# Patient Record
Sex: FEMALE | Race: WHITE | NOT HISPANIC OR LATINO | Employment: OTHER | URBAN - METROPOLITAN AREA
[De-identification: names, ages, dates, MRNs, and addresses within clinical notes are randomized per-mention and may not be internally consistent; named-entity substitution may affect disease eponyms.]

---

## 2018-01-23 ENCOUNTER — HOSPITAL ENCOUNTER (INPATIENT)
Facility: HOSPITAL | Age: 58
LOS: 2 days | Discharge: HOME | DRG: 068 | End: 2018-01-25
Attending: EMERGENCY MEDICINE | Admitting: FAMILY MEDICINE
Payer: COMMERCIAL

## 2018-01-23 ENCOUNTER — APPOINTMENT (EMERGENCY)
Dept: RADIOLOGY | Facility: HOSPITAL | Age: 58
DRG: 068 | End: 2018-01-23
Payer: COMMERCIAL

## 2018-01-23 DIAGNOSIS — J10.1 INFLUENZA A: ICD-10-CM

## 2018-01-23 DIAGNOSIS — J44.9 COPD (CHRONIC OBSTRUCTIVE PULMONARY DISEASE) (HCC): ICD-10-CM

## 2018-01-23 DIAGNOSIS — J40 BRONCHITIS: Primary | ICD-10-CM

## 2018-01-23 DIAGNOSIS — F17.200 NICOTINE DEPENDENCE: ICD-10-CM

## 2018-01-23 DIAGNOSIS — J44.1 COPD EXACERBATION (HCC): ICD-10-CM

## 2018-01-23 PROBLEM — F32.A DEPRESSION: Status: ACTIVE | Noted: 2018-01-23

## 2018-01-23 PROBLEM — K21.9 GERD (GASTROESOPHAGEAL REFLUX DISEASE): Status: ACTIVE | Noted: 2018-01-23

## 2018-01-23 PROBLEM — M54.9 CHRONIC BACK PAIN: Status: ACTIVE | Noted: 2018-01-23

## 2018-01-23 PROBLEM — G89.29 CHRONIC BACK PAIN: Status: ACTIVE | Noted: 2018-01-23

## 2018-01-23 PROBLEM — F31.9 BIPOLAR 1 DISORDER (HCC): Status: ACTIVE | Noted: 2018-01-23

## 2018-01-23 PROBLEM — J45.909 ASTHMA: Status: ACTIVE | Noted: 2018-01-23

## 2018-01-23 PROBLEM — F41.9 ANXIETY: Status: ACTIVE | Noted: 2018-01-23

## 2018-01-23 PROBLEM — R65.10 SIRS (SYSTEMIC INFLAMMATORY RESPONSE SYNDROME) (HCC): Status: ACTIVE | Noted: 2018-01-23

## 2018-01-23 PROBLEM — R06.00 DYSPNEA: Status: ACTIVE | Noted: 2018-01-23

## 2018-01-23 PROBLEM — E78.5 HLD (HYPERLIPIDEMIA): Status: ACTIVE | Noted: 2018-01-23

## 2018-01-23 LAB
ALBUMIN SERPL BCP-MCNC: 3.2 G/DL (ref 3.5–5)
ALP SERPL-CCNC: 112 U/L (ref 46–116)
ALT SERPL W P-5'-P-CCNC: 22 U/L (ref 12–78)
ANION GAP SERPL CALCULATED.3IONS-SCNC: 6 MMOL/L (ref 4–13)
APTT PPP: 29 SECONDS (ref 24–33)
AST SERPL W P-5'-P-CCNC: 19 U/L (ref 5–45)
BASOPHILS # BLD AUTO: 0.12 THOUSAND/UL (ref 0–0.1)
BASOPHILS NFR MAR MANUAL: 1 % (ref 0–1)
BILIRUB SERPL-MCNC: 0.5 MG/DL (ref 0.2–1)
BUN SERPL-MCNC: 11 MG/DL (ref 5–25)
CALCIUM SERPL-MCNC: 9.3 MG/DL (ref 8.3–10.1)
CHLORIDE SERPL-SCNC: 97 MMOL/L (ref 100–108)
CO2 SERPL-SCNC: 32 MMOL/L (ref 21–32)
CREAT SERPL-MCNC: 0.88 MG/DL (ref 0.6–1.3)
ERYTHROCYTE [DISTWIDTH] IN BLOOD BY AUTOMATED COUNT: 12.6 % (ref 11.6–15.1)
FLUAV AG SPEC QL IA: NEGATIVE
FLUBV AG SPEC QL IA: NEGATIVE
GFR SERPL CREATININE-BSD FRML MDRD: 74 ML/MIN/1.73SQ M
GLUCOSE SERPL-MCNC: 92 MG/DL (ref 65–140)
HCT VFR BLD AUTO: 36.2 % (ref 37–47)
HGB BLD-MCNC: 12.4 G/DL (ref 12–16)
INR PPP: 1.05 (ref 0.86–1.16)
LACTATE SERPL-SCNC: 1.4 MMOL/L (ref 0.5–2)
LG PLATELETS BLD QL SMEAR: PRESENT
LIPASE SERPL-CCNC: 51 U/L (ref 73–393)
LYMPHOCYTES # BLD AUTO: 0.87 THOUSAND/UL (ref 0.6–4.47)
LYMPHOCYTES # BLD AUTO: 7 %
MAGNESIUM SERPL-MCNC: 1.6 MG/DL (ref 1.6–2.6)
MCH RBC QN AUTO: 30.9 PG (ref 27–31)
MCHC RBC AUTO-ENTMCNC: 34.3 G/DL (ref 31.4–37.4)
MCV RBC AUTO: 90 FL (ref 82–98)
MONOCYTES # BLD AUTO: 0.87 THOUSAND/UL (ref 0–1.22)
MONOCYTES NFR BLD AUTO: 7 % (ref 4–12)
NEUTS BAND NFR BLD MANUAL: 13 % (ref 0–8)
NEUTS SEG # BLD: 10.54 THOUSAND/UL (ref 1.81–6.82)
NEUTS SEG NFR BLD AUTO: 72 %
PLATELET # BLD AUTO: 479 THOUSANDS/UL (ref 130–400)
PLATELET BLD QL SMEAR: ABNORMAL
PMV BLD AUTO: 7.3 FL (ref 8.9–12.7)
POLYCHROMASIA BLD QL SMEAR: PRESENT
POTASSIUM SERPL-SCNC: 4.1 MMOL/L (ref 3.5–5.3)
PROT SERPL-MCNC: 7.3 G/DL (ref 6.4–8.2)
PROTHROMBIN TIME: 11 SECONDS (ref 9.4–11.7)
RBC # BLD AUTO: 4.01 MILLION/UL (ref 4.2–5.4)
SODIUM SERPL-SCNC: 135 MMOL/L (ref 136–145)
TOTAL CELLS COUNTED SPEC: 100
TROPONIN I SERPL-MCNC: <0.02 NG/ML
WBC # BLD AUTO: 12.4 THOUSAND/UL (ref 4.8–10.8)

## 2018-01-23 PROCEDURE — 87081 CULTURE SCREEN ONLY: CPT | Performed by: EMERGENCY MEDICINE

## 2018-01-23 PROCEDURE — 96374 THER/PROPH/DIAG INJ IV PUSH: CPT

## 2018-01-23 PROCEDURE — 94664 DEMO&/EVAL PT USE INHALER: CPT

## 2018-01-23 PROCEDURE — 85730 THROMBOPLASTIN TIME PARTIAL: CPT | Performed by: EMERGENCY MEDICINE

## 2018-01-23 PROCEDURE — 99285 EMERGENCY DEPT VISIT HI MDM: CPT

## 2018-01-23 PROCEDURE — 71045 X-RAY EXAM CHEST 1 VIEW: CPT

## 2018-01-23 PROCEDURE — 83605 ASSAY OF LACTIC ACID: CPT | Performed by: EMERGENCY MEDICINE

## 2018-01-23 PROCEDURE — 85007 BL SMEAR W/DIFF WBC COUNT: CPT | Performed by: EMERGENCY MEDICINE

## 2018-01-23 PROCEDURE — 94760 N-INVAS EAR/PLS OXIMETRY 1: CPT

## 2018-01-23 PROCEDURE — 87449 NOS EACH ORGANISM AG IA: CPT | Performed by: NURSE PRACTITIONER

## 2018-01-23 PROCEDURE — 83690 ASSAY OF LIPASE: CPT | Performed by: EMERGENCY MEDICINE

## 2018-01-23 PROCEDURE — 85610 PROTHROMBIN TIME: CPT | Performed by: EMERGENCY MEDICINE

## 2018-01-23 PROCEDURE — 84484 ASSAY OF TROPONIN QUANT: CPT | Performed by: EMERGENCY MEDICINE

## 2018-01-23 PROCEDURE — 96361 HYDRATE IV INFUSION ADD-ON: CPT

## 2018-01-23 PROCEDURE — 87040 BLOOD CULTURE FOR BACTERIA: CPT | Performed by: EMERGENCY MEDICINE

## 2018-01-23 PROCEDURE — 85027 COMPLETE CBC AUTOMATED: CPT | Performed by: EMERGENCY MEDICINE

## 2018-01-23 PROCEDURE — 83735 ASSAY OF MAGNESIUM: CPT | Performed by: EMERGENCY MEDICINE

## 2018-01-23 PROCEDURE — 80053 COMPREHEN METABOLIC PANEL: CPT | Performed by: EMERGENCY MEDICINE

## 2018-01-23 PROCEDURE — 36415 COLL VENOUS BLD VENIPUNCTURE: CPT | Performed by: EMERGENCY MEDICINE

## 2018-01-23 PROCEDURE — 87798 DETECT AGENT NOS DNA AMP: CPT | Performed by: EMERGENCY MEDICINE

## 2018-01-23 PROCEDURE — 93005 ELECTROCARDIOGRAM TRACING: CPT

## 2018-01-23 PROCEDURE — 87400 INFLUENZA A/B EACH AG IA: CPT | Performed by: EMERGENCY MEDICINE

## 2018-01-23 PROCEDURE — 94640 AIRWAY INHALATION TREATMENT: CPT

## 2018-01-23 PROCEDURE — 81003 URINALYSIS AUTO W/O SCOPE: CPT | Performed by: EMERGENCY MEDICINE

## 2018-01-23 RX ORDER — ATORVASTATIN CALCIUM 20 MG/1
20 TABLET, FILM COATED ORAL
Status: DISCONTINUED | OUTPATIENT
Start: 2018-01-24 | End: 2018-01-25 | Stop reason: HOSPADM

## 2018-01-23 RX ORDER — LEVOFLOXACIN 5 MG/ML
500 INJECTION, SOLUTION INTRAVENOUS ONCE
Status: COMPLETED | OUTPATIENT
Start: 2018-01-23 | End: 2018-01-23

## 2018-01-23 RX ORDER — VENLAFAXINE HYDROCHLORIDE 75 MG/1
75 CAPSULE, EXTENDED RELEASE ORAL DAILY
Status: DISCONTINUED | OUTPATIENT
Start: 2018-01-24 | End: 2018-01-25 | Stop reason: HOSPADM

## 2018-01-23 RX ORDER — KETOTIFEN FUMARATE 0.35 MG/ML
1 SOLUTION/ DROPS OPHTHALMIC 2 TIMES DAILY
Status: DISCONTINUED | OUTPATIENT
Start: 2018-01-23 | End: 2018-01-25 | Stop reason: HOSPADM

## 2018-01-23 RX ORDER — ACETAMINOPHEN 325 MG/1
650 TABLET ORAL ONCE
Status: DISCONTINUED | OUTPATIENT
Start: 2018-01-23 | End: 2018-01-25 | Stop reason: HOSPADM

## 2018-01-23 RX ORDER — LAMOTRIGINE 150 MG/1
25 TABLET ORAL DAILY
COMMUNITY
End: 2018-07-23

## 2018-01-23 RX ORDER — IPRATROPIUM BROMIDE AND ALBUTEROL SULFATE 2.5; .5 MG/3ML; MG/3ML
3 SOLUTION RESPIRATORY (INHALATION)
Status: DISCONTINUED | OUTPATIENT
Start: 2018-01-23 | End: 2018-01-25 | Stop reason: HOSPADM

## 2018-01-23 RX ORDER — PROPRANOLOL HYDROCHLORIDE 40 MG/1
10 TABLET ORAL DAILY
COMMUNITY

## 2018-01-23 RX ORDER — OSELTAMIVIR PHOSPHATE 75 MG/1
75 CAPSULE ORAL EVERY 12 HOURS SCHEDULED
Status: DISCONTINUED | OUTPATIENT
Start: 2018-01-23 | End: 2018-01-25 | Stop reason: HOSPADM

## 2018-01-23 RX ORDER — TOLTERODINE 4 MG/1
4 CAPSULE, EXTENDED RELEASE ORAL DAILY
Status: DISCONTINUED | OUTPATIENT
Start: 2018-01-24 | End: 2018-01-25 | Stop reason: HOSPADM

## 2018-01-23 RX ORDER — LORATADINE 10 MG/1
10 TABLET ORAL DAILY
COMMUNITY

## 2018-01-23 RX ORDER — IPRATROPIUM BROMIDE AND ALBUTEROL SULFATE 2.5; .5 MG/3ML; MG/3ML
3 SOLUTION RESPIRATORY (INHALATION) ONCE
Status: COMPLETED | OUTPATIENT
Start: 2018-01-23 | End: 2018-01-23

## 2018-01-23 RX ORDER — ONDANSETRON 2 MG/ML
4 INJECTION INTRAMUSCULAR; INTRAVENOUS EVERY 6 HOURS PRN
Status: DISCONTINUED | OUTPATIENT
Start: 2018-01-23 | End: 2018-01-25 | Stop reason: HOSPADM

## 2018-01-23 RX ORDER — METHYLPREDNISOLONE SODIUM SUCCINATE 40 MG/ML
20 INJECTION, POWDER, LYOPHILIZED, FOR SOLUTION INTRAMUSCULAR; INTRAVENOUS EVERY 8 HOURS SCHEDULED
Status: DISCONTINUED | OUTPATIENT
Start: 2018-01-23 | End: 2018-01-24

## 2018-01-23 RX ORDER — LAMOTRIGINE 25 MG/1
25 TABLET ORAL DAILY
Status: DISCONTINUED | OUTPATIENT
Start: 2018-01-24 | End: 2018-01-25 | Stop reason: HOSPADM

## 2018-01-23 RX ORDER — ACETAMINOPHEN 325 MG/1
650 TABLET ORAL EVERY 6 HOURS PRN
COMMUNITY
End: 2019-06-30

## 2018-01-23 RX ORDER — PROPANTHELINE BROMIDE 15 MG/1
15 TABLET, FILM COATED ORAL 4 TIMES DAILY
COMMUNITY
End: 2019-06-30

## 2018-01-23 RX ORDER — METHYLPREDNISOLONE SODIUM SUCCINATE 125 MG/2ML
125 INJECTION, POWDER, LYOPHILIZED, FOR SOLUTION INTRAMUSCULAR; INTRAVENOUS ONCE
Status: COMPLETED | OUTPATIENT
Start: 2018-01-23 | End: 2018-01-23

## 2018-01-23 RX ORDER — BENZTROPINE MESYLATE 0.5 MG/1
0.5 TABLET ORAL 2 TIMES DAILY
Status: DISCONTINUED | OUTPATIENT
Start: 2018-01-23 | End: 2018-01-25 | Stop reason: HOSPADM

## 2018-01-23 RX ORDER — MONTELUKAST SODIUM 10 MG/1
10 TABLET ORAL
Status: DISCONTINUED | OUTPATIENT
Start: 2018-01-23 | End: 2018-01-25 | Stop reason: HOSPADM

## 2018-01-23 RX ORDER — ACETAMINOPHEN 325 MG/1
650 TABLET ORAL EVERY 6 HOURS PRN
Status: DISCONTINUED | OUTPATIENT
Start: 2018-01-23 | End: 2018-01-25 | Stop reason: HOSPADM

## 2018-01-23 RX ORDER — PANTOPRAZOLE SODIUM 40 MG/1
40 TABLET, DELAYED RELEASE ORAL
Status: DISCONTINUED | OUTPATIENT
Start: 2018-01-24 | End: 2018-01-25 | Stop reason: HOSPADM

## 2018-01-23 RX ORDER — NICOTINE 21 MG/24HR
1 PATCH, TRANSDERMAL 24 HOURS TRANSDERMAL DAILY
Status: DISCONTINUED | OUTPATIENT
Start: 2018-01-24 | End: 2018-01-25 | Stop reason: HOSPADM

## 2018-01-23 RX ORDER — BUSPIRONE HYDROCHLORIDE 5 MG/1
15 TABLET ORAL 3 TIMES DAILY
Status: DISCONTINUED | OUTPATIENT
Start: 2018-01-23 | End: 2018-01-25 | Stop reason: HOSPADM

## 2018-01-23 RX ORDER — GABAPENTIN 300 MG/1
900 CAPSULE ORAL 3 TIMES DAILY
Status: DISCONTINUED | OUTPATIENT
Start: 2018-01-23 | End: 2018-01-25 | Stop reason: HOSPADM

## 2018-01-23 RX ORDER — MULTIVITAMIN
1 TABLET ORAL DAILY
COMMUNITY

## 2018-01-23 RX ORDER — PROPRANOLOL HYDROCHLORIDE 20 MG/1
40 TABLET ORAL 2 TIMES DAILY
Status: DISCONTINUED | OUTPATIENT
Start: 2018-01-23 | End: 2018-01-24

## 2018-01-23 RX ORDER — FLUTICASONE PROPIONATE 50 MCG
1 SPRAY, SUSPENSION (ML) NASAL DAILY
COMMUNITY

## 2018-01-23 RX ADMIN — IPRATROPIUM BROMIDE AND ALBUTEROL SULFATE 3 ML: .5; 3 SOLUTION RESPIRATORY (INHALATION) at 17:34

## 2018-01-23 RX ADMIN — GABAPENTIN 900 MG: 300 CAPSULE ORAL at 21:46

## 2018-01-23 RX ADMIN — LEVOFLOXACIN 500 MG: 5 INJECTION, SOLUTION INTRAVENOUS at 18:38

## 2018-01-23 RX ADMIN — PROPRANOLOL HYDROCHLORIDE 40 MG: 20 TABLET ORAL at 21:46

## 2018-01-23 RX ADMIN — BUSPIRONE HYDROCHLORIDE 15 MG: 5 TABLET ORAL at 21:46

## 2018-01-23 RX ADMIN — IPRATROPIUM BROMIDE AND ALBUTEROL SULFATE 3 ML: .5; 3 SOLUTION RESPIRATORY (INHALATION) at 21:11

## 2018-01-23 RX ADMIN — METHYLPREDNISOLONE SODIUM SUCCINATE 20 MG: 40 INJECTION, POWDER, FOR SOLUTION INTRAMUSCULAR; INTRAVENOUS at 21:48

## 2018-01-23 RX ADMIN — MONTELUKAST SODIUM 10 MG: 10 TABLET, FILM COATED ORAL at 21:46

## 2018-01-23 RX ADMIN — BENZTROPINE MESYLATE 0.5 MG: 0.5 TABLET ORAL at 23:06

## 2018-01-23 RX ADMIN — METHYLPREDNISOLONE SODIUM SUCCINATE 125 MG: 125 INJECTION, POWDER, FOR SOLUTION INTRAMUSCULAR; INTRAVENOUS at 17:33

## 2018-01-23 RX ADMIN — OSELTAMIVIR PHOSPHATE 75 MG: 75 CAPSULE ORAL at 22:23

## 2018-01-23 RX ADMIN — SODIUM CHLORIDE 1000 ML: 0.9 INJECTION, SOLUTION INTRAVENOUS at 17:30

## 2018-01-23 RX ADMIN — KETOTIFEN FUMARATE 1 DROP: 0.35 SOLUTION/ DROPS OPHTHALMIC at 23:07

## 2018-01-23 NOTE — ED PROVIDER NOTES
History  Chief Complaint   Patient presents with    Cough     pt c/o cough x 1day  60-year-old female with a history of COPD and smoking presents with shortness of breath wheezing and productive cough worsening over the last couple of days  She is currently febrile  Sick contacts noted  Denies any travel history  Denies any nausea vomiting chest pain abdominal pain diarrhea or any other symptoms at this time  She is not on home oxygen  History provided by:  Patient   used: No        Prior to Admission Medications   Prescriptions Last Dose Informant Patient Reported? Taking? Folic Acid-Vit V5-EQA M98 (FOLBEE PO)   Yes Yes   Sig: Take 1 tablet by mouth daily   Multiple Vitamin (MULTIVITAMIN) tablet   Yes Yes   Sig: Take 1 tablet by mouth daily   acetaminophen (TYLENOL) 325 mg tablet   Yes Yes   Sig: Take 650 mg by mouth every 6 (six) hours as needed for mild pain   albuterol (PROVENTIL HFA,VENTOLIN HFA) 90 mcg/act inhaler   Yes Yes   Sig: Inhale 2 puffs every 6 (six) hours as needed for wheezing  aspirin 81 MG tablet   Yes Yes   Sig: Take 81 mg by mouth daily  atorvastatin (LIPITOR) 20 mg tablet   Yes Yes   Sig: Take 20 mg by mouth daily  benztropine (COGENTIN) 0 5 mg tablet   Yes Yes   Sig: Take 0 5 mg by mouth 2 (two) times a day   busPIRone (BUSPAR) 10 mg tablet   Yes Yes   Sig: Take 15 mg by mouth 3 (three) times a day     fluticasone (FLONASE) 50 mcg/act nasal spray   Yes Yes   Si spray into each nostril daily   gabapentin (NEURONTIN) 300 mg capsule   Yes Yes   Sig: Take 900 mg by mouth 3 (three) times a day     ketotifen (ZADITOR) 0 025 % ophthalmic solution   Yes Yes   Si drop 2 (two) times a day     lamoTRIgine (LaMICtal) 150 MG tablet   Yes Yes   Sig: Take 25 mg by mouth daily   loratadine (CLARITIN) 10 mg tablet   Yes Yes   Sig: Take 10 mg by mouth daily   lurasidone (LATUDA) 40 mg tablet   Yes Yes   Sig: Take 40 mg by mouth daily with dinner montelukast (SINGULAIR) 10 mg tablet   Yes Yes   Sig: Take 10 mg by mouth daily at bedtime  pantoprazole (PROTONIX) 40 mg tablet   Yes Yes   Sig: Take 40 mg by mouth daily  propantheline (PROBANTHINE) 15 mg tablet   Yes No   Sig: Take 15 mg by mouth 4 (four) times a day   propranolol (INDERAL) 40 mg tablet   Yes Yes   Sig: Take 40 mg by mouth 2 (two) times a day   tolterodine (DETROL LA) 4 mg 24 hr capsule   Yes Yes   Sig: Take 4 mg by mouth daily  venlafaxine (EFFEXOR-XR) 37 5 mg 24 hr capsule   Yes Yes   Sig: Take 75 mg by mouth daily        Facility-Administered Medications: None       Past Medical History:   Diagnosis Date    Asthma     Chronic pain     back    GERD (gastroesophageal reflux disease)     Hyperlipidemia     Psychiatric disorder     bipolar       History reviewed  No pertinent surgical history  History reviewed  No pertinent family history  I have reviewed and agree with the history as documented  Social History   Substance Use Topics    Smoking status: Current Every Day Smoker     Packs/day: 2 00    Smokeless tobacco: Never Used    Alcohol use No        Review of Systems   All other systems reviewed and are negative  Physical Exam  ED Triage Vitals [01/23/18 1632]   Temperature Pulse Resp Blood Pressure SpO2   (!) 101 8 °F (38 8 °C) 84 -- 112/62 93 %      Temp src Heart Rate Source Patient Position - Orthostatic VS BP Location FiO2 (%)   -- Monitor Sitting Left arm --      Pain Score       --           Orthostatic Vital Signs  Vitals:    01/23/18 1632   BP: 112/62   Pulse: 84   Patient Position - Orthostatic VS: Sitting       Physical Exam   Constitutional: She is oriented to person, place, and time  She appears well-developed and well-nourished  HENT:   Head: Normocephalic and atraumatic  Eyes: EOM are normal  Pupils are equal, round, and reactive to light  Neck: Normal range of motion  Neck supple  Cardiovascular: Normal rate and regular rhythm  Pulmonary/Chest: Effort normal  She has wheezes  She has rales  Abdominal: Soft  Bowel sounds are normal    Musculoskeletal: Normal range of motion  Neurological: She is alert and oriented to person, place, and time  Skin: Skin is warm and dry  Psychiatric: She has a normal mood and affect  Nursing note and vitals reviewed  ED Medications  Medications   acetaminophen (TYLENOL) tablet 650 mg (0 mg Oral Hold 1/23/18 1803)   levofloxacin (LEVAQUIN) IVPB (premix) 500 mg (500 mg Intravenous New Bag 1/23/18 1838)   sodium chloride 0 9 % bolus 1,000 mL (0 mL Intravenous Stopped 1/23/18 1838)   ipratropium-albuterol (DUO-NEB) 0 5-2 5 mg/mL inhalation solution 3 mL (3 mL Nebulization Given 1/23/18 1734)   methylPREDNISolone sodium succinate (Solu-MEDROL) injection 125 mg (125 mg Intravenous Given 1/23/18 1733)       Diagnostic Studies  Results Reviewed     Procedure Component Value Units Date/Time    CBC and differential [[id number]]  (Abnormal) Collected:  01/23/18 1711    Lab Status:  Final result Specimen:  Blood from Arm, Left Updated:  01/23/18 1806     WBC 12 40 (H) Thousand/uL      RBC 4 01 (L) Million/uL      Hemoglobin 12 4 g/dL      Hematocrit 36 2 (L) %      MCV 90 fL      MCH 30 9 pg      MCHC 34 3 g/dL      RDW 12 6 %      MPV 7 3 (L) fL      Platelets 059 (H) Thousands/uL     Troponin I [[id number]]  (Normal) Collected:  01/23/18 1711    Lab Status:  Final result Specimen:  Blood from Arm, Left Updated:  01/23/18 1746     Troponin I <0 02 ng/mL     Narrative:         [employer] Chemistry analyzer 99% cutoff is > 0 04 ng/mL in network labs    o cTnI 99% cutoff is useful only when applied to patients in the clinical setting of myocardial ischemia  o cTnI 99% cutoff should be interpreted in the context of clinical history, ECG findings and possibly cardiac imaging to establish correct diagnosis    o cTnI 99% cutoff may be suggestive but clearly not indicative of a coronary event without the clinical setting of myocardial ischemia  Lactic acid, plasma [[id number]]  (Normal) Collected:  01/23/18 1711    Lab Status:  Final result Specimen:  Blood from Arm, Left Updated:  01/23/18 1743     LACTIC ACID 1 4 mmol/L     Narrative:         Result may be elevated if tourniquet was used during collection  Comprehensive metabolic panel [[id number]]  (Abnormal) Collected:  01/23/18 1711    Lab Status:  Final result Specimen:  Blood from Arm, Left Updated:  01/23/18 1741     Sodium 135 (L) mmol/L      Potassium 4 1 mmol/L      Chloride 97 (L) mmol/L      CO2 32 mmol/L      Anion Gap 6 mmol/L      BUN 11 mg/dL      Creatinine 0 88 mg/dL      Glucose 92 mg/dL      Calcium 9 3 mg/dL      AST 19 U/L      ALT 22 U/L      Alkaline Phosphatase 112 U/L      Total Protein 7 3 g/dL      Albumin 3 2 (L) g/dL      Total Bilirubin 0 50 mg/dL      eGFR 74 ml/min/1 73sq m     Narrative:         National Kidney Disease Education Program recommendations are as follows:  GFR calculation is accurate only with a steady state creatinine  Chronic Kidney disease less than 60 ml/min/1 73 sq  meters  Kidney failure less than 15 ml/min/1 73 sq  meters  Magnesium [[id number]]  (Normal) Collected:  01/23/18 1711    Lab Status:  Final result Specimen:  Blood from Arm, Left Updated:  01/23/18 1741     Magnesium 1 6 mg/dL     Lipase [[id number]]  (Abnormal) Collected:  01/23/18 1711    Lab Status:  Final result Specimen:  Blood from Arm, Left Updated:  01/23/18 1741     Lipase 51 (L) u/L     Rapid Influenza Screen with Reflex PCR (indicated for patients <2mo of age) [[id number]]  (Normal) Collected:  01/23/18 1711    Lab Status:  Final result Specimen:  Nasopharyngeal from Nasopharyngeal Swab Updated:  01/23/18 1740     Rapid Influenza A Ag Negative     Rapid Influenza B Ag Negative    Influenza A/B and RSV by PCR (Indicated for patients > 2 mo of age) [[id number]] Collected:  01/23/18 1711    Lab Status:   In process Specimen:  Nasopharyngeal from Nasopharyngeal Swab Updated:  01/23/18 1740    Protime-INR [[id number]]  (Normal) Collected:  01/23/18 1711    Lab Status:  Final result Specimen:  Blood from Arm, Left Updated:  01/23/18 1739     Protime 11 0 seconds      INR 1 05    APTT [[id number]]  (Normal) Collected:  01/23/18 1711    Lab Status:  Final result Specimen:  Blood from Arm, Left Updated:  01/23/18 1739     PTT 29 seconds     Narrative: Therapeutic Heparin Range = 60-90 seconds    Blood culture #2 [[id number]] Collected:  01/23/18 1711    Lab Status: In process Specimen:  Blood from Arm, Left Updated:  01/23/18 1723    Blood culture #1 [[id number]] Collected:  01/23/18 1711    Lab Status: In process Specimen:  Blood from Arm, Left Updated:  01/23/18 1723    UA w Reflex to Microscopic w Reflex to Culture [[id number]]     Lab Status:  No result Specimen:  Urine                  XR chest 1 view portable    (Results Pending)              Procedures  ECG 12 Lead Documentation  Date/Time: 1/23/2018 5:02 PM  Performed by: Kyle Hurst  Authorized by: Kyle Hurst     ECG reviewed by me, the ED Provider: yes    Patient location:  ED  Previous ECG:     Previous ECG:  Unavailable    Comparison to cardiac monitor: Yes    Interpretation:     Interpretation: normal    Rate:     ECG rate assessment: normal    Rhythm:     Rhythm: sinus rhythm    Ectopy:     Ectopy: none    QRS:     QRS axis:  Normal  Conduction:     Conduction: normal    ST segments:     ST segments:  Normal  T waves:     T waves: normal               Phone Contacts  ED Phone Contact    ED Course  ED Course                                MDM  Number of Diagnoses or Management Options  Bronchitis:   COPD (chronic obstructive pulmonary disease) ([facility] [address] ):   Diagnosis management comments: Patient given nebulizer treatments IV fluids and steroids  She was also started on antibiotics for bronchitis  Evaluated with labs and chest x-ray    Admitted to the hospitalist for bronchitis and COPD exacerbation  Amount and/or Complexity of Data Reviewed  Clinical lab tests: ordered and reviewed  Tests in the radiology section of CPT®: ordered and reviewed  Tests in the medicine section of CPT®: ordered and reviewed    Patient Progress  Patient progress: stable    CritCare Time    Disposition  Final diagnoses:   Bronchitis   COPD (chronic obstructive pulmonary disease) ([facility] [address] )     Time reflects when diagnosis was documented in both MDM as applicable and the Disposition within this note     Time User Action Codes Description Comment    1/23/2018  6:33 PM Dariel Petty Add [J40] Bronchitis     1/23/2018  6:33 PM Dariel Petty Add [J44 9] COPD (chronic obstructive pulmonary disease) [facility])       ED Disposition     ED Disposition Condition Comment    Admit         Follow-up Information    None       Patient's Medications   Discharge Prescriptions    No medications on file     No discharge procedures on file      ED Provider  Electronically Signed by           Roberto Matt DO  01/23/18 3287

## 2018-01-24 PROBLEM — J10.1 INFLUENZA A: Status: ACTIVE | Noted: 2018-01-24

## 2018-01-24 LAB
ANION GAP SERPL CALCULATED.3IONS-SCNC: 12 MMOL/L (ref 4–13)
BILIRUB UR QL STRIP: NEGATIVE
BUN SERPL-MCNC: 13 MG/DL (ref 5–25)
CALCIUM SERPL-MCNC: 9.4 MG/DL (ref 8.3–10.1)
CHLORIDE SERPL-SCNC: 104 MMOL/L (ref 100–108)
CLARITY UR: CLEAR
CO2 SERPL-SCNC: 28 MMOL/L (ref 21–32)
COLOR UR: NORMAL
CREAT SERPL-MCNC: 0.68 MG/DL (ref 0.6–1.3)
ERYTHROCYTE [DISTWIDTH] IN BLOOD BY AUTOMATED COUNT: 12.6 % (ref 11.6–15.1)
FLUAV AG SPEC QL: DETECTED
FLUBV AG SPEC QL: ABNORMAL
GFR SERPL CREATININE-BSD FRML MDRD: 98 ML/MIN/1.73SQ M
GLUCOSE SERPL-MCNC: 161 MG/DL (ref 65–140)
GLUCOSE UR STRIP-MCNC: NEGATIVE MG/DL
HCT VFR BLD AUTO: 33.1 % (ref 37–47)
HGB BLD-MCNC: 11.7 G/DL (ref 12–16)
HGB UR QL STRIP.AUTO: NEGATIVE
KETONES UR STRIP-MCNC: NEGATIVE MG/DL
L PNEUMO1 AG UR QL IA.RAPID: NEGATIVE
LEUKOCYTE ESTERASE UR QL STRIP: NEGATIVE
MAGNESIUM SERPL-MCNC: 1.9 MG/DL (ref 1.6–2.6)
MCH RBC QN AUTO: 32 PG (ref 27–31)
MCHC RBC AUTO-ENTMCNC: 35.3 G/DL (ref 31.4–37.4)
MCV RBC AUTO: 91 FL (ref 82–98)
NITRITE UR QL STRIP: NEGATIVE
PH UR STRIP.AUTO: 6 [PH] (ref 5–9)
PHOSPHATE SERPL-MCNC: 4.3 MG/DL (ref 2.7–4.5)
PLATELET # BLD AUTO: 406 THOUSANDS/UL (ref 130–400)
PMV BLD AUTO: 7.5 FL (ref 8.9–12.7)
POTASSIUM SERPL-SCNC: 4.3 MMOL/L (ref 3.5–5.3)
PROT UR STRIP-MCNC: NEGATIVE MG/DL
RBC # BLD AUTO: 3.65 MILLION/UL (ref 4.2–5.4)
RSV B RNA SPEC QL NAA+PROBE: ABNORMAL
S PNEUM AG UR QL: NEGATIVE
SODIUM SERPL-SCNC: 144 MMOL/L (ref 136–145)
SP GR UR STRIP.AUTO: <=1.005 (ref 1–1.03)
UROBILINOGEN UR QL STRIP.AUTO: 0.2 E.U./DL
WBC # BLD AUTO: 8.1 THOUSAND/UL (ref 4.8–10.8)

## 2018-01-24 PROCEDURE — 94760 N-INVAS EAR/PLS OXIMETRY 1: CPT

## 2018-01-24 PROCEDURE — 99232 SBSQ HOSP IP/OBS MODERATE 35: CPT | Performed by: NURSE PRACTITIONER

## 2018-01-24 PROCEDURE — 85027 COMPLETE CBC AUTOMATED: CPT | Performed by: NURSE PRACTITIONER

## 2018-01-24 PROCEDURE — 94640 AIRWAY INHALATION TREATMENT: CPT

## 2018-01-24 PROCEDURE — 84100 ASSAY OF PHOSPHORUS: CPT | Performed by: NURSE PRACTITIONER

## 2018-01-24 PROCEDURE — 87205 SMEAR GRAM STAIN: CPT | Performed by: NURSE PRACTITIONER

## 2018-01-24 PROCEDURE — 87070 CULTURE OTHR SPECIMN AEROBIC: CPT | Performed by: NURSE PRACTITIONER

## 2018-01-24 PROCEDURE — 80048 BASIC METABOLIC PNL TOTAL CA: CPT | Performed by: NURSE PRACTITIONER

## 2018-01-24 PROCEDURE — 83735 ASSAY OF MAGNESIUM: CPT | Performed by: NURSE PRACTITIONER

## 2018-01-24 RX ORDER — ASPIRIN 81 MG/1
81 TABLET, CHEWABLE ORAL DAILY
Status: DISCONTINUED | OUTPATIENT
Start: 2018-01-24 | End: 2018-01-25 | Stop reason: HOSPADM

## 2018-01-24 RX ORDER — IPRATROPIUM BROMIDE AND ALBUTEROL SULFATE 2.5; .5 MG/3ML; MG/3ML
SOLUTION RESPIRATORY (INHALATION)
Status: COMPLETED
Start: 2018-01-24 | End: 2018-01-24

## 2018-01-24 RX ORDER — METHYLPREDNISOLONE SODIUM SUCCINATE 40 MG/ML
20 INJECTION, POWDER, LYOPHILIZED, FOR SOLUTION INTRAMUSCULAR; INTRAVENOUS EVERY 12 HOURS SCHEDULED
Status: DISCONTINUED | OUTPATIENT
Start: 2018-01-24 | End: 2018-01-25 | Stop reason: HOSPADM

## 2018-01-24 RX ORDER — PROPRANOLOL HYDROCHLORIDE 20 MG/1
20 TABLET ORAL 2 TIMES DAILY
Status: DISCONTINUED | OUTPATIENT
Start: 2018-01-24 | End: 2018-01-25 | Stop reason: HOSPADM

## 2018-01-24 RX ADMIN — BENZTROPINE MESYLATE 0.5 MG: 0.5 TABLET ORAL at 17:38

## 2018-01-24 RX ADMIN — IPRATROPIUM BROMIDE AND ALBUTEROL SULFATE 3 ML: .5; 3 SOLUTION RESPIRATORY (INHALATION) at 02:34

## 2018-01-24 RX ADMIN — IPRATROPIUM BROMIDE AND ALBUTEROL SULFATE 3 ML: .5; 3 SOLUTION RESPIRATORY (INHALATION) at 20:49

## 2018-01-24 RX ADMIN — BUSPIRONE HYDROCHLORIDE 15 MG: 5 TABLET ORAL at 21:54

## 2018-01-24 RX ADMIN — ATORVASTATIN CALCIUM 20 MG: 20 TABLET, FILM COATED ORAL at 16:34

## 2018-01-24 RX ADMIN — METHYLPREDNISOLONE SODIUM SUCCINATE 20 MG: 40 INJECTION, POWDER, FOR SOLUTION INTRAMUSCULAR; INTRAVENOUS at 05:22

## 2018-01-24 RX ADMIN — IPRATROPIUM BROMIDE AND ALBUTEROL SULFATE 3 ML: .5; 3 SOLUTION RESPIRATORY (INHALATION) at 08:18

## 2018-01-24 RX ADMIN — OSELTAMIVIR PHOSPHATE 75 MG: 75 CAPSULE ORAL at 09:04

## 2018-01-24 RX ADMIN — VENLAFAXINE HYDROCHLORIDE 75 MG: 75 CAPSULE, EXTENDED RELEASE ORAL at 09:05

## 2018-01-24 RX ADMIN — KETOTIFEN FUMARATE 1 DROP: 0.35 SOLUTION/ DROPS OPHTHALMIC at 17:35

## 2018-01-24 RX ADMIN — METHYLPREDNISOLONE SODIUM SUCCINATE 20 MG: 40 INJECTION, POWDER, FOR SOLUTION INTRAMUSCULAR; INTRAVENOUS at 21:53

## 2018-01-24 RX ADMIN — BENZTROPINE MESYLATE 0.5 MG: 0.5 TABLET ORAL at 09:05

## 2018-01-24 RX ADMIN — PROPRANOLOL HYDROCHLORIDE 40 MG: 20 TABLET ORAL at 09:04

## 2018-01-24 RX ADMIN — TOLTERODINE TARTRATE 4 MG: 4 CAPSULE, EXTENDED RELEASE ORAL at 09:05

## 2018-01-24 RX ADMIN — GABAPENTIN 900 MG: 300 CAPSULE ORAL at 09:04

## 2018-01-24 RX ADMIN — ENOXAPARIN SODIUM 40 MG: 40 INJECTION SUBCUTANEOUS at 09:05

## 2018-01-24 RX ADMIN — PANTOPRAZOLE SODIUM 40 MG: 40 TABLET, DELAYED RELEASE ORAL at 05:22

## 2018-01-24 RX ADMIN — LURASIDONE HYDROCHLORIDE 40 MG: 20 TABLET, FILM COATED ORAL at 16:33

## 2018-01-24 RX ADMIN — METHYLPREDNISOLONE SODIUM SUCCINATE 20 MG: 40 INJECTION, POWDER, FOR SOLUTION INTRAMUSCULAR; INTRAVENOUS at 13:59

## 2018-01-24 RX ADMIN — GABAPENTIN 900 MG: 300 CAPSULE ORAL at 16:34

## 2018-01-24 RX ADMIN — PROPRANOLOL HYDROCHLORIDE 20 MG: 20 TABLET ORAL at 17:38

## 2018-01-24 RX ADMIN — ASPIRIN 81 MG 81 MG: 81 TABLET ORAL at 09:59

## 2018-01-24 RX ADMIN — IPRATROPIUM BROMIDE AND ALBUTEROL SULFATE 3 ML: .5; 3 SOLUTION RESPIRATORY (INHALATION) at 14:10

## 2018-01-24 RX ADMIN — MONTELUKAST SODIUM 10 MG: 10 TABLET, FILM COATED ORAL at 21:54

## 2018-01-24 RX ADMIN — BUSPIRONE HYDROCHLORIDE 15 MG: 5 TABLET ORAL at 09:03

## 2018-01-24 RX ADMIN — SODIUM CHLORIDE 250 ML: 0.9 INJECTION, SOLUTION INTRAVENOUS at 14:00

## 2018-01-24 RX ADMIN — GABAPENTIN 900 MG: 300 CAPSULE ORAL at 21:54

## 2018-01-24 RX ADMIN — BUSPIRONE HYDROCHLORIDE 15 MG: 5 TABLET ORAL at 16:34

## 2018-01-24 RX ADMIN — KETOTIFEN FUMARATE 1 DROP: 0.35 SOLUTION/ DROPS OPHTHALMIC at 09:05

## 2018-01-24 RX ADMIN — OSELTAMIVIR PHOSPHATE 75 MG: 75 CAPSULE ORAL at 21:54

## 2018-01-24 RX ADMIN — LAMOTRIGINE 25 MG: 25 TABLET ORAL at 09:04

## 2018-01-24 NOTE — RESPIRATORY THERAPY NOTE
RT Protocol Note  Tyra Hernandez 65 y o  female MRN: [id number]  Unit/Bed#: [address] [place] [address] [id number] Encounter: [id number]    Assessment    Principal Problem:    Dyspnea  Active Problems:    COPD exacerbation (HCC)    Nicotine dependence    Chronic back pain    GERD (gastroesophageal reflux disease)    HLD (hyperlipidemia)    Bipolar 1 disorder (HCC)      Home Pulmonary Medications:ventolin    Past Medical History:   Diagnosis Date    Asthma     Chronic pain     back    GERD (gastroesophageal reflux disease)     Hyperlipidemia     Psychiatric disorder     bipolar     Social History     Social History    Marital status:      Spouse name: N/A    Number of children: N/A    Years of education: N/A     Social History Main Topics    Smoking status: Current Every Day Smoker     Packs/day: 2 00    Smokeless tobacco: Never Used    Alcohol use No    Drug use: No    Sexual activity: Not Asked     Other Topics Concern    None     Social History Narrative    None       Subjective    Subjective Data: Pt states her breathing is much better than ealier today    Objective    Physical Exam:   Assessment Type: Pre-treatment  General Appearance: Alert, Awake  Respiratory Pattern: Normal  Chest Assessment: Chest expansion symmetrical  Bilateral Breath Sounds: Diminished, Expiratory wheezes  R Breath Sounds: Diminished  L Breath Sounds: Diminished, Expiratory wheezes  Cough: Non-productive, Strong  O2 Device: NC 2 l/m    Vitals:  Blood pressure 109/55, pulse 73, temperature 98 4 °F (36 9 °C), temperature source Oral, resp  rate 16, height 5' 8" (1 727 m), weight 87 1 kg (192 lb), SpO2 97 %, not currently breastfeeding  Imaging and other studies: I have personally reviewed pertinent reports        O2 Device: NC 2 l/m     Plan    Respiratory Plan: Home Bronchodilator Patient pathway  Airway Clearance Plan: Incentive Spirometer     Resp Comments: pt awake and alert no distress noted [facility] well

## 2018-01-24 NOTE — H&P
History and Physical - [facility]    Patient Information: Wendie Hendrix 65 y o  female MRN: [id number]  Unit/Bed#: [address] [place] [address] Encounter: [id number]  Admitting Physician: Se Newell  PCP: Andrew Colunga MD  Date of Admission:  01/23/18    Assessment/Plan:    Hospital Problem List:     Principal Problem:    Dyspnea  Active Problems:    COPD exacerbation (HCC)    Nicotine dependence    Chronic back pain    GERD (gastroesophageal reflux disease)    HLD (hyperlipidemia)    Bipolar 1 disorder (HCC)    SIRS ([facility]ic inflammatory response syndrome) ([facility] )    Anxiety    Depression      Plan for the Primary Problem(s):  · Dyspnea: CXR revealed no acute abnormality in the chest   · Multifactorial, likely due to acute bronchitis and COPD exacerbation, rule out influenza  · Check Influenza/ RSV by PCR  · Start scheduled Duoneb nebulizer treatments, Solumedrol 20 mg IV Q 8 hours, and Tamiflu prophylactically   · Given Levaquin IVPB in ED, hold off on further ABX as pt states she recently completed a 10-day course of ABX therapy  · Supplemental oxygen  · Droplet precautions until Influenza/RSV ruled out    Plan for Additional Problems:   · COPD Exacerbation: Solumedrol 20 mg IV Q 8 hours and scheduled nebulizer treatments  Continue home medication, Singulair  Rest of plan as above  · SIRS: As evidenced by fever and leukocytosis  Concern for influenza  Check Influenza/RSV by PCR  Tylenol as needed for fever  Follow-up final blood and sputum culture  Droplet precautions  Started Tamiflu prophylactically  · Hyponatremia/ Hypochloremia: Na 135, Cl 97, likely due to poor oral intake and dehydration  Given 1 liter normal saline bolus in ED  Check BMP in am    · Nicotine Dependence: Nicotine patch daily  Encourage smoking cessation and education     · GERD: On PPI  · HLD: On statin  · Bipolar Disorder: Continue home medications, Cogentin, , Lamictal, and Latuda,   · Anxiety/ Depression: Continue home medications, Buspar and Effexor     VTE Prophylaxis: Enoxaparin (Lovenox)  / reason for no mechanical VTE prophylaxis on Lovenox   Code Status: Full Code   POLST: POLST form is not discussed and not completed at this time  Anticipated Length of Stay:  Patient will be admitted on an Inpatient basis with an anticipated length of stay of  Greater than 2 midnights  Justification for Hospital Stay: dyspnea, COPD exacerbation on IV steroids    Total Time for Visit, including Counseling / Coordination of Care: 1 hour  Greater than 50% of this total time spent on direct patient counseling and coordination of care  Chief Complaint:   Shortness of breath, cough, myalgias     History of Present Illness:    Red Bailon is a 65 y o  female with a PMH including COPD, nicotine dependence, chronic back pain, bipolar disorder, GERD, and HLD who presents with worsening shortness of breath, wheezing, and a productive cough over the last few days  States she has had a 2-week history of cough, cold, and flu-like symptoms for which she states she completed a 10-day course of antibiotics per her PCP  She is unable to recall the name of the antibiotic or when she completed the course  States today she had increased difficulty breathing described as 'chest tightness' making it difficult to take deep breaths  Reports relief after Duoneb neb treatment, Solumedrol 80 mg IV x1, and Levaquin IVPB x1 in ED  Denies headache, lightheadedness, dizziness, chest pain, abdominal pain, nausea, vomiting, or diarrhea  Review of Systems:    Review of Systems   Constitutional: Positive for diaphoresis  Negative for appetite change, chills and fever  HENT: Positive for congestion  Negative for postnasal drip, rhinorrhea and sore throat  Eyes: Negative for photophobia and visual disturbance  Respiratory: Positive for cough, shortness of breath and wheezing  Negative for chest tightness      Cardiovascular: Negative for chest pain, palpitations and leg swelling  Gastrointestinal: Negative for abdominal distention, abdominal pain, constipation, diarrhea, nausea and vomiting  Genitourinary: Negative for difficulty urinating, dysuria and hematuria  Musculoskeletal: Negative for arthralgias, gait problem and myalgias  Skin: Negative for rash and wound  Neurological: Negative for dizziness, weakness, light-headedness, numbness and headaches  Psychiatric/Behavioral: Negative for confusion  The patient is not nervous/anxious  Past Medical and Surgical History:     Past Medical History:   Diagnosis Date    Asthma     Chronic pain     back    GERD (gastroesophageal reflux disease)     Hyperlipidemia     Psychiatric disorder     bipolar       History reviewed  No pertinent surgical history  Meds/Allergies:    Prior to Admission medications    Medication Sig Start Date End Date Taking? Authorizing Provider   acetaminophen (TYLENOL) 325 mg tablet Take 650 mg by mouth every 6 (six) hours as needed for mild pain   Yes Historical Provider, MD   albuterol (PROVENTIL HFA,VENTOLIN HFA) 90 mcg/act inhaler Inhale 2 puffs every 6 (six) hours as needed for wheezing  Yes Historical Provider, MD   aspirin 81 MG tablet Take 81 mg by mouth daily  Yes Historical Provider, MD   atorvastatin (LIPITOR) 20 mg tablet Take 20 mg by mouth daily     Yes Historical Provider, MD   benztropine (COGENTIN) 0 5 mg tablet Take 0 5 mg by mouth 2 (two) times a day   Yes Historical Provider, MD   busPIRone (BUSPAR) 10 mg tablet Take 15 mg by mouth 3 (three) times a day     Yes Historical Provider, MD   fluticasone (FLONASE) 50 mcg/act nasal spray 1 spray into each nostril daily   Yes Historical Provider, MD   Folic Acid-Vit B1-OAM P21 (FOLBEE PO) Take 1 tablet by mouth daily   Yes Historical Provider, MD   gabapentin (NEURONTIN) 300 mg capsule Take 900 mg by mouth 3 (three) times a day     Yes Historical Provider, MD   ketotifen (ZADITOR) 0 025 % ophthalmic solution 1 drop 2 (two) times a day  Yes Historical Provider, MD   lamoTRIgine (LaMICtal) 150 MG tablet Take 25 mg by mouth daily   Yes Historical Provider, MD   loratadine (CLARITIN) 10 mg tablet Take 10 mg by mouth daily   Yes Historical Provider, MD   lurasidone (LATUDA) 40 mg tablet Take 40 mg by mouth daily with dinner     Yes Historical Provider, MD   montelukast (SINGULAIR) 10 mg tablet Take 10 mg by mouth daily at bedtime  Yes Historical Provider, MD   Multiple Vitamin (MULTIVITAMIN) tablet Take 1 tablet by mouth daily   Yes Historical Provider, MD   pantoprazole (PROTONIX) 40 mg tablet Take 40 mg by mouth daily  Yes Historical Provider, MD   propranolol (INDERAL) 40 mg tablet Take 40 mg by mouth 2 (two) times a day   Yes Historical Provider, MD   tolterodine (DETROL LA) 4 mg 24 hr capsule Take 4 mg by mouth daily  Yes Historical Provider, MD   venlafaxine (EFFEXOR-XR) 37 5 mg 24 hr capsule Take 75 mg by mouth daily     Yes Historical Provider, MD   propantheline (PROBANTHINE) 15 mg tablet Take 15 mg by mouth 4 (four) times a day    Historical Provider, MD   albuterol (ACCUNEB) 1 25 MG/3ML nebulizer solution Take 1 ampule by nebulization 3 (three) times a day as needed for wheezing   1/23/18  Historical Provider, MD   ibuprofen (MOTRIN) 800 mg tablet Take 1 tablet by mouth every 8 (eight) hours as needed for mild pain 11/13/16 1/23/18  Álvaro Jones, DO   Mometasone Furo-Formoterol Fum (DULERA) 200-5 MCG/ACT AERO Inhale 2 puffs 2 (two) times a day  1/23/18  Historical Provider, MD     I have reviewed home medications with patient personally      Allergies: No Known Allergies    Social History:     Marital Status:    Patient Pre-hospital Living Situation: Home with boyfriend   Patient Pre-hospital Level of Mobility: Independent with use of cane   Patient Pre-hospital Diet Restrictions: None  Substance Use History:   History   Alcohol Use No     History   Smoking Status    Current Every Day Smoker    Packs/day: 1 50   Smokeless Tobacco    Never Used     History   Drug Use No       Family History:    History reviewed  No pertinent family history  Physical Exam:     Vitals:   Blood Pressure: 110/62 (01/23/18 2121)  Pulse: 70 (01/23/18 2121)  Temperature: 98 °F (36 7 °C) (01/23/18 2121)  Temp Source: Oral (01/23/18 2121)  Respirations: 18 (01/23/18 2121)  Height: 5' 9" (175 3 cm) (01/23/18 2121)  Weight - Scale: 87 1 kg (192 lb) (01/23/18 2121)  SpO2: 95 % (01/23/18 2121)    Physical Exam   Constitutional: She is oriented to person, place, and time  She appears well-developed and well-nourished  No distress  HENT:   Head: Normocephalic  Mouth/Throat: Oropharynx is clear and moist    Eyes: Conjunctivae and EOM are normal  Right eye exhibits no discharge  Left eye exhibits no discharge  No scleral icterus  Neck: Normal range of motion  Neck supple  No JVD present  Cardiovascular: Normal rate, regular rhythm, normal heart sounds and intact distal pulses  Pulmonary/Chest: Effort normal  No respiratory distress  She has decreased breath sounds in the right upper field, the right lower field, the left upper field and the left lower field  She has no wheezes  She has no rhonchi  She has no rales  Abdominal: Soft  Bowel sounds are normal  She exhibits no distension  There is no tenderness  There is no rebound and no guarding  Musculoskeletal: Normal range of motion  She exhibits no edema or tenderness  Neurological: She is alert and oriented to person, place, and time  No cranial nerve deficit  Skin: Skin is warm and dry  No rash noted  She is not diaphoretic  No erythema  Psychiatric: Her speech is normal and behavior is normal  Her mood appears anxious  Cognition and memory are impaired  She expresses impulsivity  Nursing note and vitals reviewed        Additional Data:   Labs:    Results from last 7 days  Lab Units 01/23/18  1711   WBC Thousand/uL 12 40*   HEMOGLOBIN g/dL 12 4 HEMATOCRIT % 36 2*   PLATELETS Thousands/uL 479*   LYMPHO PCT % 7   MONO PCT MAN % 7       Results from last 7 days  Lab Units 01/23/18  1711   SODIUM mmol/L 135*   POTASSIUM mmol/L 4 1   CHLORIDE mmol/L 97*   CO2 mmol/L 32   BUN mg/dL 11   CREATININE mg/dL 0 88   CALCIUM mg/dL 9 3   TOTAL PROTEIN g/dL 7 3   BILIRUBIN TOTAL mg/dL 0 50   ALK PHOS U/L 112   ALT U/L 22   AST U/L 19   GLUCOSE RANDOM mg/dL 92       Results from last 7 days  Lab Units 01/23/18  1711   INR  1 05       * I Have Reviewed All Lab Data Listed Above  * Additional Pertinent Lab Tests Reviewed: All Labs Within Last 24 Hours Reviewed    Imaging:  Imaging Reports Reviewed Today Include: Procedure: Xr Chest 1 View Portable    Result Date: 1/23/2018  Narrative: CHEST INDICATION:  Cough COMPARISON:  April 20, 2015 VIEWS:   AP frontal IMAGES:  1 FINDINGS:     Cardiomediastinal silhouette appears unremarkable  The lungs are clear  No pneumothorax or pleural effusion  Visualized osseous structures appear within normal limits for the patient's age  Impression: No acute abnormality in the chest  Workstation performed: [id number]       EKG, Pathology, and Other Studies Reviewed on Admission:   · EKG: NSR, QTc 432    Allscripts Records Reviewed: Yes     ** Please Note: Dragon 360 Dictation voice to text software may have been used in the creation of this document   **

## 2018-01-24 NOTE — CASE MANAGEMENT
Initial Clinical Review    Admission: Date/Time/Statement: 1/23/18 @ 1833     Orders Placed This Encounter   Procedures    Inpatient Admission (expected length of stay for this patient is greater than two midnights)     Standing Status:   Standing     Number of Occurrences:   1     Order Specific Question:   Admitting Physician     Answer:   Violeta Edmond [[id number]]     Order Specific Question:   Level of Care     Answer:   Med Surg [[id number]]     Order Specific Question:   Estimated length of stay     Answer:   More than 2 Midnights     Order Specific Question:   Certification     Answer:   I certify that inpatient services are medically necessary for this patient for a duration of greater than two midnights  See H&P and MD Progress Notes for additional information about the patient's course of treatment  ED: Date/Time/Mode of Arrival:   ED Arrival Information     Expected Arrival Acuity Means of Arrival Escorted By Service Admission Type    - 1/23/2018 16:18 [facility]    Arrival Complaint    difficulty breathing          Chief Complaint:   Chief Complaint   Patient presents with    Cough     pt c/o cough x 1day  History of Illness: Nash Zamorano is a 65 y o  female with a PMH including COPD, nicotine dependence, chronic back pain, bipolar disorder, GERD, and HLD who presents with worsening shortness of breath, wheezing, and a productive cough over the last few days  States she has had a 2-week history of cough, cold, and flu-like symptoms for which she states she completed a 10-day course of antibiotics per her PCP  She is unable to recall the name of the antibiotic or when she completed the course  States today she had increased difficulty breathing described as 'chest tightness' making it difficult to take deep breaths  Reports relief after Duoneb neb treatment, Solumedrol 80 mg IV x1, and Levaquin IVPB x1 in ED   Denies headache, lightheadedness, dizziness, chest pain, abdominal pain, nausea, vomiting, or diarrhea  She has decreased breath sounds in the right upper field, the right lower field, the left upper field and the left lower fieldShe has wheezes  She has rales  ED Vital Signs:   ED Triage Vitals   Temperature Pulse Respirations Blood Pressure SpO2   01/23/18 1632 01/23/18 1632 01/23/18 1843 01/23/18 1632 01/23/18 1632   (!) 101 8 °F (38 8 °C) 84 22 112/62 93 %      Temp Source Heart Rate Source Patient Position - Orthostatic VS BP Location FiO2 (%)   01/23/18 1843 01/23/18 1632 01/23/18 1632 01/23/18 1632 --   Oral Monitor Sitting Left arm       Pain Score       01/23/18 1843       No Pain        Wt Readings from Last 1 Encounters:   01/23/18 87 1 kg (192 lb)       Vital Signs (abnormal):      Abnormal Labs/Diagnostic Test Results: INFLU A PCR DETECTED   WBC 12 40 (H) Thousand/uL          RBC 4 01 (L) Million/uL     MPV 7 3 (L) fL          Platelets 545 (H) Thousands/uL     Sodium 135 (L) mmol/L           Chloride 97 (L) mmol/L      Albumin 3 2 (L) g/dL     Lipase 51 (L) u/L   CXR  No acute abnormality in the chest     ED Treatment:   Medication Administration from 01/23/2018 1618 to 01/23/2018 2059       Date/Time Order Dose Route Action Action by Comments     01/23/2018 1838 sodium chloride 0 9 % bolus 1,000 mL 0 mL Intravenous Stopped Tanvir Ricketts RN      01/23/2018 1730 sodium chloride 0 9 % bolus 1,000 mL 1,000 mL Intravenous New Bag Tanvir Ricketts RN      01/23/2018 1734 ipratropium-albuterol (DUO-NEB) 0 5-2 5 mg/mL inhalation solution 3 mL 3 mL Nebulization Given Tanvir Ricketts RN      01/23/2018 1733 methylPREDNISolone sodium succinate (Solu-MEDROL) injection 125 mg 125 mg Intravenous Given Tanvir Ricketts RN      01/23/2018 1803 acetaminophen (TYLENOL) tablet 650 mg 0 mg Oral Hold Luis Bishop RN pt dosed with OTC tylenol prehospital     01/23/2018 1952 levofloxacin (LEVAQUIN) IVPB (premix) 500 mg 0 mg Intravenous Stopped Conception CHAVEZ Orellana      01/23/2018 1838 levofloxacin (LEVAQUIN) IVPB (premix) 500 mg 500 mg Intravenous New Bag Tanvir Ricketts RN           Past Medical/Surgical History: Active Ambulatory Problems     Diagnosis Date Noted    No Active Ambulatory Problems     Resolved Ambulatory Problems     Diagnosis Date Noted    No Resolved Ambulatory Problems     Past Medical History:   Diagnosis Date    Asthma     Chronic pain     GERD (gastroesophageal reflux disease)     Hyperlipidemia     Psychiatric disorder        Admitting Diagnosis: COPD (chronic obstructive pulmonary disease) ([facility]) [J44 9]  Bronchitis [J40]  Difficulty breathing [R06 89]    Age/Sex: 65 y o  female    Assessment/Plan:   Principal Problem:    Dyspnea  Active Problems:    COPD exacerbation ([facility])    Nicotine dependence    Chronic back pain    GERD (gastroesophageal reflux disease)    HLD (hyperlipidemia)    Bipolar 1 disorder ([facility])    SIRS (systemic inflammatory response syndrome) ([facility] )    Anxiety    Depression   Plan for the Primary Problem(s):  · Dyspnea: CXR revealed no acute abnormality in the chest   ? Multifactorial, likely due to acute bronchitis and COPD exacerbation, rule out influenza  ? Check Influenza/ RSV by PCR  ? Start scheduled Duoneb nebulizer treatments, Solumedrol 20 mg IV Q 8 hours, and Tamiflu prophylactically   ? Given Levaquin IVPB in ED, hold off on further ABX as pt states she recently completed a 10-day course of ABX therapy  ? Supplemental oxygen  ? Droplet precautions until Influenza/RSV ruled out  Plan for Additional Problems:   · COPD Exacerbation: Solumedrol 20 mg IV Q 8 hours and scheduled nebulizer treatments  Continue home medication, Singulair  Rest of plan as above  · SIRS: As evidenced by fever and leukocytosis  Concern for influenza  Check Influenza/RSV by PCR  Tylenol as needed for fever  Follow-up final blood and sputum culture  Droplet precautions  Started Tamiflu prophylactically  · Hyponatremia/ Hypochloremia: Na 135, Cl 97, likely due to poor oral intake and dehydration  Given 1 liter normal saline bolus in ED  Check BMP in am    · Nicotine Dependence: Nicotine patch daily  Encourage smoking cessation and education  · GERD: On PPI  · HLD: On statin  · Bipolar Disorder: Continue home medications, Cogentin, , Lamictal, and Latuda,   · Anxiety/ Depression: Continue home medications, Buspar and Effexor   VTE Prophylaxis: Enoxaparin (Lovenox)  / reason for no mechanical VTE prophylaxis on Lovenox   Code Status: Full Code   POLST: POLST form is not discussed and not completed at this time  Anticipated Length of Stay:  Patient will be admitted on an Inpatient basis with an anticipated length of stay of  Greater than 2 midnights     Justification for Hospital Stay: dyspnea, COPD exacerbation on IV steroids     Admission Orders:  AIRWAY CLEARANCE PROTOCOL  IS  OXYGEN 2L KEEP SPO2>92%  BLD CX PENDING  URINE CX PENDING  BMP MG PHOS CBC PLT  CONTACT AND DROPLET ISOLATION  URIEN STREP PNA LEGIONELLA ANTIGEN  SPUTUM GSCS  Scheduled Meds:   acetaminophen 650 mg Oral Once   aspirin 81 mg Oral Daily   atorvastatin 20 mg Oral Daily With Dinner   benztropine 0 5 mg Oral BID   busPIRone 15 mg Oral TID   enoxaparin 40 mg Subcutaneous Daily   Folic Acid-Vit J4-VKD X51  Oral Daily   gabapentin 900 mg Oral TID   ipratropium-albuterol 3 mL Nebulization Q6H   ketotifen 1 drop Both Eyes BID   lamoTRIgine 25 mg Oral Daily   lurasidone 40 mg Oral Daily With Dinner   methylPREDNISolone sodium succinate 20 mg Intravenous Q8H [facility]   montelukast 10 mg Oral HS   nicotine 1 patch Transdermal Daily   oseltamivir 75 mg Oral Q12H BEAR   pantoprazole 40 mg Oral Early Morning   propranolol 40 mg Oral BID   tolterodine 4 mg Oral Daily   venlafaxine 75 mg Oral Daily     Continuous Infusions:    PRN Meds: acetaminophen    ondansetron

## 2018-01-24 NOTE — SOCIAL WORK
Met with pt  Uses [facility] for ambulation  No other dme  No hhc  Resides in apt with no steps  Resides with boyfriend Pratik Gillette  Uses [employer] for transport  Explained role of cm, no d/c needs  CM reviewed d/c planning process including the following: identifying help at home, patient preference for d/c planning needs, and availability of the treatment team to discuss questions or concerns patient and/or family may have regarding understanding of medications and recognizing signs and symptoms once discharged  CM also encouraged patient to follow up with all recommended appointments after discharge  Patient advised of importance for patient and family to participate in managing patient's medical well being

## 2018-01-24 NOTE — CASE MANAGEMENT
Continued Stay Review    Date: 1/24/18    Vital Signs:TMAX 101 8 /77 (BP Location: Right arm)   Pulse 73   Temp 98 3 °F (36 8 °C) (Oral)   Resp 18   Ht 5' 9" (1 753 m)   Wt 87 1 kg (192 lb)   SpO2 94%   Breastfeeding? No   BMI 28 35 kg/m²     CONTACT DROPLET ISOLATION  IVNS BOLUS 250ML   CBC BMP   Medications:   Scheduled Meds:   acetaminophen 650 mg Oral Once   aspirin 81 mg Oral Daily   atorvastatin 20 mg Oral Daily With Dinner   benztropine 0 5 mg Oral BID   busPIRone 15 mg Oral TID   enoxaparin 40 mg Subcutaneous Daily   Folic Acid-Vit Z0-EJH U14  Oral Daily   gabapentin 900 mg Oral TID   ipratropium-albuterol 3 mL Nebulization Q6H   ketotifen 1 drop Both Eyes BID   lamoTRIgine 25 mg Oral Daily   lurasidone 40 mg Oral Daily With Dinner   methylPREDNISolone sodium succinate 20 mg Intravenous Q8H [facility]   montelukast 10 mg Oral HS   nicotine 1 patch Transdermal Daily   oseltamivir 75 mg Oral Q12H BEAR   pantoprazole 40 mg Oral Early Morning   propranolol 40 mg Oral BID   tolterodine 4 mg Oral Daily   venlafaxine 75 mg Oral Daily     Continuous Infusions:    PRN Meds: acetaminophen    ondansetron    Abnormal Labs/Diagnostic Results: GLUCOSE 161 , UA NEGATIVE   + IFLU PCR  Age/Sex: 65 y o  female     ATTENDING  Continues with shortness of breath that worsens with exertion or conversation  Difficulty with deep inspiration, but improved from yesterday  Reports an episode of diarrhea  Denies mucus or blood in stool  Denies HA, CP, abd pain, N/V     Assessment:  Principal Problem:    COPD exacerbation (HCC)  Active Problems:    Nicotine dependence    Chronic back pain    GERD (gastroesophageal reflux disease)    HLD (hyperlipidemia)    Bipolar 1 disorder (HCC)    SIRS (systemic inflammatory response syndrome) (HCC)    Anxiety    Depression    Influenza A     Plan:  COPD Exacerbation:  Likely due to influenza A    Continue Solumedrol 20 mg IV Q 8 hours, scheduled nebulizer treatments, Singulair, and Tamiflu  Given Levaquin IVPB x1 in ED but will hold off on further antibiotic as patient states she recently completed a 10 day course of antibiotic therapy as an outpatient  Follow up blood and sputum cultures  Continue supplemental oxygen and droplet precautions      Influenza A:  Continue steroids, breathing treatments, and Tamiflu  Droplet precautions  Monitor for fevers      SIRS: As evidenced by fever and leukocytosis likely due to influenza A  Follow up final blood and sputum cultures  Continue droplet precautions  Rest of plan as above  Monitor for worsening signs and symptoms of infection  Monitor CBC in a m        Hyponatremia/ Hypochloremia: Na 135->144, Cl 97->104, resolved, likely due to poor oral intake and dehydration  Given 1 liter normal saline bolus in ED  Encourage oral intake  Monitor BMP in am       Nicotine Dependence: Nicotine patch daily  Encourage smoking cessation and education       GERD: On PPI     HLD: On statin     Bipolar Disorder: Continue home medications, Cogentin, , Lamictal, and Latuda     Anxiety/ Depression: Continue home medications, Buspar and Effexor      VTE Pharmacologic Prophylaxis:   Pharmacologic: Enoxaparin (Lovenox)  Mechanical: Mechanical VTE prophylaxis in place    Certification Statement: The patient will continue to require additional inpatient hospital stay due to SIRS, influenza, COPD exacerbation, nicotine abuse

## 2018-01-24 NOTE — PLAN OF CARE
DISCHARGE PLANNING     Discharge to home or other facility with appropriate resources Progressing        INFECTION - ADULT     Absence or prevention of progression during hospitalization Progressing     Absence of fever/infection during neutropenic period Progressing        RESPIRATORY - ADULT     Achieves optimal ventilation and oxygenation Progressing        SAFETY ADULT     Patient will remain free of falls Progressing     Maintain or return to baseline ADL function Progressing     Maintain or return mobility status to optimal level Progressing

## 2018-01-24 NOTE — PROGRESS NOTES
[facility] Progress Note  Patient: Zacarias Adams 65 y o  female   MRN: [id number]  PCP: Ester Montelongo MD  Unit/Bed#: [address] [place] [address] Encounter: [id number]  Date Of Visit: 01/24/18    Assessment:  Principal Problem:    COPD exacerbation ([facility] )  Active Problems:    Nicotine dependence    Chronic back pain    GERD (gastroesophageal reflux disease)    HLD (hyperlipidemia)    Bipolar 1 disorder (HCC)    SIRS (systemic inflammatory response syndrome) ([place] [address] )    Anxiety    Depression    Influenza A    Plan:  COPD Exacerbation:  Likely due to influenza A  Continue Solumedrol 20 mg IV Q 8 hours, scheduled nebulizer treatments, Singulair, and Tamiflu  Given Levaquin IVPB x1 in ED but will hold off on further antibiotic as patient states she recently completed a 10 day course of antibiotic therapy as an outpatient  Follow up blood and sputum cultures  Continue supplemental oxygen and droplet precautions  Influenza A:  Continue steroids, breathing treatments, and Tamiflu  Droplet precautions  Monitor for fevers  SIRS: As evidenced by fever and leukocytosis likely due to influenza A  Follow up final blood and sputum cultures  Continue droplet precautions  Rest of plan as above  Monitor for worsening signs and symptoms of infection  Monitor CBC in a m  Hyponatremia/ Hypochloremia: Na 135->144, Cl 97->104, resolved, likely due to poor oral intake and dehydration  Given 1 liter normal saline bolus in ED  Encourage oral intake  Monitor BMP in am      Nicotine Dependence: Nicotine patch daily  Encourage smoking cessation and education  GERD: On PPI    HLD: On statin    Bipolar Disorder: Continue home medications, Cogentin, , Lamictal, and Latuda    Anxiety/ Depression: Continue home medications, Buspar and Effexor      VTE Pharmacologic Prophylaxis:   Pharmacologic: Enoxaparin (Lovenox)  Mechanical: Mechanical VTE prophylaxis in place      Patient Centered Rounds: I have performed bedside rounds with nursing staff today  Discussions with Specialists or Other Care Team Provider: Nursing, CM   Education and Discussions with Family / Patient: I have answered all questions to the best of my ability  Time Spent for Care: 20 minutes  More than 50% of total time spent on counseling and coordination of care as described above  Current Length of Stay: 1 day(s)  Current Patient Status: Inpatient   Certification Statement: The patient will continue to require additional inpatient hospital stay due to SIRS, influenza, COPD exacerbation, nicotine abuse    Discharge Plan: Patient is not medically stable for discharge today, likely tomorrow if stable  Code Status: Level 1 - Full Code    Subjective:   Continues with shortness of breath that worsens with exertion or conversation  Difficulty with deep inspiration, but improved from yesterday  Reports an episode of diarrhea  Denies mucus or blood in stool  Denies HA, CP, abd pain, N/V  Objective:   Vitals:   Temp (24hrs), Av 9 °F (37 2 °C), Min:97 8 °F (36 6 °C), Max:101 8 °F (38 8 °C)    HR:  [62-86] 73  Resp:  [16-22] 18  BP: ()/(50-77) 117/77  SpO2:  [90 %-97 %] 94 %  Body mass index is 28 35 kg/m²  Input and Output Summary (last 24 hours): Intake/Output Summary (Last 24 hours) at 18 1123  Last data filed at 18 2152   Gross per 24 hour   Intake             1000 ml   Output              200 ml   Net              800 ml       Physical Exam:     Physical Exam   Constitutional: She is oriented to person, place, and time  She appears well-developed  No distress  HENT:   Head: Normocephalic  Neck: Normal range of motion  Cardiovascular: Normal rate and regular rhythm  Pulmonary/Chest: Effort normal  No respiratory distress  She has decreased breath sounds  She has wheezes  She has no rhonchi  She has no rales  Abdominal: Soft  Bowel sounds are normal  She exhibits no distension  There is no tenderness  Musculoskeletal: Normal range of motion  She exhibits no edema or tenderness  Neurological: She is alert and oriented to person, place, and time  She has normal reflexes  Skin: Skin is warm and dry  No rash noted  She is not diaphoretic  Psychiatric: Her speech is normal and behavior is normal  Her mood appears anxious  She expresses impulsivity  Nursing note and vitals reviewed  Additional Data:   Labs:    Results from last 7 days  Lab Units 01/24/18  0532 01/23/18  1711   WBC Thousand/uL 8 10 12 40*   HEMOGLOBIN g/dL 11 7* 12 4   HEMATOCRIT % 33 1* 36 2*   PLATELETS Thousands/uL 406* 479*   LYMPHO PCT %  --  7   MONO PCT MAN %  --  7       Results from last 7 days  Lab Units 01/24/18  0532 01/23/18  1711   SODIUM mmol/L 144 135*   POTASSIUM mmol/L 4 3 4 1   CHLORIDE mmol/L 104 97*   CO2 mmol/L 28 32   BUN mg/dL 13 11   CREATININE mg/dL 0 68 0 88   CALCIUM mg/dL 9 4 9 3   TOTAL PROTEIN g/dL  --  7 3   BILIRUBIN TOTAL mg/dL  --  0 50   ALK PHOS U/L  --  112   ALT U/L  --  22   AST U/L  --  19   GLUCOSE RANDOM mg/dL 161* 92       Results from last 7 days  Lab Units 01/23/18  1711   INR  1 05       * I Have Reviewed All Lab Data Listed Above  * Additional Pertinent Lab Tests Reviewed: All Labs Within Last 24 Hours Reviewed    Imaging:    Imaging Reports Reviewed Today Include: Procedure: Xr Chest 1 View Portable    Result Date: 1/23/2018  Narrative: CHEST INDICATION:  Cough COMPARISON:  April 20, 2015 VIEWS:   AP frontal IMAGES:  1 FINDINGS:     Cardiomediastinal silhouette appears unremarkable  The lungs are clear  No pneumothorax or pleural effusion  Visualized osseous structures appear within normal limits for the patient's age       Impression: No acute abnormality in the chest  Workstation performed: [id number]       Cultures:   Blood Culture: No results found for: BLOODCX  Urine Culture: No results found for: URINECX  Sputum Culture: No components found for: SPUTUMCX  Wound Culture: No results found for: WOUNDCULT    Last 24 Hours Medication List:     acetaminophen 650 mg Oral Once   aspirin 81 mg Oral Daily   atorvastatin 20 mg Oral Daily With Dinner   benztropine 0 5 mg Oral BID   busPIRone 15 mg Oral TID   enoxaparin 40 mg Subcutaneous Daily   Folic Acid-Vit B6-WNB D43  Oral Daily   gabapentin 900 mg Oral TID   ipratropium-albuterol 3 mL Nebulization Q6H   ketotifen 1 drop Both Eyes BID   lamoTRIgine 25 mg Oral Daily   lurasidone 40 mg Oral Daily With Dinner   methylPREDNISolone sodium succinate 20 mg Intravenous Q8H [address]   montelukast 10 mg Oral HS   nicotine 1 patch Transdermal Daily   oseltamivir 75 mg Oral Q12H BEAR   pantoprazole 40 mg Oral Early Morning   propranolol 40 mg Oral BID   tolterodine 4 mg Oral Daily   venlafaxine 75 mg Oral Daily        Today, Patient Was Seen By: NALDO Alvarado    ** Please Note: Dragon 360 Dictation voice to text software may have been used in the creation of this document   **

## 2018-01-25 VITALS
HEART RATE: 66 BPM | OXYGEN SATURATION: 94 % | BODY MASS INDEX: 28.44 KG/M2 | RESPIRATION RATE: 18 BRPM | SYSTOLIC BLOOD PRESSURE: 148 MMHG | HEIGHT: 69 IN | DIASTOLIC BLOOD PRESSURE: 73 MMHG | WEIGHT: 192 LBS | TEMPERATURE: 97.5 F

## 2018-01-25 PROBLEM — R65.10 SIRS (SYSTEMIC INFLAMMATORY RESPONSE SYNDROME) (HCC): Status: RESOLVED | Noted: 2018-01-23 | Resolved: 2018-01-25

## 2018-01-25 PROBLEM — J44.1 COPD EXACERBATION (HCC): Status: RESOLVED | Noted: 2018-01-23 | Resolved: 2018-01-25

## 2018-01-25 LAB
ANION GAP SERPL CALCULATED.3IONS-SCNC: 7 MMOL/L (ref 4–13)
ATRIAL RATE: 83 BPM
BUN SERPL-MCNC: 17 MG/DL (ref 5–25)
CALCIUM SERPL-MCNC: 9.3 MG/DL (ref 8.3–10.1)
CHLORIDE SERPL-SCNC: 105 MMOL/L (ref 100–108)
CO2 SERPL-SCNC: 31 MMOL/L (ref 21–32)
CREAT SERPL-MCNC: 0.69 MG/DL (ref 0.6–1.3)
ERYTHROCYTE [DISTWIDTH] IN BLOOD BY AUTOMATED COUNT: 13.6 % (ref 11.6–15.1)
GFR SERPL CREATININE-BSD FRML MDRD: 98 ML/MIN/1.73SQ M
GLUCOSE SERPL-MCNC: 129 MG/DL (ref 65–140)
HCT VFR BLD AUTO: 33.8 % (ref 37–47)
HGB BLD-MCNC: 11.2 G/DL (ref 12–16)
MCH RBC QN AUTO: 31 PG (ref 27–31)
MCHC RBC AUTO-ENTMCNC: 33.3 G/DL (ref 31.4–37.4)
MCV RBC AUTO: 93 FL (ref 82–98)
MRSA NOSE QL CULT: NORMAL
P AXIS: 53 DEGREES
PLATELET # BLD AUTO: 393 THOUSANDS/UL (ref 130–400)
PMV BLD AUTO: 7.8 FL (ref 8.9–12.7)
POTASSIUM SERPL-SCNC: 4.1 MMOL/L (ref 3.5–5.3)
PR INTERVAL: 140 MS
QRS AXIS: 30 DEGREES
QRSD INTERVAL: 74 MS
QT INTERVAL: 368 MS
QTC INTERVAL: 432 MS
RBC # BLD AUTO: 3.63 MILLION/UL (ref 4.2–5.4)
SODIUM SERPL-SCNC: 143 MMOL/L (ref 136–145)
T WAVE AXIS: 21 DEGREES
VENTRICULAR RATE: 83 BPM
WBC # BLD AUTO: 14.3 THOUSAND/UL (ref 4.8–10.8)

## 2018-01-25 PROCEDURE — 85027 COMPLETE CBC AUTOMATED: CPT | Performed by: NURSE PRACTITIONER

## 2018-01-25 PROCEDURE — 80048 BASIC METABOLIC PNL TOTAL CA: CPT | Performed by: NURSE PRACTITIONER

## 2018-01-25 PROCEDURE — 94640 AIRWAY INHALATION TREATMENT: CPT

## 2018-01-25 PROCEDURE — 99239 HOSP IP/OBS DSCHRG MGMT >30: CPT | Performed by: NURSE PRACTITIONER

## 2018-01-25 PROCEDURE — 94760 N-INVAS EAR/PLS OXIMETRY 1: CPT

## 2018-01-25 RX ORDER — NICOTINE 21 MG/24HR
1 PATCH, TRANSDERMAL 24 HOURS TRANSDERMAL DAILY
Qty: 28 PATCH | Refills: 0 | Status: SHIPPED | OUTPATIENT
Start: 2018-01-26 | End: 2018-07-23

## 2018-01-25 RX ORDER — OSELTAMIVIR PHOSPHATE 75 MG/1
75 CAPSULE ORAL EVERY 12 HOURS SCHEDULED
Qty: 6 CAPSULE | Refills: 0 | Status: SHIPPED | OUTPATIENT
Start: 2018-01-25 | End: 2018-01-28

## 2018-01-25 RX ORDER — PREDNISONE 20 MG/1
TABLET ORAL
Qty: 26 TABLET | Refills: 0 | Status: SHIPPED | OUTPATIENT
Start: 2018-01-25 | End: 2019-06-30

## 2018-01-25 RX ADMIN — ENOXAPARIN SODIUM 40 MG: 40 INJECTION SUBCUTANEOUS at 09:23

## 2018-01-25 RX ADMIN — GABAPENTIN 900 MG: 300 CAPSULE ORAL at 09:23

## 2018-01-25 RX ADMIN — PANTOPRAZOLE SODIUM 40 MG: 40 TABLET, DELAYED RELEASE ORAL at 05:50

## 2018-01-25 RX ADMIN — IPRATROPIUM BROMIDE AND ALBUTEROL SULFATE 3 ML: .5; 3 SOLUTION RESPIRATORY (INHALATION) at 08:36

## 2018-01-25 RX ADMIN — TOLTERODINE TARTRATE 4 MG: 4 CAPSULE, EXTENDED RELEASE ORAL at 09:22

## 2018-01-25 RX ADMIN — METHYLPREDNISOLONE SODIUM SUCCINATE 20 MG: 40 INJECTION, POWDER, FOR SOLUTION INTRAMUSCULAR; INTRAVENOUS at 09:23

## 2018-01-25 RX ADMIN — LAMOTRIGINE 25 MG: 25 TABLET ORAL at 09:23

## 2018-01-25 RX ADMIN — PROPRANOLOL HYDROCHLORIDE 20 MG: 20 TABLET ORAL at 09:23

## 2018-01-25 RX ADMIN — KETOTIFEN FUMARATE 1 DROP: 0.35 SOLUTION/ DROPS OPHTHALMIC at 09:22

## 2018-01-25 RX ADMIN — OSELTAMIVIR PHOSPHATE 75 MG: 75 CAPSULE ORAL at 09:23

## 2018-01-25 RX ADMIN — ASPIRIN 81 MG 81 MG: 81 TABLET ORAL at 09:22

## 2018-01-25 RX ADMIN — BENZTROPINE MESYLATE 0.5 MG: 0.5 TABLET ORAL at 09:22

## 2018-01-25 RX ADMIN — BUSPIRONE HYDROCHLORIDE 15 MG: 5 TABLET ORAL at 09:22

## 2018-01-25 RX ADMIN — IPRATROPIUM BROMIDE AND ALBUTEROL SULFATE 3 ML: .5; 3 SOLUTION RESPIRATORY (INHALATION) at 02:57

## 2018-01-25 RX ADMIN — VENLAFAXINE HYDROCHLORIDE 75 MG: 75 CAPSULE, EXTENDED RELEASE ORAL at 09:22

## 2018-01-25 NOTE — PLAN OF CARE
DISCHARGE PLANNING     Discharge to home or other facility with appropriate resources Progressing        DISCHARGE PLANNING - CARE MANAGEMENT     Discharge to post-acute care or home with appropriate resources Progressing        INFECTION - ADULT     Absence or prevention of progression during hospitalization Progressing     Absence of fever/infection during neutropenic period Progressing        RESPIRATORY - ADULT     Achieves optimal ventilation and oxygenation Progressing        SAFETY ADULT     Patient will remain free of falls Progressing     Maintain or return to baseline ADL function Progressing     Maintain or return mobility status to optimal level Progressing

## 2018-01-25 NOTE — DISCHARGE SUMMARY
Discharge Summary - [address] [facility]    Patient Information: Scarlet Mitchell 65 y o  female MRN: [id number]  Unit/Bed#: [address] [place] [address] Encounter: [id number]    Discharging Physician / Practitioner: NALDO Godfrey  PCP: Nury Bowman MD  Admission Date: 1/23/2018  Discharge Date: 01/25/18    Reason for Admission:  Exacerbation of COPD likely due to influenza A    Discharge Diagnoses:     Principal Problem (Resolved):    COPD exacerbation ([place] [address] )  Active Problems:    Nicotine dependence    Chronic back pain    GERD (gastroesophageal reflux disease)    HLD (hyperlipidemia)    Bipolar 1 disorder ([place] [address] )    Anxiety    Depression    Influenza A  Resolved Problems:    SIRS (systemic inflammatory response syndrome) ([place] [address] )      Consultations During Hospital Stay:  · Respiratory therapy  ·     Procedures Performed:     · PCXR, No acute abnormality in the chest     Significant Findings:     · none    Incidental Findings:   · none    Test Results Pending at Discharge (will require follow up): · Blood cultures  · Final sputum culture results     Outpatient Tests Requested:  · none    Complications:  none    Hospital Course:     Scarlet Mitchell is a 65 y o  female patient who originally presented to the hospital on 1/23/2018   with a PMH of COPD, nicotine dependence, chronic back pain, bipolar disorder, GERD, and HLD who presents with worsening shortness of breath, wheezing, and productive cough for over the last few days  She states she has had a 2 week history of cough, cold, and flu-like symptoms for which she states she has completed a 10 day course of antibiotics per her PCP  She reports relief after DuoNeb treatment, Solu-Medrol 80 mg IV x1 and Levaquin IV piggyback x1 in the ED  She was admitted to [facility] with a diagnosis of COPD exacerbation due to influenza A  Lactic acid was 1 4  Preliminary blood cultures showed no growth at 24 hours  MRSA culture negative    Urine Legionella and strep pneumoniae, negative  She remained afebrile  She was given no antibiotics as she just recently completed a 10 day course of antibiotic therapy  She was given Solu-Medrol IV which was then tapered down to prednisone po upon discharge  She was initiated on Tamiflu and was also given an Rx to complete a 5 day course  She was encouraged to stop smoking and was provided a Rx for the nicotine patch  She was discharged home with instructions to follow-up with PCP within 7 days of discharge  Condition at Discharge: stable     Discharge Day Visit / Exam:     Subjective:  She is observed sitting on side of bed conversing with ease in full complete sentences on telephone  She denies chest pain, abdominal pain, or headache  Denies nausea, vomiting, or diarrhea  Denies shortness of breath  Vitals: Blood Pressure: 148/73 (01/25/18 0800)  Pulse: 66 (01/25/18 0837)  Temperature: 97 5 °F (36 4 °C) (01/25/18 0800)  Temp Source: Tympanic (01/25/18 0800)  Respirations: 18 (01/25/18 0837)  Height: 5' 9" (175 3 cm) (01/23/18 2121)  Weight - Scale: 87 1 kg (192 lb) (01/23/18 2121)  SpO2: 94 % (01/25/18 0837)  Exam:   Physical Exam   Constitutional: She is oriented to person, place, and time  She appears well-developed and well-nourished  No distress  HENT:   Head: Normocephalic and atraumatic  Neck: Normal range of motion  Neck supple  Cardiovascular: Normal rate, regular rhythm and normal heart sounds  Exam reveals no gallop and no friction rub  No murmur heard  Pulmonary/Chest: Effort normal and breath sounds normal  No respiratory distress  She has no wheezes  She has no rales  She exhibits no tenderness  Abdominal: Soft  Bowel sounds are normal  She exhibits no distension and no mass  There is no tenderness  There is no rebound and no guarding  Musculoskeletal: Normal range of motion  She exhibits no edema, tenderness or deformity  Neurological: She is alert and oriented to person, place, and time  Skin: Skin is warm and dry  No rash noted  She is not diaphoretic  No erythema  No pallor  Psychiatric: She has a normal mood and affect  Her behavior is normal  Judgment and thought content normal        Discharge instructions/Information to patient and family:   See after visit summary for information provided to patient and family  Provisions for Follow-Up Care:  See after visit summary for information related to follow-up care and any pertinent home health orders  Disposition:     Home    For Discharges to [facility]:   · Not Applicable to this Patient - Not Applicable to this Patient    Planned Readmission:  Not anticipated     Discharge Statement:  I spent 35 minutes discharging the patient  This time was spent on the day of discharge  I had direct contact with the patient on the day of discharge  Greater than 50% of the total time was spent examining patient, answering all patient questions, arranging and discussing plan of care with patient as well as directly providing post-discharge instructions  Additional time then spent on discharge activities  Discharge Medications:  See after visit summary for reconciled discharge medications provided to patient and family  ** Please Note: Dragon 360 Dictation voice to text software may have been used in the creation of this document   **

## 2018-01-25 NOTE — NURSING NOTE
Pt left via walking with steady gait accompanied by David Laboy Rn  Care notes and discharge teaching provided  Pt refused smoking cessation information  IV dc and intact  No further questions at this time

## 2018-01-25 NOTE — SOCIAL WORK
PER PCP, PT STABLE FOR DC TO HOME  PT DOES NOT HAVE A RIDE, USES [employer]  CALL MADE TO [facility], RIDE SCHEDULED WITH [facility]UNNER FOR  BETWEEN NOW AND 320PM   PT CAN TAKE A CAR RIDE WITHOUT ANY DEVICES OR HOOK UPS    CONFIRMATION NUMBER IS [id number]

## 2018-01-26 LAB
BACTERIA SPT RESP CULT: NORMAL
GRAM STN SPEC: NORMAL

## 2018-01-26 NOTE — CASE MANAGEMENT
NALDO Vizcarra Nurse Practitioner Attested Hospitalist Discharge Summaries Date of Service: 1/25/2018  1:11 PM      Attestation signed by Mercedes Foreman MD at 1/26/2018 8:08 AM       Split/Shared Statement  I saw/examined the patient  I agree with the Advanced Practitioner's note with the following additions/exceptions:      27-year-old female presented with shortness of breath wheezing and productive cough  Patient was admitted with diagnosis of COPD exacerbation and influenza A  The patient received IV Solu-Medrol with taper and Tamiflu  Patient remained in a stable condition and will be discharged home on prednisone taper and Tamiflu  []Hide copied text     Discharge Summary - [address] [facility]     Patient Information: Norma Homans 65 y o  female MRN: [id number]  Unit/Bed#: [address] [place] [address] Encounter: [id number]     Discharging Physician / Practitioner: NALDO Vizcarra  PCP: Eli Louis MD  Admission Date: 1/23/2018  Discharge Date: 01/25/18     Reason for Admission:  Exacerbation of COPD likely due to influenza A     Discharge Diagnoses:      Principal Problem (Resolved):    COPD exacerbation ([facility] )  Active Problems:    Nicotine dependence    Chronic back pain    GERD (gastroesophageal reflux disease)    HLD (hyperlipidemia)    Bipolar 1 disorder ([facility] )    Anxiety    Depression    Influenza A  Resolved Problems:    SIRS (systemic inflammatory response syndrome) ([facility] )        Consultations During Hospital Stay:  · Respiratory therapy  ·      Procedures Performed:      · PCXR, No acute abnormality in the chest      Significant Findings:      · none     Incidental Findings:   · none     Test Results Pending at Discharge (will require follow up):    · Blood cultures  · Final sputum culture results     Outpatient Tests Requested:  · none     Complications:  none     Hospital Course:      Norma Homans is a 65 y o  female patient who originally presented to the hospital on 1/23/2018   with a PMH of COPD, nicotine dependence, chronic back pain, bipolar disorder, GERD, and HLD who presents with worsening shortness of breath, wheezing, and productive cough for over the last few days  She states she has had a 2 week history of cough, cold, and flu-like symptoms for which she states she has completed a 10 day course of antibiotics per her PCP  She reports relief after DuoNeb treatment, Solu-Medrol 80 mg IV x1 and Levaquin IV piggyback x1 in the ED  She was admitted to [facility] with a diagnosis of COPD exacerbation due to influenza A  Lactic acid was 1 4  Preliminary blood cultures showed no growth at 24 hours  MRSA culture negative  Urine Legionella and strep pneumoniae, negative  She remained afebrile  She was given no antibiotics as she just recently completed a 10 day course of antibiotic therapy  She was given Solu-Medrol IV which was then tapered down to prednisone po upon discharge  She was initiated on Tamiflu and was also given an Rx to complete a 5 day course  She was encouraged to stop smoking and was provided a Rx for the nicotine patch  She was discharged home with instructions to follow-up with PCP within 7 days of discharge      Condition at Discharge: stable      Discharge Day Visit / Exam:      Subjective:  She is observed sitting on side of bed conversing with ease in full complete sentences on telephone  She denies chest pain, abdominal pain, or headache  Denies nausea, vomiting, or diarrhea  Denies shortness of breath         Vitals: Blood Pressure: 148/73 (01/25/18 0800)  Pulse: 66 (01/25/18 0837)  Temperature: 97 5 °F (36 4 °C) (01/25/18 0800)  Temp Source: Tympanic (01/25/18 0800)  Respirations: 18 (01/25/18 0837)  Height: 5' 9" (175 3 cm) (01/23/18 2121)  Weight - Scale: 87 1 kg (192 lb) (01/23/18 2121)  SpO2: 94 % (01/25/18 0837)  Exam:   Physical Exam   Constitutional: She is oriented to person, place, and time   She appears well-developed and well-nourished  No distress  HENT:   Head: Normocephalic and atraumatic  Neck: Normal range of motion  Neck supple  Cardiovascular: Normal rate, regular rhythm and normal heart sounds  Exam reveals no gallop and no friction rub  No murmur heard  Pulmonary/Chest: Effort normal and breath sounds normal  No respiratory distress  She has no wheezes  She has no rales  She exhibits no tenderness  Abdominal: Soft  Bowel sounds are normal  She exhibits no distension and no mass  There is no tenderness  There is no rebound and no guarding  Musculoskeletal: Normal range of motion  She exhibits no edema, tenderness or deformity  Neurological: She is alert and oriented to person, place, and time  Skin: Skin is warm and dry  No rash noted  She is not diaphoretic  No erythema  No pallor  Psychiatric: She has a normal mood and affect  Her behavior is normal  Judgment and thought content normal          Discharge instructions/Information to patient and family:   See after visit summary for information provided to patient and family        Provisions for Follow-Up Care:  See after visit summary for information related to follow-up care and any pertinent home health orders        Disposition:      Home     For Discharges to [facility]:   · Not Applicable to this Patient - Not Applicable to this Patient     Planned Readmission:  Not anticipated     Discharge Statement:  I spent 35 minutes discharging the patient  This time was spent on the day of discharge  I had direct contact with the patient on the day of discharge  Greater than 50% of the total time was spent examining patient, answering all patient questions, arranging and discussing plan of care with patient as well as directly providing post-discharge instructions  Additional time then spent on discharge activities      Discharge Medications:  See after visit summary for reconciled discharge medications provided to patient and family     ** Please Note: Dragon 360 Dictation voice to text software may have been used in the creation of this document   **                              Cosigned by: Treva Cat MD at 1/26/2018  8:08 AM   Revision History

## 2018-01-28 LAB
BACTERIA BLD CULT: NORMAL
BACTERIA BLD CULT: NORMAL

## 2018-05-14 ENCOUNTER — TRANSCRIBE ORDERS (OUTPATIENT)
Dept: ADMINISTRATIVE | Facility: HOSPITAL | Age: 58
End: 2018-05-14

## 2018-05-14 DIAGNOSIS — Z12.39 SCREENING BREAST EXAMINATION: Primary | ICD-10-CM

## 2018-05-18 ENCOUNTER — HOSPITAL ENCOUNTER (OUTPATIENT)
Dept: RADIOLOGY | Facility: HOSPITAL | Age: 58
Discharge: HOME | End: 2018-05-18
Payer: COMMERCIAL

## 2018-05-18 DIAGNOSIS — Z12.39 SCREENING BREAST EXAMINATION: ICD-10-CM

## 2018-05-18 PROCEDURE — 77067 SCR MAMMO BI INCL CAD: CPT

## 2018-07-23 ENCOUNTER — APPOINTMENT (EMERGENCY)
Dept: RADIOLOGY | Facility: HOSPITAL | Age: 58
End: 2018-07-23
Payer: COMMERCIAL

## 2018-07-23 ENCOUNTER — HOSPITAL ENCOUNTER (EMERGENCY)
Facility: HOSPITAL | Age: 58
Discharge: HOME | End: 2018-07-24
Attending: EMERGENCY MEDICINE | Admitting: EMERGENCY MEDICINE
Payer: COMMERCIAL

## 2018-07-23 VITALS
HEART RATE: 65 BPM | TEMPERATURE: 97.6 F | OXYGEN SATURATION: 94 % | RESPIRATION RATE: 18 BRPM | DIASTOLIC BLOOD PRESSURE: 66 MMHG | SYSTOLIC BLOOD PRESSURE: 108 MMHG

## 2018-07-23 DIAGNOSIS — M54.9 BACK PAIN: Primary | ICD-10-CM

## 2018-07-23 DIAGNOSIS — M25.559 HIP PAIN: ICD-10-CM

## 2018-07-23 PROCEDURE — 96372 THER/PROPH/DIAG INJ SC/IM: CPT

## 2018-07-23 PROCEDURE — 73521 X-RAY EXAM HIPS BI 2 VIEWS: CPT

## 2018-07-23 PROCEDURE — 72100 X-RAY EXAM L-S SPINE 2/3 VWS: CPT

## 2018-07-23 RX ORDER — KETOROLAC TROMETHAMINE 30 MG/ML
30 INJECTION, SOLUTION INTRAMUSCULAR; INTRAVENOUS ONCE
Status: COMPLETED | OUTPATIENT
Start: 2018-07-23 | End: 2018-07-23

## 2018-07-23 RX ADMIN — KETOROLAC TROMETHAMINE 30 MG: 30 INJECTION, SOLUTION INTRAMUSCULAR at 23:11

## 2018-07-24 PROCEDURE — 99284 EMERGENCY DEPT VISIT MOD MDM: CPT

## 2018-07-24 NOTE — ED TRIAGE NOTES
Spoke to  who happened to be on scene for a different incident when he made eye contact with the patient at which time the patient put her hand out and sat herself on the ground    She then stated that she fell

## 2018-07-24 NOTE — DISCHARGE INSTRUCTIONS
Acute Low Back Pain   WHAT YOU NEED TO KNOW:   Acute low back pain is sudden discomfort in your lower back area that lasts for up to 6 weeks  The discomfort makes it difficult to tolerate activity  DISCHARGE INSTRUCTIONS:   Seek care immediately or call 911 if:   · You have severe pain  · You have sudden stiffness and heaviness on both buttocks down to both legs  · You have numbness or weakness in one leg, or pain in both legs  · You have numbness in your genital area or across your lower back  · You cannot control your urine or bowel movements  Contact your healthcare provider if:   · You have a fever  · You have pain at night or when you rest     · Your pain does not get better with treatment  · You have pain that worsens when you cough or sneeze  · You suddenly feel something pop or snap in your back  · You have questions or concerns about your condition or care  Medicines: The following medicines may be ordered by your healthcare provider:  · Acetaminophen  decreases pain  It is available without a doctor's order  Ask how much to take and how often to take it  Follow directions  Acetaminophen can cause liver damage if not taken correctly  · NSAIDs  help decrease swelling and pain  This medicine is available with or without a doctor's order  NSAIDs can cause stomach bleeding or kidney problems in certain people  If you take blood thinner medicine, always ask your healthcare provider if NSAIDs are safe for you  Always read the medicine label and follow directions  · Prescription pain medicine  may be given  Ask your healthcare provider how to take this medicine safely  · Muscle relaxers  decrease pain by relaxing the muscles in your lower spine  · Take your medicine as directed  Contact your healthcare provider if you think your medicine is not helping or if you have side effects  Tell him of her if you are allergic to any medicine   Keep a list of the medicines, vitamins, and herbs you take  Include the amounts, and when and why you take them  Bring the list or the pill bottles to follow-up visits  Carry your medicine list with you in case of an emergency  Self-care:   · Stay active  as much as you can without causing more pain  Bed rest could make your back pain worse  Start with some light exercises such as walking  Avoid heavy lifting until your pain is gone  Ask for more information about the activities or exercises that are right for you  · Ice  helps decrease swelling, pain, and muscle spams  Put crushed ice in a plastic bag  Cover it with a towel  Place it on your lower back for 20 to 30 minutes every 2 hours  Do this for about 2 to 3 days after your pain starts, or as directed  · Heat  helps decrease pain and muscle spasms  Start to use heat after treatment with ice has stopped  Use a small towel dampened with warm water or a heating pad, or sit in a warm bath  Apply heat on the area for 20 to 30 minutes every 2 hours for as many days as directed  Alternate heat and ice  Prevent acute low back pain:   · Use proper body mechanics  ¨ Bend at the hips and knees when you  objects  Do not bend from the waist  Use your leg muscles as you lift the load  Do not use your back  Keep the object close to your chest as you lift it  Try not to twist or lift anything above your waist     ¨ Change your position often when you stand for long periods of time  Rest one foot on a small box or footrest, and then switch to the other foot often  ¨ Try not to sit for long periods of time  When you do, sit in a straight-backed chair with your feet flat on the floor  Never reach, pull, or push while you are sitting  · Do exercises that strengthen your back muscles  Warm up before you exercise  Ask your healthcare provider the best exercises for you  · Maintain a healthy weight  Ask your healthcare provider how much you should weigh   Ask him to help you create a weight loss plan if you are overweight  Follow up with your healthcare provider as directed:  Return for a follow-up visit if you still have pain after 1 to 3 weeks of treatment  You may need to visit an orthopedist if your back pain lasts more than 6 to 12 weeks  Write down your questions so you remember to ask them during your visits  © 2017 [employer]  Information is for End User's use only and may not be sold, redistributed or otherwise used for commercial purposes  All illustrations and images included in CareNotes® are the copyrighted property of [employer]  or [employer]  The above information is an  only  It is not intended as medical advice for individual conditions or treatments  Talk to your doctor, nurse or pharmacist before following any medical regimen to see if it is safe and effective for you
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
## 2018-11-14 ENCOUNTER — HOSPITAL ENCOUNTER (EMERGENCY)
Facility: HOSPITAL | Age: 58
Discharge: HOME | End: 2018-11-15
Attending: EMERGENCY MEDICINE | Admitting: EMERGENCY MEDICINE
Payer: COMMERCIAL

## 2018-11-14 ENCOUNTER — APPOINTMENT (EMERGENCY)
Dept: RADIOLOGY | Facility: HOSPITAL | Age: 58
End: 2018-11-14
Payer: COMMERCIAL

## 2018-11-14 DIAGNOSIS — N39.0 UTI (URINARY TRACT INFECTION): ICD-10-CM

## 2018-11-14 DIAGNOSIS — R41.82 CHANGE IN MENTAL STATUS: Primary | ICD-10-CM

## 2018-11-14 DIAGNOSIS — F31.9 BIPOLAR DISORDER (HCC): ICD-10-CM

## 2018-11-14 LAB
ALBUMIN SERPL BCP-MCNC: 3.1 G/DL (ref 3.5–5)
ALP SERPL-CCNC: 116 U/L (ref 46–116)
ALT SERPL W P-5'-P-CCNC: 21 U/L (ref 12–78)
AMPHETAMINES SERPL QL SCN: NEGATIVE
ANION GAP SERPL CALCULATED.3IONS-SCNC: 8 MMOL/L (ref 4–13)
APTT PPP: 28 SECONDS (ref 26–38)
AST SERPL W P-5'-P-CCNC: 24 U/L (ref 5–45)
BACTERIA UR QL AUTO: ABNORMAL /HPF
BARBITURATES UR QL: NEGATIVE
BASOPHILS # BLD AUTO: 0.06 THOUSANDS/ΜL (ref 0–0.1)
BASOPHILS NFR BLD AUTO: 1 % (ref 0–1)
BENZODIAZ UR QL: NEGATIVE
BILIRUB SERPL-MCNC: 0.7 MG/DL (ref 0.2–1)
BILIRUB UR QL STRIP: NEGATIVE
BUN SERPL-MCNC: 12 MG/DL (ref 5–25)
CALCIUM SERPL-MCNC: 8.6 MG/DL (ref 8.3–10.1)
CHLORIDE SERPL-SCNC: 103 MMOL/L (ref 100–108)
CLARITY UR: ABNORMAL
CO2 SERPL-SCNC: 28 MMOL/L (ref 21–32)
COCAINE UR QL: NEGATIVE
COLOR UR: ABNORMAL
CREAT SERPL-MCNC: 0.76 MG/DL (ref 0.6–1.3)
EOSINOPHIL # BLD AUTO: 0.64 THOUSAND/ΜL (ref 0–0.61)
EOSINOPHIL NFR BLD AUTO: 6 % (ref 0–6)
ERYTHROCYTE [DISTWIDTH] IN BLOOD BY AUTOMATED COUNT: 12.6 % (ref 11.6–15.1)
ETHANOL SERPL-MCNC: <3 MG/DL (ref 0–3)
GFR SERPL CREATININE-BSD FRML MDRD: 87 ML/MIN/1.73SQ M
GLUCOSE SERPL-MCNC: 86 MG/DL (ref 65–140)
GLUCOSE UR STRIP-MCNC: NEGATIVE MG/DL
HCT VFR BLD AUTO: 36.8 % (ref 34.8–46.1)
HGB BLD-MCNC: 12.1 G/DL (ref 11.5–15.4)
HGB UR QL STRIP.AUTO: NEGATIVE
IMM GRANULOCYTES # BLD AUTO: 0.03 THOUSAND/UL (ref 0–0.2)
IMM GRANULOCYTES NFR BLD AUTO: 0 % (ref 0–2)
INR PPP: 1 (ref 0.86–1.16)
KETONES UR STRIP-MCNC: NEGATIVE MG/DL
LEUKOCYTE ESTERASE UR QL STRIP: ABNORMAL
LYMPHOCYTES # BLD AUTO: 3.65 THOUSANDS/ΜL (ref 0.6–4.47)
LYMPHOCYTES NFR BLD AUTO: 34 % (ref 14–44)
MAGNESIUM SERPL-MCNC: 1.7 MG/DL (ref 1.6–2.6)
MCH RBC QN AUTO: 31.1 PG (ref 26.8–34.3)
MCHC RBC AUTO-ENTMCNC: 32.9 G/DL (ref 31.4–37.4)
MCV RBC AUTO: 95 FL (ref 82–98)
METHADONE UR QL: NEGATIVE
MONOCYTES # BLD AUTO: 1.16 THOUSAND/ΜL (ref 0.17–1.22)
MONOCYTES NFR BLD AUTO: 11 % (ref 4–12)
NEUTROPHILS # BLD AUTO: 5.25 THOUSANDS/ΜL (ref 1.85–7.62)
NEUTS SEG NFR BLD AUTO: 48 % (ref 43–75)
NITRITE UR QL STRIP: NEGATIVE
NON-SQ EPI CELLS URNS QL MICRO: ABNORMAL /HPF
NRBC BLD AUTO-RTO: 0 /100 WBCS
OPIATES UR QL SCN: NEGATIVE
PCP UR QL: NEGATIVE
PH UR STRIP.AUTO: 5.5 [PH] (ref 5–9)
PHOSPHATE SERPL-MCNC: 3.1 MG/DL (ref 2.7–4.5)
PLATELET # BLD AUTO: 433 THOUSANDS/UL (ref 149–390)
PMV BLD AUTO: 9.8 FL (ref 8.9–12.7)
POTASSIUM SERPL-SCNC: 3.4 MMOL/L (ref 3.5–5.3)
PROT SERPL-MCNC: 6.1 G/DL (ref 6.4–8.2)
PROT UR STRIP-MCNC: NEGATIVE MG/DL
PROTHROMBIN TIME: 10.5 SECONDS (ref 9.4–11.7)
RBC # BLD AUTO: 3.89 MILLION/UL (ref 3.81–5.12)
RBC #/AREA URNS AUTO: ABNORMAL /HPF
SODIUM SERPL-SCNC: 139 MMOL/L (ref 136–145)
SP GR UR STRIP.AUTO: 1.02 (ref 1–1.03)
THC UR QL: NEGATIVE
TROPONIN I SERPL-MCNC: <0.02 NG/ML
UROBILINOGEN UR QL STRIP.AUTO: 0.2 E.U./DL
WBC # BLD AUTO: 10.79 THOUSAND/UL (ref 4.31–10.16)
WBC #/AREA URNS AUTO: ABNORMAL /HPF

## 2018-11-14 PROCEDURE — 73564 X-RAY EXAM KNEE 4 OR MORE: CPT

## 2018-11-14 PROCEDURE — 72125 CT NECK SPINE W/O DYE: CPT

## 2018-11-14 PROCEDURE — 73590 X-RAY EXAM OF LOWER LEG: CPT

## 2018-11-14 PROCEDURE — 84484 ASSAY OF TROPONIN QUANT: CPT | Performed by: EMERGENCY MEDICINE

## 2018-11-14 PROCEDURE — 93005 ELECTROCARDIOGRAM TRACING: CPT

## 2018-11-14 PROCEDURE — 80053 COMPREHEN METABOLIC PANEL: CPT | Performed by: EMERGENCY MEDICINE

## 2018-11-14 PROCEDURE — 96361 HYDRATE IV INFUSION ADD-ON: CPT

## 2018-11-14 PROCEDURE — 96360 HYDRATION IV INFUSION INIT: CPT

## 2018-11-14 PROCEDURE — 85025 COMPLETE CBC W/AUTO DIFF WBC: CPT | Performed by: EMERGENCY MEDICINE

## 2018-11-14 PROCEDURE — 80320 DRUG SCREEN QUANTALCOHOLS: CPT | Performed by: EMERGENCY MEDICINE

## 2018-11-14 PROCEDURE — 70450 CT HEAD/BRAIN W/O DYE: CPT

## 2018-11-14 PROCEDURE — 81001 URINALYSIS AUTO W/SCOPE: CPT | Performed by: EMERGENCY MEDICINE

## 2018-11-14 PROCEDURE — 90715 TDAP VACCINE 7 YRS/> IM: CPT | Performed by: EMERGENCY MEDICINE

## 2018-11-14 PROCEDURE — 83735 ASSAY OF MAGNESIUM: CPT | Performed by: EMERGENCY MEDICINE

## 2018-11-14 PROCEDURE — 85610 PROTHROMBIN TIME: CPT | Performed by: EMERGENCY MEDICINE

## 2018-11-14 PROCEDURE — 36415 COLL VENOUS BLD VENIPUNCTURE: CPT | Performed by: EMERGENCY MEDICINE

## 2018-11-14 PROCEDURE — 85730 THROMBOPLASTIN TIME PARTIAL: CPT | Performed by: EMERGENCY MEDICINE

## 2018-11-14 PROCEDURE — 87086 URINE CULTURE/COLONY COUNT: CPT | Performed by: EMERGENCY MEDICINE

## 2018-11-14 PROCEDURE — 90471 IMMUNIZATION ADMIN: CPT

## 2018-11-14 PROCEDURE — 84100 ASSAY OF PHOSPHORUS: CPT | Performed by: EMERGENCY MEDICINE

## 2018-11-14 PROCEDURE — 80307 DRUG TEST PRSMV CHEM ANLYZR: CPT | Performed by: EMERGENCY MEDICINE

## 2018-11-14 PROCEDURE — 71046 X-RAY EXAM CHEST 2 VIEWS: CPT

## 2018-11-14 PROCEDURE — 99285 EMERGENCY DEPT VISIT HI MDM: CPT

## 2018-11-14 RX ORDER — POTASSIUM CHLORIDE 20 MEQ/1
40 TABLET, EXTENDED RELEASE ORAL ONCE
Status: COMPLETED | OUTPATIENT
Start: 2018-11-14 | End: 2018-11-14

## 2018-11-14 RX ORDER — LORAZEPAM 1 MG/1
1 TABLET ORAL ONCE
Status: COMPLETED | OUTPATIENT
Start: 2018-11-14 | End: 2018-11-14

## 2018-11-14 RX ORDER — CEPHALEXIN 500 MG/1
500 CAPSULE ORAL ONCE
Status: COMPLETED | OUTPATIENT
Start: 2018-11-14 | End: 2018-11-14

## 2018-11-14 RX ADMIN — CEPHALEXIN 500 MG: 500 CAPSULE ORAL at 21:58

## 2018-11-14 RX ADMIN — POTASSIUM CHLORIDE 40 MEQ: 1500 TABLET, EXTENDED RELEASE ORAL at 22:14

## 2018-11-14 RX ADMIN — TETANUS TOXOID, REDUCED DIPHTHERIA TOXOID AND ACELLULAR PERTUSSIS VACCINE, ADSORBED 0.5 ML: 5; 2.5; 8; 8; 2.5 SUSPENSION INTRAMUSCULAR at 21:46

## 2018-11-14 RX ADMIN — SODIUM CHLORIDE 1000 ML: 0.9 INJECTION, SOLUTION INTRAVENOUS at 20:09

## 2018-11-14 RX ADMIN — LORAZEPAM 1 MG: 1 TABLET ORAL at 23:18

## 2018-11-15 VITALS
SYSTOLIC BLOOD PRESSURE: 117 MMHG | HEART RATE: 65 BPM | DIASTOLIC BLOOD PRESSURE: 75 MMHG | RESPIRATION RATE: 14 BRPM | OXYGEN SATURATION: 94 % | TEMPERATURE: 96.2 F

## 2018-11-15 LAB — AMMONIA PLAS-SCNC: 22 UMOL/L (ref 11–35)

## 2018-11-15 PROCEDURE — 96372 THER/PROPH/DIAG INJ SC/IM: CPT

## 2018-11-15 PROCEDURE — 82140 ASSAY OF AMMONIA: CPT

## 2018-11-15 PROCEDURE — 36415 COLL VENOUS BLD VENIPUNCTURE: CPT

## 2018-11-15 RX ORDER — OLANZAPINE 10 MG/1
INJECTION, POWDER, LYOPHILIZED, FOR SOLUTION INTRAMUSCULAR
Status: COMPLETED
Start: 2018-11-15 | End: 2018-11-15

## 2018-11-15 RX ORDER — HALOPERIDOL 5 MG/ML
5 INJECTION INTRAMUSCULAR ONCE
Status: COMPLETED | OUTPATIENT
Start: 2018-11-15 | End: 2018-11-15

## 2018-11-15 RX ORDER — NITROFURANTOIN 25; 75 MG/1; MG/1
100 CAPSULE ORAL 2 TIMES DAILY
Qty: 14 CAPSULE | Refills: 0 | Status: SHIPPED | OUTPATIENT
Start: 2018-11-15 | End: 2018-11-22

## 2018-11-15 RX ORDER — OLANZAPINE 10 MG/1
10 INJECTION, POWDER, LYOPHILIZED, FOR SOLUTION INTRAMUSCULAR ONCE
Status: COMPLETED | OUTPATIENT
Start: 2018-11-15 | End: 2018-11-15

## 2018-11-15 RX ADMIN — OLANZAPINE 10 MG: 10 INJECTION, POWDER, FOR SOLUTION INTRAMUSCULAR at 01:39

## 2018-11-15 RX ADMIN — OLANZAPINE 10 MG: 10 INJECTION, POWDER, LYOPHILIZED, FOR SOLUTION INTRAMUSCULAR at 01:39

## 2018-11-15 RX ADMIN — HALOPERIDOL LACTATE 5 MG: 5 INJECTION, SOLUTION INTRAMUSCULAR at 02:42

## 2018-11-15 NOTE — ED NOTES
Pt ambulated to bathroom with one assist, pt poorly kept, pt feet and legs dirty       Ray Alvarenga RN  11/15/18 7574

## 2018-11-15 NOTE — ED NOTES
Pt non stop in bed, attempting to get oob, rambling on about her clothes, attempt to reorient  Dr Radha Jones made aware       Yolanda Borjas, RN  11/15/18 0309

## 2018-11-15 NOTE — ED NOTES
Pt with multiple attempts to get OOB, Dr Sapna Novak aware, meds ordered       Dorita Ruggiero, RN  11/15/18 9121

## 2018-11-15 NOTE — DISCHARGE INSTRUCTIONS
Urinary Tract Infection in Women   WHAT YOU NEED TO KNOW:   A urinary tract infection (UTI) is caused by bacteria that get inside your urinary tract  Most bacteria that enter your urinary tract come out when you urinate  If the bacteria stay in your urinary tract, you may get an infection  Your urinary tract includes your kidneys, ureters, bladder, and urethra  Urine is made in your kidneys, and it flows from the ureters to the bladder  Urine leaves the bladder through the urethra  A UTI is more common in your lower urinary tract, which includes your bladder and urethra  DISCHARGE INSTRUCTIONS:   Return to the emergency department if:   · You are urinating very little or not at all  · You have a high fever with shaking chills  · You have side or back pain that gets worse  Contact your healthcare provider if:   · You have a fever  · You do not feel better after 2 days of taking antibiotics  · You are vomiting  · You have questions or concerns about your condition or care  Medicines:   · Antibiotics  help fight a bacterial infection  · Medicines  may be given to decrease pain and burning when you urinate  They will also help decrease the feeling that you need to urinate often  These medicines will make your urine orange or red  · Take your medicine as directed  Contact your healthcare provider if you think your medicine is not helping or if you have side effects  Tell him or her if you are allergic to any medicine  Keep a list of the medicines, vitamins, and herbs you take  Include the amounts, and when and why you take them  Bring the list or the pill bottles to follow-up visits  Carry your medicine list with you in case of an emergency  Follow up with your healthcare provider as directed:  Write down your questions so you remember to ask them during your visits  Prevent another UTI:   · Empty your bladder often  Urinate and empty your bladder as soon as you feel the need   Do not hold your urine for long periods of time  · Wipe from front to back after you urinate or have a bowel movement  This will help prevent germs from getting into your urinary tract through your urethra  · Drink liquids as directed  Ask how much liquid to drink each day and which liquids are best for you  You may need to drink more liquids than usual to help flush out the bacteria  Do not drink alcohol, caffeine, or citrus juices  These can irritate your bladder and increase your symptoms  Your healthcare provider may recommend cranberry juice to help prevent a UTI  · Urinate after you have sex  This can help flush out bacteria passed during sex  · Do not douche or use feminine deodorants  These can change the chemical balance in your vagina  · Change sanitary pads or tampons often  This will help prevent germs from getting into your urinary tract  · Do pelvic muscle exercises often  Pelvic muscle exercises may help you start and stop urinating  Strong pelvic muscles may help you empty your bladder easier  Squeeze these muscles tightly for 5 seconds like you are trying to hold back urine  Then relax for 5 seconds  Gradually work up to squeezing for 10 seconds  Do 3 sets of 15 repetitions a day, or as directed  © 2017 [address]  Information is for End User's use only and may not be sold, redistributed or otherwise used for commercial purposes  All illustrations and images included in CareNotes® are the copyrighted property of [employer]  or [employer]  The above information is an  only  It is not intended as medical advice for individual conditions or treatments  Talk to your doctor, nurse or pharmacist before following any medical regimen to see if it is safe and effective for you  Bipolar Disorder   WHAT YOU NEED TO KNOW:   Bipolar disorder is a long-term chemical imbalance that causes rapid changes in mood and behavior   High moods are called byron  Low moods are called depression  Sometimes you will feel manic and sometimes you will feel depressed  You can have alternating episodes of byron and depression  This is called a mixed bipolar state  DISCHARGE INSTRUCTIONS:   Contact your healthcare provider or psychiatrist if:   · You are having trouble managing your bipolar disorder  · You cannot sleep, or are sleeping all the time  · You cannot eat, or are eating more than usual     · You feel dizzy or your stomach is upset  · You cannot make it to your next meeting with your healthcare provider  · You have questions or concerns about your condition or care  Medicines:   · Medicines  may be given to help keep your mood stable, or to help you sleep  Changes in medicine are often needed as your bipolar disorder changes  · Take your medicine as directed  Contact your healthcare provider if you think your medicine is not helping or if you have side effects  Tell him or her if you are allergic to any medicine  Keep a list of the medicines, vitamins, and herbs you take  Include the amounts, and when and why you take them  Bring the list or the pill bottles to follow-up visits  Carry your medicine list with you in case of an emergency  Follow up with your healthcare provider or psychiatrist as directed: You may need to return for blood tests to monitor the levels of bipolar medicine in your blood  Manage bipolar disorder:  Watch for triggers of bipolar disorder symptoms, such as stress  Learn new ways to relax, such as deep breathing, to manage your stress  Tell someone if you feel a manic or depressive period might be coming on  Ask a friend or family member to help watch you for bipolar symptoms  Work to develop skills that will help you manage bipolar disorder  You may need to make lifestyle changes  Ask your healthcare provider or psychiatrist for resources     For support and more information:   · [address] [facility], Public Information & Communication Branch  [address], [address], [address]  [place] [address]   Phone: [phone]  Phone: [phone]  Web Address: [employer] tn  · Depression and [address] (DBS[employer])  [address] [place] [address], [address] [place] [address] , [address] [address]  Phone: [phone]  Web Address: [employer] no  org   © 2017 [address] [facility] Information is for End User's use only and may not be sold, redistributed or otherwise used for commercial purposes  All illustrations and images included in CareNotes® are the copyrighted property of A D A M [employer]  or [employer]  The above information is an  only  It is not intended as medical advice for individual conditions or treatments  Talk to your doctor, nurse or pharmacist before following any medical regimen to see if it is safe and effective for you

## 2018-11-15 NOTE — ED NOTES
Pt sleeping soundly  Per night shift the pt  Was up all night and should not be woken this am  One to one at bedside       Reza Queen RN  11/15/18 0095

## 2018-11-15 NOTE — ED CARE HANDOFF
Emergency Department Sign Out Note        Sign out and transfer of care from Dr Nicol Monsivais  See Separate Emergency Department note  The patient, Cecelia Langley, was evaluated by the previous provider for psychiatric evaluation  Workup Completed:  Complete medical workup    ED Course / Workup Pending (followup): Psychiatric screening                             Procedures  MDM  Number of Diagnoses or Management Options  Bipolar disorder [facility]):   Change in mental status:   UTI (urinary tract infection):   Diagnosis management comments: Patient is medically cleared and screen by the emergency room mental health workers  Patient is stable for discharge to follow up with her primary care doctor  CritCare Time      Disposition  Final diagnoses:   Change in mental status   UTI (urinary tract infection)   Bipolar disorder ([facility] [address] )     Time reflects when diagnosis was documented in both MDM as applicable and the Disposition within this note     Time User Action Codes Description Comment    11/14/2018  9:49 PM Ike Quinn Add [R41 82] Change in mental status     11/14/2018  9:55 PM Loral Litchfield Add [N39 0] UTI (urinary tract infection)     11/15/2018  6:00 PM Wells Flakes Add [F31 9] Bipolar disorder [facility])       ED Disposition     ED Disposition Condition Comment    Discharge  Cecelia Langley discharge to home/self care      Condition at discharge: Stable        Follow-up Information     Follow up With Specialties Details Why Contact Info    Cherri Salgado MD  Schedule an appointment as soon as possible for a visit in 3 days  Lori HOLGUIN [address] [facility]  [phone]          Patient's Medications   Discharge Prescriptions    NITROFURANTOIN (MACROBID) 100 MG CAPSULE    Take 1 capsule (100 mg total) by mouth 2 (two) times a day for 7 days       Start Date: 11/15/2018End Date: 11/22/2018       Order Dose: 100 mg       Quantity: 14 capsule    Refills: 0     No discharge procedures on file         ED Provider  Electronically Signed by     Sharonda Howard MD  11/15/18 0825

## 2018-11-15 NOTE — ED NOTES
Family guidance attempted to speak with patient, pt not awake for evaluation, will evaluate later in day       Antelmo Schwartz, RN  11/15/18 6133

## 2018-11-15 NOTE — ED NOTES
Pt continues to move around in bed, attempt to get OOB, upper dentures removed and secured in locker #22  Pt with 1:1 at bedside for safety       Nidia Murphy RN  11/15/18 9093

## 2018-11-15 NOTE — ED NOTES
Pt moved to room 2 for close 1:1 observation, and not to disturb other secured holding patients  Pt continues to ramble and remain restless in bed, safety maintained  Tech at bedside       Linh Teague RN  11/15/18 Mei Will

## 2018-11-15 NOTE — ED NOTES
Pt finally observed sleeping, HOB up respirations easy and unlabored       Jessica Linton RN  11/15/18 5276
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
## 2018-11-15 NOTE — ED NOTES
11/15/18 @ 0730:  Received call from Vimal at [facility]; PES updated that patient is sleeping, and was up all night  ED MD noted that patient should not be wakened this AM, therefore, PES will contact [facility] center for screening when patient wakes    [address] MS
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
## 2018-11-15 NOTE — ED NOTES
Pt remains restless in bed, disoriented  Attempts to get OOB, "looking for underwear"  Attempt to reorient  Obervation maintained       John Arthur RN  11/15/18 8074

## 2018-11-15 NOTE — ED NOTES
Pt remains restless in bed, 1:1 at bedside  Repositioned frequently, pt makes no sense when speaking, nura Rodriguez RN  11/15/18 1450

## 2018-11-15 NOTE — ED NOTES
Patient Belongings - Locker 22    Bag #1  Sandals  Pajama Bottoms  Two Tanktops  Hair tie  Sweater    Purse  Two Checkbooks  Pre Rolled Cigarettes  Wallet  -Misc   Cards  Keychain    Red Bag  Medications     Dashawn Agudelo  11/14/18 5520
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
## 2018-11-15 NOTE — ED NOTES
[facility] did a mobile outreach after being contacted by patient's therapist, Deandra Briscoe, BERTHA Squires and Dr John Bay) all who saw patient on 11/13/18 - concerns were regarding patient being disorganized; disoriented; falling with bruising - ER staff was advised  [facility] to screen patient; chart faxed @ 22:00, called to verify receipt and spoke with Dorita  - although PES did not see any commitment criteria

## 2018-11-15 NOTE — ED NOTES
Pt restless in bed, observation maintained, spoke with family guidance and will not be seen until the morning       Concepción Antunez RN  11/14/18 7398
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
## 2018-11-16 LAB
ATRIAL RATE: 60 BPM
BACTERIA UR CULT: NORMAL
P AXIS: 30 DEGREES
PR INTERVAL: 144 MS
QRS AXIS: -6 DEGREES
QRSD INTERVAL: 94 MS
QT INTERVAL: 440 MS
QTC INTERVAL: 440 MS
T WAVE AXIS: 17 DEGREES
VENTRICULAR RATE: 60 BPM

## 2018-11-16 PROCEDURE — 93010 ELECTROCARDIOGRAM REPORT: CPT | Performed by: INTERNAL MEDICINE

## 2018-11-16 NOTE — ED NOTES
Pt's belongings returned, to go home via [facility], discharge instructions given     Cordell Zimmerman RN  11/15/18 1544

## 2018-11-20 ENCOUNTER — HOSPITAL ENCOUNTER (EMERGENCY)
Facility: HOSPITAL | Age: 58
End: 2018-11-22
Attending: EMERGENCY MEDICINE | Admitting: EMERGENCY MEDICINE
Payer: COMMERCIAL

## 2018-11-20 DIAGNOSIS — N39.0 UTI (URINARY TRACT INFECTION): ICD-10-CM

## 2018-11-20 DIAGNOSIS — F41.9 ANXIETY: ICD-10-CM

## 2018-11-20 DIAGNOSIS — F32.A DEPRESSION: ICD-10-CM

## 2018-11-20 DIAGNOSIS — F31.9 BIPOLAR DISORDER (HCC): ICD-10-CM

## 2018-11-20 DIAGNOSIS — F23 ACUTE PSYCHOSIS (HCC): Primary | ICD-10-CM

## 2018-11-20 LAB
ALBUMIN SERPL BCP-MCNC: 3.3 G/DL (ref 3.5–5)
ALP SERPL-CCNC: 126 U/L (ref 46–116)
ALT SERPL W P-5'-P-CCNC: 45 U/L (ref 12–78)
AMPHETAMINES SERPL QL SCN: NEGATIVE
ANION GAP SERPL CALCULATED.3IONS-SCNC: 12 MMOL/L (ref 4–13)
AST SERPL W P-5'-P-CCNC: 61 U/L (ref 5–45)
BACTERIA UR QL AUTO: ABNORMAL /HPF
BARBITURATES UR QL: NEGATIVE
BASOPHILS # BLD AUTO: 0.03 THOUSANDS/ΜL (ref 0–0.1)
BASOPHILS NFR BLD AUTO: 0 % (ref 0–1)
BENZODIAZ UR QL: NEGATIVE
BILIRUB SERPL-MCNC: 1 MG/DL (ref 0.2–1)
BILIRUB UR QL STRIP: ABNORMAL
BUN SERPL-MCNC: 19 MG/DL (ref 5–25)
CALCIUM SERPL-MCNC: 9.1 MG/DL (ref 8.3–10.1)
CHLORIDE SERPL-SCNC: 101 MMOL/L (ref 100–108)
CLARITY UR: ABNORMAL
CO2 SERPL-SCNC: 26 MMOL/L (ref 21–32)
COCAINE UR QL: NEGATIVE
COLOR UR: YELLOW
CREAT SERPL-MCNC: 0.6 MG/DL (ref 0.6–1.3)
EOSINOPHIL # BLD AUTO: 0.05 THOUSAND/ΜL (ref 0–0.61)
EOSINOPHIL NFR BLD AUTO: 1 % (ref 0–6)
ERYTHROCYTE [DISTWIDTH] IN BLOOD BY AUTOMATED COUNT: 12.9 % (ref 11.6–15.1)
ETHANOL SERPL-MCNC: <3 MG/DL (ref 0–3)
EXT PREG TEST URINE: NEGATIVE
GFR SERPL CREATININE-BSD FRML MDRD: 102 ML/MIN/1.73SQ M
GLUCOSE SERPL-MCNC: 104 MG/DL (ref 65–140)
GLUCOSE UR STRIP-MCNC: NEGATIVE MG/DL
HCT VFR BLD AUTO: 34 % (ref 34.8–46.1)
HGB BLD-MCNC: 11.1 G/DL (ref 11.5–15.4)
HGB UR QL STRIP.AUTO: NEGATIVE
IMM GRANULOCYTES # BLD AUTO: 0.03 THOUSAND/UL (ref 0–0.2)
IMM GRANULOCYTES NFR BLD AUTO: 0 % (ref 0–2)
KETONES UR STRIP-MCNC: ABNORMAL MG/DL
LEUKOCYTE ESTERASE UR QL STRIP: ABNORMAL
LYMPHOCYTES # BLD AUTO: 1.83 THOUSANDS/ΜL (ref 0.6–4.47)
LYMPHOCYTES NFR BLD AUTO: 18 % (ref 14–44)
MCH RBC QN AUTO: 30.7 PG (ref 26.8–34.3)
MCHC RBC AUTO-ENTMCNC: 32.6 G/DL (ref 31.4–37.4)
MCV RBC AUTO: 94 FL (ref 82–98)
METHADONE UR QL: NEGATIVE
MONOCYTES # BLD AUTO: 1.31 THOUSAND/ΜL (ref 0.17–1.22)
MONOCYTES NFR BLD AUTO: 13 % (ref 4–12)
MUCOUS THREADS UR QL AUTO: ABNORMAL
NEUTROPHILS # BLD AUTO: 6.74 THOUSANDS/ΜL (ref 1.85–7.62)
NEUTS SEG NFR BLD AUTO: 68 % (ref 43–75)
NITRITE UR QL STRIP: NEGATIVE
NON-SQ EPI CELLS URNS QL MICRO: ABNORMAL /HPF
NRBC BLD AUTO-RTO: 0 /100 WBCS
OPIATES UR QL SCN: NEGATIVE
PCP UR QL: NEGATIVE
PH UR STRIP.AUTO: 5.5 [PH] (ref 5–9)
PLATELET # BLD AUTO: 384 THOUSANDS/UL (ref 149–390)
PMV BLD AUTO: 9.5 FL (ref 8.9–12.7)
POTASSIUM SERPL-SCNC: 3.1 MMOL/L (ref 3.5–5.3)
PROT SERPL-MCNC: 6.6 G/DL (ref 6.4–8.2)
PROT UR STRIP-MCNC: ABNORMAL MG/DL
RBC # BLD AUTO: 3.61 MILLION/UL (ref 3.81–5.12)
RBC #/AREA URNS AUTO: ABNORMAL /HPF
SODIUM SERPL-SCNC: 139 MMOL/L (ref 136–145)
SP GR UR STRIP.AUTO: >=1.03 (ref 1–1.03)
THC UR QL: NEGATIVE
TROPONIN I SERPL-MCNC: <0.02 NG/ML
UROBILINOGEN UR QL STRIP.AUTO: 0.2 E.U./DL
WBC # BLD AUTO: 9.99 THOUSAND/UL (ref 4.31–10.16)
WBC #/AREA URNS AUTO: ABNORMAL /HPF

## 2018-11-20 PROCEDURE — 80053 COMPREHEN METABOLIC PANEL: CPT | Performed by: EMERGENCY MEDICINE

## 2018-11-20 PROCEDURE — 99285 EMERGENCY DEPT VISIT HI MDM: CPT

## 2018-11-20 PROCEDURE — 85025 COMPLETE CBC W/AUTO DIFF WBC: CPT | Performed by: EMERGENCY MEDICINE

## 2018-11-20 PROCEDURE — G0480 DRUG TEST DEF 1-7 CLASSES: HCPCS | Performed by: EMERGENCY MEDICINE

## 2018-11-20 PROCEDURE — 80320 DRUG SCREEN QUANTALCOHOLS: CPT | Performed by: EMERGENCY MEDICINE

## 2018-11-20 PROCEDURE — 36415 COLL VENOUS BLD VENIPUNCTURE: CPT | Performed by: EMERGENCY MEDICINE

## 2018-11-20 PROCEDURE — 81025 URINE PREGNANCY TEST: CPT | Performed by: EMERGENCY MEDICINE

## 2018-11-20 PROCEDURE — 93005 ELECTROCARDIOGRAM TRACING: CPT

## 2018-11-20 PROCEDURE — 84484 ASSAY OF TROPONIN QUANT: CPT | Performed by: EMERGENCY MEDICINE

## 2018-11-20 PROCEDURE — 81001 URINALYSIS AUTO W/SCOPE: CPT | Performed by: EMERGENCY MEDICINE

## 2018-11-20 PROCEDURE — 80307 DRUG TEST PRSMV CHEM ANLYZR: CPT | Performed by: EMERGENCY MEDICINE

## 2018-11-20 RX ORDER — POTASSIUM CHLORIDE 20 MEQ/1
40 TABLET, EXTENDED RELEASE ORAL ONCE
Status: COMPLETED | OUTPATIENT
Start: 2018-11-20 | End: 2018-11-20

## 2018-11-20 RX ORDER — LORAZEPAM 1 MG/1
1 TABLET ORAL ONCE
Status: COMPLETED | OUTPATIENT
Start: 2018-11-20 | End: 2018-11-20

## 2018-11-20 RX ADMIN — POTASSIUM CHLORIDE 40 MEQ: 1500 TABLET, EXTENDED RELEASE ORAL at 12:40

## 2018-11-20 RX ADMIN — LORAZEPAM 1 MG: 1 TABLET ORAL at 14:01
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
## 2018-11-20 NOTE — ED NOTES
Pt observed to be pacing in her room and muttering to herself  Pt states that she feels very anxious and is concerned about her cat   Doctor Robin made aware        Lissett Alexandre RN  11/20/18 0198

## 2018-11-20 NOTE — ED NOTES
11/20/18 @ [phone]:  Please see copy of crisis assessment dated 11/14/18 for full assessment  63year-old white female, re-presented to the ED after being seen running in the neighborhood naked  Patient's neighbor spotted patient and called 911  Patient says, "I don't remember doing that; How did I get these cuts on my arms and legs?"  Patient says, "I don't remember any of this "  Patient reports that she has been forgetting things and experiencing visual hallucinations, saying, "Since my medication was cut in half, I've been seeing people; not just men or women, but even children; I don't remember anything "  Patient reports correlate with time medication was adjusted  PES offered inpatient treatment to make necessary medication adjustment, but patient began to cry saying, "I just want to go home; I don't want to go into the hospital; send someone who really needs it "  PES asked patient to "think it over, and we will talk about it again later;" patient agreed  This writer has yet to determine if patient, due to her psychosis and memory issue, is competent to sign informed voluntary consent; assessment is ongoing  [address] [facility], [address] : PES met with patient, and although patient was understanding need for treatment, patient still doesn't remember events that happened today, therefore, this writer doesn't feel patient is able to provide informed voluntary consent  PES will seek screening for commitment        Schizophrenia, by history:  F20 9    Joanne, [place]

## 2018-11-20 NOTE — ED NOTES
Chart was faxed to [facility] around [phone] - called to verify receipt; reported they would be over shortly  [facility] staff arrived to screen patient at 18:30  [facility] staff reported patient was not able to participate in an assessment at all - was not making any sense with her answers to their questions - a tele-psych will be arranged  Called STEFANI / Freya Turner for an update @ 21:50 - in the Q for tele-psych

## 2018-11-20 NOTE — ED PROCEDURE NOTE
PROCEDURE  ECG 12 Lead Documentation  Date/Time: 11/20/2018 9:39 AM  Performed by: Sheron Parks  Authorized by: Sheron Parks     ECG reviewed by me, the ED Provider: yes    Patient location:  ED  Interpretation:     Interpretation: non-specific    Quality:     Tracing quality:  Limited by artifact (AMS patient movement)  Rate:     ECG rate assessment: normal    Rhythm:     Rhythm: sinus rhythm    Ectopy:     Ectopy: none    ST segments:     ST segments:  Non-specific  T waves:     T waves: normal           Jones Johnston DO  11/20/18 1393

## 2018-11-20 NOTE — ED NOTES
Pt states that she saw her cat scurry under the chair and it scared her  Pt states that she is anxious and wants to speak to crisis  Pt medicated as per doctors orders   Crisis worker made aware of pt's request      Tobi Ding RN  11/20/18 9119

## 2018-11-21 PROCEDURE — 87086 URINE CULTURE/COLONY COUNT: CPT | Performed by: EMERGENCY MEDICINE

## 2018-11-21 RX ORDER — ATORVASTATIN CALCIUM 20 MG/1
20 TABLET, FILM COATED ORAL
Status: DISCONTINUED | OUTPATIENT
Start: 2018-11-21 | End: 2018-11-22 | Stop reason: HOSPADM

## 2018-11-21 RX ORDER — VENLAFAXINE HYDROCHLORIDE 150 MG/1
150 CAPSULE, EXTENDED RELEASE ORAL DAILY
Status: DISCONTINUED | OUTPATIENT
Start: 2018-11-21 | End: 2018-11-22 | Stop reason: HOSPADM

## 2018-11-21 RX ORDER — FLUTICASONE FUROATE AND VILANTEROL 200; 25 UG/1; UG/1
1 POWDER RESPIRATORY (INHALATION) DAILY
Status: DISCONTINUED | OUTPATIENT
Start: 2018-11-21 | End: 2018-11-22 | Stop reason: HOSPADM

## 2018-11-21 RX ORDER — MONTELUKAST SODIUM 10 MG/1
10 TABLET ORAL
Status: DISCONTINUED | OUTPATIENT
Start: 2018-11-21 | End: 2018-11-22 | Stop reason: HOSPADM

## 2018-11-21 RX ORDER — BUSPIRONE HYDROCHLORIDE 5 MG/1
15 TABLET ORAL 2 TIMES DAILY
Status: DISCONTINUED | OUTPATIENT
Start: 2018-11-21 | End: 2018-11-22 | Stop reason: HOSPADM

## 2018-11-21 RX ORDER — PROPRANOLOL HYDROCHLORIDE 20 MG/1
10 TABLET ORAL 3 TIMES DAILY
Status: DISCONTINUED | OUTPATIENT
Start: 2018-11-21 | End: 2018-11-22 | Stop reason: HOSPADM

## 2018-11-21 RX ORDER — ASPIRIN 81 MG/1
81 TABLET, CHEWABLE ORAL DAILY
Status: DISCONTINUED | OUTPATIENT
Start: 2018-11-21 | End: 2018-11-22 | Stop reason: HOSPADM

## 2018-11-21 RX ORDER — PANTOPRAZOLE SODIUM 40 MG/1
40 TABLET, DELAYED RELEASE ORAL DAILY
Status: DISCONTINUED | OUTPATIENT
Start: 2018-11-21 | End: 2018-11-22 | Stop reason: HOSPADM

## 2018-11-21 RX ORDER — BENZTROPINE MESYLATE 0.5 MG/1
1 TABLET ORAL 2 TIMES DAILY
Status: DISCONTINUED | OUTPATIENT
Start: 2018-11-21 | End: 2018-11-22 | Stop reason: HOSPADM

## 2018-11-21 RX ORDER — ALBUTEROL SULFATE 2.5 MG/3ML
2.5 SOLUTION RESPIRATORY (INHALATION) 3 TIMES DAILY PRN
Status: DISCONTINUED | OUTPATIENT
Start: 2018-11-21 | End: 2018-11-22 | Stop reason: HOSPADM

## 2018-11-21 RX ORDER — CEPHALEXIN 500 MG/1
500 CAPSULE ORAL 2 TIMES DAILY
Status: DISCONTINUED | OUTPATIENT
Start: 2018-11-21 | End: 2018-11-22 | Stop reason: HOSPADM

## 2018-11-21 RX ORDER — ACETAMINOPHEN 325 MG/1
650 TABLET ORAL ONCE
Status: COMPLETED | OUTPATIENT
Start: 2018-11-21 | End: 2018-11-21

## 2018-11-21 RX ADMIN — CEPHALEXIN 500 MG: 500 CAPSULE ORAL at 09:01

## 2018-11-21 RX ADMIN — BENZTROPINE MESYLATE 1 MG: 0.5 TABLET ORAL at 11:02

## 2018-11-21 RX ADMIN — ACETAMINOPHEN 650 MG: 325 TABLET, FILM COATED ORAL at 10:45

## 2018-11-21 RX ADMIN — ASPIRIN 81 MG 81 MG: 81 TABLET ORAL at 10:46

## 2018-11-21 RX ADMIN — BUSPIRONE HYDROCHLORIDE 15 MG: 5 TABLET ORAL at 18:00

## 2018-11-21 RX ADMIN — PROPRANOLOL HYDROCHLORIDE 10 MG: 20 TABLET ORAL at 17:02

## 2018-11-21 RX ADMIN — VENLAFAXINE HYDROCHLORIDE 150 MG: 150 CAPSULE, EXTENDED RELEASE ORAL at 11:02

## 2018-11-21 RX ADMIN — ATORVASTATIN CALCIUM 20 MG: 20 TABLET, FILM COATED ORAL at 17:03

## 2018-11-21 RX ADMIN — LURASIDONE HYDROCHLORIDE 40 MG: 20 TABLET, FILM COATED ORAL at 17:02

## 2018-11-21 RX ADMIN — FLUTICASONE FUROATE AND VILANTEROL TRIFENATATE 1 PUFF: 200; 25 POWDER RESPIRATORY (INHALATION) at 10:55

## 2018-11-21 RX ADMIN — MONTELUKAST 10 MG: 10 TABLET, FILM COATED ORAL at 23:11

## 2018-11-21 RX ADMIN — CEPHALEXIN 500 MG: 500 CAPSULE ORAL at 17:03

## 2018-11-21 RX ADMIN — PROPRANOLOL HYDROCHLORIDE 10 MG: 20 TABLET ORAL at 10:45

## 2018-11-21 RX ADMIN — BUSPIRONE HYDROCHLORIDE 15 MG: 5 TABLET ORAL at 10:55

## 2018-11-21 RX ADMIN — BENZTROPINE MESYLATE 1 MG: 0.5 TABLET ORAL at 17:04

## 2018-11-21 RX ADMIN — LAMOTRIGINE 150 MG: 100 TABLET ORAL at 23:12

## 2018-11-21 RX ADMIN — PANTOPRAZOLE SODIUM 40 MG: 40 TABLET, DELAYED RELEASE ORAL at 10:46

## 2018-11-21 NOTE — ED NOTES
[facility] MMSE completed - score was 20/30 - faxed to [facility] @ 18:05 - called to verify receipt  [facility] pending referral to [address]  [facility] Night called - accepted at [address] - by Keli Yap MD; Rn report can be called to [phone] - o for  and ask for "[facility]"  Pateint; ER staff and [facility] all updated  [facility] to transport @ 08:00 (11/22/18)

## 2018-11-21 NOTE — ED NOTES
Patient sleeping, even and unlabored breaths remains on continuous observation     Karsten Kehr, RN  11/21/18 7311

## 2018-11-21 NOTE — ED NOTES
11/21/18 @ 0800:  Patient has been pacing in her room and is seemingly unable to calm down  PES wants patient medicated, but because we are still, after many hours, waiting [facility], medication has to wait; otherwise, patient won't be able to properly assess  PES will continue to monitor  Joanne, MS  0830: Received message [facility]; patient is in line for telepsych later this AM   Samira Salmeron, MS    0987: Sheree from [facility] arrived to complete telepsych with Dr Jean-Pierre Salmeron, MS  7581: Telepsych completed; patient committed; bed search to commence; patient complained of hip pain, which was reported to ED MD; Vimal from [facility] to confirm medication    Samira Salmeron, MS

## 2018-11-22 VITALS
HEART RATE: 77 BPM | BODY MASS INDEX: 27.76 KG/M2 | SYSTOLIC BLOOD PRESSURE: 107 MMHG | OXYGEN SATURATION: 97 % | WEIGHT: 187.39 LBS | DIASTOLIC BLOOD PRESSURE: 53 MMHG | RESPIRATION RATE: 18 BRPM | HEIGHT: 69 IN | TEMPERATURE: 98 F

## 2018-11-22 LAB — BACTERIA UR CULT: NORMAL

## 2018-11-22 NOTE — EMTALA/ACUTE CARE TRANSFER
[facility] EMERGENCY DEPARTMENT  [address] [address]  Dept: [phone]      EMTALA TRANSFER CONSENT    NAME Larissa Montalvo                                         1960                              MRN [id number]    I have been informed of my rights regarding examination, treatment, and transfer   by Dr Landen Alcocer DO    Benefits:      Risks:        Consent for Transfer:  I acknowledge that my medical condition has been evaluated and explained to me by the emergency department physician or other qualified medical person and/or my attending physician, who has recommended that I be transferred to the service of    [address] Name, [address] : [facility]  The above potential benefits of such transfer, the potential risks associated with such transfer, and the probable risks of not being transferred have been explained to me, and I fully understand them  The doctor has explained that, in my case, the benefits of transfer outweigh the risks  I agree to be transferred  I authorize the performance of emergency medical procedures and treatments upon me in both transit and upon arrival at the receiving facility  Additionally, I authorize the release of any and all medical records to the receiving facility and request they be transported with me, if possible  I understand that the safest mode of transportation during a medical emergency is an ambulance and that the Hospital advocates the use of this mode of transport  Risks of traveling to the receiving facility by car, including absence of medical control, life sustaining equipment, such as oxygen, and medical personnel has been explained to me and I fully understand them  (ARMINDA CORRECT BOX BELOW)  [  ]  I consent to the stated transfer and to be transported by ambulance/helicopter    [  ]  I consent to the stated transfer, but refuse transportation by ambulance and accept full responsibility for my transportation by car  I understand the risks of non-ambulance transfers and I exonerate the Hospital and its staff from any deterioration in my condition that results from this refusal     X___________________________________________    DATE  18  TIME________  Signature of patient or legally responsible individual signing on patient behalf           RELATIONSHIP TO PATIENT_________________________          Provider Certification    NAME Shukri Holland                                         1960                              MRN [id number]    A medical screening exam was performed on the above named patient  Based on the examination:    Condition Necessitating Transfer The primary encounter diagnosis was Acute psychosis ([address] )  Diagnoses of UTI (urinary tract infection), Bipolar disorder ([address] ), Depression, and Anxiety were also pertinent to this visit  Patient Condition:      Reason for Transfer:      Transfer Requirements: Facility     · Space available and qualified personnel available for treatment as acknowledged by    · Agreed to accept transfer and to provide appropriate medical treatment as acknowledged by          · Appropriate medical records of the examination and treatment of the patient are provided at the time of transfer   [address], [address] _______  · Transfer will be performed by qualified personnel from    and appropriate transfer equipment as required, including the use of necessary and appropriate life support measures      Provider Certification: I have examined the patient and explained the following risks and benefits of being transferred/refusing transfer to the patient/family:         Based on these reasonable risks and benefits to the patient and/or the unborn child(kayleigh), and based upon the information available at the time of the patients examination, I certify that the medical benefits reasonably to be expected from the provision of appropriate medical treatments at another medical facility outweigh the increasing risks, if any, to the individuals medical condition, and in the case of labor to the unborn child, from effecting the transfer      X____________________________________________ DATE 11/22/18        TIME_______      ORIGINAL - SEND TO MEDICAL RECORDS   COPY - SEND WITH PATIENT DURING TRANSFER

## 2018-11-22 NOTE — EMTALA/ACUTE CARE TRANSFER
[facility] EMERGENCY DEPARTMENT  [place] [address] [address]  Dept: [phone]      EMTALA TRANSFER CONSENT    NAME Red Bailon                                         1960                              MRN [id number]    I have been informed of my rights regarding examination, treatment, and transfer   by Dr Tl Wills DO    Benefits:      Risks:        { ED EMTALA TRANSFER CHOICES:[id number]}    I authorize the performance of emergency medical procedures and treatments upon me in both transit and upon arrival at the receiving facility  Additionally, I authorize the release of any and all medical records to the receiving facility and request they be transported with me, if possible  I understand that the safest mode of transportation during a medical emergency is an ambulance and that the Hospital advocates the use of this mode of transport  Risks of traveling to the receiving facility by car, including absence of medical control, life sustaining equipment, such as oxygen, and medical personnel has been explained to me and I fully understand them  (ARMINDA CORRECT BOX BELOW)  [  ]  I consent to the stated transfer and to be transported by ambulance/helicopter  [  ]  I consent to the stated transfer, but refuse transportation by ambulance and accept full responsibility for my transportation by car    I understand the risks of non-ambulance transfers and I exonerate the Hospital and its staff from any deterioration in my condition that results from this refusal     X___________________________________________    DATE  18  TIME________  Signature of patient or legally responsible individual signing on patient behalf           RELATIONSHIP TO PATIENT_________________________          Provider Certification    NAME Red Bailon                                         1960                              MRN [id number]    A medical screening exam was performed on the above named patient  Based on the examination:    Condition Necessitating Transfer The primary encounter diagnosis was Acute psychosis ([facility] [address] )  Diagnoses of UTI (urinary tract infection), Bipolar disorder ([facility] [address] ), Depression, and Anxiety were also pertinent to this visit  Patient Condition:      Reason for Transfer:      Transfer Requirements: Facility [facility]   [employer] available for treatment as acknowledged by    · Agreed to accept transfer and to provide appropriate medical treatment as acknowledged by          · Appropriate medical records of the examination and treatment of the patient are provided at the time of transfer   [address] [facility] _______  · Transfer will be performed by qualified personnel from    and appropriate transfer equipment as required, including the use of necessary and appropriate life support measures  Provider Certification: I have examined the patient and explained the following risks and benefits of being transferred/refusing transfer to the patient/family:         Based on these reasonable risks and benefits to the patient and/or the unborn child(kayleigh), and based upon the information available at the time of the patients examination, I certify that the medical benefits reasonably to be expected from the provision of appropriate medical treatments at another medical facility outweigh the increasing risks, if any, to the individuals medical condition, and in the case of labor to the unborn child, from effecting the transfer      X____________________________________________ DATE 11/22/18        TIME_______      ORIGINAL - SEND TO MEDICAL RECORDS   COPY - SEND WITH PATIENT DURING TRANSFER

## 2018-11-22 NOTE — ED NOTES
T/c to [facility], requested to be called back in 10 minutes nurse is busy with a patient     Michelle Ramos RN  11/22/18 9999

## 2018-11-22 NOTE — EMTALA/ACUTE CARE TRANSFER
[facility] EMERGENCY DEPARTMENT  [address] [facility] [address]  Dept: [phone]      EMTALA TRANSFER CONSENT    NAME Zacarias dAams                                         1960                              MRN [id number]    I have been informed of my rights regarding examination, treatment, and transfer   by Dr Ashley Damon DO    Benefits:      Risks:        Consent for Transfer:  I acknowledge that my medical condition has been evaluated and explained to me by the emergency department physician or other qualified medical person and/or my attending physician, who has recommended that I be transferred to the service of    [address] Name, [address] : [facility]  The above potential benefits of such transfer, the potential risks associated with such transfer, and the probable risks of not being transferred have been explained to me, and I fully understand them  The doctor has explained that, in my case, the benefits of transfer outweigh the risks  I agree to be transferred  I authorize the performance of emergency medical procedures and treatments upon me in both transit and upon arrival at the receiving facility  Additionally, I authorize the release of any and all medical records to the receiving facility and request they be transported with me, if possible  I understand that the safest mode of transportation during a medical emergency is an ambulance and that the Hospital advocates the use of this mode of transport  Risks of traveling to the receiving facility by car, including absence of medical control, life sustaining equipment, such as oxygen, and medical personnel has been explained to me and I fully understand them  (ARMINDA CORRECT BOX BELOW)  [  ]  I consent to the stated transfer and to be transported by ambulance/helicopter    [  ]  I consent to the stated transfer, but refuse transportation by ambulance and accept full responsibility for my transportation by car  I understand the risks of non-ambulance transfers and I exonerate the Hospital and its staff from any deterioration in my condition that results from this refusal     X___________________________________________    DATE  18  TIME________  Signature of patient or legally responsible individual signing on patient behalf           RELATIONSHIP TO PATIENT_________________________          Provider Certification    NAME Lauren Wallace                                         1960                              MRN [id number]    A medical screening exam was performed on the above named patient  Based on the examination:    Condition Necessitating Transfer The primary encounter diagnosis was Acute psychosis ([address] )  Diagnoses of UTI (urinary tract infection), Bipolar disorder ([address] ), Depression, and Anxiety were also pertinent to this visit  Patient Condition:      Reason for Transfer:      Transfer Requirements: Facility [facility]   [employer] available for treatment as acknowledged by    · Agreed to accept transfer and to provide appropriate medical treatment as acknowledged by          · Appropriate medical records of the examination and treatment of the patient are provided at the time of transfer   [address] [facility] _______  · Transfer will be performed by qualified personnel from    and appropriate transfer equipment as required, including the use of necessary and appropriate life support measures      Provider Certification: I have examined the patient and explained the following risks and benefits of being transferred/refusing transfer to the patient/family:         Based on these reasonable risks and benefits to the patient and/or the unborn child(kayleigh), and based upon the information available at the time of the patients examination, I certify that the medical benefits reasonably to be expected from the provision of appropriate medical treatments at another medical facility outweigh the increasing risks, if any, to the individuals medical condition, and in the case of labor to the unborn child, from effecting the transfer      X____________________________________________ DATE 11/22/18        TIME_______      ORIGINAL - SEND TO MEDICAL RECORDS   COPY - SEND WITH PATIENT DURING TRANSFER

## 2018-11-22 NOTE — ED NOTES
11/22/18 @ 0800:  [facility] arrived and transported patient to [facility] with all her belongings and paperwork  Patient was calm and cooperative    Isaac Mahajan, MS

## 2018-11-23 LAB
ATRIAL RATE: 82 BPM
P AXIS: -14 DEGREES
PR INTERVAL: 106 MS
QRS AXIS: 10 DEGREES
QRSD INTERVAL: 74 MS
QT INTERVAL: 410 MS
QTC INTERVAL: 479 MS
T WAVE AXIS: -14 DEGREES
VENTRICULAR RATE: 82 BPM

## 2018-11-23 PROCEDURE — 93010 ELECTROCARDIOGRAM REPORT: CPT | Performed by: INTERNAL MEDICINE

## 2019-03-28 ENCOUNTER — HOSPITAL ENCOUNTER (EMERGENCY)
Facility: HOSPITAL | Age: 59
Discharge: HOME | End: 2019-03-28
Attending: EMERGENCY MEDICINE
Payer: COMMERCIAL

## 2019-03-28 ENCOUNTER — APPOINTMENT (EMERGENCY)
Dept: RADIOLOGY | Facility: HOSPITAL | Age: 59
End: 2019-03-28
Payer: COMMERCIAL

## 2019-03-28 VITALS
HEIGHT: 67 IN | WEIGHT: 170 LBS | BODY MASS INDEX: 26.68 KG/M2 | OXYGEN SATURATION: 96 % | HEART RATE: 72 BPM | RESPIRATION RATE: 18 BRPM | TEMPERATURE: 97.3 F | DIASTOLIC BLOOD PRESSURE: 78 MMHG | SYSTOLIC BLOOD PRESSURE: 147 MMHG
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
## 2019-03-28 DIAGNOSIS — M25.531 RIGHT WRIST PAIN: ICD-10-CM

## 2019-03-28 DIAGNOSIS — L08.9 TOE INFECTION: ICD-10-CM

## 2019-03-28 PROCEDURE — 99283 EMERGENCY DEPT VISIT LOW MDM: CPT

## 2019-03-28 PROCEDURE — 73110 X-RAY EXAM OF WRIST: CPT

## 2019-03-28 RX ORDER — LAMOTRIGINE 150 MG/1
200 TABLET ORAL DAILY
COMMUNITY

## 2019-03-28 RX ORDER — TRAZODONE HYDROCHLORIDE 50 MG/1
100 TABLET ORAL
COMMUNITY

## 2019-03-28 RX ORDER — SULFAMETHOXAZOLE AND TRIMETHOPRIM 800; 160 MG/1; MG/1
1 TABLET ORAL ONCE
Status: COMPLETED | OUTPATIENT
Start: 2019-03-28 | End: 2019-03-28

## 2019-03-28 RX ORDER — NAPROXEN 500 MG/1
500 TABLET ORAL ONCE
Status: COMPLETED | OUTPATIENT
Start: 2019-03-28 | End: 2019-03-28

## 2019-03-28 RX ORDER — CYCLOBENZAPRINE HCL 10 MG
10 TABLET ORAL 3 TIMES DAILY PRN
COMMUNITY
End: 2019-06-30

## 2019-03-28 RX ORDER — SULFAMETHOXAZOLE AND TRIMETHOPRIM 800; 160 MG/1; MG/1
1 TABLET ORAL 2 TIMES DAILY
Qty: 14 TABLET | Refills: 0 | Status: SHIPPED | OUTPATIENT
Start: 2019-03-28 | End: 2019-04-04

## 2019-03-28 RX ADMIN — SULFAMETHOXAZOLE AND TRIMETHOPRIM 1 TABLET: 800; 160 TABLET ORAL at 06:50

## 2019-03-28 RX ADMIN — NAPROXEN 500 MG: 500 TABLET ORAL at 06:50

## 2019-06-30 ENCOUNTER — APPOINTMENT (EMERGENCY)
Dept: RADIOLOGY | Facility: HOSPITAL | Age: 59
End: 2019-06-30
Payer: COMMERCIAL

## 2019-06-30 ENCOUNTER — HOSPITAL ENCOUNTER (EMERGENCY)
Facility: HOSPITAL | Age: 59
Discharge: HOME | End: 2019-06-30
Attending: EMERGENCY MEDICINE | Admitting: EMERGENCY MEDICINE
Payer: COMMERCIAL

## 2019-06-30 VITALS
DIASTOLIC BLOOD PRESSURE: 68 MMHG | SYSTOLIC BLOOD PRESSURE: 115 MMHG | WEIGHT: 170 LBS | BODY MASS INDEX: 26.68 KG/M2 | OXYGEN SATURATION: 97 % | HEART RATE: 85 BPM | TEMPERATURE: 98 F | RESPIRATION RATE: 18 BRPM | HEIGHT: 67 IN

## 2019-06-30 DIAGNOSIS — S40.022A ARM CONTUSION, LEFT, INITIAL ENCOUNTER: ICD-10-CM

## 2019-06-30 DIAGNOSIS — M25.551 RIGHT HIP PAIN: ICD-10-CM
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
## 2019-06-30 LAB
EXT PREG TEST URINE: NORMAL
EXT. CONTROL ED NAV: NORMAL

## 2019-06-30 PROCEDURE — 81025 URINE PREGNANCY TEST: CPT | Performed by: EMERGENCY MEDICINE

## 2019-06-30 PROCEDURE — 99284 EMERGENCY DEPT VISIT MOD MDM: CPT

## 2019-06-30 PROCEDURE — 73080 X-RAY EXAM OF ELBOW: CPT

## 2019-06-30 PROCEDURE — 73060 X-RAY EXAM OF HUMERUS: CPT

## 2019-06-30 PROCEDURE — 73502 X-RAY EXAM HIP UNI 2-3 VIEWS: CPT

## 2019-06-30 PROCEDURE — 73130 X-RAY EXAM OF HAND: CPT

## 2019-06-30 RX ORDER — MECLIZINE HYDROCHLORIDE 25 MG/1
25 TABLET ORAL EVERY 8 HOURS PRN
COMMUNITY

## 2019-06-30 RX ORDER — ACETAMINOPHEN 325 MG/1
650 TABLET ORAL ONCE
Status: COMPLETED | OUTPATIENT
Start: 2019-06-30 | End: 2019-06-30

## 2019-06-30 RX ORDER — NAPROXEN 500 MG/1
500 TABLET ORAL 2 TIMES DAILY WITH MEALS
Qty: 20 TABLET | Refills: 0 | Status: SHIPPED | OUTPATIENT
Start: 2019-06-30

## 2019-06-30 RX ORDER — DESVENLAFAXINE 50 MG/1
50 TABLET, EXTENDED RELEASE ORAL DAILY
COMMUNITY

## 2019-06-30 RX ORDER — ZIPRASIDONE HYDROCHLORIDE 80 MG/1
80 CAPSULE ORAL EVERY EVENING
COMMUNITY

## 2019-06-30 RX ORDER — NAPROXEN 500 MG/1
500 TABLET ORAL ONCE
Status: COMPLETED | OUTPATIENT
Start: 2019-06-30 | End: 2019-06-30

## 2019-06-30 RX ORDER — PREDNISONE 50 MG/1
50 TABLET ORAL DAILY
Qty: 5 TABLET | Refills: 0 | Status: SHIPPED | OUTPATIENT
Start: 2019-06-30 | End: 2019-07-05

## 2019-06-30 RX ORDER — BIOTIN 5 MG
1 TABLET ORAL
COMMUNITY

## 2019-06-30 RX ADMIN — NAPROXEN 500 MG: 500 TABLET ORAL at 19:03

## 2019-06-30 RX ADMIN — PREDNISONE 50 MG: 20 TABLET ORAL at 18:24

## 2019-06-30 RX ADMIN — ACETAMINOPHEN 650 MG: 325 TABLET, FILM COATED ORAL at 16:43

## 2019-06-30 NOTE — DISCHARGE INSTRUCTIONS
Please take a list of all of your medications and discharge paperwork with you to all of your follow-up medical visits  Please take all of your medications as directed  Please call your family doctor or return to the ER if you have increased shortness of breath, chest pain, fevers, chills, nausea, vomiting, diarrhea, or any other worsening symptoms  Fall Prevention   WHAT YOU NEED TO KNOW:   What is fall prevention? Fall prevention includes ways to make your home and other areas safer  It also includes ways you can move more carefully to prevent a fall  What increases my risk for falls? · Lack of vitamin D    · Not getting enough sleep each night    · Trouble walking or keeping your balance, or foot problems    · Health conditions that cause changes in your blood pressure, vision, or muscle strength and coordination    · Medicines that make you dizzy, weak, or sleepy    · Problems seeing clearly    · Shoes that have high heels or are not supportive    · Tripping hazards, such as items left on the floor, no handrails on the stairs, or broken steps  How can I help protect myself from falls? · Stand or sit up slowly  This may help you keep your balance and prevent falls  If you need to get up during the night, sit up first  Be sure you are fully awake before you stand  Turn on the light before you start walking  Go slowly in case you are still sleepy  Make sure you will not trip over any pets sleeping in the bedroom  · Use assistive devices as directed  Your healthcare provider may suggest that you use a cane or walker to help you keep your balance  You may need to have grab bars put in your bathroom near the toilet or in the shower  · Wear shoes that fit well and have soles that   Wear shoes both inside and outside  Use slippers with good   Do not wear shoes with high heels  · Wear a personal alarm  This is a device that allows you to call 911 if you fall and need help   Ask your healthcare provider for more information  · Stay active  Exercise can help strengthen your muscles and improve your balance  Your healthcare provider may recommend water aerobics or walking  He or she may also recommend physical therapy to improve your coordination  Never start an exercise program without talking to your healthcare provider first      · Manage medical conditions  Keep all appointments with your healthcare providers  Visit your eye doctor as directed  How can I make my home safer? · Add items to prevent falls in the bathroom  Put nonslip strips on your bath or shower floor to prevent you from slipping  Use a bath mat if you do not have carpet in the bathroom  This will prevent you from falling when you step out of the bath or shower  Use a shower seat so you do not need to stand while you shower  Sit on the toilet or a chair in your bathroom to dry yourself and put on clothing  This will prevent you from losing your balance from drying or dressing yourself while you are standing  · Keep paths clear  Remove books, shoes, and other objects from walkways and stairs  Place cords for telephones and lamps out of the way so that you do not need to walk over them  Tape them down if you cannot move them  Remove small rugs  If you cannot remove a rug, secure it with double-sided tape  This will prevent you from tripping  · Install bright lights in your home  Use night lights to help light paths to the bathroom or kitchen  Always turn on the light before you start walking  · Keep items you use often on shelves within reach  Do not use a step stool to help you reach an item  · Paint or place reflective tape on the edges of your stairs  This will help you see the stairs better  Call 911 or have someone else call if:   · You have fallen and are unconscious  · You have fallen and cannot move part of your body    Contact your healthcare provider if:   · You have fallen and have pain or a headache  · You have questions or concerns about your condition or care  CARE AGREEMENT:   You have the right to help plan your care  Learn about your health condition and how it may be treated  Discuss treatment options with your caregivers to decide what care you want to receive  You always have the right to refuse treatment  The above information is an  only  It is not intended as medical advice for individual conditions or treatments  Talk to your doctor, nurse or pharmacist before following any medical regimen to see if it is safe and effective for you  © 2017 [address]  Information is for End User's use only and may not be sold, redistributed or otherwise used for commercial purposes  All illustrations and images included in CareNotes® are the copyrighted property of A [employer] M [employer]  or [employer]

## 2019-06-30 NOTE — ED PROVIDER NOTES
History  Chief Complaint   Patient presents with    Fall     tripped and fell on left hip  Got up on ehr own after call  C/o lright hip, left arm, left wrist pain  52-year-old female with past medical history of hyperlipidemia, GERD, asthma, chronic back pain, bipolar disorder, history of falls in the past, presents to the ER for evaluation of right hip pain after a fall  Patient was walking outside when she is not sure if he lost her balance but fell and landed on her right hip  Patient is having mild pain in the hip as a result  Patient was able to get up on her own after the fall in the street  Patient arrived to the ER via BLS  Patient also states that she is having some left hand pain and left elbow pain after a fall at home in her apartment yesterday  Patient denies any injury to head or LOC with either 1 of these falls  Patient denies any fevers, chills, focal neuro deficits  History provided by:  Patient      Prior to Admission Medications   Prescriptions Last Dose Informant Patient Reported? Taking? Biotin (SUPER BIOTIN) 5 MG TABS   Yes Yes   Sig: Take 1 tablet by mouth   Multiple Vitamin (MULTIVITAMIN) tablet   Yes Yes   Sig: Take 1 tablet by mouth daily   aspirin 81 MG tablet   Yes Yes   Sig: Take 81 mg by mouth daily  atorvastatin (LIPITOR) 20 mg tablet   Yes Yes   Sig: Take 20 mg by mouth daily  desvenlafaxine succinate (PRISTIQ) 50 mg 24 hr tablet   Yes Yes   Sig: Take 50 mg by mouth daily   fluticasone (FLONASE) 50 mcg/act nasal spray   Yes Yes   Si spray into each nostril daily   ketotifen (ZADITOR) 0 025 % ophthalmic solution   Yes Yes   Si drop 2 (two) times a day     lamoTRIgine (LaMICtal) 150 MG tablet   Yes Yes   Sig: Take 150 mg by mouth daily    loratadine (CLARITIN) 10 mg tablet   Yes Yes   Sig: Take 10 mg by mouth daily   lurasidone (LATUDA) 40 mg tablet   Yes No   Sig: Take 80 mg by mouth daily with dinner    meclizine (ANTIVERT) 25 mg tablet   Yes Yes   Sig: Take 25 mg by mouth every 8 (eight) hours as needed for dizziness   montelukast (SINGULAIR) 10 mg tablet   Yes Yes   Sig: Take 10 mg by mouth daily at bedtime  pantoprazole (PROTONIX) 40 mg tablet   Yes Yes   Sig: Take 40 mg by mouth daily  propranolol (INDERAL) 40 mg tablet   Yes Yes   Sig: Take 10 mg by mouth daily    tolterodine (DETROL LA) 4 mg 24 hr capsule   Yes Yes   Sig: Take 4 mg by mouth daily  traZODone (DESYREL) 50 mg tablet   Yes Yes   Sig: Take 100 mg by mouth daily at bedtime as needed    venlafaxine (EFFEXOR-XR) 37 5 mg 24 hr capsule   Yes Yes   Sig: Take 75 mg by mouth daily     ziprasidone (GEODON) 80 mg capsule   Yes Yes   Sig: Take 80 mg by mouth every evening      Facility-Administered Medications: None       Past Medical History:   Diagnosis Date    Asthma     Chronic pain     back    GERD (gastroesophageal reflux disease)     Hyperlipidemia     Psychiatric disorder     bipolar       Past Surgical History:   Procedure Laterality Date    TUBAL LIGATION         History reviewed  No pertinent family history  I have reviewed and agree with the history as documented  Social History     Tobacco Use    Smoking status: Current Every Day Smoker     Packs/day: 1 00    Smokeless tobacco: Never Used   Substance Use Topics    Alcohol use: No    Drug use: No        Review of Systems   Constitutional: Negative for activity change, appetite change, chills and fever  HENT: Negative for congestion and ear pain  Eyes: Negative for pain and discharge  Respiratory: Negative for cough, chest tightness, shortness of breath, wheezing and stridor  Cardiovascular: Negative for chest pain and palpitations  Gastrointestinal: Negative for abdominal distention, abdominal pain, constipation, diarrhea and nausea  Endocrine: Negative for cold intolerance  Genitourinary: Negative for dysuria, frequency and urgency  Musculoskeletal: Positive for arthralgias  Negative for back pain     Skin: Negative for color change and rash  Allergic/Immunologic: Negative for environmental allergies and food allergies  Neurological: Negative for dizziness, weakness, numbness and headaches  Hematological: Negative for adenopathy  Psychiatric/Behavioral: Negative for agitation, behavioral problems and confusion  The patient is not nervous/anxious  All other systems reviewed and are negative  Physical Exam  Physical Exam   Constitutional: She is oriented to person, place, and time  She appears well-developed and well-nourished  HENT:   Head: Normocephalic and atraumatic  Mouth/Throat: Oropharynx is clear and moist    Eyes: Conjunctivae and EOM are normal    Neck: Normal range of motion  Neck supple  Cardiovascular: Normal rate, regular rhythm, normal heart sounds and intact distal pulses  Pulmonary/Chest: Effort normal and breath sounds normal    Abdominal: Soft  Bowel sounds are normal  She exhibits no distension  There is no tenderness  Musculoskeletal: Normal range of motion  Pulses intact bilateral upper and lower extremities  No deformity noted with palpation of pelvis  Mild pain noted with passive external and internal rotation of the right hip  No bony deformity, skin changes, edema, erythema noted on evaluation of right hip  Old ecchymosis noted to the distal lateral aspect of the left humerus  Mild tenderness to palpation noted over left elbow and humerus as well as over the 1st MCP bone of the left hand  No bony deformity, erythema, edema noted on examination of left upper extremity  Neurological: She is alert and oriented to person, place, and time  Skin: Skin is warm and dry  Psychiatric: She has a normal mood and affect  Her behavior is normal  Judgment and thought content normal    Nursing note and vitals reviewed        Vital Signs  ED Triage Vitals [06/30/19 1611]   Temperature Pulse Respirations Blood Pressure SpO2   98 °F (36 7 °C) 85 18 115/68 97 %      Temp Source Heart Rate Source Patient Position - Orthostatic VS BP Location FiO2 (%)   Tympanic Monitor Lying Left arm --      Pain Score       5           Vitals:    06/30/19 1611   BP: 115/68   Pulse: 85   Patient Position - Orthostatic VS: Lying         Visual Acuity      ED Medications  Medications   acetaminophen (TYLENOL) tablet 650 mg (650 mg Oral Given 6/30/19 1643)   predniSONE tablet 50 mg (50 mg Oral Given 6/30/19 1824)   naproxen (NAPROSYN) tablet 500 mg (500 mg Oral Given 6/30/19 1903)       Diagnostic Studies  Results Reviewed     Procedure Component Value Units Date/Time    POCT pregnancy, urine [[id number]]  (Normal) Resulted:  06/30/19 1646    Lab Status:  Final result Updated:  06/30/19 1646     EXT PREG TEST UR (Ref: Negative) neg     Control neg    UA w Reflex to Microscopic w Reflex to Culture [[id number]]     Lab Status:  No result Specimen:  Urine                  XR hip/pelv 2-3 vws right    (Results Pending)   XR humerus LEFT    (Results Pending)   XR elbow 3+ views LEFT    (Results Pending)   XR hand 3+ views LEFT    (Results Pending)              Procedures  Procedures       ED Course                               MDM  Number of Diagnoses or Management Options  Accidental fall, initial encounter: new and requires workup  Arm contusion, left, initial encounter: new and requires workup  Right hip pain: new and requires workup  Diagnosis management comments: Obtain x-ray of right hip, pelvis, left humerus, left elbow, left hand to rule out any acute traumatic injuries  Check UA and urine pregnancy       Amount and/or Complexity of Data Reviewed  Tests in the radiology section of CPT®: ordered and reviewed  Tests in the medicine section of CPT®: ordered and reviewed  Review and summarize past medical records: yes  Independent visualization of images, tracings, or specimens: yes    Risk of Complications, Morbidity, and/or Mortality  General comments: No acute traumatic abnormalities noted on x-rays  Patient continued to have some right hip pain in the ED after given steroids and naproxen  Crutches were given subsequently for ambulation  At this time patient is discharged home on crutches, p r n  Naproxen as well as prednisone  Patient to follow up with her PCP as well as orthopedic surgery for any worsening symptoms  Close return instructions given to return to the ER for any worsening symptoms  Patient agrees with discharge plan  Patient well appearing at time of discharge  Patient Progress  Patient progress: stable      Disposition  Final diagnoses:   Accidental fall, initial encounter   Right hip pain   Arm contusion, left, initial encounter     Time reflects when diagnosis was documented in both MDM as applicable and the Disposition within this note     Time User Action Codes Description Comment    6/30/2019  7:12 PM Chanel Pierce Add [W19  XXXA] Accidental fall, initial encounter     6/30/2019  7:13 PM Chanel Pierce Add [M25 551] Right hip pain     6/30/2019  7:13 PM Chanel Pierce Add [S40 022A] Arm contusion, left, initial encounter       ED Disposition     ED Disposition Condition Date/Time Comment    Discharge Stable Sun Jun 30, 2019  7:12 PM Randalyn Bodily discharge to home/self care  Follow-up Information     Follow up With Specialties Details Why Tami Downey MD  In 2 days  [address] [phone]  [facility], DO Orthopedic Surgery In 1 week If your hip pain worsens   [address]            Patient's Medications   Discharge Prescriptions    NAPROXEN (EC NAPROSYN) 500 MG EC TABLET    Take 1 tablet (500 mg total) by mouth 2 (two) times a day with meals       Start Date: 6/30/2019 End Date: --       Order Dose: 500 mg       Quantity: 20 tablet    Refills: 0    PREDNISONE 50 MG TABLET    Take 1 tablet (50 mg total) by mouth daily for 5 days       Start Date: 6/30/2019 End Date: 7/5/2019       Order Dose: 50 mg       Quantity: 5 tablet    Refills: 0     No discharge procedures on file      ED Provider  Electronically Signed by           Araseli Worrell DO  06/30/19 4761

## 2019-08-09 ENCOUNTER — HOSPITAL ENCOUNTER (EMERGENCY)
Facility: HOSPITAL | Age: 59
Discharge: HOME | End: 2019-08-09
Attending: EMERGENCY MEDICINE | Admitting: EMERGENCY MEDICINE
Payer: COMMERCIAL

## 2019-08-09 ENCOUNTER — APPOINTMENT (EMERGENCY)
Dept: RADIOLOGY | Facility: HOSPITAL | Age: 59
End: 2019-08-09
Payer: COMMERCIAL

## 2019-08-09 VITALS
DIASTOLIC BLOOD PRESSURE: 82 MMHG | RESPIRATION RATE: 20 BRPM | SYSTOLIC BLOOD PRESSURE: 134 MMHG | TEMPERATURE: 98.8 F | BODY MASS INDEX: 28.72 KG/M2 | HEIGHT: 67 IN | WEIGHT: 183 LBS | HEART RATE: 98 BPM | OXYGEN SATURATION: 93 %
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
## 2019-08-09 DIAGNOSIS — R42 VERTIGO: ICD-10-CM

## 2019-08-09 LAB
ANION GAP SERPL CALCULATED.3IONS-SCNC: 8 MMOL/L (ref 4–13)
BASOPHILS # BLD AUTO: 0.08 THOUSANDS/ΜL (ref 0–0.1)
BASOPHILS NFR BLD AUTO: 1 % (ref 0–1)
BUN SERPL-MCNC: 16 MG/DL (ref 5–25)
CALCIUM SERPL-MCNC: 9.6 MG/DL (ref 8.3–10.1)
CHLORIDE SERPL-SCNC: 101 MMOL/L (ref 100–108)
CO2 SERPL-SCNC: 30 MMOL/L (ref 21–32)
CREAT SERPL-MCNC: 0.92 MG/DL (ref 0.6–1.3)
EOSINOPHIL # BLD AUTO: 0.48 THOUSAND/ΜL (ref 0–0.61)
EOSINOPHIL NFR BLD AUTO: 4 % (ref 0–6)
ERYTHROCYTE [DISTWIDTH] IN BLOOD BY AUTOMATED COUNT: 13.3 % (ref 11.6–15.1)
GFR SERPL CREATININE-BSD FRML MDRD: 69 ML/MIN/1.73SQ M
GLUCOSE SERPL-MCNC: 91 MG/DL (ref 65–140)
HCT VFR BLD AUTO: 38.9 % (ref 34.8–46.1)
HGB BLD-MCNC: 12.8 G/DL (ref 11.5–15.4)
IMM GRANULOCYTES # BLD AUTO: 0.04 THOUSAND/UL (ref 0–0.2)
IMM GRANULOCYTES NFR BLD AUTO: 0 % (ref 0–2)
LYMPHOCYTES # BLD AUTO: 3.13 THOUSANDS/ΜL (ref 0.6–4.47)
LYMPHOCYTES NFR BLD AUTO: 25 % (ref 14–44)
MCH RBC QN AUTO: 31 PG (ref 26.8–34.3)
MCHC RBC AUTO-ENTMCNC: 32.9 G/DL (ref 31.4–37.4)
MCV RBC AUTO: 94 FL (ref 82–98)
MONOCYTES # BLD AUTO: 1.28 THOUSAND/ΜL (ref 0.17–1.22)
MONOCYTES NFR BLD AUTO: 10 % (ref 4–12)
NEUTROPHILS # BLD AUTO: 7.78 THOUSANDS/ΜL (ref 1.85–7.62)
NEUTS SEG NFR BLD AUTO: 60 % (ref 43–75)
NRBC BLD AUTO-RTO: 0 /100 WBCS
PLATELET # BLD AUTO: 391 THOUSANDS/UL (ref 149–390)
PMV BLD AUTO: 9.5 FL (ref 8.9–12.7)
POTASSIUM SERPL-SCNC: 3.7 MMOL/L (ref 3.5–5.3)
RBC # BLD AUTO: 4.13 MILLION/UL (ref 3.81–5.12)
SODIUM SERPL-SCNC: 139 MMOL/L (ref 136–145)
WBC # BLD AUTO: 12.79 THOUSAND/UL (ref 4.31–10.16)

## 2019-08-09 PROCEDURE — 36415 COLL VENOUS BLD VENIPUNCTURE: CPT | Performed by: EMERGENCY MEDICINE

## 2019-08-09 PROCEDURE — 99284 EMERGENCY DEPT VISIT MOD MDM: CPT

## 2019-08-09 PROCEDURE — 80048 BASIC METABOLIC PNL TOTAL CA: CPT | Performed by: EMERGENCY MEDICINE

## 2019-08-09 PROCEDURE — 85025 COMPLETE CBC W/AUTO DIFF WBC: CPT | Performed by: EMERGENCY MEDICINE

## 2019-08-09 PROCEDURE — 70450 CT HEAD/BRAIN W/O DYE: CPT

## 2019-08-09 PROCEDURE — 73110 X-RAY EXAM OF WRIST: CPT

## 2019-08-09 PROCEDURE — 96360 HYDRATION IV INFUSION INIT: CPT

## 2019-08-09 PROCEDURE — 73562 X-RAY EXAM OF KNEE 3: CPT

## 2019-08-09 RX ORDER — ACETAMINOPHEN 325 MG/1
650 TABLET ORAL ONCE
Status: COMPLETED | OUTPATIENT
Start: 2019-08-09 | End: 2019-08-09

## 2019-08-09 RX ADMIN — ACETAMINOPHEN 650 MG: 325 TABLET, FILM COATED ORAL at 20:01

## 2019-08-09 RX ADMIN — SODIUM CHLORIDE 1000 ML: 0.9 INJECTION, SOLUTION INTRAVENOUS at 20:06

## 2019-08-09 NOTE — ED PROVIDER NOTES
History  Chief Complaint   Patient presents with    Fall     Patient comes via EMS post fall,denies LOC,complains of left wrist pain,headache,states she became light headed and fell     HPI    This is a 63 year female that presents after a fall  Patient states she felt did not lose conscious  She has left wrist pain along with a mild headache  Also states she has left knee pain  No other complaints of chest pain shortness of breath no fevers or chills  63 year female that presents after a fall  Will do x-rays  Patient states she feels a little weak  Will get basic blood work symptomatic treatment reassess    Prior to Admission Medications   Prescriptions Last Dose Informant Patient Reported? Taking? Biotin (SUPER BIOTIN) 5 MG TABS   Yes Yes   Sig: Take 1 tablet by mouth   Multiple Vitamin (MULTIVITAMIN) tablet   Yes Yes   Sig: Take 1 tablet by mouth daily   aspirin 81 MG tablet   Yes Yes   Sig: Take 81 mg by mouth daily  atorvastatin (LIPITOR) 20 mg tablet   Yes Yes   Sig: Take 20 mg by mouth daily  desvenlafaxine succinate (PRISTIQ) 50 mg 24 hr tablet   Yes Yes   Sig: Take 50 mg by mouth daily   fluticasone (FLONASE) 50 mcg/act nasal spray   Yes Yes   Si spray into each nostril daily   ketotifen (ZADITOR) 0 025 % ophthalmic solution   Yes No   Si drop 2 (two) times a day  lamoTRIgine (LaMICtal) 150 MG tablet   Yes Yes   Sig: Take 150 mg by mouth daily    loratadine (CLARITIN) 10 mg tablet   Yes Yes   Sig: Take 10 mg by mouth daily   lurasidone (LATUDA) 40 mg tablet   Yes Yes   Sig: Take 80 mg by mouth daily with dinner    meclizine (ANTIVERT) 25 mg tablet   Yes Yes   Sig: Take 25 mg by mouth every 8 (eight) hours as needed for dizziness   montelukast (SINGULAIR) 10 mg tablet   Yes Yes   Sig: Take 10 mg by mouth daily at bedtime     naproxen (EC NAPROSYN) 500 MG EC tablet   No Yes   Sig: Take 1 tablet (500 mg total) by mouth 2 (two) times a day with meals   pantoprazole (PROTONIX) 40 mg tablet   Yes Yes   Sig: Take 40 mg by mouth daily  propranolol (INDERAL) 40 mg tablet   Yes Yes   Sig: Take 10 mg by mouth daily    tolterodine (DETROL LA) 4 mg 24 hr capsule   Yes Yes   Sig: Take 4 mg by mouth daily  traZODone (DESYREL) 50 mg tablet   Yes Yes   Sig: Take 100 mg by mouth daily at bedtime as needed    venlafaxine (EFFEXOR-XR) 37 5 mg 24 hr capsule   Yes Yes   Sig: Take 75 mg by mouth daily     ziprasidone (GEODON) 80 mg capsule   Yes Yes   Sig: Take 80 mg by mouth every evening      Facility-Administered Medications: None       Past Medical History:   Diagnosis Date    Asthma     Chronic pain     back    GERD (gastroesophageal reflux disease)     Hyperlipidemia     Psychiatric disorder     bipolar       Past Surgical History:   Procedure Laterality Date    TUBAL LIGATION         History reviewed  No pertinent family history  I have reviewed and agree with the history as documented  Social History     Tobacco Use    Smoking status: Current Every Day Smoker     Packs/day: 1 00    Smokeless tobacco: Never Used   Substance Use Topics    Alcohol use: No    Drug use: No        Review of Systems   Constitutional: Negative  Negative for diaphoresis and fever  HENT: Negative  Respiratory: Negative  Negative for cough, shortness of breath and wheezing  Cardiovascular: Negative  Negative for chest pain, palpitations and leg swelling  Gastrointestinal: Negative for abdominal distention, abdominal pain, nausea and vomiting  Genitourinary: Negative  Musculoskeletal: Positive for arthralgias  Skin: Negative  Neurological: Positive for weakness and headaches  Psychiatric/Behavioral: Negative  All other systems reviewed and are negative  Physical Exam  Physical Exam   Constitutional: She is oriented to person, place, and time  She appears well-developed and well-nourished  HENT:   Head: Normocephalic and atraumatic     Eyes: Pupils are equal, round, and reactive to light  EOM are normal    Neck: Normal range of motion  Neck supple  Cardiovascular: Normal rate, regular rhythm and normal heart sounds  No murmur heard  Pulmonary/Chest: Effort normal and breath sounds normal    Abdominal: Soft  Bowel sounds are normal  She exhibits no distension  There is no tenderness  There is no rebound and no guarding  Musculoskeletal: Normal range of motion  She exhibits no edema  Neurological: She is alert and oriented to person, place, and time  Normal motor and sensory exam   Skin: Skin is warm and dry  Psychiatric: She has a normal mood and affect  Vitals reviewed        Vital Signs  ED Triage Vitals [08/09/19 1849]   Temperature Pulse Respirations Blood Pressure SpO2   99 2 °F (37 3 °C) 94 20 129/84 94 %      Temp Source Heart Rate Source Patient Position - Orthostatic VS BP Location FiO2 (%)   Tympanic Monitor Lying Left arm --      Pain Score       --           Vitals:    08/09/19 1849 08/09/19 2107   BP: 129/84 134/82   Pulse: 94 98   Patient Position - Orthostatic VS: Lying          Visual Acuity      ED Medications  Medications   acetaminophen (TYLENOL) tablet 650 mg (650 mg Oral Given 8/9/19 2001)   sodium chloride 0 9 % bolus 1,000 mL (0 mL Intravenous Stopped 8/9/19 2106)       Diagnostic Studies  Results Reviewed     Procedure Component Value Units Date/Time    Basic metabolic panel [[id number]] Collected:  08/09/19 2005    Lab Status:  Final result Specimen:  Blood from Arm, Left Updated:  08/09/19 2022     Sodium 139 mmol/L      Potassium 3 7 mmol/L      Chloride 101 mmol/L      CO2 30 mmol/L      ANION GAP 8 mmol/L      BUN 16 mg/dL      Creatinine 0 92 mg/dL      Glucose 91 mg/dL      Calcium 9 6 mg/dL      eGFR 69 ml/min/1 73sq m     Narrative:       [facility] guidelines for Chronic Kidney Disease (CKD):     Stage 1 with normal or high GFR (GFR > 90 mL/min/1 73 square meters)    Stage 2 Mild CKD (GFR = 60-89 mL/min/1 73 square meters)    Stage 3A Moderate CKD (GFR = 45-59 mL/min/1 73 square meters)    Stage 3B Moderate CKD (GFR = 30-44 mL/min/1 73 square meters)    Stage 4 Severe CKD (GFR = 15-29 mL/min/1 73 square meters)    Stage 5 End Stage CKD (GFR <15 mL/min/1 73 square meters)  Note: GFR calculation is accurate only with a steady state creatinine    CBC and differential [[id number]]  (Abnormal) Collected:  08/09/19 2005    Lab Status:  Final result Specimen:  Blood from Arm, Left Updated:  08/09/19 2012     WBC 12 79 Thousand/uL      RBC 4 13 Million/uL      Hemoglobin 12 8 g/dL      Hematocrit 38 9 %      MCV 94 fL      MCH 31 0 pg      MCHC 32 9 g/dL      RDW 13 3 %      MPV 9 5 fL      Platelets 780 Thousands/uL      nRBC 0 /100 WBCs      Neutrophils Relative 60 %      Immat GRANS % 0 %      Lymphocytes Relative 25 %      Monocytes Relative 10 %      Eosinophils Relative 4 %      Basophils Relative 1 %      Neutrophils Absolute 7 78 Thousands/µL      Immature Grans Absolute 0 04 Thousand/uL      Lymphocytes Absolute 3 13 Thousands/µL      Monocytes Absolute 1 28 Thousand/µL      Eosinophils Absolute 0 48 Thousand/µL      Basophils Absolute 0 08 Thousands/µL                  CT head without contrast   Final Result by Kinsey Wu MD (08/09 2038)      No acute intracranial abnormality  Workstation performed: [id number]         XR wrist 3+ views LEFT    (Results Pending)   XR knee 3 views left non injury    (Results Pending)              Procedures  Procedures       ED Course  ED Course as of Aug 09 2302   Fri Aug 09, 2019   2042 Negative workup, will discharge  2053 Discussed the results with the patient will discharge patient home                                    MDM    Disposition  Final diagnoses:   Fall   Vertigo     Time reflects when diagnosis was documented in both MDM as applicable and the Disposition within this note     Time User Action Codes Description Comment    8/9/2019  8:53 PM Marianna Tipton Carlotta Britt Sam Moralezhard  XXXA] Fall     8/9/2019  8:53 PM Isaac Lane Add [R42] Vertigo       ED Disposition     ED Disposition Condition Date/Time Comment    Discharge Stable Fri Aug 9, 2019  8:53 PM Jeffrey Prater discharge to home/self care  Follow-up Information     Follow up With Specialties Details Why Britney Moreno MD   As needed Emigdio [facility]OLGUIN [facility]  [phone]            Discharge Medication List as of 8/9/2019  8:53 PM      CONTINUE these medications which have NOT CHANGED    Details   aspirin 81 MG tablet Take 81 mg by mouth daily  , Until Discontinued, Historical Med      atorvastatin (LIPITOR) 20 mg tablet Take 20 mg by mouth daily  , Until Discontinued, Historical Med      Biotin (SUPER BIOTIN) 5 MG TABS Take 1 tablet by mouth, Historical Med      desvenlafaxine succinate (PRISTIQ) 50 mg 24 hr tablet Take 50 mg by mouth daily, Historical Med      fluticasone (FLONASE) 50 mcg/act nasal spray 1 spray into each nostril daily, Historical Med      lamoTRIgine (LaMICtal) 150 MG tablet Take 150 mg by mouth daily , Historical Med      loratadine (CLARITIN) 10 mg tablet Take 10 mg by mouth daily, Historical Med      lurasidone (LATUDA) 40 mg tablet Take 80 mg by mouth daily with dinner , Historical Med      meclizine (ANTIVERT) 25 mg tablet Take 25 mg by mouth every 8 (eight) hours as needed for dizziness, Historical Med      montelukast (SINGULAIR) 10 mg tablet Take 10 mg by mouth daily at bedtime  , Until Discontinued, Historical Med      Multiple Vitamin (MULTIVITAMIN) tablet Take 1 tablet by mouth daily, Historical Med      naproxen (EC NAPROSYN) 500 MG EC tablet Take 1 tablet (500 mg total) by mouth 2 (two) times a day with meals, Starting Sun 6/30/2019, Normal      pantoprazole (PROTONIX) 40 mg tablet Take 40 mg by mouth daily  , Until Discontinued, Historical Med      propranolol (INDERAL) 40 mg tablet Take 10 mg by mouth daily , Historical Med      tolterodine (DETROL LA) 4 mg 24 hr capsule Take 4 mg by mouth daily  , Until Discontinued, Historical Med      traZODone (DESYREL) 50 mg tablet Take 100 mg by mouth daily at bedtime as needed , Historical Med      venlafaxine (EFFEXOR-XR) 37 5 mg 24 hr capsule Take 75 mg by mouth daily  , Until Discontinued, Historical Med      ziprasidone (GEODON) 80 mg capsule Take 80 mg by mouth every evening, Historical Med      ketotifen (ZADITOR) 0 025 % ophthalmic solution 1 drop 2 (two) times a day , Until Discontinued, Historical Med           No discharge procedures on file      ED Provider  Electronically Signed by           Benjamin Ramos MD  08/09/19 0135

## 2019-08-12 ENCOUNTER — HOSPITAL ENCOUNTER (EMERGENCY)
Facility: HOSPITAL | Age: 59
Discharge: HOME | End: 2019-08-12
Attending: EMERGENCY MEDICINE
Payer: COMMERCIAL

## 2019-08-12 VITALS
HEIGHT: 67 IN | DIASTOLIC BLOOD PRESSURE: 88 MMHG | WEIGHT: 182 LBS | RESPIRATION RATE: 20 BRPM | TEMPERATURE: 98.1 F | BODY MASS INDEX: 28.56 KG/M2 | OXYGEN SATURATION: 96 % | HEART RATE: 82 BPM | SYSTOLIC BLOOD PRESSURE: 153 MMHG

## 2019-08-12 DIAGNOSIS — L60.0 INGROWN NAIL OF GREAT TOE OF RIGHT FOOT: Primary | ICD-10-CM

## 2019-08-12 DIAGNOSIS — G56.00 CARPAL TUNNEL SYNDROME: ICD-10-CM

## 2019-08-12 PROCEDURE — 90715 TDAP VACCINE 7 YRS/> IM: CPT | Performed by: EMERGENCY MEDICINE

## 2019-08-12 PROCEDURE — 90471 IMMUNIZATION ADMIN: CPT

## 2019-08-12 PROCEDURE — 99283 EMERGENCY DEPT VISIT LOW MDM: CPT

## 2019-08-12 RX ORDER — BACITRACIN, NEOMYCIN, POLYMYXIN B 400; 3.5; 5 [USP'U]/G; MG/G; [USP'U]/G
1 OINTMENT TOPICAL ONCE
Status: COMPLETED | OUTPATIENT
Start: 2019-08-12 | End: 2019-08-12

## 2019-08-12 RX ORDER — GINSENG 100 MG
1 CAPSULE ORAL ONCE
Status: DISCONTINUED | OUTPATIENT
Start: 2019-08-12 | End: 2019-08-12

## 2019-08-12 RX ORDER — CEPHALEXIN 500 MG/1
500 CAPSULE ORAL EVERY 8 HOURS SCHEDULED
Qty: 21 CAPSULE | Refills: 0 | Status: SHIPPED | OUTPATIENT
Start: 2019-08-12 | End: 2019-08-19

## 2019-08-12 RX ORDER — CEPHALEXIN 500 MG/1
500 CAPSULE ORAL ONCE
Status: COMPLETED | OUTPATIENT
Start: 2019-08-12 | End: 2019-08-12

## 2019-08-12 RX ADMIN — BACITRACIN, NEOMYCIN, POLYMYXIN B 1 SMALL APPLICATION: 400; 3.5; 5 OINTMENT TOPICAL at 21:01

## 2019-08-12 RX ADMIN — CEPHALEXIN 500 MG: 500 CAPSULE ORAL at 21:04

## 2019-08-12 RX ADMIN — TETANUS TOXOID, REDUCED DIPHTHERIA TOXOID AND ACELLULAR PERTUSSIS VACCINE, ADSORBED 0.5 ML: 5; 2.5; 8; 8; 2.5 SUSPENSION INTRAMUSCULAR at 20:58

## 2019-08-12 NOTE — ED NOTES
Pt was brought in by EMS for L arm tingling  Before entering her room pt stated that she needed to use the bathroom  Pt ambulated to the bathroom x1 and gave a urine sample        Katie Orlando RN  08/12/19 1955

## 2019-08-12 NOTE — ED PROVIDER NOTES
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 Taking? Biotin (SUPER BIOTIN) 5 MG TABS   Yes No   Sig: Take 1 tablet by mouth   Multiple Vitamin (MULTIVITAMIN) tablet   Yes No   Sig: Take 1 tablet by mouth daily   aspirin 81 MG tablet   Yes No   Sig: Take 81 mg by mouth daily  atorvastatin (LIPITOR) 20 mg tablet   Yes No   Sig: Take 20 mg by mouth daily  desvenlafaxine succinate (PRISTIQ) 50 mg 24 hr tablet   Yes No   Sig: Take 50 mg by mouth daily   fluticasone (FLONASE) 50 mcg/act nasal spray   Yes No   Si spray into each nostril daily   ketotifen (ZADITOR) 0 025 % ophthalmic solution   Yes No   Si drop 2 (two) times a day  lamoTRIgine (LaMICtal) 150 MG tablet   Yes No   Sig: Take 150 mg by mouth daily    loratadine (CLARITIN) 10 mg tablet   Yes No   Sig: Take 10 mg by mouth daily   lurasidone (LATUDA) 40 mg tablet   Yes No   Sig: Take 80 mg by mouth daily with dinner    meclizine (ANTIVERT) 25 mg tablet   Yes No   Sig: Take 25 mg by mouth every 8 (eight) hours as needed for dizziness   montelukast (SINGULAIR) 10 mg tablet   Yes No   Sig: Take 10 mg by mouth daily at bedtime  naproxen (EC NAPROSYN) 500 MG EC tablet   No No   Sig: Take 1 tablet (500 mg total) by mouth 2 (two) times a day with meals   pantoprazole (PROTONIX) 40 mg tablet   Yes No   Sig: Take 40 mg by mouth daily  propranolol (INDERAL) 40 mg tablet   Yes No   Sig: Take 10 mg by mouth daily    tolterodine (DETROL LA) 4 mg 24 hr capsule   Yes No   Sig: Take 4 mg by mouth daily  traZODone (DESYREL) 50 mg tablet   Yes No   Sig: Take 100 mg by mouth daily at bedtime as needed    venlafaxine (EFFEXOR-XR) 37 5 mg 24 hr capsule   Yes No   Sig: Take 75 mg by mouth daily     ziprasidone (GEODON) 80 mg capsule   Yes No   Sig: Take 80 mg by mouth every evening      Facility-Administered Medications: None       Past Medical History:   Diagnosis Date    Asthma     Chronic pain     back    GERD (gastroesophageal reflux disease)     Hyperlipidemia     Psychiatric disorder     bipolar       Past Surgical History:   Procedure Laterality Date    TUBAL LIGATION         History reviewed  No pertinent family history  I have reviewed and agree with the history as documented  Social History     Tobacco Use    Smoking status: Current Every Day Smoker     Packs/day: 1 00    Smokeless tobacco: Never Used   Substance Use Topics    Alcohol use: No    Drug use: No        Review of Systems   All other systems reviewed and are negative  Physical Exam  Physical Exam   Constitutional: She appears well-developed and well-nourished  HENT:   Head: Normocephalic  Neck: Neck supple  Cardiovascular: Normal rate  Pulmonary/Chest: Effort normal    Musculoskeletal:   Left wrist: tingling and numbness reproduced with tapping on the guyon's canal consistent with carpal tunnel syndrome, positive radial pulse intact; no deformity noted    Left big toe: ingrown toe nail with surrounding erythema and cellulitis with mild abrasion noted, no deformity; positive DP,PT pulses intact     Neurological: She is alert  Skin: Skin is warm and dry  Psychiatric: She has a normal mood and affect  Nursing note and vitals reviewed        Vital Signs  ED Triage Vitals [08/12/19 1957]   Temperature Pulse Respirations Blood Pressure SpO2   98 1 °F (36 7 °C) 86 20 153/88 97 %      Temp Source Heart Rate Source Patient Position - Orthostatic VS BP Location FiO2 (%)   Tympanic Monitor Sitting Right arm --      Pain Score       7           Vitals:    08/12/19 1957 08/12/19 2000   BP: 153/88 153/88   Pulse: 86 82   Patient Position - Orthostatic VS: Sitting          Visual Acuity      ED Medications  Medications   tetanus-diphtheria-acellular pertussis (BOOSTRIX) IM injection 0 5 mL (0 5 mL Intramuscular Given 8/12/19 2058)   cephalexin (KEFLEX) capsule 500 mg (500 mg Oral Given 8/12/19 2104)   neomycin-bacitracin-polymyxin b (NEOSPORIN) ointment 1 small application (1 small application Topical Given 8/12/19 2101)       Diagnostic Studies  Results Reviewed     None                 No orders to display              Procedures  Procedures       ED Course                               MDM  Number of Diagnoses or Management Options  Carpal tunnel syndrome:   Ingrown nail of great toe of right foot:   Diagnosis management comments: Patient discharged with appropriate instructions, medications and follow up with orthopedics and podiatry  Wrist splint applied  Patient verbalized understanding of instructions, had no further questions at the time of discharge  Patient had stable vital signs, and well appearing at discharge         Amount and/or Complexity of Data Reviewed  Tests in the radiology section of CPT®: reviewed  Tests in the medicine section of CPT®: reviewed    Patient Progress  Patient progress: stable      Disposition  Final diagnoses:   Ingrown nail of great toe of right foot   Carpal tunnel syndrome     Time reflects when diagnosis was documented in both MDM as applicable and the Disposition within this note     Time User Action Codes Description Comment    8/12/2019  8:39 PM Tanna Petty [L60 0] Ingrown nail of great toe of right foot     8/12/2019  8:39 PM Tanna Petty [G56 00] Carpal tunnel syndrome       ED Disposition     ED Disposition Condition Date/Time Comment    Discharge Stable Mon Aug 12, 2019  8:39 PM Jeffrey Prater discharge to home/self care  Follow-up Information     Follow up With Specialties Details Why Contact Info Additional Information    Patel Pickens MD  Schedule an appointment as soon as possible for a visit   [phone]       [address] [facility] Emergency Department Emergency Medicine  If symptoms worsen [address] [facility]  [phone] [facility], [place], [place], [address] [facility], DPM Podiatry   [address] [facility] [address] [facility],[address], [place] [facility]   [address] [facility] [address]  [phone]             Discharge Medication List as of 8/12/2019  8:40 PM      START taking these medications    Details   cephalexin (KEFLEX) 500 mg capsule Take 1 capsule (500 mg total) by mouth every 8 (eight) hours for 7 days, Starting Mon 8/12/2019, Until Mon 8/19/2019, Print         CONTINUE these medications which have NOT CHANGED    Details   aspirin 81 MG tablet Take 81 mg by mouth daily  , Until Discontinued, Historical Med      atorvastatin (LIPITOR) 20 mg tablet Take 20 mg by mouth daily  , Until Discontinued, Historical Med      Biotin (SUPER BIOTIN) 5 MG TABS Take 1 tablet by mouth, Historical Med      desvenlafaxine succinate (PRISTIQ) 50 mg 24 hr tablet Take 50 mg by mouth daily, Historical Med      fluticasone (FLONASE) 50 mcg/act nasal spray 1 spray into each nostril daily, Historical Med      ketotifen (ZADITOR) 0 025 % ophthalmic solution 1 drop 2 (two) times a day , Until Discontinued, Historical Med      lamoTRIgine (LaMICtal) 150 MG tablet Take 150 mg by mouth daily , Historical Med      loratadine (CLARITIN) 10 mg tablet Take 10 mg by mouth daily, Historical Med      lurasidone (LATUDA) 40 mg tablet Take 80 mg by mouth daily with dinner , Historical Med      meclizine (ANTIVERT) 25 mg tablet Take 25 mg by mouth every 8 (eight) hours as needed for dizziness, Historical Med      montelukast (SINGULAIR) 10 mg tablet Take 10 mg by mouth daily at bedtime  , Until Discontinued, Historical Med      Multiple Vitamin (MULTIVITAMIN) tablet Take 1 tablet by mouth daily, Historical Med      naproxen (EC NAPROSYN) 500 MG EC tablet Take 1 tablet (500 mg total) by mouth 2 (two) times a day with meals, Starting Sun 6/30/2019, Normal      pantoprazole (PROTONIX) 40 mg tablet Take 40 mg by mouth daily  , Until Discontinued, Historical Med      propranolol (INDERAL) 40 mg tablet Take 10 mg by mouth daily , Historical Med      tolterodine (DETROL LA) 4 mg 24 hr capsule Take 4 mg by mouth daily  , Until Discontinued, Historical Med      traZODone (DESYREL) 50 mg tablet Take 100 mg by mouth daily at bedtime as needed , Historical Med      venlafaxine (EFFEXOR-XR) 37 5 mg 24 hr capsule Take 75 mg by mouth daily  , Until Discontinued, Historical Med      ziprasidone (GEODON) 80 mg capsule Take 80 mg by mouth every evening, Historical Med           No discharge procedures on file      ED Provider  Electronically Signed by           Bert Beavers DO  08/12/19 1653

## 2019-08-13 NOTE — ED NOTES
Pt is waiting in the hospital lobby  Called [facility] back and still no rides available       Adiel Plascencia, CHAVEZ  08/12/19 2261

## 2019-08-30 ENCOUNTER — HOSPITAL ENCOUNTER (EMERGENCY)
Facility: HOSPITAL | Age: 59
Discharge: HOME | End: 2019-08-30
Attending: EMERGENCY MEDICINE | Admitting: EMERGENCY MEDICINE
Payer: COMMERCIAL

## 2019-08-30 VITALS
HEIGHT: 67 IN | DIASTOLIC BLOOD PRESSURE: 85 MMHG | HEART RATE: 79 BPM | RESPIRATION RATE: 18 BRPM | WEIGHT: 182.1 LBS | OXYGEN SATURATION: 97 % | BODY MASS INDEX: 28.58 KG/M2 | SYSTOLIC BLOOD PRESSURE: 125 MMHG

## 2019-08-30 DIAGNOSIS — T30.0 BURN: Primary | ICD-10-CM

## 2019-08-30 PROCEDURE — 99283 EMERGENCY DEPT VISIT LOW MDM: CPT

## 2019-08-30 RX ORDER — BACITRACIN, NEOMYCIN, POLYMYXIN B 400; 3.5; 5 [USP'U]/G; MG/G; [USP'U]/G
1 OINTMENT TOPICAL ONCE
Status: COMPLETED | OUTPATIENT
Start: 2019-08-30 | End: 2019-08-30

## 2019-08-30 RX ORDER — HYDROCODONE BITARTRATE AND ACETAMINOPHEN 5; 325 MG/1; MG/1
1 TABLET ORAL EVERY 6 HOURS PRN
Qty: 12 TABLET | Refills: 0 | Status: SHIPPED | OUTPATIENT
Start: 2019-08-30

## 2019-08-30 RX ORDER — HYDROCODONE BITARTRATE AND ACETAMINOPHEN 5; 325 MG/1; MG/1
1 TABLET ORAL ONCE
Status: COMPLETED | OUTPATIENT
Start: 2019-08-30 | End: 2019-08-30

## 2019-08-30 RX ADMIN — BACITRACIN ZINC NEOMYCIN SULFATE POLYMYXIN B SULFATE 1 LARGE APPLICATION: 400; 3.5; 5 OINTMENT TOPICAL at 17:56

## 2019-08-30 RX ADMIN — HYDROCODONE BITARTRATE AND ACETAMINOPHEN 1 TABLET: 5; 325 TABLET ORAL at 17:56

## 2019-08-30 NOTE — ED PROVIDER NOTES
History  Chief Complaint   Patient presents with    Burn     Patient dropped a chicken into a pot of hot soup causing the soup to splash up under her chin/chest and her right hand     69-year-old female presents with complaints of skin burn to underneath her chin on to the top part of her chest   Patient states that she had a pot of hot chicken soup that she had made and when she went to jump into another bowl big piece of chicken fell out splashed in the bowl and splashed the hot chicken soup up on to her chest and neck  No other noticeable burns anywhere she does have some erythema to the anterior chest and to her anterior neck  Nothing around her airway or face  Hand seemed to be okay also  No other complaints patient is up-to-date on her tetanus shot      History provided by:  Patient   used: No        Prior to Admission Medications   Prescriptions Last Dose Informant Patient Reported? Taking? Biotin (SUPER BIOTIN) 5 MG TABS   Yes No   Sig: Take 1 tablet by mouth   Multiple Vitamin (MULTIVITAMIN) tablet   Yes No   Sig: Take 1 tablet by mouth daily   aspirin 81 MG tablet   Yes No   Sig: Take 81 mg by mouth daily  atorvastatin (LIPITOR) 20 mg tablet   Yes No   Sig: Take 20 mg by mouth daily  desvenlafaxine succinate (PRISTIQ) 50 mg 24 hr tablet   Yes No   Sig: Take 50 mg by mouth daily   fluticasone (FLONASE) 50 mcg/act nasal spray   Yes No   Si spray into each nostril daily   ketotifen (ZADITOR) 0 025 % ophthalmic solution   Yes No   Si drop 2 (two) times a day     lamoTRIgine (LaMICtal) 150 MG tablet   Yes No   Sig: Take 150 mg by mouth daily    loratadine (CLARITIN) 10 mg tablet   Yes No   Sig: Take 10 mg by mouth daily   lurasidone (LATUDA) 40 mg tablet   Yes No   Sig: Take 80 mg by mouth daily with dinner    meclizine (ANTIVERT) 25 mg tablet   Yes No   Sig: Take 25 mg by mouth every 8 (eight) hours as needed for dizziness   montelukast (SINGULAIR) 10 mg tablet   Yes No Sig: Take 10 mg by mouth daily at bedtime  naproxen (EC NAPROSYN) 500 MG EC tablet   No No   Sig: Take 1 tablet (500 mg total) by mouth 2 (two) times a day with meals   pantoprazole (PROTONIX) 40 mg tablet   Yes No   Sig: Take 40 mg by mouth daily  propranolol (INDERAL) 40 mg tablet   Yes No   Sig: Take 10 mg by mouth daily    tolterodine (DETROL LA) 4 mg 24 hr capsule   Yes No   Sig: Take 4 mg by mouth daily  traZODone (DESYREL) 50 mg tablet   Yes No   Sig: Take 100 mg by mouth daily at bedtime as needed    venlafaxine (EFFEXOR-XR) 37 5 mg 24 hr capsule   Yes No   Sig: Take 75 mg by mouth daily     ziprasidone (GEODON) 80 mg capsule   Yes No   Sig: Take 80 mg by mouth every evening      Facility-Administered Medications: None       Past Medical History:   Diagnosis Date    Asthma     Chronic pain     back    GERD (gastroesophageal reflux disease)     Hyperlipidemia     Psychiatric disorder     bipolar       Past Surgical History:   Procedure Laterality Date    TUBAL LIGATION         History reviewed  No pertinent family history  I have reviewed and agree with the history as documented  Social History     Tobacco Use    Smoking status: Current Every Day Smoker     Packs/day: 1 00    Smokeless tobacco: Never Used   Substance Use Topics    Alcohol use: No    Drug use: No        Review of Systems   Constitutional: Negative for activity change, chills, diaphoresis and fever  HENT: Negative for congestion, ear pain, nosebleeds, sore throat, trouble swallowing and voice change  Eyes: Negative for pain, discharge and redness  Respiratory: Negative for apnea, cough, choking, shortness of breath, wheezing and stridor  Cardiovascular: Negative for chest pain and palpitations  Gastrointestinal: Negative for abdominal distention, abdominal pain, constipation, diarrhea, nausea and vomiting  Endocrine: Negative for polydipsia     Genitourinary: Negative for difficulty urinating, dysuria, flank pain, frequency, hematuria and urgency  Musculoskeletal: Negative for back pain, gait problem, joint swelling, myalgias, neck pain and neck stiffness  Skin: Positive for wound  Negative for pallor and rash  Neurological: Negative for dizziness, tremors, syncope, speech difficulty, weakness, numbness and headaches  Hematological: Negative for adenopathy  Psychiatric/Behavioral: Negative for confusion, hallucinations, self-injury and suicidal ideas  The patient is not nervous/anxious  Physical Exam  Physical Exam   Constitutional: She is oriented to person, place, and time  Vital signs are normal  She appears well-developed and well-nourished  No distress  HENT:   Head: Normocephalic and atraumatic  Right Ear: External ear normal    Left Ear: External ear normal    Nose: Nose normal    Mouth/Throat: Oropharynx is clear and moist    Eyes: Pupils are equal, round, and reactive to light  Conjunctivae are normal    Neck: Normal range of motion  Neck supple  Patient does have erythema to the anterior upper chest into her neck  No blisters noted patient states she has pain to the area  Cardiovascular: Normal rate, regular rhythm, normal heart sounds and intact distal pulses  Pulmonary/Chest: Effort normal and breath sounds normal    Abdominal: Soft  Bowel sounds are normal    Musculoskeletal: Normal range of motion  Neurological: She is alert and oriented to person, place, and time  She has normal reflexes  Skin: Skin is warm and dry  She is not diaphoretic  Psychiatric: She has a normal mood and affect  Nursing note and vitals reviewed        Vital Signs  ED Triage Vitals [08/30/19 1717]   Temp Pulse Respirations Blood Pressure SpO2   -- 79 18 125/85 97 %      Temp src Heart Rate Source Patient Position - Orthostatic VS BP Location FiO2 (%)   -- -- -- -- --      Pain Score       8           Vitals:    08/30/19 1717   BP: 125/85   Pulse: 79         Visual Acuity      ED Medications  Medications   neomycin-bacitracin-polymyxin b (NEOSPORIN) ointment 1 large application (has no administration in time range)       Diagnostic Studies  Results Reviewed     None                 No orders to display              Procedures  Procedures       ED Course                               MDM    Disposition  Final diagnoses:   Burn     Time reflects when diagnosis was documented in both MDM as applicable and the Disposition within this note     Time User Action Codes Description Comment    8/30/2019  5:33 PM Ron Simmons Add [T30 0] Burn       ED Disposition     ED Disposition Condition Date/Time Comment    Discharge Stable Fri Aug 30, 2019  5:33 PM Asya Ivan discharge to home/self care  Follow-up Information     Follow up With Specialties Details Why Contact Info    Joenathan Baumgarten, MD  Schedule an appointment as soon as possible for a visit  As needed Carlee HOLGUIN [address] [facility]  [phone]            Patient's Medications   Discharge Prescriptions    HYDROCODONE-ACETAMINOPHEN (NORCO) 5-325 MG PER TABLET    Take 1 tablet by mouth every 6 (six) hours as needed for pain for up to 12 dosesMax Daily Amount: 4 tablets       Start Date: 8/30/2019 End Date: --       Order Dose: 1 tablet       Quantity: 12 tablet    Refills: 0     No discharge procedures on file      ED Provider  Electronically Signed by           Rosy Low DO  08/30/19 0617

## 2019-08-31 ENCOUNTER — HOSPITAL ENCOUNTER (EMERGENCY)
Facility: HOSPITAL | Age: 59
Discharge: LEFT BEFORE DISCHARGE | End: 2019-08-31
Payer: COMMERCIAL

## 2019-08-31 ENCOUNTER — APPOINTMENT (EMERGENCY)
Dept: RADIOLOGY | Facility: HOSPITAL | Age: 59
End: 2019-08-31
Payer: COMMERCIAL

## 2019-08-31 ENCOUNTER — HOSPITAL ENCOUNTER (OUTPATIENT)
Facility: HOSPITAL | Age: 59
Setting detail: OBSERVATION
Discharge: HOME | End: 2019-09-03
Attending: EMERGENCY MEDICINE | Admitting: STUDENT IN AN ORGANIZED HEALTH CARE EDUCATION/TRAINING PROGRAM
Payer: COMMERCIAL

## 2019-08-31 VITALS
SYSTOLIC BLOOD PRESSURE: 125 MMHG | WEIGHT: 180.78 LBS | HEIGHT: 67 IN | DIASTOLIC BLOOD PRESSURE: 73 MMHG | RESPIRATION RATE: 20 BRPM | HEART RATE: 75 BPM | OXYGEN SATURATION: 97 % | TEMPERATURE: 97 F | BODY MASS INDEX: 28.37 KG/M2

## 2019-08-31 DIAGNOSIS — R94.31 ABNORMAL EKG: ICD-10-CM

## 2019-08-31 DIAGNOSIS — N39.0 UTI (URINARY TRACT INFECTION): ICD-10-CM

## 2019-08-31 DIAGNOSIS — F29 PSYCHOSES (HCC): Primary | ICD-10-CM

## 2019-08-31 DIAGNOSIS — R77.8 ELEVATED TROPONIN: ICD-10-CM

## 2019-08-31 DIAGNOSIS — F31.9 BIPOLAR 1 DISORDER (HCC): ICD-10-CM

## 2019-08-31 DIAGNOSIS — F30.9 MANIA (HCC): ICD-10-CM

## 2019-08-31 PROBLEM — I21.4 NSTEMI (NON-ST ELEVATED MYOCARDIAL INFARCTION) (HCC): Status: ACTIVE | Noted: 2019-08-31

## 2019-08-31 LAB
ALBUMIN SERPL BCP-MCNC: 3.9 G/DL (ref 3.5–5)
ALP SERPL-CCNC: 94 U/L (ref 46–116)
ALT SERPL W P-5'-P-CCNC: 25 U/L (ref 12–78)
AMPHETAMINES SERPL QL SCN: NEGATIVE
ANION GAP SERPL CALCULATED.3IONS-SCNC: 11 MMOL/L (ref 4–13)
AST SERPL W P-5'-P-CCNC: 31 U/L (ref 5–45)
BACTERIA UR QL AUTO: ABNORMAL /HPF
BARBITURATES UR QL: NEGATIVE
BASOPHILS # BLD AUTO: 0.08 THOUSANDS/ΜL (ref 0–0.1)
BASOPHILS NFR BLD AUTO: 1 % (ref 0–1)
BENZODIAZ UR QL: NEGATIVE
BILIRUB SERPL-MCNC: 0.4 MG/DL (ref 0.2–1)
BILIRUB UR QL STRIP: ABNORMAL
BUN SERPL-MCNC: 20 MG/DL (ref 5–25)
CALCIUM SERPL-MCNC: 10.1 MG/DL (ref 8.3–10.1)
CHLORIDE SERPL-SCNC: 104 MMOL/L (ref 100–108)
CLARITY UR: ABNORMAL
CO2 SERPL-SCNC: 27 MMOL/L (ref 21–32)
COCAINE UR QL: NEGATIVE
COLOR UR: YELLOW
CREAT SERPL-MCNC: 0.84 MG/DL (ref 0.6–1.3)
EOSINOPHIL # BLD AUTO: 0.37 THOUSAND/ΜL (ref 0–0.61)
EOSINOPHIL NFR BLD AUTO: 4 % (ref 0–6)
ERYTHROCYTE [DISTWIDTH] IN BLOOD BY AUTOMATED COUNT: 12.8 % (ref 11.6–15.1)
ETHANOL SERPL-MCNC: <3 MG/DL (ref 0–3)
GFR SERPL CREATININE-BSD FRML MDRD: 77 ML/MIN/1.73SQ M
GLUCOSE SERPL-MCNC: 105 MG/DL (ref 65–140)
GLUCOSE UR STRIP-MCNC: NEGATIVE MG/DL
HCT VFR BLD AUTO: 40.8 % (ref 34.8–46.1)
HGB BLD-MCNC: 13.3 G/DL (ref 11.5–15.4)
HGB UR QL STRIP.AUTO: ABNORMAL
IMM GRANULOCYTES # BLD AUTO: 0.03 THOUSAND/UL (ref 0–0.2)
IMM GRANULOCYTES NFR BLD AUTO: 0 % (ref 0–2)
INR PPP: 0.99 (ref 0.91–1.09)
KETONES UR STRIP-MCNC: ABNORMAL MG/DL
LEUKOCYTE ESTERASE UR QL STRIP: NEGATIVE
LYMPHOCYTES # BLD AUTO: 2.92 THOUSANDS/ΜL (ref 0.6–4.47)
LYMPHOCYTES NFR BLD AUTO: 30 % (ref 14–44)
MCH RBC QN AUTO: 30.9 PG (ref 26.8–34.3)
MCHC RBC AUTO-ENTMCNC: 32.6 G/DL (ref 31.4–37.4)
MCV RBC AUTO: 95 FL (ref 82–98)
METHADONE UR QL: NEGATIVE
MONOCYTES # BLD AUTO: 0.92 THOUSAND/ΜL (ref 0.17–1.22)
MONOCYTES NFR BLD AUTO: 10 % (ref 4–12)
NEUTROPHILS # BLD AUTO: 5.37 THOUSANDS/ΜL (ref 1.85–7.62)
NEUTS SEG NFR BLD AUTO: 55 % (ref 43–75)
NITRITE UR QL STRIP: NEGATIVE
NON-SQ EPI CELLS URNS QL MICRO: ABNORMAL /HPF
NRBC BLD AUTO-RTO: 0 /100 WBCS
OPIATES UR QL SCN: POSITIVE
PCP UR QL: NEGATIVE
PH UR STRIP.AUTO: 5 [PH]
PLATELET # BLD AUTO: 408 THOUSANDS/UL (ref 149–390)
PMV BLD AUTO: 9.6 FL (ref 8.9–12.7)
POTASSIUM SERPL-SCNC: 4 MMOL/L (ref 3.5–5.3)
PROT SERPL-MCNC: 7.3 G/DL (ref 6.4–8.2)
PROT UR STRIP-MCNC: ABNORMAL MG/DL
PROTHROMBIN TIME: 10.7 SECONDS (ref 9.8–12)
RBC # BLD AUTO: 4.3 MILLION/UL (ref 3.81–5.12)
RBC #/AREA URNS AUTO: ABNORMAL /HPF
SODIUM SERPL-SCNC: 142 MMOL/L (ref 136–145)
SP GR UR STRIP.AUTO: >=1.03 (ref 1–1.03)
THC UR QL: NEGATIVE
TROPONIN I SERPL-MCNC: 0.91 NG/ML
TROPONIN I SERPL-MCNC: 0.94 NG/ML
UROBILINOGEN UR QL STRIP.AUTO: 1 E.U./DL
WBC # BLD AUTO: 9.69 THOUSAND/UL (ref 4.31–10.16)
WBC #/AREA URNS AUTO: ABNORMAL /HPF

## 2019-08-31 PROCEDURE — 85025 COMPLETE CBC W/AUTO DIFF WBC: CPT | Performed by: EMERGENCY MEDICINE

## 2019-08-31 PROCEDURE — 96372 THER/PROPH/DIAG INJ SC/IM: CPT

## 2019-08-31 PROCEDURE — 80053 COMPREHEN METABOLIC PANEL: CPT | Performed by: EMERGENCY MEDICINE

## 2019-08-31 PROCEDURE — 81001 URINALYSIS AUTO W/SCOPE: CPT | Performed by: EMERGENCY MEDICINE

## 2019-08-31 PROCEDURE — 85610 PROTHROMBIN TIME: CPT | Performed by: EMERGENCY MEDICINE

## 2019-08-31 PROCEDURE — 99220 PR INITIAL OBSERVATION CARE/DAY 70 MINUTES: CPT | Performed by: STUDENT IN AN ORGANIZED HEALTH CARE EDUCATION/TRAINING PROGRAM

## 2019-08-31 PROCEDURE — 71045 X-RAY EXAM CHEST 1 VIEW: CPT

## 2019-08-31 PROCEDURE — 99283 EMERGENCY DEPT VISIT LOW MDM: CPT

## 2019-08-31 PROCEDURE — 84484 ASSAY OF TROPONIN QUANT: CPT | Performed by: EMERGENCY MEDICINE

## 2019-08-31 PROCEDURE — 36415 COLL VENOUS BLD VENIPUNCTURE: CPT | Performed by: EMERGENCY MEDICINE

## 2019-08-31 PROCEDURE — 93005 ELECTROCARDIOGRAM TRACING: CPT

## 2019-08-31 PROCEDURE — 99285 EMERGENCY DEPT VISIT HI MDM: CPT

## 2019-08-31 PROCEDURE — 80320 DRUG SCREEN QUANTALCOHOLS: CPT | Performed by: EMERGENCY MEDICINE

## 2019-08-31 PROCEDURE — 80307 DRUG TEST PRSMV CHEM ANLYZR: CPT | Performed by: EMERGENCY MEDICINE

## 2019-08-31 RX ORDER — ATORVASTATIN CALCIUM 80 MG/1
80 TABLET, FILM COATED ORAL ONCE
Status: DISCONTINUED | OUTPATIENT
Start: 2019-08-31 | End: 2019-09-03 | Stop reason: HOSPADM

## 2019-08-31 RX ORDER — ASPIRIN 81 MG/1
81 TABLET, CHEWABLE ORAL DAILY
Status: DISCONTINUED | OUTPATIENT
Start: 2019-09-01 | End: 2019-09-03 | Stop reason: HOSPADM

## 2019-08-31 RX ORDER — PANTOPRAZOLE SODIUM 40 MG/1
40 TABLET, DELAYED RELEASE ORAL
Status: DISCONTINUED | OUTPATIENT
Start: 2019-09-01 | End: 2019-09-03 | Stop reason: HOSPADM

## 2019-08-31 RX ORDER — IPRATROPIUM BROMIDE AND ALBUTEROL SULFATE 2.5; .5 MG/3ML; MG/3ML
3 SOLUTION RESPIRATORY (INHALATION) ONCE
Status: COMPLETED | OUTPATIENT
Start: 2019-08-31 | End: 2019-08-31

## 2019-08-31 RX ORDER — ZIPRASIDONE HYDROCHLORIDE 40 MG/1
80 CAPSULE ORAL EVERY EVENING
Status: DISCONTINUED | OUTPATIENT
Start: 2019-08-31 | End: 2019-09-03 | Stop reason: HOSPADM

## 2019-08-31 RX ORDER — FLUTICASONE PROPIONATE 50 MCG
1 SPRAY, SUSPENSION (ML) NASAL DAILY
Status: DISCONTINUED | OUTPATIENT
Start: 2019-09-01 | End: 2019-09-03 | Stop reason: HOSPADM

## 2019-08-31 RX ORDER — CEPHALEXIN 500 MG/1
500 CAPSULE ORAL ONCE
Status: COMPLETED | OUTPATIENT
Start: 2019-08-31 | End: 2019-08-31

## 2019-08-31 RX ORDER — ACETAMINOPHEN 325 MG/1
650 TABLET ORAL EVERY 6 HOURS PRN
Status: DISCONTINUED | OUTPATIENT
Start: 2019-08-31 | End: 2019-09-01

## 2019-08-31 RX ORDER — ATORVASTATIN CALCIUM 20 MG/1
20 TABLET, FILM COATED ORAL DAILY
Status: DISCONTINUED | OUTPATIENT
Start: 2019-09-01 | End: 2019-09-03 | Stop reason: HOSPADM

## 2019-08-31 RX ORDER — OLANZAPINE 10 MG/1
10 INJECTION, POWDER, LYOPHILIZED, FOR SOLUTION INTRAMUSCULAR ONCE
Status: COMPLETED | OUTPATIENT
Start: 2019-08-31 | End: 2019-08-31

## 2019-08-31 RX ORDER — CEFTRIAXONE 1 G/50ML
1000 INJECTION, SOLUTION INTRAVENOUS EVERY 24 HOURS
Status: DISCONTINUED | OUTPATIENT
Start: 2019-09-01 | End: 2019-09-01

## 2019-08-31 RX ORDER — LORATADINE 10 MG/1
10 TABLET ORAL DAILY
Status: DISCONTINUED | OUTPATIENT
Start: 2019-09-01 | End: 2019-09-03 | Stop reason: HOSPADM

## 2019-08-31 RX ORDER — MONTELUKAST SODIUM 10 MG/1
10 TABLET ORAL
Status: DISCONTINUED | OUTPATIENT
Start: 2019-08-31 | End: 2019-09-03 | Stop reason: HOSPADM

## 2019-08-31 RX ORDER — SODIUM CHLORIDE 9 MG/ML
50 INJECTION, SOLUTION INTRAVENOUS CONTINUOUS
Status: DISCONTINUED | OUTPATIENT
Start: 2019-09-01 | End: 2019-09-01

## 2019-08-31 RX ORDER — TRAZODONE HYDROCHLORIDE 50 MG/1
100 TABLET ORAL
Status: DISCONTINUED | OUTPATIENT
Start: 2019-08-31 | End: 2019-09-03 | Stop reason: HOSPADM

## 2019-08-31 RX ORDER — DESVENLAFAXINE 50 MG/1
50 TABLET, EXTENDED RELEASE ORAL DAILY
Status: DISCONTINUED | OUTPATIENT
Start: 2019-09-01 | End: 2019-09-03 | Stop reason: HOSPADM

## 2019-08-31 RX ORDER — ASPIRIN 81 MG/1
324 TABLET, CHEWABLE ORAL ONCE
Status: COMPLETED | OUTPATIENT
Start: 2019-08-31 | End: 2019-08-31

## 2019-08-31 RX ORDER — PROPRANOLOL HYDROCHLORIDE 20 MG/1
10 TABLET ORAL DAILY
Status: DISCONTINUED | OUTPATIENT
Start: 2019-09-01 | End: 2019-09-03 | Stop reason: HOSPADM

## 2019-08-31 RX ORDER — ATORVASTATIN CALCIUM 80 MG/1
40 TABLET, FILM COATED ORAL
Status: DISCONTINUED | OUTPATIENT
Start: 2019-09-01 | End: 2019-08-31

## 2019-08-31 RX ORDER — LORAZEPAM 2 MG/ML
2 INJECTION INTRAMUSCULAR ONCE
Status: COMPLETED | OUTPATIENT
Start: 2019-08-31 | End: 2019-08-31

## 2019-08-31 RX ORDER — KETOTIFEN FUMARATE 0.35 MG/ML
1 SOLUTION/ DROPS OPHTHALMIC 2 TIMES DAILY
Status: DISCONTINUED | OUTPATIENT
Start: 2019-09-01 | End: 2019-09-03 | Stop reason: HOSPADM

## 2019-08-31 RX ORDER — VENLAFAXINE HYDROCHLORIDE 75 MG/1
75 CAPSULE, EXTENDED RELEASE ORAL DAILY
Status: DISCONTINUED | OUTPATIENT
Start: 2019-09-01 | End: 2019-09-01

## 2019-08-31 RX ADMIN — MONTELUKAST SODIUM 10 MG: 10 TABLET, FILM COATED ORAL at 23:51

## 2019-08-31 RX ADMIN — LORAZEPAM 2 MG: 2 INJECTION INTRAMUSCULAR; INTRAVENOUS at 18:58

## 2019-08-31 RX ADMIN — ASPIRIN 81 MG 324 MG: 81 TABLET ORAL at 20:35

## 2019-08-31 RX ADMIN — ZIPRASIDONE HYDROCHLORIDE 80 MG: 40 CAPSULE ORAL at 23:51

## 2019-08-31 RX ADMIN — SODIUM CHLORIDE 50 ML/HR: 0.9 INJECTION, SOLUTION INTRAVENOUS at 21:03

## 2019-08-31 RX ADMIN — SODIUM CHLORIDE 50 ML/HR: 0.9 INJECTION, SOLUTION INTRAVENOUS at 23:53

## 2019-08-31 RX ADMIN — CEPHALEXIN 500 MG: 500 CAPSULE ORAL at 18:58

## 2019-08-31 RX ADMIN — OLANZAPINE 10 MG: 10 INJECTION, POWDER, FOR SOLUTION INTRAMUSCULAR at 18:58

## 2019-08-31 RX ADMIN — IPRATROPIUM BROMIDE AND ALBUTEROL SULFATE 3 ML: 2.5; .5 SOLUTION RESPIRATORY (INHALATION) at 17:02

## 2019-08-31 NOTE — ED PROVIDER NOTES
History  Chief Complaint   Patient presents with    Psychiatric Evaluation     Pt arrived via squad from home  Has called 911/police 7 times today for various complaints  Today sprayed bug spray and inhaled it  Felt SOB  Talking continuously  Sts she had a ride home in a taxi and is in love with the   Has a cat at home and it is her therapy cat and he is training to be the first drug sniffing cat for the police  62 yo female c/o cough and sob from spraying bed bed spray and inhaled the whole cannister  + associated nausea  No chest pain  No fever  No vomiting or diarrhea  Has anxiety  Was not trying to hurt herself  Says she has a very special friend and doesn't want to hurt herself  Police say she has called them multiple times today for various complaints ranting and raving  History provided by:  Patient   used: No    Psychiatric Evaluation   Associated symptoms: no abdominal pain, no chest pain and no headaches        Prior to Admission Medications   Prescriptions Last Dose Informant Patient Reported? Taking? Biotin (SUPER BIOTIN) 5 MG TABS   Yes No   Sig: Take 1 tablet by mouth   HYDROcodone-acetaminophen (NORCO) 5-325 mg per tablet   No No   Sig: Take 1 tablet by mouth every 6 (six) hours as needed for pain for up to 12 dosesMax Daily Amount: 4 tablets   Multiple Vitamin (MULTIVITAMIN) tablet   Yes No   Sig: Take 1 tablet by mouth daily   aspirin 81 MG tablet   Yes No   Sig: Take 81 mg by mouth daily  atorvastatin (LIPITOR) 20 mg tablet   Yes No   Sig: Take 20 mg by mouth daily  desvenlafaxine succinate (PRISTIQ) 50 mg 24 hr tablet   Yes No   Sig: Take 50 mg by mouth daily   fluticasone (FLONASE) 50 mcg/act nasal spray   Yes No   Si spray into each nostril daily   ketotifen (ZADITOR) 0 025 % ophthalmic solution   Yes No   Si drop 2 (two) times a day     lamoTRIgine (LaMICtal) 150 MG tablet   Yes No   Sig: Take 150 mg by mouth daily    loratadine (CLARITIN) 10 mg tablet   Yes No   Sig: Take 10 mg by mouth daily   lurasidone (LATUDA) 40 mg tablet   Yes No   Sig: Take 80 mg by mouth daily with dinner    meclizine (ANTIVERT) 25 mg tablet   Yes No   Sig: Take 25 mg by mouth every 8 (eight) hours as needed for dizziness   montelukast (SINGULAIR) 10 mg tablet   Yes No   Sig: Take 10 mg by mouth daily at bedtime  naproxen (EC NAPROSYN) 500 MG EC tablet   No No   Sig: Take 1 tablet (500 mg total) by mouth 2 (two) times a day with meals   pantoprazole (PROTONIX) 40 mg tablet   Yes No   Sig: Take 40 mg by mouth daily  propranolol (INDERAL) 40 mg tablet   Yes No   Sig: Take 10 mg by mouth daily    tolterodine (DETROL LA) 4 mg 24 hr capsule   Yes No   Sig: Take 4 mg by mouth daily  traZODone (DESYREL) 50 mg tablet   Yes No   Sig: Take 100 mg by mouth daily at bedtime as needed    venlafaxine (EFFEXOR-XR) 37 5 mg 24 hr capsule   Yes No   Sig: Take 75 mg by mouth daily     ziprasidone (GEODON) 80 mg capsule   Yes No   Sig: Take 80 mg by mouth every evening      Facility-Administered Medications: None       Past Medical History:   Diagnosis Date    Asthma     Chronic pain     back    GERD (gastroesophageal reflux disease)     Hyperlipidemia     Psychiatric disorder     bipolar       Past Surgical History:   Procedure Laterality Date    TUBAL LIGATION         History reviewed  No pertinent family history  I have reviewed and agree with the history as documented  Social History     Tobacco Use    Smoking status: Current Every Day Smoker     Packs/day: 1 00    Smokeless tobacco: Never Used   Substance Use Topics    Alcohol use: No    Drug use: No        Review of Systems   Constitutional: Negative  Negative for fever  HENT: Negative  Eyes: Negative  Respiratory: Positive for cough and shortness of breath  Cardiovascular: Negative  Negative for chest pain  Gastrointestinal: Negative    Negative for abdominal pain, diarrhea, nausea and vomiting  Genitourinary: Negative  Negative for dysuria and flank pain  Musculoskeletal: Negative  Negative for back pain and myalgias  Skin: Negative  Negative for rash and wound  Neurological: Negative  Negative for dizziness and headaches  Hematological: Does not bruise/bleed easily  Psychiatric/Behavioral: Negative  All other systems reviewed and are negative  Physical Exam  Physical Exam   Constitutional: She appears well-developed and well-nourished  No distress  HENT:   Head: Normocephalic and atraumatic  Mouth/Throat: Oropharynx is clear and moist    Eyes: Pupils are equal, round, and reactive to light  Conjunctivae are normal  No scleral icterus  Neck: Normal range of motion  Neck supple  Cardiovascular: Normal rate, regular rhythm and normal heart sounds  No murmur heard  Pulmonary/Chest: Effort normal and breath sounds normal  No respiratory distress  Abdominal: Soft  She exhibits no distension  There is no tenderness  Musculoskeletal: Normal range of motion  She exhibits no edema or deformity  Neurological: She is alert  No cranial nerve deficit  She exhibits normal muscle tone  Skin: Skin is warm and dry  No rash noted  She is not diaphoretic  No pallor  Psychiatric:   Talking non-stop with flight of ideas; unable to redirect; talking about being in love with  who is 20 years younger than her and her cat who is going to join the police force to sniff out drugs   Nursing note and vitals reviewed        Vital Signs  ED Triage Vitals   Temperature Pulse Respirations Blood Pressure SpO2   08/31/19 1641 08/31/19 1641 08/31/19 1641 08/31/19 1641 08/31/19 1641   (!) 96 9 °F (36 1 °C) 98 20 113/78 97 %      Temp src Heart Rate Source Patient Position - Orthostatic VS BP Location FiO2 (%)   -- 08/31/19 2024 08/31/19 2024 08/31/19 2024 --    Monitor Lying Right arm       Pain Score       08/31/19 1641       No Pain           Vitals:    08/31/19 1641 08/31/19 2024 08/31/19 2030   BP: 113/78 116/68 116/68   Pulse: 98 82 76   Patient Position - Orthostatic VS:  Lying          Visual Acuity      ED Medications  Medications   atorvastatin (LIPITOR) tablet 80 mg (has no administration in time range)   ipratropium-albuterol (DUO-NEB) 0 5-2 5 mg/3 mL inhalation solution 3 mL (3 mL Nebulization Given 8/31/19 1702)   cephalexin (KEFLEX) capsule 500 mg (500 mg Oral Given 8/31/19 1858)   LORazepam (ATIVAN) 2 mg/mL injection 2 mg (2 mg Intramuscular Given 8/31/19 1858)   OLANZapine (ZyPREXA) IM injection 10 mg (10 mg Intramuscular Given 8/31/19 1858)   aspirin chewable tablet 324 mg (324 mg Oral Given 8/31/19 2035)       Diagnostic Studies  Results Reviewed     Procedure Component Value Units Date/Time    Troponin I [[id number]]     Lab Status:  No result Specimen:  Blood     Troponin I [[id number]]  (Abnormal) Collected:  08/31/19 1832    Lab Status:  Final result Specimen:  Blood from Arm, Left Updated:  08/31/19 1905     Troponin I 0 94 ng/mL     Urine Microscopic [[id number]]  (Abnormal) Collected:  08/31/19 1752    Lab Status:  Final result Specimen:  Urine, Other Updated:  08/31/19 1825     RBC, UA 0-1 /hpf      WBC, UA 20-30 /hpf      Epithelial Cells Moderate /hpf      Bacteria, UA Moderate /hpf     Rapid drug screen, urine [[id number]]  (Abnormal) Collected:  08/31/19 1752    Lab Status:  Final result Specimen:  Urine, Other Updated:  08/31/19 1813     Amph/Meth UR Negative     Barbiturate Ur Negative     Benzodiazepine Urine Negative     Cocaine Urine Negative     Methadone Urine Negative     Opiate Urine Positive     PCP Ur Negative     THC Urine Negative    Narrative:       Presumptive report  If requested, specimen will be sent to reference lab for confirmation  FOR MEDICAL PURPOSES ONLY  IF CONFIRMATION NEEDED PLEASE CONTACT THE LAB WITHIN 5 DAYS      Drug Screen Cutoff Levels:  AMPHETAMINE/METHAMPHETAMINES  1000 ng/mL  BARBITURATES     200 ng/mL  BENZODIAZEPINES     200 ng/mL  COCAINE      300 ng/mL  METHADONE      300 ng/mL  OPIATES      300 ng/mL  PHENCYCLIDINE     25 ng/mL  THC       50 ng/mL      Comprehensive metabolic panel [[id number]] Collected:  08/31/19 1746    Lab Status:  Final result Specimen:  Blood from Arm, Right Updated:  08/31/19 1809     Sodium 142 mmol/L      Potassium 4 0 mmol/L      Chloride 104 mmol/L      CO2 27 mmol/L      ANION GAP 11 mmol/L      BUN 20 mg/dL      Creatinine 0 84 mg/dL      Glucose 105 mg/dL      Calcium 10 1 mg/dL      AST 31 U/L      ALT 25 U/L      Alkaline Phosphatase 94 U/L      Total Protein 7 3 g/dL      Albumin 3 9 g/dL      Total Bilirubin 0 40 mg/dL      eGFR 77 ml/min/1 73sq m     Narrative:       [place] guidelines for Chronic Kidney Disease (CKD):     Stage 1 with normal or high GFR (GFR > 90 mL/min/1 73 square meters)    Stage 2 Mild CKD (GFR = 60-89 mL/min/1 73 square meters)    Stage 3A Moderate CKD (GFR = 45-59 mL/min/1 73 square meters)    Stage 3B Moderate CKD (GFR = 30-44 mL/min/1 73 square meters)    Stage 4 Severe CKD (GFR = 15-29 mL/min/1 73 square meters)    Stage 5 End Stage CKD (GFR <15 mL/min/1 73 square meters)  Note: GFR calculation is accurate only with a steady state creatinine    Ethanol [[id number]]  (Normal) Collected:  08/31/19 1746    Lab Status:  Final result Specimen:  Blood from Arm, Right Updated:  08/31/19 1809     Ethanol Lvl <3 mg/dL     UA w Reflex to Microscopic [[id number]]  (Abnormal) Collected:  08/31/19 1752    Lab Status:  Final result Specimen:  Urine, Other Updated:  08/31/19 1802     Color, UA Yellow     Clarity, UA Cloudy     Specific Gravity, UA >=1 030     pH, UA 5 0     Leukocytes, UA Negative     Nitrite, UA Negative     Protein,  (2+) mg/dl      Glucose, UA Negative mg/dl      Ketones, UA Trace mg/dl      Urobilinogen, UA 1 0 E U /dl      Bilirubin, UA Interference- unable to analyze     Blood, UA Trace-Intact    CBC and differential [[id number]] (Abnormal) Collected:  08/31/19 1746    Lab Status:  Final result Specimen:  Blood from Arm, Right Updated:  08/31/19 1752     WBC 9 69 Thousand/uL      RBC 4 30 Million/uL      Hemoglobin 13 3 g/dL      Hematocrit 40 8 %      MCV 95 fL      MCH 30 9 pg      MCHC 32 6 g/dL      RDW 12 8 %      MPV 9 6 fL      Platelets 033 Thousands/uL      nRBC 0 /100 WBCs      Neutrophils Relative 55 %      Immat GRANS % 0 %      Lymphocytes Relative 30 %      Monocytes Relative 10 %      Eosinophils Relative 4 %      Basophils Relative 1 %      Neutrophils Absolute 5 37 Thousands/µL      Immature Grans Absolute 0 03 Thousand/uL      Lymphocytes Absolute 2 92 Thousands/µL      Monocytes Absolute 0 92 Thousand/µL      Eosinophils Absolute 0 37 Thousand/µL      Basophils Absolute 0 08 Thousands/µL                  XR chest 1 view portable   ED Interpretation by Mojgan Turner MD (35/27 2207)   No active disease      Final Result by Zee Abreu MD (08/31 1954)      No acute cardiopulmonary disease  Workstation performed: [id number]                    Procedures  Procedures       ED Course         HEART Risk Score      Most Recent Value   History  0 Filed at: 08/31/2019 2038   ECG  2 Filed at: 08/31/2019 2038   Age  1 Filed at: 08/31/2019 2038   Risk Factors  1 Filed at: 08/31/2019 2038   Troponin  2 Filed at: 08/31/2019 2038   Heart Score Risk Calculator   History  0 Filed at: 08/31/2019 2038   ECG  2 Filed at: 08/31/2019 2038   Age  1 Filed at: 08/31/2019 2038   Risk Factors  1 Filed at: 08/31/2019 2038   Troponin  2 Filed at: 08/31/2019 2038   HEART Score  6 Filed at: 08/31/2019 2038   HEART Score  6 Filed at: 08/31/2019 2038                            MDM  Number of Diagnoses or Management Options  Abnormal EKG:   Elevated troponin:   Fifi ([facility] [address] ):   Psychoses ([facility] ):   UTI (urinary tract infection):   Diagnosis management comments: EKG has nonspecific T wave inversion I and aVL but pt  Is not having any chest pain    Will check troponin now and in 3 hours  Will add abx for UTI  Labs otherwise normal   1849 -  Pt  Moved to room 21 and waiting to be medically cleared and screened  Pt  Now got very agitated, yelling, banging the wall  Will need to medicate for pt  Safety  2010 - Troponin elevated  Pt  Will be monitored, repeat EKG - unchanges  Pt  Continues to deny any chest pain and sob from the bed bug bomb had cleared after neb  Pt  Still very upset despite sedation, wants to go home  I have attempted to explain to her that I am concerned she is having a heart attack and she says she doesn't care  Pt  Will need to be admitted, medically cleared and then screened by psych  Discussed with cardiologist who advised aspirin and statin and follow enzymes since pt  Is not having any ACS symptoms  Disposition  Final diagnoses:   Psychoses ([facility] )   Fifi ([place] [address] )   UTI (urinary tract infection)   Abnormal EKG   Elevated troponin     Time reflects when diagnosis was documented in both MDM as applicable and the Disposition within this note     Time User Action Codes Description Comment    4/23/6507  0:91 PM Shayy Scales Add [S45] Psychoses ([facility] )     6/22/5500  1:39 PM Shayy Scales Add [H97 1] Fifi ([place] [address] )     7/96/7361  6:24 PM Shayy Scales Add [E01 1] UTI (urinary tract infection)     5/88/2741  0:05 PM Aracely MARTINEZ Add [K74 25] Abnormal EKG     4/01/0147  2:85 PM Aracely MARTINEZ Add [G73 6] Elevated troponin       ED Disposition     None      Follow-up Information    None         Patient's Medications   Discharge Prescriptions    No medications on file     No discharge procedures on file      ED Provider  Electronically Signed by           Robert De Paz MD  65/56/83 9390

## 2019-08-31 NOTE — ED NOTES
During assessment, patient having a hard time staying on topic,Speech is rambling and rapid  Patient consistently talking and repeating self       Stella Mathias RN  08/31/19 6701

## 2019-08-31 NOTE — ED NOTES
8/31/19 @ 1625:  PES consulted with  who escorted patient to ED  Officer reports that this was the 7th time they were called to residence by patient, and this is the 2nd time she presented in the ED today, but the first time, she walked out  Also, patient presented in the ED yesterday, all for medical complaints   says that patient "may have bed bugs," and informed charge RN  Officer says that patient was "spraying herself with bug spray "  Additionally, officer reports that patient has been yelling and fighting with her neighbors, yelling out the window and foaming at the mouth "  PES will continue to monitor  Joanne, MS  1800: PES attempted to complete crisis assessment, but patient presents with flight of ideas, and is extremely tangential   Patient unable to answer questions appropriately, and repeats herself often  Patient told PES same story about a man she met  Patient discussed her bed bugs and roaches in her apartment, and that she's "taking care of them, so I can [facility] "  As patient seems to be decompensating, presents manic, and made 7 calls to the Police, PES will have patient screened for commitment when ED MD considers patient to be "medically cleared "  PES will monitor blood results and medical clearance  Abby Barkley, [address] [place] [address]: Patient moved to secured holding room 21; patient hasn't stopped talking since she arrived, and has been telling the same story over and over  Patient is loud and tangential   PES still awaiting medical clearance  Abby Barkley MS  1900: Patient became agitated and began punching the door and saying, "I don't want to be committed; I'm fine; I have a date; I didn't do anything wrong "  In fact, the patient has decompensated and had the Police to her house 7 times today, and made two visits to the ED today, and once yesterday  Patient had to be medicated for safety  Giuliana Corral Roger 87  2040:  It appears patient will be medically admitted for observation due to cardiac issues  At this point, patient is unable to make decisions on her own due to psychosis and byron, therefore, patient should be kept in hospital against her will  When patient is "medically cleared," [facility] will be contacted for screening and telepsych assessment  PES will continue to monitor    Robi Galindo MS

## 2019-08-31 NOTE — ED NOTES
Patient cooperative with blood draw, 1:1 for safety  Patient ambulated with steady gait to bathroom to provide urine sample with JULIAN Woods  08/31/19 1756

## 2019-08-31 NOTE — ED NOTES
Was here earlier today and walked out because she didn't get taken care of  Was here for 15 minutes       Jessica Kramer, RN  08/31/19 2460 [address], RN  08/31/19 2008

## 2019-09-01 PROBLEM — R82.90 ABNORMAL URINALYSIS: Status: ACTIVE | Noted: 2019-08-31

## 2019-09-01 PROBLEM — J10.1 INFLUENZA A: Status: RESOLVED | Noted: 2018-01-24 | Resolved: 2019-09-01

## 2019-09-01 PROBLEM — F31.10 BIPOLAR I DISORDER WITH MANIA (HCC): Status: ACTIVE | Noted: 2018-01-23

## 2019-09-01 LAB
ANION GAP SERPL CALCULATED.3IONS-SCNC: 9 MMOL/L (ref 4–13)
ATRIAL RATE: 66 BPM
ATRIAL RATE: 66 BPM
ATRIAL RATE: 79 BPM
BASOPHILS # BLD AUTO: 0.05 THOUSANDS/ΜL (ref 0–0.1)
BASOPHILS NFR BLD AUTO: 1 % (ref 0–1)
BUN SERPL-MCNC: 22 MG/DL (ref 5–25)
CALCIUM SERPL-MCNC: 8.9 MG/DL (ref 8.3–10.1)
CHLORIDE SERPL-SCNC: 104 MMOL/L (ref 100–108)
CO2 SERPL-SCNC: 26 MMOL/L (ref 21–32)
CREAT SERPL-MCNC: 0.67 MG/DL (ref 0.6–1.3)
EOSINOPHIL # BLD AUTO: 0.52 THOUSAND/ΜL (ref 0–0.61)
EOSINOPHIL NFR BLD AUTO: 7 % (ref 0–6)
ERYTHROCYTE [DISTWIDTH] IN BLOOD BY AUTOMATED COUNT: 12.7 % (ref 11.6–15.1)
GFR SERPL CREATININE-BSD FRML MDRD: 97 ML/MIN/1.73SQ M
GLUCOSE P FAST SERPL-MCNC: 85 MG/DL (ref 65–99)
GLUCOSE SERPL-MCNC: 85 MG/DL (ref 65–140)
HCT VFR BLD AUTO: 35 % (ref 34.8–46.1)
HGB BLD-MCNC: 11.3 G/DL (ref 11.5–15.4)
IMM GRANULOCYTES # BLD AUTO: 0.02 THOUSAND/UL (ref 0–0.2)
IMM GRANULOCYTES NFR BLD AUTO: 0 % (ref 0–2)
LYMPHOCYTES # BLD AUTO: 2.23 THOUSANDS/ΜL (ref 0.6–4.47)
LYMPHOCYTES NFR BLD AUTO: 31 % (ref 14–44)
MCH RBC QN AUTO: 31.2 PG (ref 26.8–34.3)
MCHC RBC AUTO-ENTMCNC: 32.3 G/DL (ref 31.4–37.4)
MCV RBC AUTO: 97 FL (ref 82–98)
MONOCYTES # BLD AUTO: 0.75 THOUSAND/ΜL (ref 0.17–1.22)
MONOCYTES NFR BLD AUTO: 11 % (ref 4–12)
NEUTROPHILS # BLD AUTO: 3.57 THOUSANDS/ΜL (ref 1.85–7.62)
NEUTS SEG NFR BLD AUTO: 50 % (ref 43–75)
NRBC BLD AUTO-RTO: 0 /100 WBCS
P AXIS: -49 DEGREES
P AXIS: 45 DEGREES
P AXIS: 63 DEGREES
PLATELET # BLD AUTO: 324 THOUSANDS/UL (ref 149–390)
PMV BLD AUTO: 9.7 FL (ref 8.9–12.7)
POTASSIUM SERPL-SCNC: 3.5 MMOL/L (ref 3.5–5.3)
PR INTERVAL: 124 MS
PR INTERVAL: 134 MS
PR INTERVAL: 140 MS
QRS AXIS: -16 DEGREES
QRS AXIS: -9 DEGREES
QRS AXIS: -9 DEGREES
QRSD INTERVAL: 86 MS
QRSD INTERVAL: 86 MS
QRSD INTERVAL: 88 MS
QT INTERVAL: 422 MS
QT INTERVAL: 454 MS
QT INTERVAL: 478 MS
QTC INTERVAL: 475 MS
QTC INTERVAL: 483 MS
QTC INTERVAL: 501 MS
RBC # BLD AUTO: 3.62 MILLION/UL (ref 3.81–5.12)
SODIUM SERPL-SCNC: 139 MMOL/L (ref 136–145)
T WAVE AXIS: 136 DEGREES
T WAVE AXIS: 137 DEGREES
T WAVE AXIS: 141 DEGREES
TROPONIN I SERPL-MCNC: 0.63 NG/ML
VENTRICULAR RATE: 66 BPM
VENTRICULAR RATE: 66 BPM
VENTRICULAR RATE: 79 BPM
WBC # BLD AUTO: 7.14 THOUSAND/UL (ref 4.31–10.16)

## 2019-09-01 PROCEDURE — 99226 PR SBSQ OBSERVATION CARE/DAY 35 MINUTES: CPT | Performed by: STUDENT IN AN ORGANIZED HEALTH CARE EDUCATION/TRAINING PROGRAM

## 2019-09-01 PROCEDURE — 80048 BASIC METABOLIC PNL TOTAL CA: CPT | Performed by: STUDENT IN AN ORGANIZED HEALTH CARE EDUCATION/TRAINING PROGRAM

## 2019-09-01 PROCEDURE — 87086 URINE CULTURE/COLONY COUNT: CPT | Performed by: STUDENT IN AN ORGANIZED HEALTH CARE EDUCATION/TRAINING PROGRAM

## 2019-09-01 PROCEDURE — 85025 COMPLETE CBC W/AUTO DIFF WBC: CPT | Performed by: STUDENT IN AN ORGANIZED HEALTH CARE EDUCATION/TRAINING PROGRAM

## 2019-09-01 PROCEDURE — 84484 ASSAY OF TROPONIN QUANT: CPT | Performed by: STUDENT IN AN ORGANIZED HEALTH CARE EDUCATION/TRAINING PROGRAM

## 2019-09-01 PROCEDURE — 99204 OFFICE O/P NEW MOD 45 MIN: CPT | Performed by: INTERNAL MEDICINE

## 2019-09-01 PROCEDURE — 87081 CULTURE SCREEN ONLY: CPT | Performed by: STUDENT IN AN ORGANIZED HEALTH CARE EDUCATION/TRAINING PROGRAM

## 2019-09-01 PROCEDURE — 93010 ELECTROCARDIOGRAM REPORT: CPT | Performed by: INTERNAL MEDICINE

## 2019-09-01 RX ORDER — HALOPERIDOL 5 MG/ML
5 INJECTION INTRAMUSCULAR EVERY 6 HOURS PRN
Status: DISCONTINUED | OUTPATIENT
Start: 2019-09-01 | End: 2019-09-02

## 2019-09-01 RX ORDER — HALOPERIDOL 5 MG/ML
2 INJECTION INTRAMUSCULAR EVERY 6 HOURS PRN
Status: DISCONTINUED | OUTPATIENT
Start: 2019-09-01 | End: 2019-09-01

## 2019-09-01 RX ORDER — ACETAMINOPHEN 325 MG/1
650 TABLET ORAL EVERY 6 HOURS PRN
Status: DISCONTINUED | OUTPATIENT
Start: 2019-09-01 | End: 2019-09-03 | Stop reason: HOSPADM

## 2019-09-01 RX ADMIN — ATORVASTATIN CALCIUM 20 MG: 20 TABLET, FILM COATED ORAL at 09:02

## 2019-09-01 RX ADMIN — LAMOTRIGINE 150 MG: 100 TABLET ORAL at 09:02

## 2019-09-01 RX ADMIN — HALOPERIDOL LACTATE 2 MG: 5 INJECTION, SOLUTION INTRAMUSCULAR at 12:22

## 2019-09-01 RX ADMIN — ZIPRASIDONE HYDROCHLORIDE 80 MG: 40 CAPSULE ORAL at 17:15

## 2019-09-01 RX ADMIN — KETOTIFEN FUMARATE 1 DROP: 0.35 SOLUTION/ DROPS OPHTHALMIC at 17:17

## 2019-09-01 RX ADMIN — KETOTIFEN FUMARATE 1 DROP: 0.35 SOLUTION/ DROPS OPHTHALMIC at 09:06

## 2019-09-01 RX ADMIN — DESVENLAFAXINE 50 MG: 50 TABLET, EXTENDED RELEASE ORAL at 09:48

## 2019-09-01 RX ADMIN — CEFTRIAXONE 1000 MG: 1 INJECTION, SOLUTION INTRAVENOUS at 00:11

## 2019-09-01 RX ADMIN — FLUTICASONE PROPIONATE 1 SPRAY: 50 SPRAY, METERED NASAL at 09:06

## 2019-09-01 RX ADMIN — ENOXAPARIN SODIUM 40 MG: 40 INJECTION SUBCUTANEOUS at 09:10

## 2019-09-01 RX ADMIN — MONTELUKAST SODIUM 10 MG: 10 TABLET, FILM COATED ORAL at 22:27

## 2019-09-01 RX ADMIN — PANTOPRAZOLE SODIUM 40 MG: 40 TABLET, DELAYED RELEASE ORAL at 06:32

## 2019-09-01 RX ADMIN — ASPIRIN 81 MG 81 MG: 81 TABLET ORAL at 09:03

## 2019-09-01 RX ADMIN — LORATADINE 10 MG: 10 TABLET ORAL at 09:03

## 2019-09-01 NOTE — ED NOTES
Pt is resting on stretcher in no distress  Easily arousable  Denies SI  Side rails up x2 and 1:1 observation       Saritha Galindo RN  08/31/19 2001

## 2019-09-01 NOTE — UTILIZATION REVIEW
Initial Clinical Review    Admission: Date/Time/Statement:    Admission Orders (From admission, onward)     Ordered        08/31/19 2040  Place in Observation (expected length of stay for this patient is less than two midnights)  Once                   Orders Placed This Encounter   Procedures    Place in Observation (expected length of stay for this patient is less than two midnights)     Standing Status:   Standing     Number of Occurrences:   1     Order Specific Question:   Admitting Physician     Answer:   Belen Mitchell [[id number]]     Order Specific Question:   Level of Care     Answer:   Med Surg [[id number]]     ED Arrival Information     Expected Arrival Acuity Means of Arrival Escorted By Service Admission Type    - 8/31/2019 16:27 Emergent Ambulance [facility] Emergency    Arrival Complaint    sprayed bed bug spray in face         Chief Complaint   Patient presents with    Psychiatric Evaluation     Pt arrived via squad from home  Has called 911/police 7 times today for various complaints  Today sprayed bug spray and inhaled it  Felt SOB  Talking continuously  Sts she had a ride home in a taxi and is in love with the   Has a cat at home and it is her therapy cat and he is training to be the first drug sniffing cat for the police  Assessment/Plan:   62 yo female c/o cough and sob from spraying bed bed spray and inhaled the whole cannister  + associated nausea  No chest pain  No fever  No vomiting or diarrhea  Has anxiety  Was not trying to hurt herself  Says she has a very special friend and doesn't want to hurt herself  Police say she has called them multiple times today for various complaints ranting and raving  NSTEMI (non-ST elevated myocardial infarction) [facility])  Assessment & Plan  EKG with T wave changes evident however pt is currently asymptomatic with no complaints of chest pain or shortness of breath  May be type 2 secondary to respiratory distress/tachypnea    ER physician has discussed case with Cardiologist on call who recommends administering Asprin and Statin (ordered)  Will admit for Observation, monitor on telemetry, trend troponin and resume Asprin, Statin and Inderal   Will await formal evaluation from Cardiology      UTI (urinary tract infection)  Assessment & Plan  Will start on Ceftriaxone with plan to follow up on urine culture (ordered)     Bipolar 1 disorder ([address] )  Assessment & Plan  Pt is visibly agitated and has contacted the police several times today for various complaints  She does not appear to be in the right state of mind to be discharged therefore will await Psych evaluation    Will resume home regimen, order 1:1 observation and consider Haldol vs Zyprexa prn for agitation    ED Triage Vitals   Temperature Pulse Respirations Blood Pressure SpO2   08/31/19 1641 08/31/19 1641 08/31/19 1641 08/31/19 1641 08/31/19 1641   (!) 96 9 °F (36 1 °C) 98 20 113/78 97 %      Temp Source Heart Rate Source Patient Position - Orthostatic VS BP Location FiO2 (%)   08/31/19 2222 08/31/19 2024 08/31/19 2024 08/31/19 2024 --   Tympanic Monitor Lying Right arm       Pain Score       08/31/19 1641       No Pain        Wt Readings from Last 1 Encounters:   09/01/19 80 5 kg (177 lb 7 5 oz)     Additional Vital Signs:   Date/Time  Temp  Pulse  Resp  BP  SpO2  O2 Device  Patient Position - Orthostatic VS   09/01/19 0300  97 6 °F (36 4 °C)  76  18  99/56  95 %  None (Room air)  Lying   09/01/19 0005  97 1 °F (36 2 °C)Abnormal   70  18  90/55  94 %  None (Room air)  Lying     Pertinent Labs/Diagnostic Test Results:   Results from last 7 days   Lab Units 09/01/19  0632 08/31/19  1746   WBC Thousand/uL 7 14 9 69   HEMOGLOBIN g/dL 11 3* 13 3   HEMATOCRIT % 35 0 40 8   PLATELETS Thousands/uL 324 408*   NEUTROS ABS Thousands/µL 3 57 5 37     Results from last 7 days   Lab Units 08/31/19  1746   SODIUM mmol/L 142   POTASSIUM mmol/L 4 0   CHLORIDE mmol/L 104   CO2 mmol/L 27   ANION GAP mmol/L 11   BUN mg/dL 20   CREATININE mg/dL 0 84   EGFR ml/min/1 73sq m 77   CALCIUM mg/dL 10 1     Results from last 7 days   Lab Units 08/31/19  1746   AST U/L 31   ALT U/L 25   ALK PHOS U/L 94   TOTAL PROTEIN g/dL 7 3   ALBUMIN g/dL 3 9   TOTAL BILIRUBIN mg/dL 0 40     Results from last 7 days   Lab Units 08/31/19  1746   GLUCOSE RANDOM mg/dL 105     Results from last 7 days   Lab Units 09/01/19  0013 08/31/19  2103 08/31/19  1832   TROPONIN I ng/mL 0 63* 0 91* 0 94*     Results from last 7 days   Lab Units 08/31/19  2125   PROTIME seconds 10 7   INR  0 99     Results from last 7 days   Lab Units 08/31/19  1752   CLARITY UA  Cloudy   COLOR UA  Yellow   SPEC GRAV UA  >=1 030   PH UA  5 0   GLUCOSE UA mg/dl Negative   KETONES UA mg/dl Trace*   BLOOD UA  Trace-Intact*   PROTEIN UA mg/dl 100 (2+)*   NITRITE UA  Negative   BILIRUBIN UA  Interference- unable to analyze*   UROBILINOGEN UA E U /dl 1 0   LEUKOCYTES UA  Negative   WBC UA /hpf 20-30*   RBC UA /hpf 0-1*   BACTERIA UA /hpf Moderate*   EPITHELIAL CELLS WET PREP /hpf Moderate*     Results from last 7 days   Lab Units 08/31/19  1752   AMPH/METH  Negative   BARBITURATE UR  Negative   BENZODIAZEPINE UR  Negative   COCAINE UR  Negative   METHADONE URINE  Negative   OPIATE UR  Positive*   PCP UR  Negative   THC UR  Negative     Results from last 7 days   Lab Units 08/31/19  1746   ETHANOL LVL mg/dL <3     CXR=No acute cardiopulmonary disease  EKG=nonspecific T wave inversion I and aVL   ED Treatment:   Medication Administration from 08/31/2019 1627 to 08/31/2019 2228       Date/Time Order Dose Route     08/31/2019 1702 ipratropium-albuterol (DUO-NEB) 0 5-2 5 mg/3 mL inhalation solution 3 mL 3 mL Nebulization     08/31/2019 1858 cephalexin (KEFLEX) capsule 500 mg 500 mg Oral     08/31/2019 1858 LORazepam (ATIVAN) 2 mg/mL injection 2 mg 2 mg Intramuscular     08/31/2019 1858 OLANZapine (ZyPREXA) IM injection 10 mg 10 mg Intramuscular     08/31/2019 2035 aspirin chewable tablet 324 mg 324 mg Oral     08/31/2019 2103 sodium chloride 0 9 % infusion 50 mL/hr Intravenous        Past Medical History:   Diagnosis Date    Asthma     Chronic pain     back    GERD (gastroesophageal reflux disease)     Hyperlipidemia     Psychiatric disorder     bipolar     Present on Admission:   Bipolar 1 disorder ([place] [address] )   HLD (hyperlipidemia)  Admitting Diagnosis: Psychoses ([place] [address] ) [F29]  Fifi ([place] [address] ) [F30 9]  UTI (urinary tract infection) [N39 0]  Abnormal EKG [R94 31]  Elevated troponin [R74 8]  Bipolar 1 disorder ([place] [address] ) [F31 9]  Patient needs psychiatric hold for evaluation [Z00 8]  Age/Sex: 63 y o  female  Admission Orders:  TELEMETRY  CONSULT CARDIO  CONSULT PSYCHE  1:1 OBSERVATION  NON-VIOLENT RESTRAINTS  Current Facility-Administered Medications:  acetaminophen 650 mg Oral Q6H PRN   aspirin 81 mg Oral Daily   atorvastatin 20 mg Oral Daily   atorvastatin 80 mg Oral Once   cefTRIAXone 1,000 mg Intravenous Q24H   desvenlafaxine succinate 50 mg Oral Daily   enoxaparin 40 mg Subcutaneous Daily   fluticasone 1 spray Nasal Daily   ketotifen 1 drop Both Eyes BID   lamoTRIgine 150 mg Oral Daily   loratadine 10 mg Oral Daily   lurasidone 80 mg Oral Daily With Dinner   montelukast 10 mg Oral HS   nicotine 1 patch Transdermal Daily   pantoprazole 40 mg Oral Early Morning   propranolol 10 mg Oral Daily   traZODone 100 mg Oral HS PRN   venlafaxine 75 mg Oral Daily   ziprasidone 80 mg Oral QPM     Network Utilization Review Department  Phone: [phone]; Fax [phone]  [email or web address]  ATTENTION: Please call with any questions or concerns to [phone] and carefully listen to the prompts so that you are directed to the right person  Send all requests for admission clinical reviews, approved or denied determinations and any other requests to fax [phone]   All voicemails are confidential

## 2019-09-01 NOTE — ED NOTES
Patient attempting to refuse aspirin that is ordered  Patient informed why aspirin was ordered  Patient asked if she was refusing medication,finally and angrily took medication       Logan Greer RN  08/31/19 1813

## 2019-09-01 NOTE — ED NOTES
Patient transferred from secured holding area to room 6  Patient agitated,accusatory to staff,being verbally abusive to staff  Patient informed she needs blood work,EKG,and medicine       Payton Rocha RN  08/31/19 2827

## 2019-09-01 NOTE — ASSESSMENT & PLAN NOTE
Pt is visibly agitated and has contacted the police several times today for various complaints     Patient is not capable at this time given acute manic episode  · Psych consulted, recs appreciated  · Continue home meds: geodon, lamictal, latuda, trazodone  · 1:1 observation  · Haldol 5mg IM PRN for agitation

## 2019-09-01 NOTE — CONSULTS
CARDIOLOGY CONSULTATION  Basilio Lara 63 y o  female MRN: [id number]  Unit/Bed#: [address] [place] [address] Encounter: [id number]      History of Present Illness   PCP: Lali Peres MD  Date of Admission:  8/31/2019  Date of Consultation: 09/01/19  Physician Requesting Consult: Georgette Obregon MD    Reason for Consult / Principal Problem: abnormal ecg    Assessment/Plan   Type 2 MI/abnormal EKG- troponin peak at 0 94  Baseline EKG with deep T-wave inversions in the lateral leads  Patient is an active smoker with age greater than 54 with a reported strong family history for CAD and myocardial infarction  She has a very poor historian given her psychiatric disorder  She currently denies having active chest pain or shortness of breath  She is euvolemic on examination  Plan for nuclear stress test to rule out for significant inducible ischemia on Tuesday morning  Recommend continuing aspirin plus statin plus beta-blocker therapy    History of active smoking- smoking cessation    Bipolar disorder- psychiatric following      HPI: Basilio Lara is a 63y o  year old female history of bipolar disorder, depression, anxiety, active smoking who presents with worsening shortness of breath  The patient was seen after inhaling bug spray at home  She was and a possible manic situation  An EKG was done which showed abnormal T-wave inversions  Cardiac enzymes were done which showed an elevation in troponin  The patient was oriented to place and date  She denies having prior history of myocardial infarction  However she states that her mother and grandmother with myocardial infarction in the early 62s  She reports being an active smoker  She denies having prior stress test   She states not being very physically active currently  She denies having any prior history of heart failure  She denies having prior syncopal episodes    She has tangential thought and cannot completely rule out having chest pain      Historical Information Past Medical History:   Diagnosis Date    Asthma     Chronic pain     back    GERD (gastroesophageal reflux disease)     Hyperlipidemia     Psychiatric disorder     bipolar     Past Surgical History:   Procedure Laterality Date    TUBAL LIGATION       Social History     Substance and Sexual Activity   Alcohol Use No    Frequency: Never    Binge frequency: Never     Social History     Substance and Sexual Activity   Drug Use No     Social History     Tobacco Use   Smoking Status Current Every Day Smoker    Packs/day: 1 00   Smokeless Tobacco Never Used     History reviewed  No pertinent family history  Meds/Allergies   Prior to Admission medications    Medication Sig Start Date End Date Taking? Authorizing Provider   aspirin 81 MG tablet Take 81 mg by mouth daily  Historical Provider, MD   atorvastatin (LIPITOR) 20 mg tablet Take 20 mg by mouth daily  Historical Provider, MD   Biotin (SUPER BIOTIN) 5 MG TABS Take 1 tablet by mouth    Historical Provider, MD   desvenlafaxine succinate (PRISTIQ) 50 mg 24 hr tablet Take 50 mg by mouth daily    Historical Provider, MD   fluticasone (FLONASE) 50 mcg/act nasal spray 1 spray into each nostril daily    Historical Provider, MD   HYDROcodone-acetaminophen (NORCO) 5-325 mg per tablet Take 1 tablet by mouth every 6 (six) hours as needed for pain for up to 12 dosesMax Daily Amount: 4 tablets 8/30/19   Nina Lazo,    ketotifen (ZADITOR) 0 025 % ophthalmic solution 1 drop 2 (two) times a day      Historical Provider, MD   lamoTRIgine (LaMICtal) 150 MG tablet Take 150 mg by mouth daily     Historical Provider, MD   loratadine (CLARITIN) 10 mg tablet Take 10 mg by mouth daily    Historical Provider, MD   lurasidone (LATUDA) 40 mg tablet Take 80 mg by mouth daily with dinner     Historical Provider, MD   meclizine (ANTIVERT) 25 mg tablet Take 25 mg by mouth every 8 (eight) hours as needed for dizziness    Historical Provider, MD   montelukast (SINGULAIR) 10 mg tablet Take 10 mg by mouth daily at bedtime  Historical Provider, MD   Multiple Vitamin (MULTIVITAMIN) tablet Take 1 tablet by mouth daily    Historical Provider, MD   naproxen (EC NAPROSYN) 500 MG EC tablet Take 1 tablet (500 mg total) by mouth 2 (two) times a day with meals 6/30/19   Ibeth Garcia DO   pantoprazole (PROTONIX) 40 mg tablet Take 40 mg by mouth daily  Historical Provider, MD   propranolol (INDERAL) 40 mg tablet Take 10 mg by mouth daily     Historical Provider, MD   tolterodine (DETROL LA) 4 mg 24 hr capsule Take 4 mg by mouth daily      Historical Provider, MD   traZODone (DESYREL) 50 mg tablet Take 100 mg by mouth daily at bedtime as needed     Historical Provider, MD   venlafaxine (EFFEXOR-XR) 37 5 mg 24 hr capsule Take 75 mg by mouth daily      Historical Provider, MD   ziprasidone (GEODON) 80 mg capsule Take 80 mg by mouth every evening    Historical Provider, MD     Current Facility-Administered Medications   Medication Dose Route Frequency Provider Last Rate Last Dose    acetaminophen (TYLENOL) tablet 650 mg  650 mg Oral Q6H PRN Fabiana De La Torre MD        aspirin chewable tablet 81 mg  81 mg Oral Daily Fabiana De La Torre MD   81 mg at 09/01/19 2666    atorvastatin (LIPITOR) tablet 20 mg  20 mg Oral Daily Fabiana De La Torre MD   20 mg at 09/01/19 0902    atorvastatin (LIPITOR) tablet 80 mg  80 mg Oral Once Christina Cummings MD        cefTRIAXone (ROCEPHIN) IVPB (premix) 1,000 mg  1,000 mg Intravenous Q24H Fabiana De La Torre  mL/hr at 09/01/19 0011 1,000 mg at 09/01/19 0011    desvenlafaxine succinate (PRISTIQ) 24 hr tablet 50 mg  50 mg Oral Daily Fabiana De La Torre MD   50 mg at 09/01/19 0948    enoxaparin (LOVENOX) subcutaneous injection 40 mg  40 mg Subcutaneous Daily Fabiana De La Torre MD   40 mg at 09/01/19 0910    fluticasone (FLONASE) 50 mcg/act nasal spray 1 spray  1 spray Nasal Daily Fabiana De La Torre MD   1 spray at 09/01/19 0906    haloperidol lactate (HALDOL) injection 5 mg  5 mg Intravenous Q6H PRN Robinson Javed MD        ketotifen (ZADITOR) 0 025 % ophthalmic solution 1 drop  1 drop Both Eyes BID Deb Odom MD   1 drop at 09/01/19 0906    lamoTRIgine (LaMICtal) tablet 150 mg  150 mg Oral Daily Deb Odom MD   150 mg at 09/01/19 0902    loratadine (CLARITIN) tablet 10 mg  10 mg Oral Daily Deb Odom MD   10 mg at 09/01/19 0903    lurasidone (LATUDA) tablet 80 mg  80 mg Oral Daily With Praveena Fernandez MD        montelukast (SINGULAIR) tablet 10 mg  10 mg Oral HS Deb Odom MD   10 mg at 08/31/19 2351    nicotine (NICODERM CQ) 7 mg/24hr TD 24 hr patch 1 patch  1 patch Transdermal Daily Deb Odom MD        pantoprazole (PROTONIX) EC tablet 40 mg  40 mg Oral Early Morning Deb Odom MD   40 mg at 09/01/19 7476    propranolol (INDERAL) tablet 10 mg  10 mg Oral Daily Deb Odom MD        traZODone (DESYREL) tablet 100 mg  100 mg Oral HS PRN Deb Odom MD        ziprasidone (GEODON) capsule 80 mg  80 mg Oral QPM Deb Odom MD   80 mg at 08/31/19 2351     No Known Allergies    Review of systems  CONSTITUTIONAL:  As per hpi  HEENT:  Eyes:  No visual loss, blurred vision, double vision or yellow sclerae  Ears, Nose, Throat:  No hearing loss, sneezing, congestion, runny nose or sore throat  SKIN:  No rash or itching  CARDIOVASCULAR:  As per hpi  RESPIRATORY:  As per hpi  GASTROINTESTINAL:  No anorexia, nausea, vomiting or diarrhea  No abdominal pain or blood  GENITOURINARY:  Burning on urination  No flank pain  NEUROLOGICAL:  No headache, dizziness, syncope, paralysis, ataxia, numbness or tingling in the extremities  No change in bowel or bladder control  MUSCULOSKELETAL:  No muscle, back pain, joint pain or stiffness  HEMATOLOGIC:  No anemia, bleeding or bruising  LYMPHATICS:  No enlarged nodes  No history of splenectomy    PSYCHIATRIC:  No active suicidal or homicidal ideation  ENDOCRINOLOGIC:  No reports of sweating, cold or heat intolerance  No polyuria or polydipsia  ALLERGIES:  No history of asthma, hives, eczema or rhinitis  More than 10 systems reviewed and otherwise noncontributory  Objective   Vitals: Blood pressure 103/60, pulse 75, temperature 98 5 °F (36 9 °C), temperature source Oral, resp  rate 18, height 5' 7" (1 702 m), weight 80 5 kg (177 lb 7 5 oz), SpO2 97 %, not currently breastfeeding  Blood pressure 103/60, pulse 75, temperature 98 5 °F (36 9 °C), temperature source Oral, resp  rate 18, height 5' 7" (1 702 m), weight 80 5 kg (177 lb 7 5 oz), SpO2 97 %, not currently breastfeeding  Body mass index is 27 8 kg/m²  BP Readings from Last 3 Encounters:   09/01/19 103/60   08/31/19 125/73   08/30/19 125/85     Orthostatic Blood Pressures      Most Recent Value   Blood Pressure  103/60 filed at 09/01/2019 6547   Patient Position - Orthostatic VS  Lying filed at 09/01/2019 5825          Intake/Output Summary (Last 24 hours) at 9/1/2019 1335  Last data filed at 8/31/2019 2103  Gross per 24 hour   Intake 1000 ml   Output --   Net 1000 ml     Invasive Devices     Peripheral Intravenous Line            Peripheral IV 08/31/19 Left Arm less than 1 day                Physical Exam   Constitutional: She is oriented to person, place, and time  She appears well-developed and well-nourished  No distress  HENT:   Head: Normocephalic and atraumatic  Right Ear: External ear normal    Left Ear: External ear normal    Nose: Nose normal    Mouth/Throat: No oropharyngeal exudate  Eyes: Pupils are equal, round, and reactive to light  Conjunctivae are normal  Right eye exhibits no discharge  Left eye exhibits no discharge  No scleral icterus  Neck: Normal range of motion  No JVD present  No tracheal deviation present  No thyromegaly present  Cardiovascular: Normal rate, regular rhythm and intact distal pulses  Exam reveals no gallop and no friction rub  No murmur heard    Pulmonary/Chest: Effort normal and breath sounds normal  No stridor  No respiratory distress  She has no wheezes  She has no rales  She exhibits no tenderness  Abdominal: Soft  Bowel sounds are normal  She exhibits no distension and no mass  There is no tenderness  There is no rebound and no guarding  Genitourinary:   Genitourinary Comments: No CVA tenderness   Musculoskeletal: She exhibits no edema or tenderness  Neurological: She is alert and oriented to person, place, and time  She displays normal reflexes  She exhibits normal muscle tone  Skin: Skin is warm and dry  No rash noted  She is not diaphoretic  No erythema  Psychiatric: Her affect is labile  Her speech is tangential  She is agitated  Thought content is delusional  She expresses impulsivity  Lab Results:  I Have Reviewed All Lab Data Below:  Results from last 7 days   Lab Units 09/01/19  0632   WBC Thousand/uL 7 14   HEMOGLOBIN g/dL 11 3*   HEMATOCRIT % 35 0   PLATELETS Thousands/uL 324   NEUTROS PCT % 50   LYMPHS PCT % 31   MONOS PCT % 11   EOS PCT % 7*     Results from last 7 days   Lab Units 09/01/19  0632 08/31/19  1746   POTASSIUM mmol/L 3 5 4 0   CHLORIDE mmol/L 104 104   CO2 mmol/L 26 27   BUN mg/dL 22 20   CREATININE mg/dL 0 67 0 84   CALCIUM mg/dL 8 9 10 1   ALK PHOS U/L  --  94   ALT U/L  --  25   AST U/L  --  31     Troponins:   Results from last 7 days   Lab Units 09/01/19  0013 08/31/19  2103 08/31/19  1832   TROPONIN I ng/mL 0 63* 0 91* 0 94*       CBC with diff:   Results from last 7 days   Lab Units 09/01/19  0632 08/31/19  1746   WBC Thousand/uL 7 14 9 69   HEMOGLOBIN g/dL 11 3* 13 3   HEMATOCRIT % 35 0 40 8   MCV fL 97 95   PLATELETS Thousands/uL 324 408*   MCH pg 31 2 30 9   MCHC g/dL 32 3 32 6   RDW % 12 7 12 8   MPV fL 9 7 9 6   NRBC AUTO /100 WBCs 0 0       CMP:   Results from last 7 days   Lab Units 09/01/19  0632 08/31/19  1746   SODIUM mmol/L 139 142   POTASSIUM mmol/L 3 5 4 0   CHLORIDE mmol/L 104 104   CO2 mmol/L 26 27   ANION GAP mmol/L 9 11   BUN mg/dL 22 20   CREATININE mg/dL 0 67 0 84   GLUCOSE FASTING mg/dL 85  --    CALCIUM mg/dL 8 9 10 1   AST U/L  --  31   ALT U/L  --  25   ALK PHOS U/L  --  94   TOTAL PROTEIN g/dL  --  7 3   ALBUMIN g/dL  --  3 9   TOTAL BILIRUBIN mg/dL  --  0 40   EGFR ml/min/1 73sq m 97 77   GLUCOSE RANDOM mg/dL 85 105     Magnesium:       NT-proBNP: No results for input(s): NTBNP in the last 72 hours  Coags:   Results from last 7 days   Lab Units 08/31/19  2125   INR  0 99       Troponins:   Results from last 7 days   Lab Units 09/01/19  0013 08/31/19  2103 08/31/19  1832   TROPONIN I ng/mL 0 63* 0 91* 0 94*       EKG:  Normal sinus rhythm with lateral T-wave inversions    Counseling / Coordination of Care Time: 50 min  Greater than 50% of total time spent on patient counseling and coordination of care  Code Status: Level 1 - Full Code  Collaboration of Care: Were Recommendations Directly Discussed with Primary Treatment Team? - Yes     Miesha Valente MD [facility]    "This note has been constructed using a voice recognition system  Therefore there may be syntax, spelling, and/or grammatical errors   Please call if you have any questions  "

## 2019-09-01 NOTE — ASSESSMENT & PLAN NOTE
UA with trace ketones and blood  Patient has no urinary complaints or signs of systemic infection  Monitor off Abx  Urine cx pending

## 2019-09-01 NOTE — CONSULTS
Consultation - Joanna Grey 63 y o  female MRN: [id number]    Unit/Bed#: [address] [place] [address] Encounter: [id number]      Identifying Data:  63years old white female is admitted at [facility] on October 31, 2019 after she was playing at bug spray and she in held it and she became shortness of breath she came to the hospital and admitted for the same  Medical evaluation and treatment is in progress  Psychiatric consultation is asked for the bipolar disorder  Patient examined spoke with the nurse history physical medications labs reviewed and noted patient's drug screen was positive with benzo and alcohol level was less than 3  Patient denies alcohol or drug abuse  Patient is a poor historian  Reportedly patient has been agitated and very manic and grandiose probably she was not complying with her psychotropic medications  I reviewed all her psychotropic medications patient is on 2 antipsychotic medications mood stabilizer and she was given stat dose of Ativan 2 mg IM once then she was given Zyprexa 10 mg IM once and she is also a smoker she is on nicotine patch  Patient reports that she wants to go home and take care of her cat  Patient reports that she lives by herself she smokes cigarette she denies abusing alcohol or drugs she has no history of drug and alcohol abuse she has 11th grade education and she has been disabled  Patient is anxious and nervous all over the place cannot relax but she is not suicidal   Nurse reported that she is all over the place and very difficult to redirect her and she is manic  Obviously patient is on lot of psychotropic medications but she has a high tolerance to the medications with long psych history of bipolar disorder with multiple psychiatric admissions in the past   Patient is suffering from bipolar disorder anxiety asthma chronic back pain GERD high cholesterol history of tubal ligation she is not allergic to any medications      Chief Complaints: Bipolar disorder    Family History: History reviewed  No pertinent family history  Patient reports that dad mom's brother was bipolar    Legal History:  Patient denies    Mental Status Exam:  63years old white female is alert awake oriented to the place she knows that she is at the hospital she is able to tell me her age patient is on one-to-one observation for the safety  Memory intact  Patient is manic pressured speech tangential and circumstantial poor historian she insist to go home during the session  Patient denies auditory or visual hallucinations  Patient denies suicidal or homicidal ideations  Poor concentration severe mood swings labile manic affect elated poor concentration poor sleep poor appetite she looks mild nourished  Insight and judgments are adequate  History of Present Illness     HPI: Boy Siddiqi is a 63y o  year old female who presents with shortness of breath    Consults      Historical Information   Past Psychiatric History:  Patient reports long psych history with multiple psychiatric admissions and she has been under psychiatric care at [facility] to see a psychiatrist and she is taking a lot of psychotropic medications it is reported in the chart that patient is on Geodon 80 mg HS Lamictal 150 mg daily Latuda 80 mg daily she is also order on Haldol 2 mg IM q 6 hours p r n  For it is not effective she is a smoker and she is on nicotine patch  Patient denies history of drug and alcohol abuse no history of IV drug abuse and no legal problems    Past Medical History:   Diagnosis Date    Asthma     Chronic pain     back    GERD (gastroesophageal reflux disease)     Hyperlipidemia     Psychiatric disorder     bipolar     Past Surgical History:   Procedure Laterality Date    TUBAL LIGATION       Social History   Social History     Substance and Sexual Activity   Alcohol Use No    Frequency: Never    Binge frequency: Never     Social History     Substance and Sexual Activity   Drug Use No     Social History     Tobacco Use   Smoking Status Current Every Day Smoker    Packs/day: 1 00   Smokeless Tobacco Never Used       Meds/Allergies   current meds:   Current Facility-Administered Medications   Medication Dose Route Frequency    acetaminophen (TYLENOL) tablet 650 mg  650 mg Oral Q6H PRN    aspirin chewable tablet 81 mg  81 mg Oral Daily    atorvastatin (LIPITOR) tablet 20 mg  20 mg Oral Daily    atorvastatin (LIPITOR) tablet 80 mg  80 mg Oral Once    cefTRIAXone (ROCEPHIN) IVPB (premix) 1,000 mg  1,000 mg Intravenous Q24H    desvenlafaxine succinate (PRISTIQ) 24 hr tablet 50 mg  50 mg Oral Daily    enoxaparin (LOVENOX) subcutaneous injection 40 mg  40 mg Subcutaneous Daily    fluticasone (FLONASE) 50 mcg/act nasal spray 1 spray  1 spray Nasal Daily    haloperidol lactate (HALDOL) injection 2 mg  2 mg Intravenous Q6H PRN    ketotifen (ZADITOR) 0 025 % ophthalmic solution 1 drop  1 drop Both Eyes BID    lamoTRIgine (LaMICtal) tablet 150 mg  150 mg Oral Daily    loratadine (CLARITIN) tablet 10 mg  10 mg Oral Daily    lurasidone (LATUDA) tablet 80 mg  80 mg Oral Daily With Dinner    montelukast (SINGULAIR) tablet 10 mg  10 mg Oral HS    nicotine (NICODERM CQ) 7 mg/24hr TD 24 hr patch 1 patch  1 patch Transdermal Daily    pantoprazole (PROTONIX) EC tablet 40 mg  40 mg Oral Early Morning    propranolol (INDERAL) tablet 10 mg  10 mg Oral Daily    traZODone (DESYREL) tablet 100 mg  100 mg Oral HS PRN    ziprasidone (GEODON) capsule 80 mg  80 mg Oral QPM    and PTA meds:    Medications Prior to Admission   Medication    aspirin 81 MG tablet    atorvastatin (LIPITOR) 20 mg tablet    Biotin (SUPER BIOTIN) 5 MG TABS    desvenlafaxine succinate (PRISTIQ) 50 mg 24 hr tablet    fluticasone (FLONASE) 50 mcg/act nasal spray    HYDROcodone-acetaminophen (NORCO) 5-325 mg per tablet    ketotifen (ZADITOR) 0 025 % ophthalmic solution    lamoTRIgine (LaMICtal) 150 MG tablet    loratadine (CLARITIN) 10 mg tablet    lurasidone (LATUDA) 40 mg tablet    meclizine (ANTIVERT) 25 mg tablet    montelukast (SINGULAIR) 10 mg tablet    Multiple Vitamin (MULTIVITAMIN) tablet    naproxen (EC NAPROSYN) 500 MG EC tablet    pantoprazole (PROTONIX) 40 mg tablet    propranolol (INDERAL) 40 mg tablet    tolterodine (DETROL LA) 4 mg 24 hr capsule    traZODone (DESYREL) 50 mg tablet    venlafaxine (EFFEXOR-XR) 37 5 mg 24 hr capsule    ziprasidone (GEODON) 80 mg capsule     No Known Allergies    Objective   Vitals: Blood pressure 103/60, pulse 75, temperature 98 5 °F (36 9 °C), temperature source Oral, resp  rate 18, height 5' 7" (1 702 m), weight 80 5 kg (177 lb 7 5 oz), SpO2 97 %, not currently breastfeeding  Routine Lab Results:   Admission on 08/31/2019   Component Date Value Ref Range Status    Sodium 08/31/2019 142  136 - 145 mmol/L Final    Potassium 08/31/2019 4 0  3 5 - 5 3 mmol/L Final    Chloride 08/31/2019 104  100 - 108 mmol/L Final    CO2 08/31/2019 27  21 - 32 mmol/L Final    ANION GAP 08/31/2019 11  4 - 13 mmol/L Final    BUN 08/31/2019 20  5 - 25 mg/dL Final    Creatinine 08/31/2019 0 84  0 60 - 1 30 mg/dL Final    Standardized to IDMS reference method    Glucose 08/31/2019 105  65 - 140 mg/dL Final      If the patient is fasting, the ADA then defines impaired fasting glucose as > 100 mg/dL and diabetes as > or equal to 123 mg/dL  Specimen collection should occur prior to Sulfasalazine administration due to the potential for falsely depressed results  Specimen collection should occur prior to Sulfapyridine administration due to the potential for falsely elevated results   Calcium 08/31/2019 10 1  8 3 - 10 1 mg/dL Final    AST 08/31/2019 31  5 - 45 U/L Final      Specimen collection should occur prior to Sulfasalazine administration due to the potential for falsely depressed results       ALT 08/31/2019 25  12 - 78 U/L Final      Specimen collection should occur prior to Sulfasalazine administration due to the potential for falsely depressed results       Alkaline Phosphatase 08/31/2019 94  46 - 116 U/L Final    Total Protein 08/31/2019 7 3  6 4 - 8 2 g/dL Final    Albumin 08/31/2019 3 9  3 5 - 5 0 g/dL Final    Total Bilirubin 08/31/2019 0 40  0 20 - 1 00 mg/dL Final    eGFR 08/31/2019 77  ml/min/1 73sq m Final    WBC 08/31/2019 9 69  4 31 - 10 16 Thousand/uL Final    RBC 08/31/2019 4 30  3 81 - 5 12 Million/uL Final    Hemoglobin 08/31/2019 13 3  11 5 - 15 4 g/dL Final    Hematocrit 08/31/2019 40 8  34 8 - 46 1 % Final    MCV 08/31/2019 95  82 - 98 fL Final    MCH 08/31/2019 30 9  26 8 - 34 3 pg Final    MCHC 08/31/2019 32 6  31 4 - 37 4 g/dL Final    RDW 08/31/2019 12 8  11 6 - 15 1 % Final    MPV 08/31/2019 9 6  8 9 - 12 7 fL Final    Platelets 71/14/4713 408* 149 - 390 Thousands/uL Final    nRBC 08/31/2019 0  /100 WBCs Final    Neutrophils Relative 08/31/2019 55  43 - 75 % Final    Immat GRANS % 08/31/2019 0  0 - 2 % Final    Lymphocytes Relative 08/31/2019 30  14 - 44 % Final    Monocytes Relative 08/31/2019 10  4 - 12 % Final    Eosinophils Relative 08/31/2019 4  0 - 6 % Final    Basophils Relative 08/31/2019 1  0 - 1 % Final    Neutrophils Absolute 08/31/2019 5 37  1 85 - 7 62 Thousands/µL Final    Immature Grans Absolute 08/31/2019 0 03  0 00 - 0 20 Thousand/uL Final    Lymphocytes Absolute 08/31/2019 2 92  0 60 - 4 47 Thousands/µL Final    Monocytes Absolute 08/31/2019 0 92  0 17 - 1 22 Thousand/µL Final    Eosinophils Absolute 08/31/2019 0 37  0 00 - 0 61 Thousand/µL Final    Basophils Absolute 08/31/2019 0 08  0 00 - 0 10 Thousands/µL Final    Amph/Meth UR 08/31/2019 Negative  Negative Final    Barbiturate Ur 08/31/2019 Negative  Negative Final    Benzodiazepine Urine 08/31/2019 Negative  Negative Final    Cocaine Urine 08/31/2019 Negative  Negative Final    Methadone Urine 08/31/2019 Negative  Negative Final    Opiate Urine 08/31/2019 Positive* Negative Final    PCP Ur 08/31/2019 Negative  Negative Final    THC Urine 08/31/2019 Negative  Negative Final    Ethanol Lvl 08/31/2019 <3  0 - 3 mg/dL Final    Color, UA 08/31/2019 Yellow   Final    Clarity, UA 08/31/2019 Cloudy   Final    Specific Gravity, UA 08/31/2019 >=1 030  1 000 - 1 030 Final    pH, UA 08/31/2019 5 0  5 0, 5 5, 6 0, 6 5, 7 0, 7 5, 8 0, 8 5, 9 0 Final    Leukocytes, UA 08/31/2019 Negative  Negative Final    Nitrite, UA 08/31/2019 Negative  Negative Final    Protein, UA 08/31/2019 100 (2+)* Negative mg/dl Final    Glucose, UA 08/31/2019 Negative  Negative mg/dl Final    Ketones, UA 08/31/2019 Trace* Negative mg/dl Final    Urobilinogen, UA 08/31/2019 1 0  0 2, 1 0 E U /dl E U /dl Final    Bilirubin, UA 08/31/2019 Interference- unable to analyze* Negative Final    The dipstick result may be falsely positive due to interfering substances  We recommend reliance upon serum bilirubin, liver & kidney function tests to guide patient care if clinically indicated   Blood, UA 08/31/2019 Trace-Intact* Negative Final    RBC, UA 08/31/2019 0-1* None Seen, 0-5 /hpf Final    WBC, UA 08/31/2019 20-30* None Seen, 0-5, 5-55, 5-65 /hpf Final    Epithelial Cells 08/31/2019 Moderate* None Seen, Occasional /hpf Final    Bacteria, UA 08/31/2019 Moderate* None Seen, Occasional /hpf Final    Troponin I 08/31/2019 0 94* <=0 04 ng/mL Final      Siemens Chemistry analyzer 99% cutoff is > 0 04 ng/mL in network labs     o cTnI 99% cutoff is useful only when applied to patients in the clinical setting of myocardial ischemia   o cTnI 99% cutoff should be interpreted in the context of clinical history, ECG findings and possibly cardiac imaging to establish correct diagnosis  o cTnI 99% cutoff may be suggestive but clearly not indicative of a coronary event without the clinical setting of myocardial ischemia        Troponin I 08/31/2019 0 91* <=0 04 ng/mL Final Siemens Chemistry analyzer 99% cutoff is > 0 04 ng/mL in network labs     o cTnI 99% cutoff is useful only when applied to patients in the clinical setting of myocardial ischemia   o cTnI 99% cutoff should be interpreted in the context of clinical history, ECG findings and possibly cardiac imaging to establish correct diagnosis  o cTnI 99% cutoff may be suggestive but clearly not indicative of a coronary event without the clinical setting of myocardial ischemia   Protime 08/31/2019 10 7  9 8 - 12 0 seconds Final    INR 08/31/2019 0 99  0 91 - 1 09 Final    Troponin I 09/01/2019 0 63* <=0 04 ng/mL Final      Siemens Chemistry analyzer 99% cutoff is > 0 04 ng/mL in network labs     o cTnI 99% cutoff is useful only when applied to patients in the clinical setting of myocardial ischemia   o cTnI 99% cutoff should be interpreted in the context of clinical history, ECG findings and possibly cardiac imaging to establish correct diagnosis  o cTnI 99% cutoff may be suggestive but clearly not indicative of a coronary event without the clinical setting of myocardial ischemia   Sodium 09/01/2019 139  136 - 145 mmol/L Final    Potassium 09/01/2019 3 5  3 5 - 5 3 mmol/L Final    Chloride 09/01/2019 104  100 - 108 mmol/L Final    CO2 09/01/2019 26  21 - 32 mmol/L Final    ANION GAP 09/01/2019 9  4 - 13 mmol/L Final    BUN 09/01/2019 22  5 - 25 mg/dL Final    Creatinine 09/01/2019 0 67  0 60 - 1 30 mg/dL Final    Standardized to IDMS reference method    Glucose 09/01/2019 85  65 - 140 mg/dL Final      If the patient is fasting, the ADA then defines impaired fasting glucose as > 100 mg/dL and diabetes as > or equal to 123 mg/dL  Specimen collection should occur prior to Sulfasalazine administration due to the potential for falsely depressed results  Specimen collection should occur prior to Sulfapyridine administration due to the potential for falsely elevated results      Glucose, Fasting 09/01/2019 85  65 - 99 mg/dL Final      Specimen collection should occur prior to Sulfasalazine administration due to the potential for falsely depressed results  Specimen collection should occur prior to Sulfapyridine administration due to the potential for falsely elevated results      Calcium 09/01/2019 8 9  8 3 - 10 1 mg/dL Final    eGFR 09/01/2019 97  ml/min/1 73sq m Final    WBC 09/01/2019 7 14  4 31 - 10 16 Thousand/uL Final    RBC 09/01/2019 3 62* 3 81 - 5 12 Million/uL Final    Hemoglobin 09/01/2019 11 3* 11 5 - 15 4 g/dL Final    Hematocrit 09/01/2019 35 0  34 8 - 46 1 % Final    MCV 09/01/2019 97  82 - 98 fL Final    MCH 09/01/2019 31 2  26 8 - 34 3 pg Final    MCHC 09/01/2019 32 3  31 4 - 37 4 g/dL Final    RDW 09/01/2019 12 7  11 6 - 15 1 % Final    MPV 09/01/2019 9 7  8 9 - 12 7 fL Final    Platelets 11/63/0858 324  149 - 390 Thousands/uL Final    nRBC 09/01/2019 0  /100 WBCs Final    Neutrophils Relative 09/01/2019 50  43 - 75 % Final    Immat GRANS % 09/01/2019 0  0 - 2 % Final    Lymphocytes Relative 09/01/2019 31  14 - 44 % Final    Monocytes Relative 09/01/2019 11  4 - 12 % Final    Eosinophils Relative 09/01/2019 7* 0 - 6 % Final    Basophils Relative 09/01/2019 1  0 - 1 % Final    Neutrophils Absolute 09/01/2019 3 57  1 85 - 7 62 Thousands/µL Final    Immature Grans Absolute 09/01/2019 0 02  0 00 - 0 20 Thousand/uL Final    Lymphocytes Absolute 09/01/2019 2 23  0 60 - 4 47 Thousands/µL Final    Monocytes Absolute 09/01/2019 0 75  0 17 - 1 22 Thousand/µL Final    Eosinophils Absolute 09/01/2019 0 52  0 00 - 0 61 Thousand/µL Final    Basophils Absolute 09/01/2019 0 05  0 00 - 0 10 Thousands/µL Final    Ventricular Rate 08/31/2019 66  BPM Final    Atrial Rate 08/31/2019 66  BPM Final    NV Interval 08/31/2019 134  ms Final    QRSD Interval 08/31/2019 86  ms Final    QT Interval 08/31/2019 454  ms Final    QTC Interval 08/31/2019 475  ms Final    P Axis 08/31/2019 45  degrees Final    QRS Axis 08/31/2019 -9  degrees Final    T Wave [place] 08/31/2019 136  degrees Final    Ventricular Rate 08/31/2019 66  BPM Final    Atrial Rate 08/31/2019 66  BPM Final    SD Interval 08/31/2019 140  ms Final    QRSD Interval 08/31/2019 88  ms Final    QT Interval 08/31/2019 478  ms Final    QTC Interval 08/31/2019 501  ms Final    P Axis 08/31/2019 63  degrees Final    QRS Axis 08/31/2019 -9  degrees Final    T Wave [place] 08/31/2019 141  degrees Final    Ventricular Rate 08/31/2019 79  BPM Final    Atrial Rate 08/31/2019 79  BPM Final    SD Interval 08/31/2019 124  ms Final    QRSD Interval 08/31/2019 86  ms Final    QT Interval 08/31/2019 422  ms Final    QTC Interval 08/31/2019 483  ms Final    P Axis 08/31/2019 -49  degrees Final    QRS Axis 08/31/2019 -16  degrees Final    T Wave [place] 08/31/2019 137  degrees Final         Diagnosis:  Bipolar disorder manic type  Anxiety    Plan:  Continue Geodon 80 mg HS  Continue Lamictal 150 mg daily  Continue Latuda 80 mg daily  Continue trazodone 100 mg HS p r n  Increase Haldol to 5 mg IM q 6 hours p r n  For anxiety agitation  Patient may not need one-to-one observation for suicidal precaution  Psychotherapy  Patient will follow up with her psychiatrist and therapist at [facility] local [facility] after the discharge  Discussed with the nurse      Aaliyah Roberson MD

## 2019-09-01 NOTE — ED NOTES
Patient resting quietly in room at this time,IVF's infusing via gravity  No aggressive or angry behaviors at this time  CNA at bedside for 1:1 observation for behavior       Nichelle Davis RN  08/31/19 [id number]

## 2019-09-01 NOTE — ED NOTES
Charge Nurse inserted IV HL and patient exclaiming,she is going home,that she does not need an IV,that we are to not obtain any more blood work,angry and threatening to [facility],claiming she is going home  Joseph, at bedside to explain process,but patient does not want to hear from him  Patient stating staff is taking blood and running tests she does not need,stating she is not staying,that she needs to go home and feed her cat  Patient stating that there is nothing wrong with her or her heart,and that we have kept her from her date and that we cannot keep her  Patient informed that her blood work and EKG indicate she is having an MI and the importance of her staying in the hospital and being treated medically appropriately,patient claims staff does not know what they are doing and that we are doing all these tests for extra money in our checks  CNA at bedside to monitor patient       Eben Salguero RN  08/31/19 [id number]

## 2019-09-01 NOTE — H&P
History and Physical - [facility]    Patient Information: Candance Elder 63 y o  female MRN: [id number]  Unit/Bed#: ED 06 Encounter: [id number]  Admitting Physician: Deb Odom MD  PCP: Modesto Cerda MD  Date of Admission:  08/31/19    Chief Complaint:     Shortness of breath   of Present Illness:    Candance Elder is a 63 y o  female with a PMH of Bipolar Disorder, Depression, Anxiety and HLD who presents with shortness of breath  She states that she was spraying bug spray at home when she inhaled it and became short of breath  Due to this she was brought to the ED where she received Duonebs  Afterwards she did not complain of any further shortness of breath  The ER physician was also notified that the pt contacted the police several times today for various complaints  In the ER she was visibly irate and "ranting and raving" therefore it was decided to have her transferred for Psych evaluation  However it was found that she had some EKG changes and an elevated troponin therefore she will require medical clearance  Currently the pt denies any chest pain or shortness of breath  She does not have suicidal ideation and wants to get back to her "special cat"  She also reports mild dysuria but will not participate further in my questioning  Review of Systems:    Review of Systems   Unable to perform ROS: Psychiatric disorder   pt is uncooperative     Past Medical and Surgical History:     Past Medical History:   Diagnosis Date    Asthma     Chronic pain     back    GERD (gastroesophageal reflux disease)     Hyperlipidemia     Psychiatric disorder     bipolar       Past Surgical History:   Procedure Laterality Date    TUBAL LIGATION         Meds/Allergies:    PTA meds:   Prior to Admission Medications   Prescriptions Last Dose Informant Patient Reported? Taking?    Biotin (SUPER BIOTIN) 5 MG TABS   Yes No   Sig: Take 1 tablet by mouth   HYDROcodone-acetaminophen (NORCO) 5-325 mg per tablet   No No   Sig: Take 1 tablet by mouth every 6 (six) hours as needed for pain for up to 12 dosesMax Daily Amount: 4 tablets   Multiple Vitamin (MULTIVITAMIN) tablet   Yes No   Sig: Take 1 tablet by mouth daily   aspirin 81 MG tablet   Yes No   Sig: Take 81 mg by mouth daily  atorvastatin (LIPITOR) 20 mg tablet   Yes No   Sig: Take 20 mg by mouth daily  desvenlafaxine succinate (PRISTIQ) 50 mg 24 hr tablet   Yes No   Sig: Take 50 mg by mouth daily   fluticasone (FLONASE) 50 mcg/act nasal spray   Yes No   Si spray into each nostril daily   ketotifen (ZADITOR) 0 025 % ophthalmic solution   Yes No   Si drop 2 (two) times a day  lamoTRIgine (LaMICtal) 150 MG tablet   Yes No   Sig: Take 150 mg by mouth daily    loratadine (CLARITIN) 10 mg tablet   Yes No   Sig: Take 10 mg by mouth daily   lurasidone (LATUDA) 40 mg tablet   Yes No   Sig: Take 80 mg by mouth daily with dinner    meclizine (ANTIVERT) 25 mg tablet   Yes No   Sig: Take 25 mg by mouth every 8 (eight) hours as needed for dizziness   montelukast (SINGULAIR) 10 mg tablet   Yes No   Sig: Take 10 mg by mouth daily at bedtime  naproxen (EC NAPROSYN) 500 MG EC tablet   No No   Sig: Take 1 tablet (500 mg total) by mouth 2 (two) times a day with meals   pantoprazole (PROTONIX) 40 mg tablet   Yes No   Sig: Take 40 mg by mouth daily  propranolol (INDERAL) 40 mg tablet   Yes No   Sig: Take 10 mg by mouth daily    tolterodine (DETROL LA) 4 mg 24 hr capsule   Yes No   Sig: Take 4 mg by mouth daily     traZODone (DESYREL) 50 mg tablet   Yes No   Sig: Take 100 mg by mouth daily at bedtime as needed    venlafaxine (EFFEXOR-XR) 37 5 mg 24 hr capsule   Yes No   Sig: Take 75 mg by mouth daily     ziprasidone (GEODON) 80 mg capsule   Yes No   Sig: Take 80 mg by mouth every evening      Facility-Administered Medications: None       Allergies: No Known Allergies  History:     Marital Status:    Social History     Substance and Sexual Activity   Alcohol Use No     Social History     Tobacco Use   Smoking Status Current Every Day Smoker    Packs/day: 1 00   Smokeless Tobacco Never Used     Social History     Substance and Sexual Activity   Drug Use No       Family History:    History reviewed  No pertinent family history  Physical Exam:     Vitals:   Blood Pressure: 116/68 (08/31/19 2030)  Pulse: 76 (08/31/19 2030)  Temperature: (!) 96 9 °F (36 1 °C) (08/31/19 1641)  Respirations: (!) 26 (08/31/19 2030)  Height: 5' 7" (170 2 cm) (08/31/19 1641)  Weight - Scale: 82 kg (180 lb 12 4 oz) (08/31/19 1641)  SpO2: 98 % (08/31/19 2030)    Physical Exam:   General: in no acute distress  HEENT: atraumatic, normocephalic  Skin: no jaundice  CVS: RRR, no murmurs appreciated  Lungs: CTAL, no wheezing or rales appreciated  Abdomen: soft, nondistended, bowel sounds normal, nontender upon palpation, no guarding or rebound tenderness  Extremities: no edema, no calf swelling or tenderness  Neuro: alert and oriented x3, no tremors  Psych: agitated, uncooperative      Lab Results: I have personally reviewed pertinent reports  Results from last 7 days   Lab Units 08/31/19  1746   WBC Thousand/uL 9 69   HEMOGLOBIN g/dL 13 3   HEMATOCRIT % 40 8   PLATELETS Thousands/uL 408*   NEUTROS PCT % 55   LYMPHS PCT % 30   MONOS PCT % 10   EOS PCT % 4     Results from last 7 days   Lab Units 08/31/19  1746   POTASSIUM mmol/L 4 0   CHLORIDE mmol/L 104   CO2 mmol/L 27   BUN mg/dL 20   CREATININE mg/dL 0 84   CALCIUM mg/dL 10 1   ALK PHOS U/L 94   ALT U/L 25   AST U/L 31           Imaging: I have personally reviewed pertinent reports  Xr Chest 1 View Portable    Result Date: 8/31/2019  Narrative: CHEST INDICATION:   crisis screening  COMPARISON:  11/14/2018  EXAM PERFORMED/VIEWS:  XR CHEST PORTABLE FINDINGS: Cardiomediastinal silhouette appears unremarkable  The lungs are clear  No pneumothorax or pleural effusion  Osseous structures appear within normal limits for patient age  Impression: No acute cardiopulmonary disease  Workstation performed: [id number]     Xr Wrist 3+ Views Left    Result Date: 8/10/2019  Narrative: LEFT WRIST INDICATION:   fall  Left wrist pain  COMPARISON:  June 30, 2019 VIEWS:  XR WRIST 3+ VW LEFT Images: 4 FINDINGS: There is no acute fracture or dislocation  Tiny fragment adjacent to the ulnar styloid is present on the previous study  No significant degenerative changes  No lytic or blastic lesions are seen  Soft tissues are unremarkable  Impression: No acute osseous abnormality  Workstation performed: [id number]     Xr Knee 3 Views Left Non Injury    Result Date: 8/10/2019  Narrative: LEFT KNEE INDICATION:   pain  COMPARISON:  None VIEWS:  XR KNEE 3 VW LEFT NON INJURY FINDINGS: There is no acute fracture or dislocation  There is no joint effusion  No significant degenerative changes  No lytic or blastic lesions are seen  Soft tissues are unremarkable  Impression: No acute osseous abnormality  Workstation performed: [id number]     Ct Head Without Contrast    Result Date: 8/9/2019  Narrative: CT BRAIN - WITHOUT CONTRAST INDICATION:   fall  COMPARISON:  None  TECHNIQUE:  CT examination of the brain was performed  In addition to axial images, coronal 2D reformatted images were created and submitted for interpretation  Radiation dose length product (DLP) for this visit:  1058  41 mGy-cm   This examination, like all CT scans performed in the [facility], was performed utilizing techniques to minimize radiation dose exposure, including the use of iterative reconstruction and automated exposure control  IMAGE QUALITY:  Diagnostic  FINDINGS: PARENCHYMA:  No intracranial mass, mass effect or midline shift  No CT signs of acute infarction  No acute parenchymal hemorrhage  There is mild periventricular white matter low attenuation which is nonspecific and most likely related to chronic small vessel ischemic changes   VENTRICLES AND EXTRA-AXIAL SPACES:  There is prominence of the ventricles and sulci related to mild volume loss  VISUALIZED ORBITS AND PARANASAL SINUSES:  There is mucosal thickening at the posterior aspect of both maxillary sinuses  CALVARIUM AND EXTRACRANIAL SOFT TISSUES:  Normal      Impression: No acute intracranial abnormality  Workstation performed: [id number]         Assessment/Plan    * NSTEMI (non-ST elevated myocardial infarction) [facility])  Assessment & Plan  EKG with T wave changes evident however pt is currently asymptomatic with no complaints of chest pain or shortness of breath  May be type 2 secondary to respiratory distress/tachypnea  ER physician has discussed case with Cardiologist on call who recommends administering Asprin and Statin (ordered)  Will admit for Observation, monitor on telemetry, trend troponin and resume Asprin, Statin and Inderal   Will await formal evaluation from Cardiology     UTI (urinary tract infection)  Assessment & Plan  Will start on Ceftriaxone with plan to follow up on urine culture (ordered)    [facility]  Assessment & Plan  Pt is visibly agitated and has contacted the police several times today for various complaints  She does not appear to be in the right state of mind to be discharged therefore will await Psych evaluation  Will resume home regimen, order 1:1 observation and consider Haldol vs Zyprexa prn for agitation     HLD (hyperlipidemia)  Assessment & Plan  Continue Statin       Hospital Problem List:     Principal Problem:    NSTEMI (non-ST elevated myocardial infarction) ([facility] )  Active Problems:    HLD (hyperlipidemia)    Bipolar 1 disorder (HCC)    UTI (urinary tract infection)        VTE Prophylaxis: Lovenox   Code Status: Prior    Anticipated Length of Stay:  Patient will be admitted on an Observation basis with an anticipated length of stay of atleast 1 midnights  Total Time for Visit, including Counseling / Coordination of Care: 30 minutes    Greater than 50% of this total time spent on direct patient counseling and coordination of care

## 2019-09-01 NOTE — PLAN OF CARE
Problem: Potential for Falls  Goal: Patient will remain free of falls  Description  INTERVENTIONS:  - Assess patient frequently for physical needs  -  Identify cognitive and physical deficits and behaviors that affect risk of falls  -  [place] fall precautions as indicated by assessment   - Educate patient/family on patient safety including physical limitations  - Instruct patient to call for assistance with activity based on assessment  - Modify environment to reduce risk of injury  - Consider OT/PT consult to assist with strengthening/mobility  Outcome: Progressing     Problem: CARDIOVASCULAR - ADULT  Goal: Absence of cardiac dysrhythmias or at baseline rhythm  Description  INTERVENTIONS:  - Continuous cardiac monitoring, vital signs, obtain 12 lead EKG if ordered  - Administer antiarrhythmic and heart rate control medications as ordered  - Monitor electrolytes and administer replacement therapy as ordered  Outcome: Progressing     Problem: INFECTION - ADULT  Goal: Absence or prevention of progression during hospitalization  Description  INTERVENTIONS:  - Assess and monitor for signs and symptoms of infection  - Monitor lab/diagnostic results  - Monitor all insertion sites, i e  indwelling lines, tubes, and drains  - Monitor endotracheal if appropriate and nasal secretions for changes in amount and color  - [place] appropriate cooling/warming therapies per order  - Administer medications as ordered  - Instruct and encourage patient and family to use good hand hygiene technique  - Identify and instruct in appropriate isolation precautions for identified infection/condition  Outcome: Progressing     Problem: SAFETY ADULT  Goal: Patient will remain free of falls  Description  INTERVENTIONS:  - Assess patient frequently for physical needs  -  Identify cognitive and physical deficits and behaviors that affect risk of falls    -  [place] fall precautions as indicated by assessment   - Educate patient/family on patient safety including physical limitations  - Instruct patient to call for assistance with activity based on assessment  - Modify environment to reduce risk of injury  - Consider OT/PT consult to assist with strengthening/mobility  Outcome: Progressing

## 2019-09-01 NOTE — ASSESSMENT & PLAN NOTE
EKG with T wave changes evident   Troponin elevated at 0 94  pt is currently asymptomatic with no complaints of chest pain or shortness of breath however given her manic state her history is not reliable      · Continue Asprin, Statin  · monitor on telemetry  · resume Asprin, Statin  · Cardiology consulted, given patients risk factors will need inpatient stress test  This may be difficult to accomplish given patients manic state and compliance, will medicate if needed prior to testing

## 2019-09-01 NOTE — ED NOTES
Lipitor po to be given to patient but patient is refusing at this time,informed of importance of medication and will attempt again to have patient take medicine       Aden Vela RN  08/31/19 6717

## 2019-09-01 NOTE — ED NOTES
Patient angry and yelling at staff  Patient stating that she is not having an MI,making statements that she does not believe staff that she is having an MI or that anything is wrong with her heart  Security at [facility]       Erica Swann RN  08/31/19 [id number]

## 2019-09-01 NOTE — PROGRESS NOTES
Progress Note - Netta Feliciano 1960, 63 y o  female MRN: [id number]    Unit/Bed#: [address] [place] [address] Encounter: [id number]    Primary Care Provider: Jane Merida MD   Date and time admitted to hospital: 8/31/2019  4:27 PM        * NSTEMI (non-ST elevated myocardial infarction) [facility])  Assessment & Plan  EKG with T wave changes evident   Troponin elevated at 0 94  pt is currently asymptomatic with no complaints of chest pain or shortness of breath however given her manic state her history is not reliable  · Continue Asprin, Statin  · monitor on telemetry  · resume Asprin, Statin  · Cardiology consulted, given patients risk factors will need inpatient stress test  This may be difficult to accomplish given patients manic state and compliance, will medicate if needed prior to testing    Bipolar I disorder with byron ([facility] )  Assessment & Plan  Pt is visibly agitated and has contacted the police several times today for various complaints  Patient is not capable at this time given acute manic episode  · Psych consulted, recs appreciated  · Continue home meds: geodon, lamictal, latuda, trazodone  · 1:1 observation  · Haldol 5mg IM PRN for agitation    Abnormal urinalysis  Assessment & Plan  UA with trace ketones and blood  Patient has no urinary complaints or signs of systemic infection  Monitor off Abx  Urine cx pending    HLD (hyperlipidemia)  Assessment & Plan  Continue Statin     Nicotine dependence  Assessment & Plan  Nicotine patch  Did not discuss smoking cessation as patient manic at this time          VTE Pharmacologic Prophylaxis:   Pharmacologic: Enoxaparin (Lovenox)  Mechanical VTE Prophylaxis in Place: Yes    Patient Centered Rounds: I have performed bedside rounds with nursing staff today  Discussions with Specialists or Other Care Team Provider: Yes  Education and Discussions with Family / Patient:Yes  Time Spent for Care: 45 minutes    More than 50% of total time spent on counseling and coordination of care as described above  Current Length of Stay: 0 day(s)  Current Patient Status: Observation     Discharge Plan: pending    Code Status: Level 1 - Full Code      Subjective: This morning patient agitated, paranoid, wants to leave AMA  Impaired and cannot carry a conversation  Denies CP, SOB, palpitations, or urinary symptoms but history is not reliable given her current state  Security was called to the room as patient became aggressive and safety concerns arose  Objective:     Vitals:   Temp (24hrs), Av 7 °F (36 5 °C), Min:96 9 °F (36 1 °C), Max:98 5 °F (36 9 °C)    Temp:  [96 9 °F (36 1 °C)-98 5 °F (36 9 °C)] 97 8 °F (36 6 °C)  HR:  [63-98] 73  Resp:  [18-26] 18  BP: ()/(49-78) 89/53  SpO2:  [93 %-98 %] 93 %  Body mass index is 27 8 kg/m²  Input and Output Summary (last 24 hours): Intake/Output Summary (Last 24 hours) at 2019 1601  Last data filed at 2019 2103  Gross per 24 hour   Intake 1000 ml   Output --   Net 1000 ml        Physical Exam:     Physical Exam   Constitutional: She is oriented to person, place, and time  She appears well-developed  She appears distressed  HENT:   Head: Normocephalic and atraumatic  Cardiovascular: Normal rate, regular rhythm and normal heart sounds  No murmur heard  Pulmonary/Chest: Effort normal and breath sounds normal  No respiratory distress  She has no wheezes  She has no rales  Abdominal: Soft  Bowel sounds are normal  She exhibits no distension  There is no tenderness  There is no rebound  Musculoskeletal: She exhibits no edema, tenderness or deformity  Neurological: She is alert and oriented to person, place, and time  Skin: Skin is warm and dry  Psychiatric: Her mood appears anxious  Her affect is angry and labile  Her speech is rapid and/or pressured and tangential  She is agitated  Thought content is delusional  Cognition and memory are impaired  She expresses impulsivity  She is inattentive     Nursing note and vitals reviewed  Additional Data:     Labs:    Results from last 7 days   Lab Units 09/01/19  0632 08/31/19  1746   WBC Thousand/uL 7 14 9 69   HEMOGLOBIN g/dL 11 3* 13 3   HEMATOCRIT % 35 0 40 8   PLATELETS Thousands/uL 324 408*   NEUTROS PCT % 50 55     Results from last 7 days   Lab Units 09/01/19  0632 08/31/19  1746   SODIUM mmol/L 139 142   POTASSIUM mmol/L 3 5 4 0   CHLORIDE mmol/L 104 104   CO2 mmol/L 26 27   BUN mg/dL 22 20   CREATININE mg/dL 0 67 0 84   CALCIUM mg/dL 8 9 10 1   TOTAL BILIRUBIN mg/dL  --  0 40   ALK PHOS U/L  --  94   ALT U/L  --  25   AST U/L  --  31     Results from last 7 days   Lab Units 08/31/19  2125   INR  0 99     Results from last 7 days   Lab Units 09/01/19  0013 08/31/19  2103 08/31/19  1832   TROPONIN I ng/mL 0 63* 0 91* 0 94*     No results found for: HGBA1C            * I Have Reviewed All Lab Data Listed Above  * Additional Pertinent Lab Tests Reviewed: All Labs Within Last 24 Hours Reviewed    Imaging:     XR chest 1 view portable   ED Interpretation by Doug Shields MD (97/30 2642)   No active disease      Final Result by Mario Knight MD (08/31 1954)      No acute cardiopulmonary disease  Workstation performed: [id number]           Imaging Reports Reviewed by myself    Cultures:   Blood Culture:   Lab Results   Component Value Date    BLOODCX No Growth After 5 Days  01/23/2018    BLOODCX No Growth After 5 Days   01/23/2018     Urine Culture:   Lab Results   Component Value Date    URINECX No Growth <1000 cfu/mL 11/21/2018    URINECX 30,000-39,000 cfu/ml  11/14/2018     Sputum Culture: No components found for: SPUTUMCX  Wound Culture: No results found for: WOUNDCULT    Last 24 Hours Medication List:     Current Facility-Administered Medications:  acetaminophen 650 mg Oral Q6H PRN Zuri Silva MD   aspirin 81 mg Oral Daily Zuri Silva MD   atorvastatin 20 mg Oral Daily Zuri Silva MD   atorvastatin 80 mg Oral Once Doug Shields MD desvenlafaxine succinate 50 mg Oral Daily Rox Miles MD   enoxaparin 40 mg Subcutaneous Daily Rox Miles MD   fluticasone 1 spray Nasal Daily Rox Miles MD   haloperidol lactate 5 mg Intravenous Q6H PRN Tato Diego MD   ketotifen 1 drop Both Eyes BID Rox Miles MD   lamoTRIgine 150 mg Oral Daily Rox Miles MD   loratadine 10 mg Oral Daily Rox Miles MD   lurasidone 80 mg Oral Daily With Silviano Rivera MD   montelukast 10 mg Oral HS Rox Miles MD   nicotine 1 patch Transdermal Daily Rox Miles MD   pantoprazole 40 mg Oral Early Morning Rox Miles MD   propranolol 10 mg Oral Daily Rox Miles MD   traZODone 100 mg Oral HS PRN Rox Miles MD   ziprasidone 80 mg Oral QPM Rox Miles MD        Today, Patient Was Seen By: Merline Sans, MD    ** Please Note: Dragon 360 Dictation voice to text software may have been used in the creation of this document   **

## 2019-09-02 LAB
ANION GAP SERPL CALCULATED.3IONS-SCNC: 8 MMOL/L (ref 4–13)
BACTERIA UR CULT: NORMAL
BUN SERPL-MCNC: 24 MG/DL (ref 5–25)
CALCIUM SERPL-MCNC: 8.7 MG/DL (ref 8.3–10.1)
CHLORIDE SERPL-SCNC: 106 MMOL/L (ref 100–108)
CO2 SERPL-SCNC: 28 MMOL/L (ref 21–32)
CREAT SERPL-MCNC: 0.68 MG/DL (ref 0.6–1.3)
ERYTHROCYTE [DISTWIDTH] IN BLOOD BY AUTOMATED COUNT: 13.1 % (ref 11.6–15.1)
GFR SERPL CREATININE-BSD FRML MDRD: 97 ML/MIN/1.73SQ M
GLUCOSE P FAST SERPL-MCNC: 98 MG/DL (ref 65–99)
GLUCOSE SERPL-MCNC: 98 MG/DL (ref 65–140)
HCT VFR BLD AUTO: 36.7 % (ref 34.8–46.1)
HGB BLD-MCNC: 11.7 G/DL (ref 11.5–15.4)
MAGNESIUM SERPL-MCNC: 1.7 MG/DL (ref 1.6–2.6)
MCH RBC QN AUTO: 30.9 PG (ref 26.8–34.3)
MCHC RBC AUTO-ENTMCNC: 31.9 G/DL (ref 31.4–37.4)
MCV RBC AUTO: 97 FL (ref 82–98)
MRSA NOSE QL CULT: NORMAL
PLATELET # BLD AUTO: 316 THOUSANDS/UL (ref 149–390)
PMV BLD AUTO: 9.9 FL (ref 8.9–12.7)
POTASSIUM SERPL-SCNC: 3.6 MMOL/L (ref 3.5–5.3)
RBC # BLD AUTO: 3.79 MILLION/UL (ref 3.81–5.12)
SODIUM SERPL-SCNC: 142 MMOL/L (ref 136–145)
WBC # BLD AUTO: 7.45 THOUSAND/UL (ref 4.31–10.16)

## 2019-09-02 PROCEDURE — 85027 COMPLETE CBC AUTOMATED: CPT | Performed by: STUDENT IN AN ORGANIZED HEALTH CARE EDUCATION/TRAINING PROGRAM

## 2019-09-02 PROCEDURE — 80048 BASIC METABOLIC PNL TOTAL CA: CPT | Performed by: STUDENT IN AN ORGANIZED HEALTH CARE EDUCATION/TRAINING PROGRAM

## 2019-09-02 PROCEDURE — 99225 PR SBSQ OBSERVATION CARE/DAY 25 MINUTES: CPT | Performed by: FAMILY MEDICINE

## 2019-09-02 PROCEDURE — 99215 OFFICE O/P EST HI 40 MIN: CPT | Performed by: INTERNAL MEDICINE

## 2019-09-02 PROCEDURE — 83735 ASSAY OF MAGNESIUM: CPT | Performed by: STUDENT IN AN ORGANIZED HEALTH CARE EDUCATION/TRAINING PROGRAM

## 2019-09-02 RX ORDER — HALOPERIDOL 5 MG/ML
5 INJECTION INTRAMUSCULAR EVERY 6 HOURS PRN
Status: DISCONTINUED | OUTPATIENT
Start: 2019-09-02 | End: 2019-09-03 | Stop reason: HOSPADM

## 2019-09-02 RX ORDER — POTASSIUM CHLORIDE 20 MEQ/1
40 TABLET, EXTENDED RELEASE ORAL ONCE
Status: COMPLETED | OUTPATIENT
Start: 2019-09-02 | End: 2019-09-02

## 2019-09-02 RX ADMIN — ASPIRIN 81 MG 81 MG: 81 TABLET ORAL at 09:06

## 2019-09-02 RX ADMIN — PANTOPRAZOLE SODIUM 40 MG: 40 TABLET, DELAYED RELEASE ORAL at 06:19

## 2019-09-02 RX ADMIN — ENOXAPARIN SODIUM 40 MG: 40 INJECTION SUBCUTANEOUS at 09:10

## 2019-09-02 RX ADMIN — ATORVASTATIN CALCIUM 20 MG: 20 TABLET, FILM COATED ORAL at 09:06

## 2019-09-02 RX ADMIN — LAMOTRIGINE 150 MG: 100 TABLET ORAL at 09:06

## 2019-09-02 RX ADMIN — KETOTIFEN FUMARATE 1 DROP: 0.35 SOLUTION/ DROPS OPHTHALMIC at 09:08

## 2019-09-02 RX ADMIN — DESVENLAFAXINE 50 MG: 50 TABLET, EXTENDED RELEASE ORAL at 09:07

## 2019-09-02 RX ADMIN — KETOTIFEN FUMARATE 1 DROP: 0.35 SOLUTION/ DROPS OPHTHALMIC at 17:29

## 2019-09-02 RX ADMIN — PROPRANOLOL HYDROCHLORIDE 10 MG: 20 TABLET ORAL at 09:05

## 2019-09-02 RX ADMIN — Medication 800 MG: at 11:08

## 2019-09-02 RX ADMIN — POTASSIUM CHLORIDE 40 MEQ: 1500 TABLET, EXTENDED RELEASE ORAL at 11:08

## 2019-09-02 RX ADMIN — MONTELUKAST SODIUM 10 MG: 10 TABLET, FILM COATED ORAL at 21:26

## 2019-09-02 RX ADMIN — LORATADINE 10 MG: 10 TABLET ORAL at 09:06

## 2019-09-02 RX ADMIN — TRAZODONE HYDROCHLORIDE 100 MG: 50 TABLET ORAL at 21:26

## 2019-09-02 RX ADMIN — FLUTICASONE PROPIONATE 1 SPRAY: 50 SPRAY, METERED NASAL at 09:08

## 2019-09-02 RX ADMIN — ZIPRASIDONE HYDROCHLORIDE 80 MG: 40 CAPSULE ORAL at 17:29

## 2019-09-02 NOTE — ASSESSMENT & PLAN NOTE
· As part of workup in ED patient had EKG which showedT wave inversions in lateral leads and elevated troponin, peaked at 0 94  · pt asymptomatic with no complaints of chest pain or shortness of breath  · She was continued on Asprin, Statin  · monitor on telemetry, no arrhythmias   · Cardiology consulted  · She underwent nuclear stress test which was negative for ischemia

## 2019-09-02 NOTE — PROGRESS NOTES
Progress Note - Cardiology   Miguel Ángel Mitchell 63 y o  female MRN: [id number]  Unit/Bed#: [address] [place] [address] Encounter: [id number]    Assessment/Plan:  Type 2 MI/abnormal EKG- troponin peak at 0 94  Baseline EKG with deep T-wave inversions in the lateral leads  Patient is an active smoker with age greater than 54 with a reported strong family history for CAD and myocardial infarction  She has a very poor historian given her psychiatric disorder  She currently denies having active chest pain or shortness of breath  She is euvolemic on examination  Plan for [address] [place] [address] nuclear stress test to rule out for significant ischemia/prior infarction on Tuesday morning  Recommend continuing aspirin plus statin plus beta-blocker therapy     History of active smoking- smoking cessation     Bipolar disorder- psychiatric following    GERD- on protonix    Subjective/Objective   Chest pain this morning after eating  Patient states having acid reflux    Objective:     Vitals: /78 (BP Location: Right arm)   Pulse 69   Temp 98 5 °F (36 9 °C) (Oral)   Resp 18   Ht 5' 7" (1 702 m)   Wt 79 1 kg (174 lb 6 1 oz)   SpO2 94%   BMI 27 31 kg/m²   Vitals:    09/01/19 0600 09/02/19 0600   Weight: 80 5 kg (177 lb 7 5 oz) 79 1 kg (174 lb 6 1 oz)     Orthostatic Blood Pressures      Most Recent Value   Blood Pressure  116/78 filed at 09/02/2019 0750   Patient Position - Orthostatic VS  Lying filed at 09/02/2019 0750            Intake/Output Summary (Last 24 hours) at 9/2/2019 1202  Last data filed at 9/2/2019 7446  Gross per 24 hour   Intake --   Output 1050 ml   Net -1050 ml       Invasive Devices     Peripheral Intravenous Line            Peripheral IV 08/31/19 Left Arm 1 day                Review of Systems: reviewed    Physical Exam   Constitutional: She is oriented to person, place, and time  She appears well-developed and well-nourished  No distress  Sitter at bedside   HENT:   Head: Normocephalic and atraumatic     Right Ear: External ear normal    Left Ear: External ear normal    Nose: Nose normal    Mouth/Throat: No oropharyngeal exudate  Eyes: Pupils are equal, round, and reactive to light  Conjunctivae are normal  Right eye exhibits no discharge  Left eye exhibits no discharge  No scleral icterus  Neck: Normal range of motion  No JVD present  No tracheal deviation present  No thyromegaly present  Cardiovascular: Normal rate, regular rhythm and intact distal pulses  Exam reveals no gallop and no friction rub  No murmur heard  Pulmonary/Chest: Effort normal and breath sounds normal  No stridor  No respiratory distress  She has no wheezes  She has no rales  She exhibits no tenderness  Abdominal: Soft  Bowel sounds are normal  She exhibits no distension and no mass  There is no tenderness  There is no rebound and no guarding  Genitourinary:   Genitourinary Comments: No CVA tenderness   Musculoskeletal: She exhibits deformity  She exhibits no edema or tenderness  Neurological: She is alert and oriented to person, place, and time  She displays normal reflexes  She exhibits normal muscle tone  Skin: Skin is warm and dry  No rash noted  She is not diaphoretic  No erythema  Psychiatric: Her behavior is normal  Judgment and thought content normal  Her affect is labile  Lab Results: I have personally reviewed pertinent lab results  Imaging: I have personally reviewed pertinent reports  EKG: reviewed  VTE Pharmacologic Prophylaxis: Sequential compression device (Venodyne)   VTE Mechanical Prophylaxis: sequential compression device    Counseling / Coordination of Care  Total time spent today 40 minutes  Greater than 50% of total time was spent with the patient and / or family counseling and / or coordination of care   A description of the counseling / coordination of care: type 2 mi, stress

## 2019-09-02 NOTE — PLAN OF CARE
Problem: Potential for Falls  Goal: Patient will remain free of falls  Description  INTERVENTIONS:  - Assess patient frequently for physical needs  -  Identify cognitive and physical deficits and behaviors that affect risk of falls  -  [place] fall precautions as indicated by assessment   - Educate patient/family on patient safety including physical limitations  - Instruct patient to call for assistance with activity based on assessment  - Modify environment to reduce risk of injury  - Consider OT/PT consult to assist with strengthening/mobility  Outcome: Progressing     Problem: CARDIOVASCULAR - ADULT  Goal: Absence of cardiac dysrhythmias or at baseline rhythm  Description  INTERVENTIONS:  - Continuous cardiac monitoring, vital signs, obtain 12 lead EKG if ordered  - Administer antiarrhythmic and heart rate control medications as ordered  - Monitor electrolytes and administer replacement therapy as ordered  Outcome: Progressing     Problem: INFECTION - ADULT  Goal: Absence or prevention of progression during hospitalization  Description  INTERVENTIONS:  - Assess and monitor for signs and symptoms of infection  - Monitor lab/diagnostic results  - Monitor all insertion sites, i e  indwelling lines, tubes, and drains  - Monitor endotracheal if appropriate and nasal secretions for changes in amount and color  - [place] appropriate cooling/warming therapies per order  - Administer medications as ordered  - Instruct and encourage patient and family to use good hand hygiene technique  - Identify and instruct in appropriate isolation precautions for identified infection/condition  Outcome: Progressing     Problem: SAFETY ADULT  Goal: Patient will remain free of falls  Description  INTERVENTIONS:  - Assess patient frequently for physical needs  -  Identify cognitive and physical deficits and behaviors that affect risk of falls    -  [place] fall precautions as indicated by assessment   - Educate patient/family on patient safety including physical limitations  - Instruct patient to call for assistance with activity based on assessment  - Modify environment to reduce risk of injury  - Consider OT/PT consult to assist with strengthening/mobility  Outcome: Progressing

## 2019-09-02 NOTE — UTILIZATION REVIEW
Continued Stay Review    Date: 9/2/19 Monday                           Current Patient Class: OBSERVATION  Current Level of Care: MED SURG    HPI:  63 y o  female initially EMS to [facility] ED 8/31/19 2nd SOB after spraying bug spray in her face,       Assessment/Plan:   NSTEMI (non-ST elevated myocardial infarction) [facility])  Assessment & Plan  EKG with T wave changes evident   Troponin elevated at 0 94  pt is currently asymptomatic with no complaints of chest pain or shortness of breath however given her manic state her history is not reliable  · Continue Asprin, Statin  · monitor on telemetry  · resume Asprin, Statin  · Cardiology consulted, given patients risk factors will need inpatient stress test  This may be difficult to accomplish given patients manic state and compliance, will medicate if needed prior to testing     Bipolar I disorder with byron ([facility] [address] )  Assessment & Plan  Pt is visibly agitated and has contacted the police several times today for various complaints  Patient is not capable at this time given acute manic episode  · Psych consulted, recs appreciated  · Continue home meds: geodon, lamictal, latuda, trazodone  · 1:1 observation  · Haldol 5mg IM PRN for agitation     Abnormal urinalysis  Assessment & Plan  UA with trace ketones and blood  Patient has no urinary complaints or signs of systemic infection  Monitor off Abx  Urine cx pending       VTE Pharmacologic Prophylaxis:   Pharmacologic: Enoxaparin (Lovenox)  Mechanical VTE Prophylaxis in Place:  Yes     Current Patient Status: Observation     Pertinent Labs/Diagnostic Results:   Results from last 7 days   Lab Units 09/02/19 0522 09/01/19  0632 08/31/19  1746   WBC Thousand/uL 7 45 7 14 9 69   HEMOGLOBIN g/dL 11 7 11 3* 13 3   HEMATOCRIT % 36 7 35 0 40 8   PLATELETS Thousands/uL 316 324 408*   NEUTROS ABS Thousands/µL  --  3 57 5 37     Results from last 7 days   Lab Units 09/02/19  0522 09/01/19  0632 08/31/19  1746   SODIUM mmol/L 142 139 142   POTASSIUM mmol/L 3 6 3 5 4 0   CHLORIDE mmol/L 106 104 104   CO2 mmol/L 28 26 27   ANION GAP mmol/L 8 9 11   BUN mg/dL 24 22 20   CREATININE mg/dL 0 68 0 67 0 84   EGFR ml/min/1 73sq m 97 97 77   CALCIUM mg/dL 8 7 8 9 10 1   MAGNESIUM mg/dL 1 7  --   --      Results from last 7 days     Lab Units 09/02/19  0522 09/01/19  0632 08/31/19  1746   GLUCOSE RANDOM mg/dL 98 85 105     Results from last 7 days   Lab Units 09/01/19  0013 08/31/19  2103 08/31/19  1832   TROPONIN I ng/mL 0 63* 0 91* 0 94*     Results from last 7 days   Lab Units 08/31/19  2125   PROTIME seconds 10 7   INR  0 99     Results from last 7 days   Lab Units 08/31/19  1752   CLARITY UA  Cloudy   COLOR UA  Yellow   SPEC GRAV UA  >=1 030   PH UA  5 0   GLUCOSE UA mg/dl Negative   KETONES UA mg/dl Trace*   BLOOD UA  Trace-Intact*   PROTEIN UA mg/dl 100 (2+)*   NITRITE UA  Negative   BILIRUBIN UA  Interference- unable to analyze*   UROBILINOGEN UA E U /dl 1 0   LEUKOCYTES UA  Negative   WBC UA /hpf 20-30*   RBC UA /hpf 0-1*   BACTERIA UA /hpf Moderate*   EPITHELIAL CELLS WET PREP /hpf Moderate*         Results from last 7 days   Lab Units 08/31/19  1752   AMPH/METH  Negative   BARBITURATE UR  Negative   BENZODIAZEPINE UR  Negative   COCAINE UR  Negative   METHADONE URINE  Negative   OPIATE UR  Positive*   PCP UR  Negative   THC UR  Negative     CXR=No acute cardiopulmonary disease    EKG=nonspecific T wave inversion I and aVL     Vital Signs:   02/19 0750  98 5 °F (36 9 °C)  69  18  116/78  94 %    09/02/19 0300  98 3 °F (36 8 °C)  77  18  105/57  91 %    09/01/19 1931  97 7 °F (36 5 °C)  71  18  94/54  93 %    09/01/19 1536  97 8 °F (36 6 °C)  73  18  89/53Abnormal   93 %      DIET CARDIOVASCULAR; CARDIAC    Medications:   Scheduled Meds:   Current Facility-Administered Medications:  acetaminophen 650 mg Oral Q6H PRN   aspirin 81 mg Oral Daily   atorvastatin 20 mg Oral Daily   atorvastatin 80 mg Oral Once   desvenlafaxine succinate 50 mg Oral Daily   enoxaparin 40 mg Subcutaneous Daily   fluticasone 1 spray Nasal Daily   haloperidol lactate 2 mg Intravenous Q6H PRN  GIVEN X 1   ketotifen 1 drop Both Eyes BID   lamoTRIgine 150 mg Oral Daily   loratadine 10 mg Oral Daily   lurasidone 80 mg Oral Daily With Dinner   montelukast 10 mg Oral HS   nicotine 1 patch Transdermal Daily   pantoprazole 40 mg Oral Early Morning   propranolol 10 mg Oral Daily   traZODone 100 mg Oral HS PRN   ziprasidone 80 mg Oral QPM     Discharge Plan:   TO BE DETERMINED  CASE MANAGEMENT FOLLOWING Orlando Cook [address] [facility] Review Department  Phone: [phone]; Fax [phone]  [email or web address]  ATTENTION: Please call with any questions or concerns to [phone]  and carefully listen to the prompts so that you are directed to the right person  Send all requests for admission clinical reviews, approved or denied determinations and any other requests to fax [phone]   All voicemails are confidential

## 2019-09-02 NOTE — PROGRESS NOTES
Patient examined spoke with the nurse patient is alert awake cooperative sitting in a chair she is in a good mood and she looks much better and she feels much better she is able to communicate her feelings well she reports that she has a good news from the test and she feels like she is ready to go home  Patient is not confused not agitated and she seems improved with byron to her baseline  She is happy with her current medications  Patient denies auditory or visual hallucination  Patient denies suicidal or homicidal ideation  Patient offers no new complaints  She reports that she misses her cat and she wants to go home  Since patient is improved with bipolar byron she is not a danger to herself she denies feeling suicidal no hallucinations she is stable at her baseline with the bipolar disorder  There is no need for one-to-one observation for the safety  I reviewed all her psychotropic medications  I will continue all her medications and patient will follow-up with a psychiatrist at the local mental health [facility] after the discharge  Therapy done with good response  I will follow up

## 2019-09-02 NOTE — ASSESSMENT & PLAN NOTE
Pt was visibly agitated and contacted the police several times for various complaints prompting them to bring her to ED  · Psych consulted, recs appreciated  · Continue home meds: geodon, lamictal, trazodone, pristiq      · Initially she required 1:1 observation  And Haldol 5mg IM PRN for agitation  · Over the coarse of her hospitalization manic episode seemed to resolve and patient was able to be discharged home with follow up with her psychiatrist

## 2019-09-02 NOTE — ASSESSMENT & PLAN NOTE
UA with trace ketones and blood  Patient has no urinary complaints or signs of systemic infection  Monitor off Abx    Patient afebrile with no leukocytosis  Urine cx pending

## 2019-09-02 NOTE — PROGRESS NOTES
[facility] Internal Medicine Progress Note  Patient: Werner Triplett 63 y o  female   MRN: [id number]  PCP: Tulio Garrett MD  Unit/Bed#: [address] [place] [address] Encounter: [id number]  Date Of Visit: 09/02/19    Problem List:    Principal Problem:    NSTEMI (non-ST elevated myocardial infarction) ([facility] )  Active Problems:    Nicotine dependence    HLD (hyperlipidemia)    Bipolar I disorder with byron ([facility])    Abnormal urinalysis      Assessment & Plan:    * NSTEMI (non-ST elevated myocardial infarction) ([facility] )  Assessment & Plan  EKG with T wave inversions in lateral leads  Troponin elevated at 0 94  pt is currently asymptomatic with no complaints of chest pain or shortness of breath  · Continue Asprin, Statin  · monitor on telemetry  · Cardiology input appreciated  given patients risk factors will need inpatient stress test   Patient today agreeable with getting stress test tomorrow    Abnormal urinalysis  Assessment & Plan  UA with trace ketones and blood  Patient has no urinary complaints or signs of systemic infection  Monitor off Abx  Patient afebrile with no leukocytosis  Urine cx pending    Bipolar I disorder with byron ([facility] )  Assessment & Plan  Pt was visibly agitated and contacted the police several times for various complaints yesterday but appears more cooperative today  · Psych consulted, recs appreciated  · Continue home meds: geodon, lamictal, trazodone, pristiq  Patient states she does not take Latuda at home and therefore discontinued  · 1:1 observation  · Haldol 5mg IM PRN for agitation  · As per Psychiatry, patient can follow up with [facility] after discharge    HLD (hyperlipidemia)  Assessment & Plan  Continue Statin  Check lipid panel in a m  Nicotine dependence  Assessment & Plan  Nicotine patch    Smoking cessation        VTE Pharmacologic Prophylaxis:   Pharmacologic: Enoxaparin (Lovenox)  Mechanical VTE Prophylaxis in Place: No    Patient Centered Rounds: I have performed bedside rounds with nursing staff today  Discussions with Specialists or Other Care Team Provider: yes    Education and Discussions with Family / Patient: yes    Time Spent for Care: 30 minutes  More than 50% of total time spent on counseling and coordination of care as described above  Current Length of Stay: 0 day(s)    Current Patient Status: Observation   Certification Statement: The patient will continue to require additional inpatient hospital stay due to NSTEMI requiring stress test, bipolar disorder manic type    Discharge Plan: pending    Code Status: Level 1 - Full Code      Subjective:   Patient states she is doing okay  Wants to know what tests she needs to have done in order to go home    Objective:     Vitals:   Temp (24hrs), Av 2 °F (36 8 °C), Min:97 7 °F (36 5 °C), Max:98 5 °F (36 9 °C)    Temp:  [97 7 °F (36 5 °C)-98 5 °F (36 9 °C)] 98 1 °F (36 7 °C)  HR:  [67-77] 67  Resp:  [18] 18  BP: ()/(54-78) 124/75  SpO2:  [91 %-97 %] 97 %  Body mass index is 27 31 kg/m²  Input and Output Summary (last 24 hours): Intake/Output Summary (Last 24 hours) at 2019 1700  Last data filed at 2019 1605  Gross per 24 hour   Intake --   Output 1000 ml   Net -1000 ml       Physical Exam:     Physical Exam   Constitutional: She is oriented to person, place, and time  She appears well-developed and well-nourished  No distress  HENT:   Head: Normocephalic and atraumatic  Eyes: EOM are normal  Right eye exhibits no discharge  Left eye exhibits no discharge  No scleral icterus  Neck: Neck supple  Cardiovascular: Normal rate and regular rhythm  Pulmonary/Chest: Effort normal and breath sounds normal  No respiratory distress  She has no wheezes  She has no rales  Abdominal: Soft  Bowel sounds are normal  She exhibits no distension  There is no tenderness  Musculoskeletal: She exhibits no edema  Neurological: She is alert and oriented to person, place, and time   No cranial nerve deficit  Skin: She is not diaphoretic  Psychiatric: Her affect is labile  Her speech is rapid and/or pressured and tangential  She is hyperactive  She is inattentive  Additional Data:     Labs:    Results from last 7 days   Lab Units 09/02/19  0522 09/01/19  0632   WBC Thousand/uL 7 45 7 14   HEMOGLOBIN g/dL 11 7 11 3*   HEMATOCRIT % 36 7 35 0   PLATELETS Thousands/uL 316 324   NEUTROS PCT %  --  50   LYMPHS PCT %  --  31   MONOS PCT %  --  11   EOS PCT %  --  7*     Results from last 7 days   Lab Units 09/02/19  0522  08/31/19  1746   POTASSIUM mmol/L 3 6   < > 4 0   CHLORIDE mmol/L 106   < > 104   CO2 mmol/L 28   < > 27   BUN mg/dL 24   < > 20   CREATININE mg/dL 0 68   < > 0 84   CALCIUM mg/dL 8 7   < > 10 1   ALK PHOS U/L  --   --  94   ALT U/L  --   --  25   AST U/L  --   --  31    < > = values in this interval not displayed  Results from last 7 days   Lab Units 08/31/19  2125   INR  0 99       * I Have Reviewed All Lab Data Listed Above  * Additional Pertinent Lab Tests Reviewed:  [facility] Admission Reviewed      Imaging:  Imaging Reports Reviewed Today Include: CXR  Imaging Personally Reviewed by Myself Includes:  N/A    Recent Cultures (last 7 days):           Last 24 Hours Medication List:     Current Facility-Administered Medications:  acetaminophen 650 mg Oral Q6H PRN Hermila Mercado MD   aspirin 81 mg Oral Daily Hermila Mercado MD   atorvastatin 20 mg Oral Daily Hermila Mercado MD   atorvastatin 80 mg Oral Once Mile Null MD   desvenlafaxine succinate 50 mg Oral Daily Hermila Mercado MD   enoxaparin 40 mg Subcutaneous Daily Hermila Mercado MD   fluticasone 1 spray Nasal Daily Hermila Mercado MD   haloperidol lactate 5 mg Intramuscular Q6H PRN Brianna Yanes DO   ketotifen 1 drop Both Eyes BID Hermila Mercado MD   lamoTRIgine 150 mg Oral Daily Hermila Mercado MD   loratadine 10 mg Oral Daily Hermila Mercado MD   montelukast 10 mg Oral HS Hermila Mercado MD nicotine 1 patch Transdermal Daily Rick Posey MD   pantoprazole 40 mg Oral Early Morning Rick Posey MD   propranolol 10 mg Oral Daily Rick Posey MD   traZODone 100 mg Oral HS PRN Rick Posey MD   ziprasidone 80 mg Oral QPM Rick Posey MD          Today, Patient Was Seen By: Haylie Sharif DO    ** Please Note: "This note has been constructed using a voice recognition system  Therefore there may be syntax, spelling, and/or grammatical errors   Please call if you have any questions  "**

## 2019-09-03 ENCOUNTER — APPOINTMENT (OUTPATIENT)
Dept: RADIOLOGY | Facility: HOSPITAL | Age: 59
End: 2019-09-03
Payer: COMMERCIAL

## 2019-09-03 ENCOUNTER — APPOINTMENT (OUTPATIENT)
Dept: NON INVASIVE DIAGNOSTICS | Facility: HOSPITAL | Age: 59
End: 2019-09-03
Payer: COMMERCIAL

## 2019-09-03 VITALS
OXYGEN SATURATION: 90 % | HEART RATE: 63 BPM | WEIGHT: 179.4 LBS | BODY MASS INDEX: 28.16 KG/M2 | RESPIRATION RATE: 18 BRPM | DIASTOLIC BLOOD PRESSURE: 68 MMHG | TEMPERATURE: 98.3 F | SYSTOLIC BLOOD PRESSURE: 103 MMHG | HEIGHT: 67 IN

## 2019-09-03 LAB
ANION GAP SERPL CALCULATED.3IONS-SCNC: 6 MMOL/L (ref 4–13)
BUN SERPL-MCNC: 20 MG/DL (ref 5–25)
CALCIUM SERPL-MCNC: 8.6 MG/DL (ref 8.3–10.1)
CHEST PAIN STATEMENT: NORMAL
CHLORIDE SERPL-SCNC: 105 MMOL/L (ref 100–108)
CHOLEST SERPL-MCNC: 129 MG/DL (ref 50–200)
CO2 SERPL-SCNC: 30 MMOL/L (ref 21–32)
CREAT SERPL-MCNC: 0.63 MG/DL (ref 0.6–1.3)
GFR SERPL CREATININE-BSD FRML MDRD: 99 ML/MIN/1.73SQ M
GLUCOSE SERPL-MCNC: 97 MG/DL (ref 65–140)
HDLC SERPL-MCNC: 46 MG/DL (ref 40–60)
LDLC SERPL CALC-MCNC: 63 MG/DL (ref 0–100)
MAGNESIUM SERPL-MCNC: 1.7 MG/DL (ref 1.6–2.6)
MAX DIASTOLIC BP: 76 MMHG
MAX HEART RATE: 90 BPM
MAX PREDICTED HEART RATE: 162 BPM
MAX. SYSTOLIC BP: 147 MMHG
NONHDLC SERPL-MCNC: 83 MG/DL
POTASSIUM SERPL-SCNC: 4.1 MMOL/L (ref 3.5–5.3)
PROTOCOL NAME: NORMAL
REASON FOR TERMINATION: NORMAL
SODIUM SERPL-SCNC: 141 MMOL/L (ref 136–145)
TARGET HR FORMULA: NORMAL
TEST INDICATION: NORMAL
TIME IN EXERCISE PHASE: NORMAL
TRIGL SERPL-MCNC: 101 MG/DL

## 2019-09-03 PROCEDURE — 99217 PR OBSERVATION CARE DISCHARGE MANAGEMENT: CPT | Performed by: STUDENT IN AN ORGANIZED HEALTH CARE EDUCATION/TRAINING PROGRAM

## 2019-09-03 PROCEDURE — 93018 CV STRESS TEST I&R ONLY: CPT | Performed by: INTERNAL MEDICINE

## 2019-09-03 PROCEDURE — 78452 HT MUSCLE IMAGE SPECT MULT: CPT

## 2019-09-03 PROCEDURE — 93017 CV STRESS TEST TRACING ONLY: CPT

## 2019-09-03 PROCEDURE — 80048 BASIC METABOLIC PNL TOTAL CA: CPT | Performed by: FAMILY MEDICINE

## 2019-09-03 PROCEDURE — 83735 ASSAY OF MAGNESIUM: CPT | Performed by: FAMILY MEDICINE

## 2019-09-03 PROCEDURE — 80061 LIPID PANEL: CPT | Performed by: FAMILY MEDICINE

## 2019-09-03 PROCEDURE — 99214 OFFICE O/P EST MOD 30 MIN: CPT | Performed by: INTERNAL MEDICINE

## 2019-09-03 PROCEDURE — A9502 TC99M TETROFOSMIN: HCPCS

## 2019-09-03 PROCEDURE — 93016 CV STRESS TEST SUPVJ ONLY: CPT | Performed by: INTERNAL MEDICINE

## 2019-09-03 PROCEDURE — 78452 HT MUSCLE IMAGE SPECT MULT: CPT | Performed by: INTERNAL MEDICINE

## 2019-09-03 RX ADMIN — REGADENOSON 0.4 MG: 0.08 INJECTION, SOLUTION INTRAVENOUS at 10:58

## 2019-09-03 RX ADMIN — ENOXAPARIN SODIUM 40 MG: 40 INJECTION SUBCUTANEOUS at 08:18

## 2019-09-03 RX ADMIN — KETOTIFEN FUMARATE 1 DROP: 0.35 SOLUTION/ DROPS OPHTHALMIC at 08:22

## 2019-09-03 RX ADMIN — LAMOTRIGINE 150 MG: 100 TABLET ORAL at 08:21

## 2019-09-03 RX ADMIN — FLUTICASONE PROPIONATE 1 SPRAY: 50 SPRAY, METERED NASAL at 08:18

## 2019-09-03 RX ADMIN — ASPIRIN 81 MG 81 MG: 81 TABLET ORAL at 08:21

## 2019-09-03 RX ADMIN — ACETAMINOPHEN 650 MG: 325 TABLET, FILM COATED ORAL at 14:24

## 2019-09-03 RX ADMIN — ATORVASTATIN CALCIUM 20 MG: 20 TABLET, FILM COATED ORAL at 08:22

## 2019-09-03 RX ADMIN — DESVENLAFAXINE 50 MG: 50 TABLET, EXTENDED RELEASE ORAL at 08:26

## 2019-09-03 RX ADMIN — PANTOPRAZOLE SODIUM 40 MG: 40 TABLET, DELAYED RELEASE ORAL at 05:59

## 2019-09-03 RX ADMIN — LORATADINE 10 MG: 10 TABLET ORAL at 08:21

## 2019-09-03 NOTE — DISCHARGE SUMMARY
Discharge- Christy Taylor 1960, 63 y o  female MRN: [id number]    Unit/Bed#: [address] [place] [address] Encounter: [id number]    Primary Care Provider: Marco Desouza MD   Date and time admitted to hospital: 8/31/2019  4:27 PM        * NSTEMI (non-ST elevated myocardial infarction) [facility])  Assessment & Plan  · As part of workup in ED patient had EKG which showedT wave inversions in lateral leads and elevated troponin, peaked at 0 94  · pt asymptomatic with no complaints of chest pain or shortness of breath  · She was continued on Asprin, Statin  · monitor on telemetry, no arrhythmias   · Cardiology consulted  · She underwent nuclear stress test which was negative for ischemia    Bipolar I disorder with byron ([facility] [address] )  Assessment & Plan  Pt was visibly agitated and contacted the police several times for various complaints prompting them to bring her to ED  · Psych consulted, recs appreciated  · Continue home meds: geodon, lamictal, trazodone, pristiq  · Initially she required 1:1 observation  And Haldol 5mg IM PRN for agitation  · Over the coarse of her hospitalization manic episode seemed to resolve and patient was able to be discharged home with follow up with her psychiatrist    Abnormal urinalysis  Assessment & Plan  UA with trace ketones and blood  Patient has no urinary complaints or signs of systemic infection  Monitor off Abx  Patient afebrile with no leukocytosis  Urine cx pending    HLD (hyperlipidemia)  Assessment & Plan  Continue Statin  Nicotine dependence  Assessment & Plan  Nicotine patch  Smoking cessation          Discharging Physician / Practitioner: Merline Sans, MD  PCP: Marco Desouza MD  Admission Date: 8/31/2019  Discharge Date: 09/03/19    Reason for Admission: Psychiatric Evaluation (Pt arrived via squad from home  Has called 911/police 7 times today for various complaints  Today sprayed bug spray and inhaled it  Felt SOB  Talking continuously   Sts she had a ride home in a taxi and is in love with the   Has a cat at home and it is her therapy cat and he is training to be the first drug sniffing cat for the police )        Resolved Problems  Date Reviewed: 9/2/2019    None          Consultations During Hospital Stay:  IP CONSULT TO CARDIOLOGY  IP CONSULT TO CARDIOLOGY  IP CONSULT TO PSYCHIATRY    Procedures Performed:     · Nuclear stress test: no ischemic changes    Significant Findings / Test Results:     · Cholesterol 129, , HDL 46, LDL 63  · EtOH on admission <3  · UDS on admission + opioids    Results from last 7 days   Lab Units 09/02/19  0522 09/01/19  0632 08/31/19  1746   WBC Thousand/uL 7 45 7 14 9 69   HEMOGLOBIN g/dL 11 7 11 3* 13 3   PLATELETS Thousands/uL 316 324 408*     Results from last 7 days   Lab Units 09/03/19  0550 09/02/19  0522 09/01/19  0632 08/31/19  1746   SODIUM mmol/L 141 142 139 142   POTASSIUM mmol/L 4 1 3 6 3 5 4 0   CHLORIDE mmol/L 105 106 104 104   CO2 mmol/L 30 28 26 27   BUN mg/dL 20 24 22 20   CREATININE mg/dL 0 63 0 68 0 67 0 84   CALCIUM mg/dL 8 6 8 7 8 9 10 1   TOTAL BILIRUBIN mg/dL  --   --   --  0 40   ALK PHOS U/L  --   --   --  94   ALT U/L  --   --   --  25   AST U/L  --   --   --  31     Results from last 7 days   Lab Units 08/31/19  2125   INR  0 99     Results from last 7 days   Lab Units 09/01/19  0013 08/31/19  2103 08/31/19  1832   TROPONIN I ng/mL 0 63* 0 91* 0 94*     No results found for: HGBA1C          Blood Culture:   Lab Results   Component Value Date    BLOODCX No Growth After 5 Days  01/23/2018    BLOODCX No Growth After 5 Days   01/23/2018     Urine Culture:   Lab Results   Component Value Date    URINECX No Growth <1000 cfu/mL 09/01/2019    URINECX No Growth <1000 cfu/mL 11/21/2018    URINECX 30,000-39,000 cfu/ml  11/14/2018     Sputum Culture: No components found for: SPUTUMCX  Wound Culture: No results found for: WOUNDCULT     XR chest 1 view portable   ED Interpretation by Sydnie Ivan MD ([id number])   No active disease Final Result by Vesta Chua MD (08/31 1954)      No acute cardiopulmonary disease  Workstation performed: [id number]                Incidental Findings:   ·      Test Results Pending at Discharge (will require follow up):   ·      Outpatient Tests Requested:  ·     Complications:  none    Reason for Admission:   Chief Complaint   Patient presents with    Psychiatric Evaluation     Pt arrived via squad from home  Has called 911/police 7 times today for various complaints  Today sprayed bug spray and inhaled it  Felt SOB  Talking continuously  Sts she had a ride home in a taxi and is in love with the   Has a cat at home and it is her therapy cat and he is training to be the first drug sniffing cat for the [facility]  Hospital Course:     Miguel Ángel Mitchell is a 63 y o  female patient with a PMH of Bipolar Disorder, Depression, Anxiety and HLD who presents with shortness of breath  She states that she was spraying bug spray at home when she inhaled it and became short of breath  Due to this she was brought to the ED where she received Duonebs  Afterwards she did not complain of any further shortness of breath  The ER physician was also notified that the pt contacted the police several times today for various complaints  In the ER she was visibly irate and "ranting and raving" therefore it was decided to have her transferred for Psych evaluation  However it was found that she had some EKG changes and an elevated troponin therefore she will require medical clearance  Currently the pt denies any chest pain or shortness of breath  She does not have suicidal ideation and wants to get back to her "special cat"  patient as actively manic at time of admission    Condition at Discharge: stable       Discharge Day Visit / Exam:     Subjective:  Feels good  No complaints  Happy to be going home   Will followup with her psychiatrist as OP  Vitals: Blood Pressure: 103/68 (09/03/19 0813)  Pulse: 63 (09/03/19 0813)  Temperature: 98 3 °F (36 8 °C) (09/03/19 0813)  Temp Source: Oral (09/03/19 0813)  Respirations: 18 (09/03/19 0813)  Height: 5' 7" (170 2 cm) (08/31/19 1641)  Weight - Scale: 81 4 kg (179 lb 6 4 oz) (09/03/19 0600)  SpO2: 90 % (09/03/19 0813)  Exam:   Physical Exam   Constitutional: She is oriented to person, place, and time  She appears well-developed  No distress  HENT:   Head: Normocephalic and atraumatic  Cardiovascular: Normal rate, regular rhythm and normal heart sounds  Pulmonary/Chest: Effort normal and breath sounds normal  No respiratory distress  She has no wheezes  She has no rales  Abdominal: Soft  Bowel sounds are normal  She exhibits no distension  There is no tenderness  There is no rebound  Musculoskeletal: She exhibits no edema, tenderness or deformity  Neurological: She is alert and oriented to person, place, and time  Skin: Skin is warm and dry  Psychiatric: She has a normal mood and affect  Her behavior is normal    Nursing note and vitals reviewed  Discharge instructions/Information to patient and family:   See after visit summary for information provided to patient and family  Provisions for Follow-Up Care:  See after visit summary for information related to follow-up care and any pertinent home health orders  Disposition:     Home    Planned Readmission: no     Discharge Statement:  I spent >30 minutes discharging the patient  This time was spent on the day of discharge  I had direct contact with the patient on the day of discharge  Greater than 50% of the total time was spent examining patient, answering all patient questions, arranging and discussing plan of care with patient as well as directly providing post-discharge instructions  Additional time then spent on discharge activities  Discharge Medications:  See after visit summary for reconciled discharge medications provided to patient and family        ** Please Note: This note has been constructed using a voice recognition system **

## 2019-09-03 NOTE — PROGRESS NOTES
Patient examined spoke with the nurse  Patient is alert awake cooperative medical workup is in progress  Patient is happy to see me she reports that she feels much better and she wants to go home she misses her cat  Patient is stable at her baseline with her bipolar disorder  Nurse also reports that patient is doing good with byron  Taking her medications patient is pleasant smiling affect is brighter and thanking me for talking to her  No side effects of medications noted  Patient denies auditory or visual hallucinations  Patient denies suicidal or homicidal ideations  Patient is pleasant she offers no new complaints she is stable at her baseline she is not a danger to herself or others once patient is medically stable she can be discharged and she will follow up with a psychiatrist at [facility]  Therapy done with good response  I will follow up

## 2019-09-03 NOTE — PROGRESS NOTES
Progress Note - Cardiology   [facility]     Kishan Stringer 63 y o  female MRN: [id number]  : 1960  Unit/Bed#: [address] [place] [address] Encounter: [id number]    Assessment and Plan:   1  Type 2 MI:  Peak troponin was 0 94  Initial EKG demonstrated T-wave inversion in the lateral leads  Patient with multiple risk factors including smoking and strong family history  Patient on multiple high risk medications for her bipolar disorder  Lexiscan nuclear stress test is pending  Continue aspirin, statin and beta-blocker therapy  2  Bipolar disorder with byron:  Patient followed by Dr Cheryl Severs  Concern regarding her compliance with medication at home  3  Tobacco abuse    4  Dyslipidemia:  Continue statin therapy     Subjective / Objective:   Patient seen and examined, no further complaints offered  4  Nuclear stress test today  Vital signs been stable  Vitals: Blood pressure 103/68, pulse 63, temperature 98 3 °F (36 8 °C), temperature source Oral, resp  rate 18, height 5' 7" (1 702 m), weight 81 4 kg (179 lb 6 4 oz), SpO2 90 %, not currently breastfeeding  Vitals:    19 0600 19 0600   Weight: 79 1 kg (174 lb 6 1 oz) 81 4 kg (179 lb 6 4 oz)     Body mass index is 28 1 kg/m²    BP Readings from Last 3 Encounters:   19 103/68   19 125/73   19 125/85     Orthostatic Blood Pressures      Most Recent Value   Blood Pressure  103/68 filed at 2019 0813   Patient Position - Orthostatic VS  Sitting filed at 2019 0813        I/O        07 -  0700  07 -  0700  07 -  0700    I V  (mL/kg)       Total Intake(mL/kg)       Urine (mL/kg/hr) 1050 (0 6) 900 (0 5) 100 (0 2)    Total Output 1050 900 100    Net -1050 -900 -100               Invasive Devices     Peripheral Intravenous Line            Peripheral IV 19 Left Arm 2 days                  Intake/Output Summary (Last 24 hours) at 9/3/2019 1306  Last data filed at 9/3/2019 1201  Gross per 24 hour   Intake --   Output 1000 ml   Net -1000 ml         Physical Exam:   Physical Exam   Constitutional: She appears well-developed and well-nourished  No distress  HENT:   Head: Normocephalic  Right Ear: External ear normal    Left Ear: External ear normal    Eyes: Pupils are equal, round, and reactive to light  Conjunctivae are normal  Right eye exhibits no discharge  Left eye exhibits no discharge  No scleral icterus  Neck: Normal range of motion  Neck supple  No JVD present  No thyromegaly present  Cardiovascular: Normal rate, regular rhythm, normal heart sounds and intact distal pulses  Pulmonary/Chest: Effort normal and breath sounds normal  No respiratory distress  She has no rales  Abdominal: Soft  Bowel sounds are normal  She exhibits no distension  Musculoskeletal: She exhibits no edema  Neurological: She is alert  Skin: Skin is warm and dry  Capillary refill takes less than 2 seconds  She is not diaphoretic  Psychiatric: She has a normal mood and affect  Nursing note and vitals reviewed                  Medications/ Allergies:     Current Facility-Administered Medications:  acetaminophen 650 mg Oral Q6H PRN Yvonne Duarte MD   aspirin 81 mg Oral Daily Yvonne Duarte MD   atorvastatin 20 mg Oral Daily Yvonne Duarte MD   atorvastatin 80 mg Oral Once Kirit Chamberlain MD   desvenlafaxine succinate 50 mg Oral Daily Yvonne Duarte MD   enoxaparin 40 mg Subcutaneous Daily Yvonne Duarte MD   fluticasone 1 spray Nasal Daily Yvonne Duarte MD   haloperidol lactate 5 mg Intramuscular Q6H PRN Brianna Yanes,    ketotifen 1 drop Both Eyes BID Yvonne Duarte MD   lamoTRIgine 150 mg Oral Daily Yvonne Duarte MD   loratadine 10 mg Oral Daily Yvonne Duarte MD   montelukast 10 mg Oral HS Yvonne Duarte MD   nicotine 1 patch Transdermal Daily Yvonne Duarte MD   pantoprazole 40 mg Oral Early Morning Yvonne Duarte MD   propranolol 10 mg Oral Daily Yvonne Duarte MD traZODone 100 mg Oral HS PRN Konrad Umanzor MD   ziprasidone 80 mg Oral QPM Konrad Umanzor MD       acetaminophen 650 mg Q6H PRN   haloperidol lactate 5 mg Q6H PRN   traZODone 100 mg HS PRN     No Known Allergies    VTE Pharmacologic Prophylaxis:   Sequential compression device (Venodyne)     Labs:   Troponins:  Results from last 7 days   Lab Units 09/01/19  0013 08/31/19  2103 08/31/19  1832   TROPONIN I ng/mL 0 63* 0 91* 0 94*     CBC with diff:  Results from last 7 days   Lab Units 09/02/19  0522 09/01/19  0632 08/31/19  1746   WBC Thousand/uL 7 45 7 14 9 69   HEMOGLOBIN g/dL 11 7 11 3* 13 3   HEMATOCRIT % 36 7 35 0 40 8   MCV fL 97 97 95   PLATELETS Thousands/uL 316 324 408*   MCH pg 30 9 31 2 30 9   MCHC g/dL 31 9 32 3 32 6   RDW % 13 1 12 7 12 8   MPV fL 9 9 9 7 9 6   NRBC AUTO /100 WBCs  --  0 0     CMP:  Results from last 7 days   Lab Units 09/03/19  0550 09/02/19  0522 09/01/19  0632 08/31/19  1746   SODIUM mmol/L 141 142 139 142   POTASSIUM mmol/L 4 1 3 6 3 5 4 0   CHLORIDE mmol/L 105 106 104 104   CO2 mmol/L 30 28 26 27   ANION GAP mmol/L 6 8 9 11   BUN mg/dL 20 24 22 20   CREATININE mg/dL 0 63 0 68 0 67 0 84   GLUCOSE FASTING mg/dL  --  98 85  --    CALCIUM mg/dL 8 6 8 7 8 9 10 1   AST U/L  --   --   --  31   ALT U/L  --   --   --  25   ALK PHOS U/L  --   --   --  94   TOTAL PROTEIN g/dL  --   --   --  7 3   ALBUMIN g/dL  --   --   --  3 9   TOTAL BILIRUBIN mg/dL  --   --   --  0 40   EGFR ml/min/1 73sq m 99 97 97 77     Magnesium:  Results from last 7 days   Lab Units 09/03/19  0550 09/02/19  0522   MAGNESIUM mg/dL 1 7 1 7     Coags:  Results from last 7 days   Lab Units 08/31/19  2125   INR  0 99     Lipid Profile:  Results from last 7 days   Lab Units 09/03/19  0550   CHOLESTEROL mg/dL 129   TRIGLYCERIDES mg/dL 101   HDL mg/dL 46   LDL CALC mg/dL 63       Imaging & Testing   I have personally reviewed pertinent reports      Xr Chest 1 View Portable    Result Date: 8/31/2019  Narrative: CHEST INDICATION:   crisis screening  COMPARISON:  11/14/2018  EXAM PERFORMED/VIEWS:  XR CHEST PORTABLE FINDINGS: Cardiomediastinal silhouette appears unremarkable  The lungs are clear  No pneumothorax or pleural effusion  Osseous structures appear within normal limits for patient age  Impression: No acute cardiopulmonary disease  Workstation performed: [id number]     Xr Wrist 3+ Views Left    Result Date: 8/10/2019  Narrative: LEFT WRIST INDICATION:   fall  Left wrist pain  COMPARISON:  June 30, 2019 VIEWS:  XR WRIST 3+ VW LEFT Images: 4 FINDINGS: There is no acute fracture or dislocation  Tiny fragment adjacent to the ulnar styloid is present on the previous study  No significant degenerative changes  No lytic or blastic lesions are seen  Soft tissues are unremarkable  Impression: No acute osseous abnormality  Workstation performed: [id number]     Xr Knee 3 Views Left Non Injury    Result Date: 8/10/2019  Narrative: LEFT KNEE INDICATION:   pain  COMPARISON:  None VIEWS:  XR KNEE 3 VW LEFT NON INJURY FINDINGS: There is no acute fracture or dislocation  There is no joint effusion  No significant degenerative changes  No lytic or blastic lesions are seen  Soft tissues are unremarkable  Impression: No acute osseous abnormality  Workstation performed: [id number]     Ct Head Without Contrast    Result Date: 8/9/2019  Narrative: CT BRAIN - WITHOUT CONTRAST INDICATION:   fall  COMPARISON:  None  TECHNIQUE:  CT examination of the brain was performed  In addition to axial images, coronal 2D reformatted images were created and submitted for interpretation  Radiation dose length product (DLP) for this visit:  1058  41 mGy-cm   This examination, like all CT scans performed in the [facility], was performed utilizing techniques to minimize radiation dose exposure, including the use of iterative reconstruction and automated exposure control  IMAGE QUALITY:  Diagnostic   FINDINGS: PARENCHYMA:  No intracranial mass, mass effect or midline shift  No CT signs of acute infarction  No acute parenchymal hemorrhage  There is mild periventricular white matter low attenuation which is nonspecific and most likely related to chronic small vessel ischemic changes  VENTRICLES AND EXTRA-AXIAL SPACES:  There is prominence of the ventricles and sulci related to mild volume loss  VISUALIZED ORBITS AND PARANASAL SINUSES:  There is mucosal thickening at the posterior aspect of both maxillary sinuses  CALVARIUM AND EXTRACRANIAL SOFT TISSUES:  Normal      Impression: No acute intracranial abnormality  Workstation performed: [id number]        Cardiac testing:  Nuclear stress test is pending          Rich NALDO Morris        "This note has been constructed using a voice recognition system  Therefore there may be syntax, spelling, and/or grammatical errors   Please call if you have any questions  "

## 2019-11-30 ENCOUNTER — HOSPITAL ENCOUNTER (EMERGENCY)
Facility: HOSPITAL | Age: 59
Discharge: HOME | End: 2019-11-30
Attending: EMERGENCY MEDICINE
Payer: COMMERCIAL

## 2019-11-30 VITALS
BODY MASS INDEX: 27.28 KG/M2 | DIASTOLIC BLOOD PRESSURE: 71 MMHG | SYSTOLIC BLOOD PRESSURE: 142 MMHG | HEART RATE: 78 BPM | WEIGHT: 180 LBS | TEMPERATURE: 97.1 F | RESPIRATION RATE: 18 BRPM | OXYGEN SATURATION: 96 % | HEIGHT: 68 IN

## 2019-11-30 DIAGNOSIS — F41.9 ANXIETY: Primary | ICD-10-CM

## 2019-11-30 PROCEDURE — 99283 EMERGENCY DEPT VISIT LOW MDM: CPT

## 2019-11-30 RX ORDER — CYCLOBENZAPRINE HCL 10 MG
10 TABLET ORAL 3 TIMES DAILY PRN
COMMUNITY

## 2019-11-30 RX ORDER — HYDROXYZINE HYDROCHLORIDE 25 MG/1
25 TABLET, FILM COATED ORAL ONCE
Status: COMPLETED | OUTPATIENT
Start: 2019-11-30 | End: 2019-11-30

## 2019-11-30 RX ORDER — HYDROXYZINE PAMOATE 25 MG/1
25 CAPSULE ORAL 3 TIMES DAILY PRN
Qty: 12 CAPSULE | Refills: 0 | Status: SHIPPED | OUTPATIENT
Start: 2019-11-30 | End: 2019-12-04

## 2019-11-30 RX ADMIN — HYDROXYZINE HYDROCHLORIDE 25 MG: 25 TABLET, FILM COATED ORAL at 09:34

## 2020-03-16 ENCOUNTER — TRANSCRIBE ORDERS (OUTPATIENT)
Dept: ADMINISTRATIVE | Facility: HOSPITAL | Age: 60
End: 2020-03-16

## 2020-03-16 DIAGNOSIS — Z12.31 SCREENING MAMMOGRAM FOR HIGH-RISK PATIENT: Primary | ICD-10-CM

## 2020-03-16 DIAGNOSIS — Z87.891 PERSONAL HISTORY OF TOBACCO USE, PRESENTING HAZARDS TO HEALTH: ICD-10-CM

## 2020-04-17 ENCOUNTER — HOSPITAL ENCOUNTER (OUTPATIENT)
Dept: RADIOLOGY | Facility: HOSPITAL | Age: 60
Discharge: HOME | End: 2020-04-17

## 2020-08-14 NOTE — ED PROVIDER NOTES
History  Chief Complaint   Patient presents with    Anxiety     Patient comes via EMS for anxiety     64 y/o female, h/o GERD/HLD/depression/Bipolar 1/ anxiety/depression, presenting with anxiety  Patient relays that she has anxiety every day which is not new for her however has worsened past few days  States that she wakes up with anxiety and seems to be worse in the morning  She does not take anything specifically for this  She did call [facility] who suggested patient go to the ED for further evaluation to obtain medication for her anxiety  She is taking off her medications as prescribed  States she is otherwise feeling well and has not been recently ill  Requesting coffee at this time  GERD has not worsened  States that she will attempt to make an appointment this upcoming week with her family doctor or family guidance  States she is not stressed out about anything in particular and does not have any mechanisms to decrease her  Denies nausea, vomiting, chest pain, calf pain or swelling, abdominal pain, diarrhea, constipation, fevers, cough, congestion, homicidal or suicidal ideation, racing thoughts or pressure speech, palpitations  Prior to Admission Medications   Prescriptions Last Dose Informant Patient Reported? Taking? Biotin (SUPER BIOTIN) 5 MG TABS   Yes Yes   Sig: Take 1 tablet by mouth   HYDROcodone-acetaminophen (NORCO) 5-325 mg per tablet Not Taking at Unknown time  No No   Sig: Take 1 tablet by mouth every 6 (six) hours as needed for pain for up to 12 dosesMax Daily Amount: 4 tablets   Patient not taking: Reported on 11/30/2019   Multiple Vitamin (MULTIVITAMIN) tablet   Yes Yes   Sig: Take 1 tablet by mouth daily   aspirin 81 MG tablet   Yes Yes   Sig: Take 81 mg by mouth daily  atorvastatin (LIPITOR) 20 mg tablet   Yes Yes   Sig: Take 20 mg by mouth daily     cyclobenzaprine (FLEXERIL) 10 mg tablet   Yes Yes   Sig: Take 10 mg by mouth 3 (three) times a day as needed for Patient will start prednisone course at this time due to acute exacerbation muscle spasms   desvenlafaxine succinate (PRISTIQ) 50 mg 24 hr tablet Not Taking at Unknown time  Yes No   Sig: Take 50 mg by mouth daily   fluticasone (FLONASE) 50 mcg/act nasal spray Not Taking at Unknown time  Yes No   Si spray into each nostril daily   ketotifen (ZADITOR) 0 025 % ophthalmic solution Not Taking at Unknown time  Yes No   Si drop 2 (two) times a day  lamoTRIgine (LaMICtal) 150 MG tablet   Yes Yes   Sig: Take 150 mg by mouth daily    loratadine (CLARITIN) 10 mg tablet   Yes Yes   Sig: Take 10 mg by mouth daily   lurasidone (LATUDA) 40 mg tablet Not Taking at Unknown time  Yes No   Sig: Take 80 mg by mouth daily with dinner    meclizine (ANTIVERT) 25 mg tablet   Yes Yes   Sig: Take 25 mg by mouth every 8 (eight) hours as needed for dizziness   montelukast (SINGULAIR) 10 mg tablet   Yes Yes   Sig: Take 10 mg by mouth daily at bedtime  naproxen (EC NAPROSYN) 500 MG EC tablet Not Taking at Unknown time  No No   Sig: Take 1 tablet (500 mg total) by mouth 2 (two) times a day with meals   Patient not taking: Reported on 2019   pantoprazole (PROTONIX) 40 mg tablet   Yes Yes   Sig: Take 40 mg by mouth daily  propranolol (INDERAL) 40 mg tablet   Yes Yes   Sig: Take 10 mg by mouth daily    tolterodine (DETROL LA) 4 mg 24 hr capsule   Yes Yes   Sig: Take 4 mg by mouth daily     traZODone (DESYREL) 50 mg tablet   Yes Yes   Sig: Take 100 mg by mouth daily at bedtime as needed    venlafaxine (EFFEXOR-XR) 37 5 mg 24 hr capsule Not Taking at Unknown time  Yes No   Sig: Take 75 mg by mouth daily     ziprasidone (GEODON) 80 mg capsule   Yes Yes   Sig: Take 80 mg by mouth every evening      Facility-Administered Medications: None       Past Medical History:   Diagnosis Date    Asthma     Chronic pain     back    GERD (gastroesophageal reflux disease)     Hyperlipidemia     Psychiatric disorder     bipolar       Past Surgical History:   Procedure Laterality Date    TUBAL LIGATION         History reviewed  No pertinent family history  I have reviewed and agree with the history as documented  Social History     Tobacco Use    Smoking status: Current Every Day Smoker     Packs/day: 1 00    Smokeless tobacco: Never Used   Substance Use Topics    Alcohol use: No     Frequency: Never     Binge frequency: Never    Drug use: No        Review of Systems   Constitutional: Negative  HENT: Negative  Eyes: Negative  Respiratory: Negative  Cardiovascular: Negative  Gastrointestinal: Negative  Genitourinary: Negative  Musculoskeletal: Negative  Skin: Negative  Neurological: Negative  Psychiatric/Behavioral: Negative for agitation, behavioral problems, confusion, decreased concentration, dysphoric mood, hallucinations, self-injury, sleep disturbance and suicidal ideas  The patient is nervous/anxious  The patient is not hyperactive  All other systems reviewed and are negative  Physical Exam  Physical Exam   Constitutional: She is oriented to person, place, and time  She appears well-developed and well-nourished  Patient appears very well  Acting appropriately  Watching tv and requesting coffee   HENT:   Head: Normocephalic and atraumatic  Nose: Nose normal    Eyes: Pupils are equal, round, and reactive to light  Conjunctivae and EOM are normal    Neck: Normal range of motion  Neck supple  Cardiovascular: Normal rate, regular rhythm, normal heart sounds and intact distal pulses  Exam reveals no gallop and no friction rub  No murmur heard  Pulmonary/Chest: Effort normal and breath sounds normal  No stridor  No respiratory distress  She has no wheezes  She has no rales  She exhibits no tenderness  spo2 is 96% indicating adequate oxygenation    Abdominal: Soft  Bowel sounds are normal  She exhibits no distension and no mass  There is no tenderness  There is no rebound and no guarding  No hernia  Neurological: She is alert and oriented to person, place, and time     Skin: Skin is warm and dry  Capillary refill takes less than 2 seconds  Psychiatric: She has a normal mood and affect  Her speech is normal and behavior is normal  Judgment and thought content normal  Her mood appears not anxious  Her affect is not angry, not blunt, not labile and not inappropriate  She is not actively hallucinating  Cognition and memory are normal  She does not exhibit a depressed mood  She is attentive  Nursing note and vitals reviewed  Vital Signs  ED Triage Vitals [11/30/19 0857]   Temperature Pulse Respirations Blood Pressure SpO2   (!) 97 1 °F (36 2 °C) 78 18 142/71 96 %      Temp Source Heart Rate Source Patient Position - Orthostatic VS BP Location FiO2 (%)   Tympanic Monitor Lying Right arm --      Pain Score       --           Vitals:    11/30/19 0857   BP: 142/71   Pulse: 78   Patient Position - Orthostatic VS: Lying         Visual Acuity      ED Medications  Medications   hydrOXYzine HCL (ATARAX) tablet 25 mg (has no administration in time range)       Diagnostic Studies  Results Reviewed     None                 No orders to display              Procedures  Procedures       ED Course                               MDM  Number of Diagnoses or Management Options  Diagnosis management comments: Patient appears very well  States she is currently feeling slightly anxious  She otherwise is feeling very well and has no other complaints at this point in time  She denies suicidal homicidal ideation  Patient is acting appropriately without any pressured speech  Discussed with patient that we may prescribed Vistaril at this time, I gave her thorough education regarding this medication as well as potential side effects  Patient informed not to drive or operate heavy machinery while taking this medication and she is aware that it may make her sleepy    I also discussed with patient that coffee does have a tendency to worsen anxiety and suggested some behavior strategies to help with her anxiety  Patient given strict return precautions for any worsening or for any homicidal suicidal ideation  Otherwise patient agrees to follow up with her family doctor,psychiatrist or family guidance  Patient verbalizes understanding and agrees with the above assessment plan  Amount and/or Complexity of Data Reviewed  Review and summarize past medical records: yes  Independent visualization of images, tracings, or specimens: yes        Disposition  Final diagnoses:   Anxiety     Time reflects when diagnosis was documented in both MDM as applicable and the Disposition within this note     Time User Action Codes Description Comment    11/30/2019  9:19 AM Serene Hall Add [F41 9] Anxiety       ED Disposition     ED Disposition Condition Date/Time Comment    Discharge Stable Sat Nov 30, 2019  9:19 AM Nahomy Enriquez discharge to home/self care  Follow-up Information     Follow up With Specialties Details Why Contact Info Additional Information    [address] [facility] Emergency Department Emergency Medicine Go to  If symptoms worsen such as chest pain, suicidal or homicidal ideation etc otherwise please follow up with your family doctor, family guidance or your psychiatrist for re-evaluation of your anxiety  [address] [facility] [address] [facility] ED, [facility], [address]    Aureliano Marshall MD  Schedule an appointment as soon as possible for a visit in 2 days  [facility] [address] [facility]  [phone]             Patient's Medications   Discharge Prescriptions    HYDROXYZINE PAMOATE (VISTARIL) 25 MG CAPSULE    Take 1 capsule (25 mg total) by mouth 3 (three) times a day as needed for anxiety for up to 4 days       Start Date: 11/30/2019End Date: 12/4/2019       Order Dose: 25 mg       Quantity: 12 capsule    Refills: 0     No discharge procedures on file      ED Provider  Electronically Signed by           Tani Lay PA-C  11/30/19 1886

## 2020-08-18 ENCOUNTER — HOSPITAL ENCOUNTER (EMERGENCY)
Facility: HOSPITAL | Age: 60
Discharge: HOME | End: 2020-08-18
Attending: EMERGENCY MEDICINE | Admitting: EMERGENCY MEDICINE
Payer: COMMERCIAL

## 2020-08-18 ENCOUNTER — HOSPITAL ENCOUNTER (OUTPATIENT)
Dept: RADIOLOGY | Facility: HOSPITAL | Age: 60
Discharge: HOME | End: 2020-08-18
Payer: COMMERCIAL

## 2020-08-18 VITALS
BODY MASS INDEX: 27.28 KG/M2 | DIASTOLIC BLOOD PRESSURE: 58 MMHG | HEART RATE: 75 BPM | RESPIRATION RATE: 17 BRPM | HEIGHT: 68 IN | TEMPERATURE: 97.1 F | WEIGHT: 180 LBS | OXYGEN SATURATION: 96 % | SYSTOLIC BLOOD PRESSURE: 91 MMHG

## 2020-08-18 VITALS — HEIGHT: 68 IN | WEIGHT: 180 LBS | BODY MASS INDEX: 27.28 KG/M2

## 2020-08-18 DIAGNOSIS — L03.311 CELLULITIS OF ABDOMINAL WALL: Primary | ICD-10-CM

## 2020-08-18 DIAGNOSIS — Z12.31 SCREENING MAMMOGRAM FOR HIGH-RISK PATIENT: ICD-10-CM

## 2020-08-18 PROCEDURE — 99282 EMERGENCY DEPT VISIT SF MDM: CPT

## 2020-08-18 PROCEDURE — 77067 SCR MAMMO BI INCL CAD: CPT

## 2020-08-18 PROCEDURE — 77063 BREAST TOMOSYNTHESIS BI: CPT

## 2020-08-18 PROCEDURE — 99283 EMERGENCY DEPT VISIT LOW MDM: CPT | Performed by: PHYSICIAN ASSISTANT

## 2020-08-18 RX ORDER — CEPHALEXIN 500 MG/1
500 CAPSULE ORAL EVERY 8 HOURS SCHEDULED
Qty: 30 CAPSULE | Refills: 0 | Status: SHIPPED | OUTPATIENT
Start: 2020-08-18 | End: 2020-08-28

## 2020-08-18 RX ORDER — SULFAMETHOXAZOLE AND TRIMETHOPRIM 800; 160 MG/1; MG/1
1 TABLET ORAL 2 TIMES DAILY
Qty: 20 TABLET | Refills: 0 | Status: SHIPPED | OUTPATIENT
Start: 2020-08-18 | End: 2020-08-28

## 2020-08-18 NOTE — ED PROVIDER NOTES
History  Chief Complaint   Patient presents with   [address]     pt c/o insect bite on left abdomen  localized  erythema noted surrounding  41-year-old female presenting today with an area of redness on the left portion of her abdomen over the past 3 days that has gradually spread  States that she 1st had a small pimple like lesion that she popped with small amount of purulence that drained, resembling a pimple  After this she has had worsening pain and swelling  No further drainage, has not been placing warm compresses  This is her 1st evaluation  Is unsure she was bitten by an insect  Does not have an history of MRSA nor any close contacts with MRSA history  No known tick bites  Denies fevers, nausea, vomiting, joint aches or swelling, chills, abdominal pain  Prior to Admission Medications   Prescriptions Last Dose Informant Patient Reported? Taking? Biotin (SUPER BIOTIN) 5 MG TABS   Yes No   Sig: Take 1 tablet by mouth   HYDROcodone-acetaminophen (NORCO) 5-325 mg per tablet   No No   Sig: Take 1 tablet by mouth every 6 (six) hours as needed for pain for up to 12 dosesMax Daily Amount: 4 tablets   Patient not taking: Reported on 2019   Multiple Vitamin (MULTIVITAMIN) tablet   Yes No   Sig: Take 1 tablet by mouth daily   aspirin 81 MG tablet   Yes No   Sig: Take 81 mg by mouth daily  atorvastatin (LIPITOR) 20 mg tablet   Yes No   Sig: Take 20 mg by mouth daily     cyclobenzaprine (FLEXERIL) 10 mg tablet   Yes No   Sig: Take 10 mg by mouth 3 (three) times a day as needed for muscle spasms   desvenlafaxine succinate (PRISTIQ) 50 mg 24 hr tablet   Yes No   Sig: Take 50 mg by mouth daily   fluticasone (FLONASE) 50 mcg/act nasal spray   Yes No   Si spray into each nostril daily   hydrOXYzine pamoate (VISTARIL) 25 mg capsule   No No   Sig: Take 1 capsule (25 mg total) by mouth 3 (three) times a day as needed for anxiety for up to 4 days   ketotifen (ZADITOR) 0 025 % ophthalmic solution   Yes No   Si drop 2 (two) times a day  lamoTRIgine (LaMICtal) 150 MG tablet   Yes No   Sig: Take 150 mg by mouth daily    loratadine (CLARITIN) 10 mg tablet   Yes No   Sig: Take 10 mg by mouth daily   lurasidone (LATUDA) 40 mg tablet   Yes No   Sig: Take 80 mg by mouth daily with dinner    meclizine (ANTIVERT) 25 mg tablet   Yes No   Sig: Take 25 mg by mouth every 8 (eight) hours as needed for dizziness   montelukast (SINGULAIR) 10 mg tablet   Yes No   Sig: Take 10 mg by mouth daily at bedtime  naproxen (EC NAPROSYN) 500 MG EC tablet   No No   Sig: Take 1 tablet (500 mg total) by mouth 2 (two) times a day with meals   Patient not taking: Reported on 2019   pantoprazole (PROTONIX) 40 mg tablet   Yes No   Sig: Take 40 mg by mouth daily  propranolol (INDERAL) 40 mg tablet   Yes No   Sig: Take 10 mg by mouth daily    tolterodine (DETROL LA) 4 mg 24 hr capsule   Yes No   Sig: Take 4 mg by mouth daily  traZODone (DESYREL) 50 mg tablet   Yes No   Sig: Take 100 mg by mouth daily at bedtime as needed    venlafaxine (EFFEXOR-XR) 37 5 mg 24 hr capsule   Yes No   Sig: Take 75 mg by mouth daily     ziprasidone (GEODON) 80 mg capsule   Yes No   Sig: Take 80 mg by mouth every evening      Facility-Administered Medications: None       Past Medical History:   Diagnosis Date    Asthma     Chronic pain     back    GERD (gastroesophageal reflux disease)     Hyperlipidemia     Psychiatric disorder     bipolar       Past Surgical History:   Procedure Laterality Date    TUBAL LIGATION         History reviewed  No pertinent family history  I have reviewed and agree with the history as documented      E-Cigarette/Vaping    E-Cigarette Use Never User      E-Cigarette/Vaping Substances     Social History     Tobacco Use    Smoking status: Current Every Day Smoker     Packs/day: 1 00    Smokeless tobacco: Never Used   Substance Use Topics    Alcohol use: No     Frequency: Never     Binge frequency: Never  Drug use: No       Review of Systems   Constitutional: Negative  HENT: Negative  Eyes: Negative  Respiratory: Negative  Cardiovascular: Negative  Gastrointestinal: Negative  Genitourinary: Negative  Musculoskeletal: Negative  Skin: Positive for color change  Negative for pallor, rash and wound  Neurological: Negative  All other systems reviewed and are negative  Physical Exam  Physical Exam  Vitals signs reviewed  Constitutional:       Appearance: Normal appearance  HENT:      Head: Normocephalic and atraumatic  Neck:      Musculoskeletal: Normal range of motion and neck supple  Cardiovascular:      Rate and Rhythm: Normal rate  Pulses: Normal pulses  Pulmonary:      Effort: Pulmonary effort is normal       Breath sounds: Normal breath sounds  Comments: S PO2 is 96% indicating adequate oxygenation  Skin:     General: Skin is warm and dry  Capillary Refill: Capillary refill takes less than 2 seconds  Findings: Erythema present  Neurological:      General: No focal deficit present  Mental Status: She is alert  Vital Signs  ED Triage Vitals [08/18/20 0851]   Temperature Pulse Respirations Blood Pressure SpO2   (!) 97 1 °F (36 2 °C) 75 17 91/58 96 %      Temp Source Heart Rate Source Patient Position - Orthostatic VS BP Location FiO2 (%)   Tympanic Monitor Sitting Left arm --      Pain Score       8           Vitals:    08/18/20 0851   BP: 91/58   Pulse: 75   Patient Position - Orthostatic VS: Sitting         Visual Acuity      ED Medications  Medications - No data to display    Diagnostic Studies  Results Reviewed     None                 No orders to display              Procedures  Procedures         ED Course       US AUDIT      Most Recent Value   Initial Alcohol Screen: US AUDIT-C    1   How often do you have a drink containing alcohol?  0 Filed at: 08/18/2020 0852   Audit-C Score  0 Filed at: 08/18/2020 4929 [email or web address]      Most Recent Value   How many times in the past year have you    Used an illegal drug or used a prescription medication for non-medical reasons? Never Filed at: 08/18/2020 9265                                [facility]  Number of Diagnoses or Management Options  Cellulitis of abdominal wall:   Diagnosis management comments: Given purulence, will cover for MRSA and treat for cellulitis  I discussed with patient that she likely is developing an early abscess and discussed with her ways to perform warm compresses  Will have her come back in 3 days when I am here for re-evaluation for potential drainage of a small abscess or sooner should any symptoms worsen such as spreading redness, fevers severe pain etcetera  Would like potential abscess to soften before draining  Patient verbalizes understanding and agrees with the above assessment plan  Amount and/or Complexity of Data Reviewed  Review and summarize past medical records: yes  Independent visualization of images, tracings, or specimens: yes          Disposition  Final diagnoses:   Cellulitis of abdominal wall     Time reflects when diagnosis was documented in both MDM as applicable and the Disposition within this note     Time User Action Codes Description Comment    8/18/2020  9:13 AM Deo Amezquita Add [U38 [id number]] Cellulitis of abdominal wall       ED Disposition     ED Disposition Condition Date/Time Comment    Discharge Stable Tue Aug 18, 2020  9:13 AM Nash Precise discharge to home/self care              Follow-up Information     Follow up With Specialties Details Why Contact Info Additional [address] Emergency Department Emergency Medicine Go in 3 days For wound re-check or sooner for worsening of spreading redness, fevers, etc [address] [facility] ED, [place], [place], [address]          Discharge Medication List as of 8/18/2020  9:14 AM      START taking these medications    Details   cephalexin (KEFLEX) 500 mg capsule Take 1 capsule (500 mg total) by mouth every 8 (eight) hours for 10 days, Starting Tue 8/18/2020, Until Fri 8/28/2020, Normal      sulfamethoxazole-trimethoprim (BACTRIM DS) 800-160 mg per tablet Take 1 tablet by mouth 2 (two) times a day for 10 days smx-tmp DS (BACTRIM) 800-160 mg tabs (1tab q12 D10), Starting Tue 8/18/2020, Until Fri 8/28/2020, Normal         CONTINUE these medications which have NOT CHANGED    Details   aspirin 81 MG tablet Take 81 mg by mouth daily  , Until Discontinued, Historical Med      atorvastatin (LIPITOR) 20 mg tablet Take 20 mg by mouth daily  , Until Discontinued, Historical Med      Biotin (SUPER BIOTIN) 5 MG TABS Take 1 tablet by mouth, Historical Med      cyclobenzaprine (FLEXERIL) 10 mg tablet Take 10 mg by mouth 3 (three) times a day as needed for muscle spasms, Historical Med      desvenlafaxine succinate (PRISTIQ) 50 mg 24 hr tablet Take 50 mg by mouth daily, Historical Med      fluticasone (FLONASE) 50 mcg/act nasal spray 1 spray into each nostril daily, Historical Med      HYDROcodone-acetaminophen (NORCO) 5-325 mg per tablet Take 1 tablet by mouth every 6 (six) hours as needed for pain for up to 12 dosesMax Daily Amount: 4 tablets, Starting Fri 8/30/2019, Print      hydrOXYzine pamoate (VISTARIL) 25 mg capsule Take 1 capsule (25 mg total) by mouth 3 (three) times a day as needed for anxiety for up to 4 days, Starting Sat 11/30/2019, Until Wed 12/4/2019, Normal      ketotifen (ZADITOR) 0 025 % ophthalmic solution 1 drop 2 (two) times a day , Until Discontinued, Historical Med      lamoTRIgine (LaMICtal) 150 MG tablet Take 150 mg by mouth daily , Historical Med      loratadine (CLARITIN) 10 mg tablet Take 10 mg by mouth daily, Historical Med      lurasidone (LATUDA) 40 mg tablet Take 80 mg by mouth daily with dinner , Historical Med      meclizine (ANTIVERT) 25 mg tablet Take 25 mg by mouth every 8 (eight) hours as needed for dizziness, Historical Med      montelukast (SINGULAIR) 10 mg tablet Take 10 mg by mouth daily at bedtime  , Until Discontinued, Historical Med      Multiple Vitamin (MULTIVITAMIN) tablet Take 1 tablet by mouth daily, Historical Med      naproxen (EC NAPROSYN) 500 MG EC tablet Take 1 tablet (500 mg total) by mouth 2 (two) times a day with meals, Starting Sun 6/30/2019, Normal      pantoprazole (PROTONIX) 40 mg tablet Take 40 mg by mouth daily  , Until Discontinued, Historical Med      propranolol (INDERAL) 40 mg tablet Take 10 mg by mouth daily , Historical Med      tolterodine (DETROL LA) 4 mg 24 hr capsule Take 4 mg by mouth daily  , Until Discontinued, Historical Med      traZODone (DESYREL) 50 mg tablet Take 100 mg by mouth daily at bedtime as needed , Historical Med      venlafaxine (EFFEXOR-XR) 37 5 mg 24 hr capsule Take 75 mg by mouth daily  , Until Discontinued, Historical Med      ziprasidone (GEODON) 80 mg capsule Take 80 mg by mouth every evening, Historical Med           No discharge procedures on file      PDMP Review     None          ED Provider  Electronically Signed by           Jarrett Olivares PA-C  08/18/20 5238

## 2022-08-03 ENCOUNTER — OFFICE VISIT (OUTPATIENT)
Dept: URGENT CARE | Facility: CLINIC | Age: 62
End: 2022-08-03
Payer: COMMERCIAL

## 2022-08-03 VITALS — HEART RATE: 74 BPM | OXYGEN SATURATION: 94 % | TEMPERATURE: 97.4 F | RESPIRATION RATE: 16 BRPM

## 2022-08-03 DIAGNOSIS — L73.9 FOLLICULITIS OF AXILLA: Primary | ICD-10-CM

## 2022-08-03 PROCEDURE — 99203 OFFICE O/P NEW LOW 30 MIN: CPT | Performed by: PHYSICIAN ASSISTANT

## 2022-08-03 RX ORDER — BUSPIRONE HYDROCHLORIDE 15 MG/1
15 TABLET ORAL
COMMUNITY

## 2022-08-03 RX ORDER — ALBUTEROL SULFATE 2.5 MG/3ML
2.5 SOLUTION RESPIRATORY (INHALATION) EVERY 6 HOURS PRN
COMMUNITY

## 2022-08-03 NOTE — PROGRESS NOTES
[facility]        NAME: Kian Mccormick is a 62 y o  female  : 1960    MRN: [id number]  DATE: August 3, 2022  TIME: 10:11 AM    Assessment and Plan   Folliculitis of axilla [P16 5]  1  Folliculitis of axilla           Patient Instructions     Patient Instructions   Recommend doing warm compresses to affected area 3 to 4 times a day for 20-30 minutes at a time  Encourage the promotion of drainage  Can take over-the-counter Tylenol as needed for any discomfort  Follow up with PCP in 3-5 days  Proceed to  ER if symptoms worsen  Chief Complaint     Chief Complaint   Patient presents with    Mass     Pt states that there is a lump under her left armpit/ was there several days/ painful         History of Present Illness       Patient is a 60-year-old female presenting today with lump of left armpit x3 days  Patient notes a couple days ago she noticed a red lump of her left armpit, notes the area is tender to palpation, has not taken any medications or alleviating factors for symptoms  Denies fever, chills, discharge or drainage, N/V/D  Review of Systems   Review of Systems   Constitutional: Negative for chills and fever  HENT: Negative for ear pain and sore throat  Eyes: Negative for pain and visual disturbance  Respiratory: Negative for cough and shortness of breath  Cardiovascular: Negative for chest pain and palpitations  Gastrointestinal: Negative for abdominal pain and vomiting  Genitourinary: Negative for dysuria and hematuria  Musculoskeletal: Negative for arthralgias and back pain  Skin:        See HPI   Neurological: Negative for seizures and syncope  All other systems reviewed and are negative          Current Medications       Current Outpatient Medications:     albuterol (2 5 mg/3 mL) 0 083 % nebulizer solution, Take 2 5 mg by nebulization every 6 (six) hours as needed for wheezing or shortness of breath, Disp: , Rfl:     aspirin 81 MG tablet, Take 81 mg by mouth daily  , Disp: , Rfl:     atorvastatin (LIPITOR) 20 mg tablet, Take 40 mg by mouth daily, Disp: , Rfl:     Biotin 5 MG TABS, Take 1 tablet by mouth, Disp: , Rfl:     busPIRone (BUSPAR) 15 mg tablet, Take 15 mg by mouth 2 (two) times a day, Disp: , Rfl:     desvenlafaxine succinate (PRISTIQ) 50 mg 24 hr tablet, Take 50 mg by mouth daily, Disp: , Rfl:     fluticasone (FLONASE) 50 mcg/act nasal spray, 1 spray into each nostril daily, Disp: , Rfl:     lamoTRIgine (LaMICtal) 150 MG tablet, Take 200 mg by mouth daily, Disp: , Rfl:     loratadine (CLARITIN) 10 mg tablet, Take 10 mg by mouth daily, Disp: , Rfl:     montelukast (SINGULAIR) 10 mg tablet, Take 10 mg by mouth daily at bedtime  , Disp: , Rfl:     Multiple Vitamin (MULTIVITAMIN) tablet, Take 1 tablet by mouth daily, Disp: , Rfl:     pantoprazole (PROTONIX) 40 mg tablet, Take 40 mg by mouth daily  , Disp: , Rfl:     propranolol (INDERAL) 40 mg tablet, Take 10 mg by mouth daily , Disp: , Rfl:     tolterodine (DETROL LA) 4 mg 24 hr capsule, Take 4 mg by mouth daily  , Disp: , Rfl:     umeclidinium bromide (INCRUSE ELLIPTA) 62 5 mcg/inh AEPB inhaler, Inhale 1 puff daily, Disp: , Rfl:     ziprasidone (GEODON) 80 mg capsule, Take 80 mg by mouth every evening, Disp: , Rfl:     cyclobenzaprine (FLEXERIL) 10 mg tablet, Take 10 mg by mouth 3 (three) times a day as needed for muscle spasms (Patient not taking: Reported on 8/3/2022), Disp: , Rfl:     HYDROcodone-acetaminophen (NORCO) 5-325 mg per tablet, Take 1 tablet by mouth every 6 (six) hours as needed for pain for up to 12 dosesMax Daily Amount: 4 tablets (Patient not taking: No sig reported), Disp: 12 tablet, Rfl: 0    hydrOXYzine pamoate (VISTARIL) 25 mg capsule, Take 1 capsule (25 mg total) by mouth 3 (three) times a day as needed for anxiety for up to 4 days, Disp: 12 capsule, Rfl: 0    ketotifen (ZADITOR) 0 025 % ophthalmic solution, 1 drop 2 (two) times a day   (Patient not taking: Reported on 8/3/2022), Disp: , Rfl:     lurasidone (LATUDA) 40 mg tablet, Take 80 mg by mouth daily with dinner  (Patient not taking: Reported on 8/3/2022), Disp: , Rfl:     meclizine (ANTIVERT) 25 mg tablet, Take 25 mg by mouth every 8 (eight) hours as needed for dizziness (Patient not taking: Reported on 8/3/2022), Disp: , Rfl:     naproxen (EC NAPROSYN) 500 MG EC tablet, Take 1 tablet (500 mg total) by mouth 2 (two) times a day with meals (Patient not taking: No sig reported), Disp: 20 tablet, Rfl: 0    traZODone (DESYREL) 50 mg tablet, Take 100 mg by mouth daily at bedtime as needed  (Patient not taking: Reported on 8/3/2022), Disp: , Rfl:     venlafaxine (EFFEXOR-XR) 37 5 mg 24 hr capsule, Take 75 mg by mouth daily   (Patient not taking: Reported on 8/3/2022), Disp: , Rfl:     Current Allergies     Allergies as of 08/03/2022    (No Known Allergies)            The following portions of the patient's history were reviewed and updated as appropriate: allergies, current medications, past family history, past medical history, past social history, past surgical history and problem list      Past Medical History:   Diagnosis Date    Asthma     Bladder incontinence     Chronic pain     back    COPD (chronic obstructive pulmonary disease) ([facility] [address] )     GERD (gastroesophageal reflux disease)     Hyperlipidemia     Psychiatric disorder     bipolar       Past Surgical History:   Procedure Laterality Date    TUBAL LIGATION         Family History   Problem Relation Age of Onset    No Known Problems Mother     No Known Problems Sister     No Known Problems Daughter     No Known Problems Maternal Aunt          Medications have been verified  Objective   Pulse 74   Temp (!) 97 4 °F (36 3 °C)   Resp 16   SpO2 94%        Physical Exam     Physical Exam  Vitals and nursing note reviewed  Constitutional:       General: She is not in acute distress  Appearance: Normal appearance   She is not ill-appearing  HENT:      Head: Normocephalic and atraumatic  Right Ear: External ear normal       Left Ear: External ear normal    Cardiovascular:      Rate and Rhythm: Normal rate  Pulses: Normal pulses  Pulmonary:      Effort: Pulmonary effort is normal    Skin:     Capillary Refill: Capillary refill takes less than 2 seconds  Comments: One small 3-4 mm pustule of left axilla, area is indurated, no active discharge or drainage, no surrounding erythema or streaking, area is slightly tender to palpation, findings consistent with folliculitis   Neurological:      General: No focal deficit present  Mental Status: She is alert and oriented to person, place, and time

## 2023-11-03 ENCOUNTER — APPOINTMENT (EMERGENCY)
Dept: RADIOLOGY | Facility: HOSPITAL | Age: 63
End: 2023-11-03
Payer: COMMERCIAL

## 2023-11-03 ENCOUNTER — HOSPITAL ENCOUNTER (EMERGENCY)
Facility: HOSPITAL | Age: 63
Discharge: HOME | End: 2023-11-03
Attending: EMERGENCY MEDICINE
Payer: COMMERCIAL

## 2023-11-03 VITALS
SYSTOLIC BLOOD PRESSURE: 136 MMHG | TEMPERATURE: 98.1 F | DIASTOLIC BLOOD PRESSURE: 79 MMHG | OXYGEN SATURATION: 91 % | HEART RATE: 96 BPM | RESPIRATION RATE: 20 BRPM

## 2023-11-03 DIAGNOSIS — J44.1 COPD WITH ACUTE EXACERBATION (HCC): Primary | ICD-10-CM

## 2023-11-03 LAB
ALBUMIN SERPL BCP-MCNC: 4.4 G/DL (ref 3.5–5)
ALP SERPL-CCNC: 149 U/L (ref 34–104)
ALT SERPL W P-5'-P-CCNC: 10 U/L (ref 7–52)
ANION GAP SERPL CALCULATED.3IONS-SCNC: 9 MMOL/L
AST SERPL W P-5'-P-CCNC: 14 U/L (ref 13–39)
ATRIAL RATE: 88 BPM
BASOPHILS # BLD AUTO: 0.07 THOUSANDS/ÂΜL (ref 0–0.1)
BASOPHILS NFR BLD AUTO: 1 % (ref 0–1)
BILIRUB SERPL-MCNC: 0.56 MG/DL (ref 0.2–1)
BUN SERPL-MCNC: 25 MG/DL (ref 5–25)
CALCIUM SERPL-MCNC: 9.8 MG/DL (ref 8.4–10.2)
CHLORIDE SERPL-SCNC: 99 MMOL/L (ref 96–108)
CO2 SERPL-SCNC: 30 MMOL/L (ref 21–32)
CREAT SERPL-MCNC: 0.86 MG/DL (ref 0.6–1.3)
EOSINOPHIL # BLD AUTO: 0.94 THOUSAND/ÂΜL (ref 0–0.61)
EOSINOPHIL NFR BLD AUTO: 7 % (ref 0–6)
ERYTHROCYTE [DISTWIDTH] IN BLOOD BY AUTOMATED COUNT: 13.8 % (ref 11.6–15.1)
FLUAV RNA RESP QL NAA+PROBE: NEGATIVE
FLUBV RNA RESP QL NAA+PROBE: NEGATIVE
GFR SERPL CREATININE-BSD FRML MDRD: 72 ML/MIN/1.73SQ M
GLUCOSE SERPL-MCNC: 114 MG/DL (ref 65–140)
HCT VFR BLD AUTO: 44.1 % (ref 34.8–46.1)
HGB BLD-MCNC: 14.7 G/DL (ref 11.5–15.4)
IMM GRANULOCYTES # BLD AUTO: 0.03 THOUSAND/UL (ref 0–0.2)
IMM GRANULOCYTES NFR BLD AUTO: 0 % (ref 0–2)
LYMPHOCYTES # BLD AUTO: 3.3 THOUSANDS/ÂΜL (ref 0.6–4.47)
LYMPHOCYTES NFR BLD AUTO: 24 % (ref 14–44)
MCH RBC QN AUTO: 29.3 PG (ref 26.8–34.3)
MCHC RBC AUTO-ENTMCNC: 33.3 G/DL (ref 31.4–37.4)
MCV RBC AUTO: 88 FL (ref 82–98)
MONOCYTES # BLD AUTO: 0.84 THOUSAND/ÂΜL (ref 0.17–1.22)
MONOCYTES NFR BLD AUTO: 6 % (ref 4–12)
NEUTROPHILS # BLD AUTO: 8.76 THOUSANDS/ÂΜL (ref 1.85–7.62)
NEUTS SEG NFR BLD AUTO: 62 % (ref 43–75)
NRBC BLD AUTO-RTO: 0 /100 WBCS
PLATELET # BLD AUTO: 470 THOUSANDS/UL (ref 149–390)
PMV BLD AUTO: 9.3 FL (ref 8.9–12.7)
POTASSIUM SERPL-SCNC: 4.2 MMOL/L (ref 3.5–5.3)
PR INTERVAL: 134 MS
PROT SERPL-MCNC: 7.8 G/DL (ref 6.4–8.4)
QRS AXIS: -24 DEGREES
QRSD INTERVAL: 90 MS
QT INTERVAL: 388 MS
QTC INTERVAL: 469 MS
RBC # BLD AUTO: 5.01 MILLION/UL (ref 3.81–5.12)
RSV RNA RESP QL NAA+PROBE: NEGATIVE
SARS-COV-2 RNA RESP QL NAA+PROBE: NEGATIVE
SODIUM SERPL-SCNC: 138 MMOL/L (ref 135–147)
T WAVE AXIS: 34 DEGREES
VENTRICULAR RATE: 88 BPM
WBC # BLD AUTO: 13.94 THOUSAND/UL (ref 4.31–10.16)

## 2023-11-03 PROCEDURE — 99285 EMERGENCY DEPT VISIT HI MDM: CPT

## 2023-11-03 PROCEDURE — 71045 X-RAY EXAM CHEST 1 VIEW: CPT

## 2023-11-03 PROCEDURE — 36415 COLL VENOUS BLD VENIPUNCTURE: CPT | Performed by: EMERGENCY MEDICINE

## 2023-11-03 PROCEDURE — 96365 THER/PROPH/DIAG IV INF INIT: CPT

## 2023-11-03 PROCEDURE — 85025 COMPLETE CBC W/AUTO DIFF WBC: CPT | Performed by: EMERGENCY MEDICINE

## 2023-11-03 PROCEDURE — 93005 ELECTROCARDIOGRAM TRACING: CPT

## 2023-11-03 PROCEDURE — 0241U HB NFCT DS VIR RESP RNA 4 TRGT: CPT | Performed by: EMERGENCY MEDICINE

## 2023-11-03 PROCEDURE — 80053 COMPREHEN METABOLIC PANEL: CPT | Performed by: EMERGENCY MEDICINE

## 2023-11-03 PROCEDURE — 96375 TX/PRO/DX INJ NEW DRUG ADDON: CPT

## 2023-11-03 RX ORDER — MAGNESIUM SULFATE HEPTAHYDRATE 40 MG/ML
2 INJECTION, SOLUTION INTRAVENOUS ONCE
Status: COMPLETED | OUTPATIENT
Start: 2023-11-03 | End: 2023-11-03

## 2023-11-03 RX ORDER — SODIUM CHLORIDE FOR INHALATION 0.9 %
VIAL, NEBULIZER (ML) INHALATION
Status: DISCONTINUED
Start: 2023-11-03 | End: 2023-11-03 | Stop reason: HOSPADM

## 2023-11-03 RX ORDER — METHYLPREDNISOLONE SODIUM SUCCINATE 125 MG/2ML
125 INJECTION, POWDER, LYOPHILIZED, FOR SOLUTION INTRAMUSCULAR; INTRAVENOUS ONCE
Status: COMPLETED | OUTPATIENT
Start: 2023-11-03 | End: 2023-11-03

## 2023-11-03 RX ORDER — SODIUM CHLORIDE FOR INHALATION 0.9 %
12 VIAL, NEBULIZER (ML) INHALATION ONCE
Status: COMPLETED | OUTPATIENT
Start: 2023-11-03 | End: 2023-11-03

## 2023-11-03 RX ORDER — AZITHROMYCIN 500 MG/1
500 TABLET, FILM COATED ORAL DAILY
Qty: 7 TABLET | Refills: 0 | Status: SHIPPED | OUTPATIENT
Start: 2023-11-03 | End: 2023-11-10

## 2023-11-03 RX ORDER — AZITHROMYCIN 250 MG/1
500 TABLET, FILM COATED ORAL ONCE
Status: COMPLETED | OUTPATIENT
Start: 2023-11-03 | End: 2023-11-03

## 2023-11-03 RX ORDER — PREDNISONE 20 MG/1
60 TABLET ORAL DAILY
Qty: 12 TABLET | Refills: 0 | Status: SHIPPED | OUTPATIENT
Start: 2023-11-03 | End: 2023-11-07

## 2023-11-03 RX ORDER — ALBUTEROL SULFATE 2.5 MG/3ML
SOLUTION RESPIRATORY (INHALATION)
Status: DISCONTINUED
Start: 2023-11-03 | End: 2023-11-03 | Stop reason: HOSPADM

## 2023-11-03 RX ADMIN — METHYLPREDNISOLONE SODIUM SUCCINATE 125 MG: 125 INJECTION, POWDER, FOR SOLUTION INTRAMUSCULAR; INTRAVENOUS at 03:56

## 2023-11-03 RX ADMIN — AZITHROMYCIN DIHYDRATE 500 MG: 250 TABLET ORAL at 04:47

## 2023-11-03 RX ADMIN — MAGNESIUM SULFATE HEPTAHYDRATE 2 G: 40 INJECTION, SOLUTION INTRAVENOUS at 04:10

## 2023-11-03 RX ADMIN — IPRATROPIUM BROMIDE 1 MG: 0.5 SOLUTION RESPIRATORY (INHALATION) at 03:50

## 2023-11-03 RX ADMIN — Medication 12 ML: at 03:50

## 2023-11-03 RX ADMIN — ALBUTEROL SULFATE 10 MG: 2.5 SOLUTION RESPIRATORY (INHALATION) at 03:50

## 2023-11-03 NOTE — DISCHARGE INSTRUCTIONS
At this time you likely had mild COPD exacerbation. You will take 3 tablets of the prednisone daily for the next 4 days. Please start tomorrow as we gave you a dose here. Similarly you will take 1 tablet of the azithromycin for the next 7 days. Please start tomorrow as we gave you a dose here. Please follow-up with your primary care doctor. Return to ER if you have any chest pain or shortness of breath.

## 2023-11-03 NOTE — ED PROVIDER NOTES
History  Chief Complaint   Patient presents with    Cough     Patient reports a cough which developed approx. a week ago. Patient states that her ribs were hurting from the coughing. She was having trouble breathing. Patient has a history of COPD. 78-year-old female past medical significant for COPD presenting to ED today for shortness of breath. Patient notes that she has had a cough for the last week. She states that today she developed bilateral rib pain secondary to having a coughing fit. She said after the coughing fit she has some posttussive emesis. Denied any fevers or chills. She came in today because the cough and shortness of breath was getting worse. She does have a history of COPD however she does not wear oxygen at home. No chest pain at this time. No abdominal pain. Prior to Admission Medications   Prescriptions Last Dose Informant Patient Reported? Taking? Biotin 5 MG TABS   Yes No   Sig: Take 1 tablet by mouth   HYDROcodone-acetaminophen (NORCO) 5-325 mg per tablet   No No   Sig: Take 1 tablet by mouth every 6 (six) hours as needed for pain for up to 12 dosesMax Daily Amount: 4 tablets   Patient not taking: No sig reported   Multiple Vitamin (MULTIVITAMIN) tablet   Yes No   Sig: Take 1 tablet by mouth daily   albuterol (2.5 mg/3 mL) 0.083 % nebulizer solution  Other (Specify) Yes No   Sig: Take 2.5 mg by nebulization every 6 (six) hours as needed for wheezing or shortness of breath   aspirin 81 MG tablet   Yes No   Sig: Take 81 mg by mouth daily.    atorvastatin (LIPITOR) 20 mg tablet  Self Yes No   Sig: Take 40 mg by mouth daily   busPIRone (BUSPAR) 15 mg tablet  Other (Specify) Yes No   Sig: Take 15 mg by mouth 2 (two) times a day   cyclobenzaprine (FLEXERIL) 10 mg tablet   Yes No   Sig: Take 10 mg by mouth 3 (three) times a day as needed for muscle spasms   Patient not taking: Reported on 8/3/2022   desvenlafaxine succinate (PRISTIQ) 50 mg 24 hr tablet   Yes No   Sig: Take 50 mg by mouth daily   fluticasone (FLONASE) 50 mcg/act nasal spray   Yes No   Si spray into each nostril daily   hydrOXYzine pamoate (VISTARIL) 25 mg capsule   No No   Sig: Take 1 capsule (25 mg total) by mouth 3 (three) times a day as needed for anxiety for up to 4 days   ketotifen (ZADITOR) 0.025 % ophthalmic solution   Yes No   Si drop 2 (two) times a day. Patient not taking: Reported on 8/3/2022   lamoTRIgine (LaMICtal) 150 MG tablet  Care Giver Yes No   Sig: Take 200 mg by mouth daily   loratadine (CLARITIN) 10 mg tablet   Yes No   Sig: Take 10 mg by mouth daily   lurasidone (LATUDA) 40 mg tablet   Yes No   Sig: Take 80 mg by mouth daily with dinner    Patient not taking: Reported on 8/3/2022   meclizine (ANTIVERT) 25 mg tablet   Yes No   Sig: Take 25 mg by mouth every 8 (eight) hours as needed for dizziness   Patient not taking: Reported on 8/3/2022   montelukast (SINGULAIR) 10 mg tablet   Yes No   Sig: Take 10 mg by mouth daily at bedtime. naproxen (EC NAPROSYN) 500 MG EC tablet   No No   Sig: Take 1 tablet (500 mg total) by mouth 2 (two) times a day with meals   Patient not taking: No sig reported   pantoprazole (PROTONIX) 40 mg tablet   Yes No   Sig: Take 40 mg by mouth daily. propranolol (INDERAL) 40 mg tablet   Yes No   Sig: Take 10 mg by mouth daily    tolterodine (DETROL LA) 4 mg 24 hr capsule   Yes No   Sig: Take 4 mg by mouth daily.    traZODone (DESYREL) 50 mg tablet   Yes No   Sig: Take 100 mg by mouth daily at bedtime as needed    Patient not taking: Reported on 8/3/2022   umeclidinium bromide (INCRUSE ELLIPTA) 62.5 mcg/inh AEPB inhaler  Care Giver Yes No   Sig: Inhale 1 puff daily   venlafaxine (EFFEXOR-XR) 37.5 mg 24 hr capsule   Yes No   Sig: Take 75 mg by mouth daily     Patient not taking: Reported on 8/3/2022   ziprasidone (GEODON) 80 mg capsule   Yes No   Sig: Take 80 mg by mouth every evening      Facility-Administered Medications: None       Past Medical History:   Diagnosis Date    Asthma     Bladder incontinence     Chronic pain     back    COPD (chronic obstructive pulmonary disease) (HCC)     GERD (gastroesophageal reflux disease)     Hyperlipidemia     Psychiatric disorder     bipolar       Past Surgical History:   Procedure Laterality Date    TUBAL LIGATION         Family History   Problem Relation Age of Onset    No Known Problems Mother     No Known Problems Sister     No Known Problems Daughter     No Known Problems Maternal Aunt      I have reviewed and agree with the history as documented. E-Cigarette/Vaping    E-Cigarette Use Never User      E-Cigarette/Vaping Substances     Social History     Tobacco Use    Smoking status: Every Day     Packs/day: 1.00     Types: Cigarettes    Smokeless tobacco: Never   Vaping Use    Vaping Use: Never used   Substance Use Topics    Alcohol use: No    Drug use: No       Review of Systems   Constitutional:  Negative for chills and fever. HENT:  Negative for hearing loss. Eyes:  Negative for visual disturbance. Respiratory:  Positive for cough, shortness of breath and wheezing. Cardiovascular:  Negative for chest pain. Gastrointestinal:  Negative for abdominal pain, constipation, diarrhea, nausea and vomiting. Genitourinary:  Positive for frequency. Musculoskeletal:  Negative for myalgias. Skin:  Negative for color change. Neurological:  Negative for dizziness and headaches. Psychiatric/Behavioral:  Negative for agitation. All other systems reviewed and are negative. Physical Exam  Physical Exam  Vitals and nursing note reviewed. Constitutional:       General: She is not in acute distress. Appearance: Normal appearance. She is well-developed. She is not ill-appearing. HENT:      Head: Normocephalic and atraumatic. Right Ear: External ear normal.      Left Ear: External ear normal.      Nose: Nose normal.      Mouth/Throat:      Mouth: Mucous membranes are moist.      Pharynx: Oropharynx is clear. No oropharyngeal exudate. Eyes:      General:         Right eye: No discharge. Left eye: No discharge. Extraocular Movements: Extraocular movements intact. Conjunctiva/sclera: Conjunctivae normal.      Pupils: Pupils are equal, round, and reactive to light. Cardiovascular:      Rate and Rhythm: Normal rate and regular rhythm. Heart sounds: Normal heart sounds. No murmur heard. No friction rub. No gallop. Pulmonary:      Effort: Respiratory distress present. Breath sounds: No stridor. Wheezing present. Comments: Tachypnea on exam    Abdominal:      General: Bowel sounds are normal. There is no distension. Palpations: Abdomen is soft. Tenderness: There is no abdominal tenderness. Musculoskeletal:         General: No swelling. Normal range of motion. Cervical back: Normal range of motion and neck supple. No rigidity. Skin:     General: Skin is warm and dry. Capillary Refill: Capillary refill takes less than 2 seconds. Neurological:      General: No focal deficit present. Mental Status: She is alert and oriented to person, place, and time. Mental status is at baseline.    Psychiatric:         Mood and Affect: Mood normal.         Behavior: Behavior normal.         Vital Signs  ED Triage Vitals   Temperature Pulse Respirations Blood Pressure SpO2   11/03/23 0339 11/03/23 0337 11/03/23 0337 11/03/23 0337 11/03/23 0337   98.1 °F (36.7 °C) 96 20 136/79 91 %      Temp Source Heart Rate Source Patient Position - Orthostatic VS BP Location FiO2 (%)   11/03/23 0339 11/03/23 0337 11/03/23 0337 11/03/23 0337 --   Oral Monitor Sitting Right arm       Pain Score       11/03/23 0337       1           Vitals:    11/03/23 0337   BP: 136/79   Pulse: 96   Patient Position - Orthostatic VS: Sitting         Visual Acuity      ED Medications  Medications   sodium chloride 0.9 % inhalation solution **ADS Override Pull** (has no administration in time range)   albuterol (2.5 mg/3 mL) 0.083 % inhalation solution **ADS Override Pull** (has no administration in time range)   albuterol (5 mg/mL) 0.5 % inhalation solution **ADS Override Pull** (has no administration in time range)   magnesium sulfate 2 g/50 mL IVPB (premix) 2 g (0 g Intravenous Stopped 11/3/23 0440)   methylPREDNISolone sodium succinate (Solu-MEDROL) injection 125 mg (125 mg Intravenous Given 11/3/23 0356)   albuterol inhalation solution 10 mg (10 mg Nebulization Given 11/3/23 0350)   ipratropium (ATROVENT) 0.02 % inhalation solution 1 mg (1 mg Nebulization Given 11/3/23 0350)   sodium chloride 0.9 % inhalation solution 12 mL (12 mL Nebulization Given 11/3/23 0350)   azithromycin (ZITHROMAX) tablet 500 mg (500 mg Oral Given 11/3/23 0447)       Diagnostic Studies  Results Reviewed       Procedure Component Value Units Date/Time    FLU/RSV/COVID - if FLU/RSV clinically relevant [[id number]]  (Normal) Collected: 11/03/23 0355    Lab Status: Final result Specimen: Nares from Nose Updated: 11/03/23 0450     SARS-CoV-2 Negative     INFLUENZA A PCR Negative     INFLUENZA B PCR Negative     RSV PCR Negative    Narrative:      FOR PEDIATRIC PATIENTS - copy/paste COVID Guidelines URL to browser: [email or web address]    SARS-CoV-2 assay is a Nucleic Acid Amplification assay intended for the  qualitative detection of nucleic acid from SARS-CoV-2 in nasopharyngeal  swabs. Results are for the presumptive identification of SARS-CoV-2 RNA. Positive results are indicative of infection with SARS-CoV-2, the virus  causing COVID-19, but do not rule out bacterial infection or co-infection  with other viruses. Laboratories within the [facility] and its  territories are required to report all positive results to the appropriate  public health authorities.  Negative results do not preclude SARS-CoV-2  infection and should not be used as the sole basis for treatment or other  patient management decisions. Negative results must be combined with  clinical observations, patient history, and epidemiological information. This test has not been FDA cleared or approved. This test has been authorized by FDA under an Emergency Use Authorization  (EUA). This test is only authorized for the duration of time the  declaration that circumstances exist justifying the authorization of the  emergency use of an in vitro diagnostic tests for detection of SARS-CoV-2  virus and/or diagnosis of COVID-19 infection under section 564(b)(1) of  the Act, 21 U. S.C. 287BNR-9(W)(1), unless the authorization is terminated  or revoked sooner. The test has been validated but independent review by FDA  and CLIA is pending. Test performed using [employer] GeneXpert: This RT-PCR assay targets N2,  a region unique to SARS-CoV-2. A conserved region in the E-gene was chosen  for pan-Sarbecovirus detection which includes SARS-CoV-2. According to CMS-2020-01-R, this platform meets the definition of high-throughput technology.     Comprehensive metabolic panel [[id number]]  (Abnormal) Collected: 11/03/23 0355    Lab Status: Final result Specimen: Blood from Arm, Left Updated: 11/03/23 0432     Sodium 138 mmol/L      Potassium 4.2 mmol/L      Chloride 99 mmol/L      CO2 30 mmol/L      ANION GAP 9 mmol/L      BUN 25 mg/dL      Creatinine 0.86 mg/dL      Glucose 114 mg/dL      Calcium 9.8 mg/dL      AST 14 U/L      ALT 10 U/L      Alkaline Phosphatase 149 U/L      Total Protein 7.8 g/dL      Albumin 4.4 g/dL      Total Bilirubin 0.56 mg/dL      eGFR 72 ml/min/1.73sq m     Narrative:      [place] guidelines for Chronic Kidney Disease (CKD):     Stage 1 with normal or high GFR (GFR > 90 mL/min/1.73 square meters)    Stage 2 Mild CKD (GFR = 60-89 mL/min/1.73 square meters)    Stage 3A Moderate CKD (GFR = 45-59 mL/min/1.73 square meters)    Stage 3B Moderate CKD (GFR = 30-44 mL/min/1.73 square meters)    Stage 4 Severe CKD (GFR = 15-29 mL/min/1.73 square meters)    Stage 5 End Stage CKD (GFR <15 mL/min/1.73 square meters)  Note: GFR calculation is accurate only with a steady state creatinine    CBC and differential [[id number]]  (Abnormal) Collected: 11/03/23 0355    Lab Status: Final result Specimen: Blood from Arm, Left Updated: 11/03/23 0413     WBC 13.94 Thousand/uL      RBC 5.01 Million/uL      Hemoglobin 14.7 g/dL      Hematocrit 44.1 %      MCV 88 fL      MCH 29.3 pg      MCHC 33.3 g/dL      RDW 13.8 %      MPV 9.3 fL      Platelets 545 Thousands/uL      nRBC 0 /100 WBCs      Neutrophils Relative 62 %      Immat GRANS % 0 %      Lymphocytes Relative 24 %      Monocytes Relative 6 %      Eosinophils Relative 7 %      Basophils Relative 1 %      Neutrophils Absolute 8.76 Thousands/µL      Immature Grans Absolute 0.03 Thousand/uL      Lymphocytes Absolute 3.30 Thousands/µL      Monocytes Absolute 0.84 Thousand/µL      Eosinophils Absolute 0.94 Thousand/µL      Basophils Absolute 0.07 Thousands/µL     UA w Reflex to Microscopic w Reflex to Culture [[id number]]     Lab Status: No result Specimen: Urine                    XR chest 1 view portable   ED Interpretation by Dale Olivares MD (11/03 6459)   I interpreted this chest x-ray is no acute cardiopulmonary process                 Procedures  CriticalCare Time    Date/Time: 11/3/2023 3:41 AM    Performed by: Dale Olivares MD  Authorized by:  Dale Olivares MD    Critical care provider statement:     Critical care time (minutes):  31    Critical care start time:  11/3/2023 3:41 AM    Critical care end time:  11/3/2023 4:12 AM    Critical care time was exclusive of:  Separately billable procedures and treating other patients    Critical care was necessary to treat or prevent imminent or life-threatening deterioration of the following conditions:  Respiratory failure    Critical care was time spent personally by me on the following activities:  Blood draw for specimens, obtaining history from patient or surrogate, development of treatment plan with patient or surrogate, examination of patient, evaluation of patient's response to treatment, review of old charts, re-evaluation of patient's condition, ordering and review of laboratory studies, ordering and review of radiographic studies and ordering and performing treatments and interventions    I assumed direction of critical care for this patient from another provider in my specialty: no    ECG 12 Lead Documentation Only    Date/Time: 11/3/2023 4:15 AM    Performed by: Fidencio Parks MD  Authorized by: Fidencio Parks MD    Indications / Diagnosis:  SOB  ECG reviewed by me, the ED Provider: yes    Patient location:  ED  Previous ECG:     Previous ECG:  Unavailable    Comparison to cardiac monitor: No    Interpretation:     Interpretation: normal    Rate:     ECG rate assessment: normal    Rhythm:     Rhythm: sinus rhythm    Ectopy:     Ectopy: none    QRS:     QRS axis:  Normal    QRS intervals:  Normal  Conduction:     Conduction: normal    ST segments:     ST segments:  Normal  T waves:     T waves: normal             ED Course  ED Course as of 11/03/23 0507   Fri Nov 03, 2023   0441 Patient improving to "100% better"                               SBIRT 22yo+      Flowsheet Row Most Recent Value   Initial Alcohol Screen: US AUDIT-C     1. How often do you have a drink containing alcohol? 0 Filed at: 11/03/2023 0340   2. How many drinks containing alcohol do you have on a typical day you are drinking? 0 Filed at: 11/03/2023 0340   3a. Male UNDER 65: How often do you have five or more drinks on one occasion? 0 Filed at: 11/03/2023 0340   3b. FEMALE Any Age, or MALE 65+: How often do you have 4 or more drinks on one occassion? 0 Filed at: 11/03/2023 0340   Audit-C Score 0 Filed at: 11/03/2023 0340   JOHN: How many times in the past year have you. .. Used an illegal drug or used a prescription medication for non-medical reasons?  Never Filed at: 11/03/2023 0340                      Medical Decision Making  50-year-old female presenting to the ED today for shortness of breath and cough. At this time on exam she has diffuse bilateral wheezes. Likely COPD exacerbation versus pneumonia versus pneumothorax. Will order CBC and BMP to evaluate at this time. We will also get a chest x-ray. We will treat for COPD exacerbation with IV magnesium, heart neb, IV azithromycin, IV Solu-Medrol. We will reassess for clinical improvement versus admission. Patient proved after medical treatment here. We will send prescription for azithromycin and prednisone. Encouraged outpatient follow-up with primary care provider. Strict return precautions discussed and patient was discharged home. Amount and/or Complexity of Data Reviewed  Labs: ordered. Radiology: ordered and independent interpretation performed. Risk  Prescription drug management. Disposition  Final diagnoses:   COPD with acute exacerbation ([address])     Time reflects when diagnosis was documented in both MDM as applicable and the Disposition within this note       Time User Action Codes Description Comment    11/3/2023  4:51 AM Shayy Castellano Add [J44.1] COPD with acute exacerbation [facility])           ED Disposition       ED Disposition   Discharge    Condition   Stable    Date/Time   Fri Nov 3, 2023 [address] [facility],[address] discharge to home/self care.                    Follow-up Information       Follow up With Specialties Details Why Contact Info Additional Information    Yesenia Munoz MD  Schedule an appointment as soon as possible for a visit in 2 days for follow up [address] [facility] [address] [facility]       [address] Emergency Department Emergency Medicine Go to  If symptoms worsen, As needed [address] [id number] [facility] Emergency Department, [address] [place] [address] [facility], [id number]            Patient's Medications   Discharge Prescriptions    AZITHROMYCIN (ZITHROMAX) 500 MG TABLET    Take 1 tablet (500 mg total) by mouth daily for 7 days       Start Date: 11/3/2023 End Date: 11/10/2023       Order Dose: 500 mg       Quantity: 7 tablet    Refills: 0    PREDNISONE 20 MG TABLET    Take 3 tablets (60 mg total) by mouth daily for 4 days       Start Date: 11/3/2023 End Date: 11/7/2023       Order Dose: 60 mg       Quantity: 12 tablet    Refills: 0       No discharge procedures on file.     PDMP Review       None            ED Provider  Electronically Signed by             Corinne Laurence, MD  11/03/23 5367 n/a

## 2023-11-06 LAB
ATRIAL RATE: 88 BPM
PR INTERVAL: 134 MS
QRS AXIS: -24 DEGREES
QRSD INTERVAL: 90 MS
QT INTERVAL: 388 MS
QTC INTERVAL: 469 MS
T WAVE AXIS: 34 DEGREES
VENTRICULAR RATE: 88 BPM

## 2023-11-06 PROCEDURE — 93010 ELECTROCARDIOGRAM REPORT: CPT | Performed by: INTERNAL MEDICINE

## 2024-03-26 ENCOUNTER — HOSPITAL ENCOUNTER (EMERGENCY)
Facility: HOSPITAL | Age: 64
Discharge: HOME | End: 2024-03-26
Attending: EMERGENCY MEDICINE
Payer: COMMERCIAL

## 2024-03-26 ENCOUNTER — APPOINTMENT (EMERGENCY)
Dept: RADIOLOGY | Facility: HOSPITAL | Age: 64
End: 2024-03-26
Payer: COMMERCIAL

## 2024-03-26 VITALS
BODY MASS INDEX: 28.04 KG/M2 | HEART RATE: 85 BPM | OXYGEN SATURATION: 95 % | HEIGHT: 68 IN | RESPIRATION RATE: 18 BRPM | DIASTOLIC BLOOD PRESSURE: 82 MMHG | TEMPERATURE: 98.4 F | WEIGHT: 185 LBS | SYSTOLIC BLOOD PRESSURE: 126 MMHG

## 2024-03-26 DIAGNOSIS — J44.1 COPD EXACERBATION (HCC): Primary | ICD-10-CM

## 2024-03-26 LAB
2HR DELTA HS TROPONIN: -1 NG/L
ALBUMIN SERPL BCP-MCNC: 4.3 G/DL (ref 3.5–5)
ALP SERPL-CCNC: 127 U/L (ref 34–104)
ALT SERPL W P-5'-P-CCNC: 19 U/L (ref 7–52)
ANION GAP SERPL CALCULATED.3IONS-SCNC: 11 MMOL/L (ref 4–13)
APTT PPP: 30 SECONDS (ref 23–37)
AST SERPL W P-5'-P-CCNC: 32 U/L (ref 13–39)
BASOPHILS # BLD AUTO: 0.06 THOUSANDS/ÂΜL (ref 0–0.1)
BASOPHILS NFR BLD AUTO: 1 % (ref 0–1)
BILIRUB SERPL-MCNC: 0.68 MG/DL (ref 0.2–1)
BILIRUB UR QL STRIP: NEGATIVE
BNP SERPL-MCNC: 14 PG/ML (ref 0–100)
BUN SERPL-MCNC: 18 MG/DL (ref 5–25)
CALCIUM SERPL-MCNC: 9.7 MG/DL (ref 8.4–10.2)
CARDIAC TROPONIN I PNL SERPL HS: 11 NG/L
CARDIAC TROPONIN I PNL SERPL HS: 12 NG/L
CHLORIDE SERPL-SCNC: 99 MMOL/L (ref 96–108)
CLARITY UR: ABNORMAL
CO2 SERPL-SCNC: 27 MMOL/L (ref 21–32)
COLOR UR: ABNORMAL
CREAT SERPL-MCNC: 1.12 MG/DL (ref 0.6–1.3)
EOSINOPHIL # BLD AUTO: 0.07 THOUSAND/ÂΜL (ref 0–0.61)
EOSINOPHIL NFR BLD AUTO: 1 % (ref 0–6)
ERYTHROCYTE [DISTWIDTH] IN BLOOD BY AUTOMATED COUNT: 14 % (ref 11.6–15.1)
GFR SERPL CREATININE-BSD FRML MDRD: 52 ML/MIN/1.73SQ M
GLUCOSE SERPL-MCNC: 102 MG/DL (ref 65–140)
GLUCOSE UR STRIP-MCNC: NEGATIVE MG/DL
HCT VFR BLD AUTO: 39 % (ref 34.8–46.1)
HGB BLD-MCNC: 12.9 G/DL (ref 11.5–15.4)
HGB UR QL STRIP.AUTO: NEGATIVE
IMM GRANULOCYTES # BLD AUTO: 0.03 THOUSAND/UL (ref 0–0.2)
IMM GRANULOCYTES NFR BLD AUTO: 0 % (ref 0–2)
INR PPP: 1.04 (ref 0.84–1.19)
KETONES UR STRIP-MCNC: ABNORMAL MG/DL
LEUKOCYTE ESTERASE UR QL STRIP: NEGATIVE
LYMPHOCYTES # BLD AUTO: 3.53 THOUSANDS/ÂΜL (ref 0.6–4.47)
LYMPHOCYTES NFR BLD AUTO: 30 % (ref 14–44)
MCH RBC QN AUTO: 28.7 PG (ref 26.8–34.3)
MCHC RBC AUTO-ENTMCNC: 33.1 G/DL (ref 31.4–37.4)
MCV RBC AUTO: 87 FL (ref 82–98)
MONOCYTES # BLD AUTO: 1.12 THOUSAND/ÂΜL (ref 0.17–1.22)
MONOCYTES NFR BLD AUTO: 10 % (ref 4–12)
NEUTROPHILS # BLD AUTO: 6.99 THOUSANDS/ÂΜL (ref 1.85–7.62)
NEUTS SEG NFR BLD AUTO: 58 % (ref 43–75)
NITRITE UR QL STRIP: NEGATIVE
NRBC BLD AUTO-RTO: 0 /100 WBCS
PH UR STRIP.AUTO: 5.5 [PH]
PLATELET # BLD AUTO: 452 THOUSANDS/UL (ref 149–390)
PMV BLD AUTO: 9.2 FL (ref 8.9–12.7)
POTASSIUM SERPL-SCNC: 4.3 MMOL/L (ref 3.5–5.3)
PROT SERPL-MCNC: 7.4 G/DL (ref 6.4–8.4)
PROT UR STRIP-MCNC: NEGATIVE MG/DL
PROTHROMBIN TIME: 13.8 SECONDS (ref 11.6–14.5)
RBC # BLD AUTO: 4.5 MILLION/UL (ref 3.81–5.12)
SODIUM SERPL-SCNC: 137 MMOL/L (ref 135–147)
SP GR UR STRIP.AUTO: 1.01 (ref 1–1.03)
UROBILINOGEN UR QL STRIP.AUTO: 0.2 E.U./DL
WBC # BLD AUTO: 11.8 THOUSAND/UL (ref 4.31–10.16)

## 2024-03-26 PROCEDURE — 83880 ASSAY OF NATRIURETIC PEPTIDE: CPT | Performed by: EMERGENCY MEDICINE

## 2024-03-26 PROCEDURE — 71045 X-RAY EXAM CHEST 1 VIEW: CPT

## 2024-03-26 PROCEDURE — 36415 COLL VENOUS BLD VENIPUNCTURE: CPT | Performed by: EMERGENCY MEDICINE

## 2024-03-26 PROCEDURE — 74174 CTA ABD&PLVS W/CONTRAST: CPT

## 2024-03-26 PROCEDURE — 85730 THROMBOPLASTIN TIME PARTIAL: CPT | Performed by: EMERGENCY MEDICINE

## 2024-03-26 PROCEDURE — 85025 COMPLETE CBC W/AUTO DIFF WBC: CPT | Performed by: EMERGENCY MEDICINE

## 2024-03-26 PROCEDURE — 84484 ASSAY OF TROPONIN QUANT: CPT | Performed by: EMERGENCY MEDICINE

## 2024-03-26 PROCEDURE — 99285 EMERGENCY DEPT VISIT HI MDM: CPT

## 2024-03-26 PROCEDURE — 81003 URINALYSIS AUTO W/O SCOPE: CPT | Performed by: EMERGENCY MEDICINE

## 2024-03-26 PROCEDURE — 72100 X-RAY EXAM L-S SPINE 2/3 VWS: CPT

## 2024-03-26 PROCEDURE — 96360 HYDRATION IV INFUSION INIT: CPT

## 2024-03-26 PROCEDURE — 99285 EMERGENCY DEPT VISIT HI MDM: CPT | Performed by: EMERGENCY MEDICINE

## 2024-03-26 PROCEDURE — 71275 CT ANGIOGRAPHY CHEST: CPT

## 2024-03-26 PROCEDURE — 85610 PROTHROMBIN TIME: CPT | Performed by: EMERGENCY MEDICINE

## 2024-03-26 PROCEDURE — 80053 COMPREHEN METABOLIC PANEL: CPT | Performed by: EMERGENCY MEDICINE

## 2024-03-26 RX ORDER — PREDNISONE 20 MG/1
40 TABLET ORAL ONCE
Status: COMPLETED | OUTPATIENT
Start: 2024-03-26 | End: 2024-03-26

## 2024-03-26 RX ORDER — PREDNISONE 10 MG/1
20 TABLET ORAL DAILY
Qty: 10 TABLET | Refills: 0 | Status: SHIPPED | OUTPATIENT
Start: 2024-03-26 | End: 2024-03-31

## 2024-03-26 RX ADMIN — PREDNISONE 40 MG: 20 TABLET ORAL at 18:15

## 2024-03-26 RX ADMIN — IOHEXOL 100 ML: 350 INJECTION, SOLUTION INTRAVENOUS at 18:49

## 2024-03-26 RX ADMIN — SODIUM CHLORIDE 500 ML: 0.9 INJECTION, SOLUTION INTRAVENOUS at 18:43

## 2024-03-26 NOTE — ED PROVIDER NOTES
History  Chief Complaint   Patient presents with    Shortness of Breath     Pt. States she feels like she in more SOB    was sick in November and  now has no more meds  and thinks she as her COPD again.  Pt. Is in no distress at this time she also states she can't raise her arms because her shoulders hurt      63-year-old female presents to the ED complaining of shortness of breath states she was sick back in November with COPD exacerbation feels that it is back again.  No fevers chills nausea vomiting diarrhea does have pain in her shoulders bilaterally radiating into her back.  Patient is a smoker she also complaining of pain down in her lower back.  No other complaints        Prior to Admission Medications   Prescriptions Last Dose Informant Patient Reported? Taking?   Biotin 5 MG TABS 3/26/2024  Yes Yes   Sig: Take 1 tablet by mouth   HYDROcodone-acetaminophen (NORCO) 5-325 mg per tablet   No No   Sig: Take 1 tablet by mouth every 6 (six) hours as needed for pain for up to 12 dosesMax Daily Amount: 4 tablets   Patient not taking: No sig reported   Multiple Vitamin (MULTIVITAMIN) tablet 3/26/2024  Yes Yes   Sig: Take 1 tablet by mouth daily   albuterol (2.5 mg/3 mL) 0.083 % nebulizer solution 3/26/2024 Other (Specify) Yes Yes   Sig: Take 2.5 mg by nebulization every 6 (six) hours as needed for wheezing or shortness of breath   aspirin 81 MG tablet 3/26/2024  Yes Yes   Sig: Take 81 mg by mouth daily.   atorvastatin (LIPITOR) 20 mg tablet 3/26/2024 Self Yes Yes   Sig: Take 40 mg by mouth daily   busPIRone (BUSPAR) 15 mg tablet 3/26/2024 Other (Specify) Yes Yes   Sig: Take 15 mg by mouth 2 (two) times a day   cyclobenzaprine (FLEXERIL) 10 mg tablet   Yes No   Sig: Take 10 mg by mouth 3 (three) times a day as needed for muscle spasms   Patient not taking: Reported on 8/3/2022   desvenlafaxine succinate (PRISTIQ) 50 mg 24 hr tablet 3/26/2024  Yes Yes   Sig: Take 50 mg by mouth daily   fluticasone (FLONASE) 50 mcg/act  nasal spray Not Taking  Yes No   Si spray into each nostril daily   Patient not taking: Reported on 3/26/2024   hydrOXYzine pamoate (VISTARIL) 25 mg capsule   No No   Sig: Take 1 capsule (25 mg total) by mouth 3 (three) times a day as needed for anxiety for up to 4 days   ketotifen (ZADITOR) 0.025 % ophthalmic solution   Yes No   Si drop 2 (two) times a day.   Patient not taking: Reported on 8/3/2022   lamoTRIgine (LaMICtal) 150 MG tablet 3/26/2024 Care Giver Yes Yes   Sig: Take 200 mg by mouth daily   loratadine (CLARITIN) 10 mg tablet 3/26/2024  Yes Yes   Sig: Take 10 mg by mouth daily   lurasidone (LATUDA) 40 mg tablet   Yes No   Sig: Take 80 mg by mouth daily with dinner    Patient not taking: Reported on 8/3/2022   meclizine (ANTIVERT) 25 mg tablet   Yes No   Sig: Take 25 mg by mouth every 8 (eight) hours as needed for dizziness   Patient not taking: Reported on 8/3/2022   montelukast (SINGULAIR) 10 mg tablet 3/26/2024  Yes Yes   Sig: Take 10 mg by mouth daily at bedtime.   naproxen (EC NAPROSYN) 500 MG EC tablet   No No   Sig: Take 1 tablet (500 mg total) by mouth 2 (two) times a day with meals   Patient not taking: No sig reported   pantoprazole (PROTONIX) 40 mg tablet 3/26/2024  Yes Yes   Sig: Take 40 mg by mouth daily.   propranolol (INDERAL) 40 mg tablet 3/26/2024  Yes Yes   Sig: Take 10 mg by mouth daily    tolterodine (DETROL LA) 4 mg 24 hr capsule 3/26/2024  Yes Yes   Sig: Take 4 mg by mouth daily.   traZODone (DESYREL) 50 mg tablet   Yes No   Sig: Take 100 mg by mouth daily at bedtime as needed    Patient not taking: Reported on 8/3/2022   umeclidinium bromide (INCRUSE ELLIPTA) 62.5 mcg/inh AEPB inhaler Unknown Care Giver Yes No   Sig: Inhale 1 puff daily   venlafaxine (EFFEXOR-XR) 37.5 mg 24 hr capsule   Yes No   Sig: Take 75 mg by mouth daily     Patient not taking: Reported on 8/3/2022   ziprasidone (GEODON) 80 mg capsule 3/26/2024  Yes Yes   Sig: Take 80 mg by mouth every evening       Facility-Administered Medications: None       Past Medical History:   Diagnosis Date    Asthma     Bladder incontinence     Chronic pain     back    COPD (chronic obstructive pulmonary disease) ([facility])     GERD (gastroesophageal reflux disease)     Hyperlipidemia     Psychiatric disorder     bipolar       Past Surgical History:   Procedure Laterality Date    TUBAL LIGATION         Family History   Problem Relation Age of Onset    No Known Problems Mother     No Known Problems Sister     No Known Problems Daughter     No Known Problems Maternal Aunt      I have reviewed and agree with the history as documented.    E-Cigarette/Vaping    E-Cigarette Use Never User      E-Cigarette/Vaping Substances     Social History     Tobacco Use    Smoking status: Every Day     Current packs/day: 1.00     Types: Cigarettes    Smokeless tobacco: Never   Vaping Use    Vaping status: Never Used   Substance Use Topics    Alcohol use: No    Drug use: No       Review of Systems   Constitutional:  Negative for activity change, chills, diaphoresis and fever.   HENT:  Negative for congestion, ear pain, nosebleeds, sore throat, trouble swallowing and voice change.    Eyes:  Negative for pain, discharge and redness.   Respiratory:  Positive for shortness of breath. Negative for apnea, cough, choking, wheezing and stridor.    Cardiovascular:  Negative for chest pain and palpitations.   Gastrointestinal:  Negative for abdominal distention, abdominal pain, constipation, diarrhea, nausea and vomiting.   Endocrine: Negative for polydipsia.   Genitourinary:  Negative for difficulty urinating, dysuria, flank pain, frequency, hematuria and urgency.   Musculoskeletal:  Positive for back pain and myalgias. Negative for gait problem, joint swelling, neck pain and neck stiffness.   Skin:  Negative for pallor and rash.   Neurological:  Negative for dizziness, tremors, syncope, speech difficulty, weakness, numbness and headaches.   Hematological:  Negative  for adenopathy.   Psychiatric/Behavioral:  Negative for confusion, hallucinations, self-injury and suicidal ideas. The patient is not nervous/anxious.        Physical Exam  Physical Exam  Vitals and nursing note reviewed.   Constitutional:       General: She is not in acute distress.     Appearance: Normal appearance. She is well-developed. She is not diaphoretic.   HENT:      Head: Normocephalic and atraumatic.      Right Ear: External ear normal.      Left Ear: External ear normal.      Nose: Nose normal.   Eyes:      Conjunctiva/sclera: Conjunctivae normal.      Pupils: Pupils are equal, round, and reactive to light.   Cardiovascular:      Rate and Rhythm: Normal rate and regular rhythm.      Heart sounds: Normal heart sounds.   Pulmonary:      Effort: Respiratory distress present.      Breath sounds: Examination of the right-upper field reveals decreased breath sounds. Examination of the left-upper field reveals decreased breath sounds. Examination of the right-middle field reveals decreased breath sounds. Examination of the left-middle field reveals decreased breath sounds. Examination of the right-lower field reveals decreased breath sounds. Examination of the left-lower field reveals decreased breath sounds. Decreased breath sounds present.   Abdominal:      General: Bowel sounds are normal.      Palpations: Abdomen is soft.      Tenderness: There is no abdominal tenderness.      Comments: Diffuse tenderness   Musculoskeletal:         General: Normal range of motion.      Cervical back: Normal range of motion and neck supple.   Skin:     General: Skin is warm and dry.   Neurological:      Mental Status: She is alert and oriented to person, place, and time.      Deep Tendon Reflexes: Reflexes are normal and symmetric.         Vital Signs  ED Triage Vitals   Temperature Pulse Respirations Blood Pressure SpO2   03/26/24 1657 03/26/24 1657 03/26/24 1657 03/26/24 1657 03/26/24 1657   98.4 °F (36.9 °C) 95 18 153/90  96 %      Temp Source Heart Rate Source Patient Position - Orthostatic VS BP Location FiO2 (%)   03/26/24 1657 03/26/24 1657 03/26/24 1943 03/26/24 1943 --   Oral Monitor Lying Left arm       Pain Score       03/26/24 1657       2           Vitals:    03/26/24 1657 03/26/24 1943   BP: 153/90 126/82   Pulse: 95 85   Patient Position - Orthostatic VS:  Lying         Visual Acuity      ED Medications  Medications   predniSONE tablet 40 mg (40 mg Oral Given 3/26/24 1815)   sodium chloride 0.9 % bolus 500 mL (0 mL Intravenous Stopped 3/26/24 1943)   iohexol (OMNIPAQUE) 350 MG/ML injection (MULTI-DOSE) 100 mL (100 mL Intravenous Given 3/26/24 1849)       Diagnostic Studies  Results Reviewed       Procedure Component Value Units Date/Time    HS Troponin I 2hr [[id number]]  (Normal) Collected: 03/26/24 1953    Lab Status: Final result Specimen: Blood from Arm, Left Updated: 03/26/24 2022     hs TnI 2hr 11 ng/L      Delta 2hr hsTnI -1 ng/L     UA (URINE) with reflex to Scope [[id number]]  (Abnormal) Collected: 03/26/24 1931    Lab Status: Final result Specimen: Urine, Clean Catch Updated: 03/26/24 1938     Color, UA Light Yellow     Clarity, UA Slightly Cloudy     Specific Gravity, UA 1.010     pH, UA 5.5     Leukocytes, UA Negative     Nitrite, UA Negative     Protein, UA Negative mg/dl      Glucose, UA Negative mg/dl      Ketones, UA 15 (1+) mg/dl      Urobilinogen, UA 0.2 E.U./dl      Bilirubin, UA Negative     Occult Blood, UA Negative    B-Type Natriuretic Peptide(BNP) [[id number]]  (Normal) Collected: 03/26/24 1757    Lab Status: Final result Specimen: Blood from Arm, Left Updated: 03/26/24 1834     BNP 14 pg/mL     HS Troponin 0hr (reflex protocol) [[id number]]  (Normal) Collected: 03/26/24 1757    Lab Status: Final result Specimen: Blood from Arm, Left Updated: 03/26/24 1833     hs TnI 0hr 12 ng/L     Comprehensive metabolic panel [[id number]]  (Abnormal) Collected: 03/26/24 1757    Lab Status: Final result Specimen:  Blood from Arm, Left Updated: 03/26/24 1825     Sodium 137 mmol/L      Potassium 4.3 mmol/L      Chloride 99 mmol/L      CO2 27 mmol/L      ANION GAP 11 mmol/L      BUN 18 mg/dL      Creatinine 1.12 mg/dL      Glucose 102 mg/dL      Calcium 9.7 mg/dL      AST 32 U/L      ALT 19 U/L      Alkaline Phosphatase 127 U/L      Total Protein 7.4 g/dL      Albumin 4.3 g/dL      Total Bilirubin 0.68 mg/dL      eGFR 52 ml/min/1.73sq m     Narrative:      National [employer] guidelines for Chronic Kidney Disease (CKD):     Stage 1 with normal or high GFR (GFR > 90 mL/min/1.73 square meters)    Stage 2 Mild CKD (GFR = 60-89 mL/min/1.73 square meters)    Stage 3A Moderate CKD (GFR = 45-59 mL/min/1.73 square meters)    Stage 3B Moderate CKD (GFR = 30-44 mL/min/1.73 square meters)    Stage 4 Severe CKD (GFR = 15-29 mL/min/1.73 square meters)    Stage 5 End Stage CKD (GFR <15 mL/min/1.73 square meters)  Note: GFR calculation is accurate only with a steady state creatinine    Protime-INR [[id number]]  (Normal) Collected: 03/26/24 1757    Lab Status: Final result Specimen: Blood from Arm, Left Updated: 03/26/24 1819     Protime 13.8 seconds      INR 1.04    APTT [[id number]]  (Normal) Collected: 03/26/24 1757    Lab Status: Final result Specimen: Blood from Arm, Left Updated: 03/26/24 1819     PTT 30 seconds     CBC and differential [[id number]]  (Abnormal) Collected: 03/26/24 1757    Lab Status: Final result Specimen: Blood from Arm, Left Updated: 03/26/24 1806     WBC 11.80 Thousand/uL      RBC 4.50 Million/uL      Hemoglobin 12.9 g/dL      Hematocrit 39.0 %      MCV 87 fL      MCH 28.7 pg      MCHC 33.1 g/dL      RDW 14.0 %      MPV 9.2 fL      Platelets 452 Thousands/uL      nRBC 0 /100 WBCs      Neutrophils Relative 58 %      Immature Grans % 0 %      Lymphocytes Relative 30 %      Monocytes Relative 10 %      Eosinophils Relative 1 %      Basophils Relative 1 %      Neutrophils Absolute 6.99 Thousands/µL       Absolute Immature Grans 0.03 Thousand/uL      Absolute Lymphocytes 3.53 Thousands/µL      Absolute Monocytes 1.12 Thousand/µL      Eosinophils Absolute 0.07 Thousand/µL      Basophils Absolute 0.06 Thousands/µL                    CTA dissection protocol chest abdomen pelvis w wo contrast   Final Result by Belle Mahajan MD (03/26 2003)      No acute abnormality identified. Stable exam.      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 Suggest follow-up in 1 year.      The study was marked in EPIC for significant notification.         Workstation performed: [id number]         XR lumbar spine 2 or 3 views   Final Result by Billy Whiting MD (03/27 1017)   Progressive spondylotic changes      No acute osseous abnormality.         Workstation performed: [id number]         XR chest 1 view portable   Final Result by Valorie Kirk MD (03/27 0940)      No acute cardiopulmonary disease.            Workstation performed: [id number]                    Procedures  Procedures         ED Course                               SBIRT 20yo+      Flowsheet Row Most Recent Value   Initial Alcohol Screen: US AUDIT-C     1. How often do you have a drink containing alcohol? 0 Filed at: 03/26/2024 1659   2. How many drinks containing alcohol do you have on a typical day you are drinking?  0 Filed at: 03/26/2024 1659   3a. Male UNDER 65: How often do you have five or more drinks on one occasion? 0 Filed at: 03/26/2024 1659   3b. FEMALE Any Age, or MALE 65+: How often do you have 4 or more drinks on one occassion? 0 Filed at: 03/26/2024 1659   Audit-C Score 0 Filed at: 03/26/2024 1659   JOHN: How many times in the past year have you...    Used an illegal drug or used a prescription medication for non-medical reasons? Never Filed at: 03/26/2024 1659                      Medical Decision Making  I went to speak with the patient to go over workup results.  When I mentioned that she has a 4cm aneurysm that will need  followup imaging in 1 yr, she started yelling and screaming at me and stated I didn't know what I was talking about. She said I was stupid and a slacker.  I tried to explain to her that she is ok that it just needed to be watched and if it did grow in fact was something repairable.  She stated she had anxiety issues and I should not have told her that news that she did not want to hear it.  She had her IV taken out and walked out of the ED yelling and screaming at me the whole way.  I tried to explain to her that it was my obligation to advise her to get followup with her pcp.  She said she didn't believe me or the tests that I ordered.     Amount and/or Complexity of Data Reviewed  Labs: ordered.  Radiology: ordered.    Risk  Prescription drug management.             Disposition  Final diagnoses:   COPD exacerbation (HCC)     Time reflects when diagnosis was documented in both MDM as applicable and the Disposition within this note       Time User Action Codes Description Comment    3/26/2024  8:39 PM Jose J Laurent Add [J44.1] COPD exacerbation (HCC)           ED Disposition       ED Disposition   Discharge    Condition   Stable    Date/Time   Tue Mar 26, 2024 2039    Comment   Rosmery Vargas discharge to home/self care.                   Follow-up Information       Follow up With Specialties Details Why Contact Info    Uriel Verma MD  Schedule an appointment as soon as possible for a visit  As needed [address]  [address]  [facility] [address]  [phone]              Discharge Medication List as of 3/27/2024  8:45 AM        START taking these medications    Details   predniSONE 10 mg tablet Take 2 tablets (20 mg total) by mouth daily for 5 days, Starting Tue 3/26/2024, Until Sun 3/31/2024, Normal           CONTINUE these medications which have NOT CHANGED    Details   albuterol (2.5 mg/3 mL) 0.083 % nebulizer solution Take 2.5 mg by nebulization every 6 (six) hours as needed for wheezing or shortness of  breath, Historical Med      aspirin 81 MG tablet Take 81 mg by mouth daily., Until Discontinued, Historical Med      atorvastatin (LIPITOR) 20 mg tablet Take 40 mg by mouth daily, Historical Med      Biotin 5 MG TABS Take 1 tablet by mouth, Historical Med      busPIRone (BUSPAR) 15 mg tablet Take 15 mg by mouth 2 (two) times a day, Historical Med      desvenlafaxine succinate (PRISTIQ) 50 mg 24 hr tablet Take 50 mg by mouth daily, Historical Med      lamoTRIgine (LaMICtal) 150 MG tablet Take 200 mg by mouth daily, Historical Med      loratadine (CLARITIN) 10 mg tablet Take 10 mg by mouth daily, Historical Med      montelukast (SINGULAIR) 10 mg tablet Take 10 mg by mouth daily at bedtime., Until Discontinued, Historical Med      Multiple Vitamin (MULTIVITAMIN) tablet Take 1 tablet by mouth daily, Historical Med      pantoprazole (PROTONIX) 40 mg tablet Take 40 mg by mouth daily., Until Discontinued, Historical Med      propranolol (INDERAL) 40 mg tablet Take 10 mg by mouth daily , Historical Med      tolterodine (DETROL LA) 4 mg 24 hr capsule Take 4 mg by mouth daily., Until Discontinued, Historical Med      ziprasidone (GEODON) 80 mg capsule Take 80 mg by mouth every evening, Historical Med      cyclobenzaprine (FLEXERIL) 10 mg tablet Take 10 mg by mouth 3 (three) times a day as needed for muscle spasms, Historical Med      fluticasone (FLONASE) 50 mcg/act nasal spray 1 spray into each nostril daily, Historical Med      HYDROcodone-acetaminophen (NORCO) 5-325 mg per tablet Take 1 tablet by mouth every 6 (six) hours as needed for pain for up to 12 dosesMax Daily Amount: 4 tablets, Starting Fri 8/30/2019, Print      hydrOXYzine pamoate (VISTARIL) 25 mg capsule Take 1 capsule (25 mg total) by mouth 3 (three) times a day as needed for anxiety for up to 4 days, Starting Sat 11/30/2019, Until Wed 12/4/2019, Normal      ketotifen (ZADITOR) 0.025 % ophthalmic solution 1 drop 2 (two) times a day., Until Discontinued,  Historical Med      lurasidone (LATUDA) 40 mg tablet Take 80 mg by mouth daily with dinner , Historical Med      meclizine (ANTIVERT) 25 mg tablet Take 25 mg by mouth every 8 (eight) hours as needed for dizziness, Historical Med      naproxen (EC NAPROSYN) 500 MG EC tablet Take 1 tablet (500 mg total) by mouth 2 (two) times a day with meals, Starting Sun 6/30/2019, Normal      traZODone (DESYREL) 50 mg tablet Take 100 mg by mouth daily at bedtime as needed , Historical Med      umeclidinium bromide (INCRUSE ELLIPTA) 62.5 mcg/inh AEPB inhaler Inhale 1 puff daily, Historical Med      venlafaxine (EFFEXOR-XR) 37.5 mg 24 hr capsule Take 75 mg by mouth daily  , Until Discontinued, Historical Med             No discharge procedures on file.    PDMP Review       None            ED Provider  Electronically Signed by             Jose J Laurent,   03/26/24 2048       Jose J Laurent,   03/27/24 1212

## 2024-03-26 NOTE — Clinical Note
Rosmery Vargas was seen and treated in our emergency department on 3/26/2024.                Diagnosis:     Rosmery  may return to work on return date.    She may return on this date: 02/29/2024         If you have any questions or concerns, please don't hesitate to call.      Jose J Laurent, DO    ______________________________           _______________          _______________  Hospital Representative                              Date                                Time

## 2024-03-28 ENCOUNTER — HOSPITAL ENCOUNTER (EMERGENCY)
Facility: HOSPITAL | Age: 64
Discharge: HOME | End: 2024-03-28
Attending: EMERGENCY MEDICINE
Payer: COMMERCIAL

## 2024-03-28 ENCOUNTER — APPOINTMENT (EMERGENCY)
Dept: RADIOLOGY | Facility: HOSPITAL | Age: 64
End: 2024-03-28
Payer: COMMERCIAL

## 2024-03-28 VITALS
DIASTOLIC BLOOD PRESSURE: 91 MMHG | HEART RATE: 83 BPM | SYSTOLIC BLOOD PRESSURE: 169 MMHG | OXYGEN SATURATION: 97 % | RESPIRATION RATE: 20 BRPM | TEMPERATURE: 98.2 F

## 2024-03-28 DIAGNOSIS — S83.92XA SPRAIN OF LEFT KNEE, UNSPECIFIED LIGAMENT, INITIAL ENCOUNTER: Primary | ICD-10-CM

## 2024-03-28 PROCEDURE — 99284 EMERGENCY DEPT VISIT MOD MDM: CPT | Performed by: EMERGENCY MEDICINE

## 2024-03-28 PROCEDURE — 73564 X-RAY EXAM KNEE 4 OR MORE: CPT

## 2024-03-28 PROCEDURE — 99283 EMERGENCY DEPT VISIT LOW MDM: CPT

## 2024-03-28 PROCEDURE — 96372 THER/PROPH/DIAG INJ SC/IM: CPT

## 2024-03-28 RX ORDER — ACETAMINOPHEN 325 MG/1
975 TABLET ORAL ONCE
Status: COMPLETED | OUTPATIENT
Start: 2024-03-28 | End: 2024-03-28

## 2024-03-28 RX ORDER — KETOROLAC TROMETHAMINE 30 MG/ML
30 INJECTION, SOLUTION INTRAMUSCULAR; INTRAVENOUS ONCE
Status: COMPLETED | OUTPATIENT
Start: 2024-03-28 | End: 2024-03-28

## 2024-03-28 RX ADMIN — KETOROLAC TROMETHAMINE 30 MG: 30 INJECTION, SOLUTION INTRAMUSCULAR; INTRAVENOUS at 22:55

## 2024-03-28 RX ADMIN — ACETAMINOPHEN 975 MG: 325 TABLET ORAL at 22:55

## 2024-03-29 NOTE — ED PROVIDER NOTES
History  Chief Complaint   Patient presents with    Knee Pain     Pt states that she was cleaning behind couch and twisted her L knee. Pt alert oriented at this time no LOC      63-year-old female past medical history significant for bipolar presenting to ED today with left leg pain.  Patient states that she was on the couch when she was cleaning when she twisted her left lower extremity.  She not fall or hit her head.  She is having some pain in left knee.  Has not taken anything prior to coming in.        Prior to Admission Medications   Prescriptions Last Dose Informant Patient Reported? Taking?   Biotin 5 MG TABS   Yes No   Sig: Take 1 tablet by mouth   HYDROcodone-acetaminophen (NORCO) 5-325 mg per tablet   No No   Sig: Take 1 tablet by mouth every 6 (six) hours as needed for pain for up to 12 dosesMax Daily Amount: 4 tablets   Patient not taking: No sig reported   Multiple Vitamin (MULTIVITAMIN) tablet   Yes No   Sig: Take 1 tablet by mouth daily   albuterol (2.5 mg/3 mL) 0.083 % nebulizer solution  Other (Specify) Yes No   Sig: Take 2.5 mg by nebulization every 6 (six) hours as needed for wheezing or shortness of breath   aspirin 81 MG tablet   Yes No   Sig: Take 81 mg by mouth daily.   atorvastatin (LIPITOR) 20 mg tablet  Self Yes No   Sig: Take 40 mg by mouth daily   busPIRone (BUSPAR) 15 mg tablet  Other (Specify) Yes No   Sig: Take 15 mg by mouth 2 (two) times a day   cyclobenzaprine (FLEXERIL) 10 mg tablet   Yes No   Sig: Take 10 mg by mouth 3 (three) times a day as needed for muscle spasms   Patient not taking: Reported on 8/3/2022   desvenlafaxine succinate (PRISTIQ) 50 mg 24 hr tablet   Yes No   Sig: Take 50 mg by mouth daily   fluticasone (FLONASE) 50 mcg/act nasal spray   Yes No   Si spray into each nostril daily   Patient not taking: Reported on 3/26/2024   hydrOXYzine pamoate (VISTARIL) 25 mg capsule   No No   Sig: Take 1 capsule (25 mg total) by mouth 3 (three) times a day as needed for  anxiety for up to 4 days   ketotifen (ZADITOR) 0.025 % ophthalmic solution   Yes No   Si drop 2 (two) times a day.   Patient not taking: Reported on 8/3/2022   lamoTRIgine (LaMICtal) 150 MG tablet  Care Giver Yes No   Sig: Take 200 mg by mouth daily   loratadine (CLARITIN) 10 mg tablet   Yes No   Sig: Take 10 mg by mouth daily   lurasidone (LATUDA) 40 mg tablet   Yes No   Sig: Take 80 mg by mouth daily with dinner    Patient not taking: Reported on 8/3/2022   meclizine (ANTIVERT) 25 mg tablet   Yes No   Sig: Take 25 mg by mouth every 8 (eight) hours as needed for dizziness   Patient not taking: Reported on 8/3/2022   montelukast (SINGULAIR) 10 mg tablet   Yes No   Sig: Take 10 mg by mouth daily at bedtime.   naproxen (EC NAPROSYN) 500 MG EC tablet   No No   Sig: Take 1 tablet (500 mg total) by mouth 2 (two) times a day with meals   Patient not taking: No sig reported   pantoprazole (PROTONIX) 40 mg tablet   Yes No   Sig: Take 40 mg by mouth daily.   predniSONE 10 mg tablet   No No   Sig: Take 2 tablets (20 mg total) by mouth daily for 5 days   propranolol (INDERAL) 40 mg tablet   Yes No   Sig: Take 10 mg by mouth daily    tolterodine (DETROL LA) 4 mg 24 hr capsule   Yes No   Sig: Take 4 mg by mouth daily.   traZODone (DESYREL) 50 mg tablet   Yes No   Sig: Take 100 mg by mouth daily at bedtime as needed    Patient not taking: Reported on 8/3/2022   umeclidinium bromide (INCRUSE ELLIPTA) 62.5 mcg/inh AEPB inhaler  Care Giver Yes No   Sig: Inhale 1 puff daily   venlafaxine (EFFEXOR-XR) 37.5 mg 24 hr capsule   Yes No   Sig: Take 75 mg by mouth daily     Patient not taking: Reported on 8/3/2022   ziprasidone (GEODON) 80 mg capsule   Yes No   Sig: Take 80 mg by mouth every evening      Facility-Administered Medications: None       Past Medical History:   Diagnosis Date    Asthma     Bladder incontinence     Chronic pain     back    COPD (chronic obstructive pulmonary disease) ([facility])     GERD (gastroesophageal reflux  disease)     Hyperlipidemia     Psychiatric disorder     bipolar       Past Surgical History:   Procedure Laterality Date    TUBAL LIGATION         Family History   Problem Relation Age of Onset    No Known Problems Mother     No Known Problems Sister     No Known Problems Daughter     No Known Problems Maternal Aunt      I have reviewed and agree with the history as documented.    E-Cigarette/Vaping    E-Cigarette Use Never User      E-Cigarette/Vaping Substances     Social History     Tobacco Use    Smoking status: Every Day     Current packs/day: 1.00     Types: Cigarettes    Smokeless tobacco: Never   Vaping Use    Vaping status: Never Used   Substance Use Topics    Alcohol use: No    Drug use: No       Review of Systems   Constitutional:  Negative for chills and fever.   HENT:  Negative for hearing loss.    Eyes:  Negative for visual disturbance.   Respiratory:  Negative for shortness of breath.    Cardiovascular:  Negative for chest pain.   Gastrointestinal:  Negative for abdominal pain, constipation, diarrhea, nausea and vomiting.   Genitourinary:  Negative for difficulty urinating.   Musculoskeletal:  Negative for myalgias.   Skin:  Negative for color change.   Neurological:  Negative for dizziness and headaches.   Psychiatric/Behavioral:  Negative for agitation.    All other systems reviewed and are negative.      Physical Exam  Physical Exam  Vitals and nursing note reviewed.   Constitutional:       General: She is not in acute distress.     Appearance: Normal appearance. She is well-developed. She is not ill-appearing.   HENT:      Head: Normocephalic and atraumatic.      Right Ear: External ear normal.      Left Ear: External ear normal.      Nose: Nose normal.      Mouth/Throat:      Mouth: Mucous membranes are moist.      Pharynx: Oropharynx is clear. No oropharyngeal exudate.   Eyes:      General:         Right eye: No discharge.         Left eye: No discharge.      Extraocular Movements: Extraocular  movements intact.      Conjunctiva/sclera: Conjunctivae normal.      Pupils: Pupils are equal, round, and reactive to light.   Cardiovascular:      Rate and Rhythm: Normal rate and regular rhythm.      Heart sounds: Normal heart sounds. No murmur heard.     No friction rub. No gallop.   Pulmonary:      Effort: Pulmonary effort is normal. No respiratory distress.      Breath sounds: Normal breath sounds. No stridor. No wheezing.   Abdominal:      General: Bowel sounds are normal. There is no distension.      Palpations: Abdomen is soft.      Tenderness: There is no abdominal tenderness.   Musculoskeletal:         General: No swelling. Normal range of motion.      Cervical back: Normal range of motion and neck supple. No rigidity.      Comments: No swelling noted in the left knee but she does have pain with some ROM of the left knee. No deformities appreciated. Distal pulses intact   Skin:     General: Skin is warm and dry.      Capillary Refill: Capillary refill takes less than 2 seconds.   Neurological:      General: No focal deficit present.      Mental Status: She is alert and oriented to person, place, and time. Mental status is at baseline.   Psychiatric:         Mood and Affect: Mood normal.         Behavior: Behavior normal.         Vital Signs  ED Triage Vitals [03/28/24 2245]   Temperature Pulse Respirations Blood Pressure SpO2   98.2 °F (36.8 °C) 85 16 156/82 98 %      Temp Source Heart Rate Source Patient Position - Orthostatic VS BP Location FiO2 (%)   Oral Monitor Sitting Left arm --      Pain Score       6           Vitals:    03/28/24 2245 03/28/24 2246   BP: 156/82 169/91   Pulse: 85 83   Patient Position - Orthostatic VS: Sitting          Visual Acuity      ED Medications  Medications   ketorolac (TORADOL) injection 30 mg (30 mg Intramuscular Given 3/28/24 2255)   acetaminophen (TYLENOL) tablet 975 mg (975 mg Oral Given 3/28/24 2255)       Diagnostic Studies  Results Reviewed       None                    XR knee 4+ vw left injury    (Results Pending)              Procedures  Procedures         ED Course                               SBIRT 22yo+      Flowsheet Row Most Recent Value   Initial Alcohol Screen: US AUDIT-C     1. How often do you have a drink containing alcohol? 0 Filed at: 03/28/2024 2305   2. How many drinks containing alcohol do you have on a typical day you are drinking?  0 Filed at: 03/28/2024 2305   3b. FEMALE Any Age, or MALE 65+: How often do you have 4 or more drinks on one occassion? 0 Filed at: 03/28/2024 2305   Audit-C Score 0 Filed at: 03/28/2024 2305   JOHN: How many times in the past year have you...    Used an illegal drug or used a prescription medication for non-medical reasons? Never Filed at: 03/28/2024 2305                      Medical Decision Making  63-year-old female presenting to ED today with left knee pain.  Likely secondary to ligamentous injury however there could be concern for possible pathologic fracture.  Will do an x-ray of the left knee.  Will treat patient's pain with Tylenol and Toradol.  I got a warning that the patient GFR was less than 60 however on review of the chart her GFR's have been well above 60 on the last few CMP's.    Patient's knee x-ray did not show any signs of acute fracture.  I did wrap her knee at bedside with an Ace bandage.  Encouraged her to continue Tylenol at home.  Encouraged outpatient follow-up with primary care provider.  Strict return precaution discussed and patient was discharged home.    Amount and/or Complexity of Data Reviewed  Radiology: ordered and independent interpretation performed.     Details: I interpreted the left knee x-rays no acute osseous abnormality.    Risk  OTC drugs.  Prescription drug management.             Disposition  Final diagnoses:   Sprain of left knee, unspecified ligament, initial encounter     Time reflects when diagnosis was documented in both MDM as applicable and the Disposition within this note        Time User Action Codes Description Comment    3/28/2024 11:18 PM Hernando Degroot Add [S83.92XA] Sprain of left knee, unspecified ligament, initial encounter           ED Disposition       ED Disposition   Discharge    Condition   Stable    Date/Time   u Mar 28, 2024 4907    Comment   Rosmery ZULEYKA Sam discharge to home/self care.                   Follow-up Information       Follow up With Specialties Details Why Contact Info Additional Information    Uriel Verma MD  Schedule an appointment as soon as possible for a visit in 2 days for follow up [address]  [address]  [facility] [address]  [phone]       [facility] Emergency Department Emergency Medicine Go to  If symptoms worsen, As needed [address] [facility] [id number] [phone] [facility] Emergency Department, [address] [place] [address], [id number]            Patient's Medications   Discharge Prescriptions    No medications on file       No discharge procedures on file.    PDMP Review       None            ED Provider  Electronically Signed by             Hernando Degroot MD  03/28/24 2104

## 2024-03-29 NOTE — DISCHARGE INSTRUCTIONS
You can continue 975 mg of Tylenol every 6 hours.  Please follow-up with your primary care doctor.    Return to ER if develop any chest pain or shortness of breath.

## 2024-04-02 ENCOUNTER — HOSPITAL ENCOUNTER (EMERGENCY)
Facility: HOSPITAL | Age: 64
Discharge: HOME | End: 2024-04-02
Attending: EMERGENCY MEDICINE
Payer: COMMERCIAL

## 2024-04-02 ENCOUNTER — APPOINTMENT (EMERGENCY)
Dept: RADIOLOGY | Facility: HOSPITAL | Age: 64
End: 2024-04-02
Payer: COMMERCIAL

## 2024-04-02 VITALS
DIASTOLIC BLOOD PRESSURE: 91 MMHG | TEMPERATURE: 98.1 F | OXYGEN SATURATION: 98 % | SYSTOLIC BLOOD PRESSURE: 137 MMHG | HEART RATE: 71 BPM | RESPIRATION RATE: 18 BRPM

## 2024-04-02 DIAGNOSIS — S61.211A LACERATION OF LEFT INDEX FINGER WITHOUT FOREIGN BODY WITHOUT DAMAGE TO NAIL, INITIAL ENCOUNTER: Primary | ICD-10-CM

## 2024-04-02 PROCEDURE — 99283 EMERGENCY DEPT VISIT LOW MDM: CPT

## 2024-04-02 PROCEDURE — 73140 X-RAY EXAM OF FINGER(S): CPT

## 2024-04-02 PROCEDURE — 12001 RPR S/N/AX/GEN/TRNK 2.5CM/<: CPT | Performed by: PHYSICIAN ASSISTANT

## 2024-04-02 PROCEDURE — 99284 EMERGENCY DEPT VISIT MOD MDM: CPT | Performed by: PHYSICIAN ASSISTANT

## 2024-04-02 PROCEDURE — 90715 TDAP VACCINE 7 YRS/> IM: CPT | Performed by: PHYSICIAN ASSISTANT

## 2024-04-02 PROCEDURE — 90471 IMMUNIZATION ADMIN: CPT

## 2024-04-02 RX ORDER — GINSENG 100 MG
1 CAPSULE ORAL ONCE
Status: COMPLETED | OUTPATIENT
Start: 2024-04-02 | End: 2024-04-02

## 2024-04-02 RX ORDER — LIDOCAINE HYDROCHLORIDE 10 MG/ML
20 INJECTION, SOLUTION EPIDURAL; INFILTRATION; INTRACAUDAL; PERINEURAL ONCE
Status: COMPLETED | OUTPATIENT
Start: 2024-04-02 | End: 2024-04-02

## 2024-04-02 RX ORDER — GLY/DIMETH/PETROLAT,WHT/WATER
CREAM (GRAM) TOPICAL AS NEEDED
Qty: 85 G | Refills: 0 | Status: SHIPPED | OUTPATIENT
Start: 2024-04-02 | End: 2024-05-02

## 2024-04-02 RX ADMIN — TETANUS TOXOID, REDUCED DIPHTHERIA TOXOID AND ACELLULAR PERTUSSIS VACCINE, ADSORBED 0.5 ML: 5; 2.5; 8; 8; 2.5 SUSPENSION INTRAMUSCULAR at 10:33

## 2024-04-02 RX ADMIN — BACITRACIN 1 SMALL APPLICATION: 500 OINTMENT TOPICAL at 10:33

## 2024-04-02 RX ADMIN — LIDOCAINE HYDROCHLORIDE 20 ML: 10 INJECTION, SOLUTION EPIDURAL; INFILTRATION; INTRACAUDAL at 10:32

## 2024-04-02 NOTE — DISCHARGE INSTRUCTIONS
You may shower and wash your hands - do not scrub at sutures  Suture removal in 7-10 days - you may return to the ED, go to your primary car provider, or [facility] for suture removal

## 2024-04-02 NOTE — ED PROVIDER NOTES
History  Chief Complaint   Patient presents with    Finger Injury     Small lac noted at distal end of 2nd digit of left hand. No numbness or tingling, able to move fingers. No blood thinners.      Pt is a 64 yo F with PMH of HTN, HLD, COPD, bipolar disorder, GERD who presents for evaluation of left second digit laceration.  Pt states she reached for something behind a shelf and cut herself on glass.  She states her tetanus is UTD.  She also requests cream for her hands.          Prior to Admission Medications   Prescriptions Last Dose Informant Patient Reported? Taking?   Biotin 5 MG TABS   Yes No   Sig: Take 1 tablet by mouth   HYDROcodone-acetaminophen (NORCO) 5-325 mg per tablet   No No   Sig: Take 1 tablet by mouth every 6 (six) hours as needed for pain for up to 12 dosesMax Daily Amount: 4 tablets   Patient not taking: No sig reported   Multiple Vitamin (MULTIVITAMIN) tablet   Yes No   Sig: Take 1 tablet by mouth daily   albuterol (2.5 mg/3 mL) 0.083 % nebulizer solution  Other (Specify) Yes No   Sig: Take 2.5 mg by nebulization every 6 (six) hours as needed for wheezing or shortness of breath   aspirin 81 MG tablet   Yes No   Sig: Take 81 mg by mouth daily.   atorvastatin (LIPITOR) 20 mg tablet  Self Yes No   Sig: Take 40 mg by mouth daily   busPIRone (BUSPAR) 15 mg tablet  Other (Specify) Yes No   Sig: Take 15 mg by mouth 2 (two) times a day   cyclobenzaprine (FLEXERIL) 10 mg tablet   Yes No   Sig: Take 10 mg by mouth 3 (three) times a day as needed for muscle spasms   Patient not taking: Reported on 8/3/2022   desvenlafaxine succinate (PRISTIQ) 50 mg 24 hr tablet   Yes No   Sig: Take 50 mg by mouth daily   fluticasone (FLONASE) 50 mcg/act nasal spray   Yes No   Si spray into each nostril daily   Patient not taking: Reported on 3/26/2024   hydrOXYzine pamoate (VISTARIL) 25 mg capsule   No No   Sig: Take 1 capsule (25 mg total) by mouth 3 (three) times a day as needed for anxiety for up to 4 days    ketotifen (ZADITOR) 0.025 % ophthalmic solution   Yes No   Si drop 2 (two) times a day.   Patient not taking: Reported on 8/3/2022   lamoTRIgine (LaMICtal) 150 MG tablet  Care Giver Yes No   Sig: Take 200 mg by mouth daily   loratadine (CLARITIN) 10 mg tablet   Yes No   Sig: Take 10 mg by mouth daily   lurasidone (LATUDA) 40 mg tablet   Yes No   Sig: Take 80 mg by mouth daily with dinner    Patient not taking: Reported on 8/3/2022   meclizine (ANTIVERT) 25 mg tablet   Yes No   Sig: Take 25 mg by mouth every 8 (eight) hours as needed for dizziness   Patient not taking: Reported on 8/3/2022   montelukast (SINGULAIR) 10 mg tablet   Yes No   Sig: Take 10 mg by mouth daily at bedtime.   naproxen (EC NAPROSYN) 500 MG EC tablet   No No   Sig: Take 1 tablet (500 mg total) by mouth 2 (two) times a day with meals   Patient not taking: No sig reported   pantoprazole (PROTONIX) 40 mg tablet   Yes No   Sig: Take 40 mg by mouth daily.   propranolol (INDERAL) 40 mg tablet   Yes No   Sig: Take 10 mg by mouth daily    tolterodine (DETROL LA) 4 mg 24 hr capsule   Yes No   Sig: Take 4 mg by mouth daily.   traZODone (DESYREL) 50 mg tablet   Yes No   Sig: Take 100 mg by mouth daily at bedtime as needed    Patient not taking: Reported on 8/3/2022   umeclidinium bromide (INCRUSE ELLIPTA) 62.5 mcg/inh AEPB inhaler  Care Giver Yes No   Sig: Inhale 1 puff daily   venlafaxine (EFFEXOR-XR) 37.5 mg 24 hr capsule   Yes No   Sig: Take 75 mg by mouth daily     Patient not taking: Reported on 8/3/2022   ziprasidone (GEODON) 80 mg capsule   Yes No   Sig: Take 80 mg by mouth every evening      Facility-Administered Medications: None       Past Medical History:   Diagnosis Date    Asthma     Bladder incontinence     Chronic pain     back    COPD (chronic obstructive pulmonary disease) ([facility])     GERD (gastroesophageal reflux disease)     Hyperlipidemia     Psychiatric disorder     bipolar       Past Surgical History:   Procedure Laterality Date     TUBAL LIGATION         Family History   Problem Relation Age of Onset    No Known Problems Mother     No Known Problems Sister     No Known Problems Daughter     No Known Problems Maternal Aunt      I have reviewed and agree with the history as documented.    E-Cigarette/Vaping    E-Cigarette Use Never User      E-Cigarette/Vaping Substances     Social History     Tobacco Use    Smoking status: Every Day     Current packs/day: 1.00     Types: Cigarettes    Smokeless tobacco: Never   Vaping Use    Vaping status: Never Used   Substance Use Topics    Alcohol use: No    Drug use: No       Review of Systems   Constitutional:  Negative for chills and fever.   HENT:  Negative for ear pain and sore throat.    Eyes: Negative.  Negative for pain and visual disturbance.   Respiratory:  Negative for cough and shortness of breath.    Cardiovascular: Negative.  Negative for chest pain and palpitations.   Gastrointestinal: Negative.  Negative for abdominal pain and vomiting.   Endocrine: Negative.    Genitourinary: Negative.  Negative for dysuria and hematuria.   Musculoskeletal: Negative.  Negative for arthralgias and back pain.   Skin:  Positive for wound. Negative for color change and rash.   Allergic/Immunologic: Negative.    Neurological: Negative.  Negative for seizures and syncope.   Hematological: Negative.    Psychiatric/Behavioral: Negative.     All other systems reviewed and are negative.      Physical Exam  Physical Exam  Vitals and nursing note reviewed.   Constitutional:       General: She is not in acute distress.     Appearance: Normal appearance. She is well-developed.   HENT:      Head: Normocephalic and atraumatic.      Right Ear: External ear normal.      Left Ear: External ear normal.      Nose: Nose normal.   Eyes:      General: No scleral icterus.     Conjunctiva/sclera: Conjunctivae normal.   Cardiovascular:      Rate and Rhythm: Normal rate and regular rhythm.      Pulses: Normal pulses.      Heart  "sounds: No murmur heard.  Pulmonary:      Effort: Pulmonary effort is normal. No respiratory distress.      Breath sounds: Normal breath sounds.   Musculoskeletal:         General: Tenderness and signs of injury present. No swelling.   Skin:     General: Skin is warm and dry.      Capillary Refill: Capillary refill takes less than 2 seconds.   Neurological:      Mental Status: She is alert.   Psychiatric:         Mood and Affect: Mood normal.         Vital Signs  ED Triage Vitals [04/02/24 0954]   Temperature Pulse Respirations Blood Pressure SpO2   98.1 °F (36.7 °C) 71 18 137/91 98 %      Temp src Heart Rate Source Patient Position - Orthostatic VS BP Location FiO2 (%)   -- -- -- -- --      Pain Score       --           Vitals:    04/02/24 0954   BP: 137/91   Pulse: 71         Visual Acuity      ED Medications  Medications   lidocaine (PF) (XYLOCAINE-MPF) 1 % injection 20 mL (20 mL Infiltration Given 4/2/24 1032)   bacitracin topical ointment 1 small application (1 small application Topical Given 4/2/24 1033)   tetanus-diphtheria-acellular pertussis (BOOSTRIX) IM injection 0.5 mL (0.5 mL Intramuscular Given 4/2/24 1033)       Diagnostic Studies  Results Reviewed       None                   XR finger second digit-index LEFT   Final Result by Philip Mehta MD (04/02 1547)      No foreign body identified in the index finger               Workstation performed: [id number]                    Procedures  Universal Protocol:  Consent: Verbal consent obtained.  Risks and benefits: risks, benefits and alternatives were discussed  Consent given by: patient  Time out: Immediately prior to procedure a \"time out\" was called to verify the correct patient, procedure, equipment, support staff and site/side marked as required.  Patient identity confirmed: verbally with patient  Laceration repair    Date/Time: 4/2/2024 12:40 PM    Performed by: Era Macario PA-C  Authorized by: Era Macario PA-C  " Body area: upper extremity  Location details: left index finger  Laceration length: 1 cm  Tendon involvement: none  Nerve involvement: none  Vascular damage: no  Anesthesia: digital block    Anesthesia:  Local Anesthetic: lidocaine 1% without epinephrine    Wound Dehiscence:  Superficial Wound Dehiscence: simple closure      Procedure Details:  Irrigation solution: saline  Irrigation method: syringe  Amount of cleaning: extensive  Debridement: none  Degree of undermining: none  Skin closure: 5-0 nylon  Number of sutures: 3  Technique: simple  Approximation: close  Dressing: non-adhesive packing strip  Patient tolerance: patient tolerated the procedure well with no immediate complications               ED Course                               SBIRT 22yo+      Flowsheet Row Most Recent Value   Initial Alcohol Screen: US AUDIT-C     1. How often do you have a drink containing alcohol? 0 Filed at: 04/02/2024 0956   2. How many drinks containing alcohol do you have on a typical day you are drinking?  0 Filed at: 04/02/2024 0956   3a. Male UNDER 65: How often do you have five or more drinks on one occasion? 0 Filed at: 04/02/2024 0956   3b. FEMALE Any Age, or MALE 65+: How often do you have 4 or more drinks on one occassion? 0 Filed at: 04/02/2024 0956   Audit-C Score 0 Filed at: 04/02/2024 0956   JOHN: How many times in the past year have you...    Used an illegal drug or used a prescription medication for non-medical reasons? Never Filed at: 04/02/2024 0956                      Medical Decision Making  Problems Addressed:  Laceration of left index finger without foreign body without damage to nail, initial encounter: acute illness or injury    Amount and/or Complexity of Data Reviewed  Radiology: ordered and independent interpretation performed.    Risk  OTC drugs.  Prescription drug management.             Disposition  Final diagnoses:   Laceration of left index finger without foreign body without damage to nail,  initial encounter     Time reflects when diagnosis was documented in both MDM as applicable and the Disposition within this note       Time User Action Codes Description Comment    4/2/2024 12:29 PM Era Macario Add [S61.211A] Laceration of left index finger without foreign body without damage to nail, initial encounter           ED Disposition       ED Disposition   Discharge    Condition   Stable    Date/Time   Tue Apr 2, 2024 1228    Comment   Rosmery ZULEYKA Sam discharge to home/self care.                   Follow-up Information       Follow up With Specialties Details Why Contact Info Additional Information    Uriel Verma MD  Go to  As needed [address]  [address]  [facility] [address]  [phone]       [facility] Emergency Department Emergency Medicine Go to  If symptoms worsen [address] [facility] [address]  [phone] [facility] Emergency Department, [address] [place], [place], [address]            Discharge Medication List as of 4/2/2024  1:38 PM        START taking these medications    Details   Emollient (cetaphil) cream Apply topically as needed for dry skin, Starting Tue 4/2/2024, Until Thu 5/2/2024 at 2359, Normal           CONTINUE these medications which have NOT CHANGED    Details   albuterol (2.5 mg/3 mL) 0.083 % nebulizer solution Take 2.5 mg by nebulization every 6 (six) hours as needed for wheezing or shortness of breath, Historical Med      aspirin 81 MG tablet Take 81 mg by mouth daily., Until Discontinued, Historical Med      atorvastatin (LIPITOR) 20 mg tablet Take 40 mg by mouth daily, Historical Med      Biotin 5 MG TABS Take 1 tablet by mouth, Historical Med      busPIRone (BUSPAR) 15 mg tablet Take 15 mg by mouth 2 (two) times a day, Historical Med      cyclobenzaprine (FLEXERIL) 10 mg tablet Take 10 mg by mouth 3 (three) times a day as needed for muscle spasms, Historical Med      desvenlafaxine succinate (PRISTIQ) 50  mg 24 hr tablet Take 50 mg by mouth daily, Historical Med      fluticasone (FLONASE) 50 mcg/act nasal spray 1 spray into each nostril daily, Historical Med      HYDROcodone-acetaminophen (NORCO) 5-325 mg per tablet Take 1 tablet by mouth every 6 (six) hours as needed for pain for up to 12 dosesMax Daily Amount: 4 tablets, Starting Fri 8/30/2019, Print      hydrOXYzine pamoate (VISTARIL) 25 mg capsule Take 1 capsule (25 mg total) by mouth 3 (three) times a day as needed for anxiety for up to 4 days, Starting Sat 11/30/2019, Until Wed 12/4/2019, Normal      ketotifen (ZADITOR) 0.025 % ophthalmic solution 1 drop 2 (two) times a day., Until Discontinued, Historical Med      lamoTRIgine (LaMICtal) 150 MG tablet Take 200 mg by mouth daily, Historical Med      loratadine (CLARITIN) 10 mg tablet Take 10 mg by mouth daily, Historical Med      lurasidone (LATUDA) 40 mg tablet Take 80 mg by mouth daily with dinner , Historical Med      meclizine (ANTIVERT) 25 mg tablet Take 25 mg by mouth every 8 (eight) hours as needed for dizziness, Historical Med      montelukast (SINGULAIR) 10 mg tablet Take 10 mg by mouth daily at bedtime., Until Discontinued, Historical Med      Multiple Vitamin (MULTIVITAMIN) tablet Take 1 tablet by mouth daily, Historical Med      naproxen (EC NAPROSYN) 500 MG EC tablet Take 1 tablet (500 mg total) by mouth 2 (two) times a day with meals, Starting Sun 6/30/2019, Normal      pantoprazole (PROTONIX) 40 mg tablet Take 40 mg by mouth daily., Until Discontinued, Historical Med      propranolol (INDERAL) 40 mg tablet Take 10 mg by mouth daily , Historical Med      tolterodine (DETROL LA) 4 mg 24 hr capsule Take 4 mg by mouth daily., Until Discontinued, Historical Med      traZODone (DESYREL) 50 mg tablet Take 100 mg by mouth daily at bedtime as needed , Historical Med      umeclidinium bromide (INCRUSE ELLIPTA) 62.5 mcg/inh AEPB inhaler Inhale 1 puff daily, Historical Med      venlafaxine (EFFEXOR-XR) 37.5  mg 24 hr capsule Take 75 mg by mouth daily  , Until Discontinued, Historical Med      ziprasidone (GEODON) 80 mg capsule Take 80 mg by mouth every evening, Historical Med             No discharge procedures on file.    PDMP Review       None            ED Provider  Electronically Signed by             Era Macario PA-C  04/02/24 2006

## 2024-04-06 ENCOUNTER — HOSPITAL ENCOUNTER (EMERGENCY)
Facility: HOSPITAL | Age: 64
Discharge: HOME | End: 2024-04-07
Attending: EMERGENCY MEDICINE | Admitting: EMERGENCY MEDICINE
Payer: COMMERCIAL

## 2024-04-06 ENCOUNTER — APPOINTMENT (EMERGENCY)
Dept: RADIOLOGY | Facility: HOSPITAL | Age: 64
End: 2024-04-06
Payer: COMMERCIAL

## 2024-04-06 DIAGNOSIS — M25.511 RIGHT SHOULDER PAIN: Primary | ICD-10-CM

## 2024-04-06 DIAGNOSIS — Z51.89 VISIT FOR WOUND CHECK: ICD-10-CM

## 2024-04-06 PROCEDURE — 73030 X-RAY EXAM OF SHOULDER: CPT

## 2024-04-06 PROCEDURE — 99284 EMERGENCY DEPT VISIT MOD MDM: CPT | Performed by: EMERGENCY MEDICINE

## 2024-04-06 PROCEDURE — 93005 ELECTROCARDIOGRAM TRACING: CPT

## 2024-04-06 RX ORDER — HYDROCODONE BITARTRATE AND ACETAMINOPHEN 5; 325 MG/1; MG/1
1 TABLET ORAL ONCE
Status: COMPLETED | OUTPATIENT
Start: 2024-04-06 | End: 2024-04-06

## 2024-04-06 RX ORDER — LIDOCAINE 50 MG/G
1 PATCH TOPICAL ONCE
Status: DISCONTINUED | OUTPATIENT
Start: 2024-04-06 | End: 2024-04-07 | Stop reason: HOSPADM

## 2024-04-06 RX ADMIN — LIDOCAINE 1 PATCH: 50 PATCH TOPICAL at 23:23

## 2024-04-06 RX ADMIN — HYDROCODONE BITARTRATE AND ACETAMINOPHEN 1 TABLET: 5; 325 TABLET ORAL at 23:23

## 2024-04-07 VITALS
RESPIRATION RATE: 19 BRPM | OXYGEN SATURATION: 97 % | HEART RATE: 85 BPM | TEMPERATURE: 98 F | DIASTOLIC BLOOD PRESSURE: 109 MMHG | SYSTOLIC BLOOD PRESSURE: 146 MMHG

## 2024-04-07 RX ORDER — IBUPROFEN 200 MG
TABLET ORAL DAILY
Qty: 28 G | Refills: 0 | Status: SHIPPED | OUTPATIENT
Start: 2024-04-07

## 2024-04-07 RX ORDER — GINSENG 100 MG
1 CAPSULE ORAL ONCE
Status: COMPLETED | OUTPATIENT
Start: 2024-04-07 | End: 2024-04-07

## 2024-04-07 RX ORDER — LIDOCAINE 50 MG/G
1 PATCH TOPICAL DAILY
Qty: 15 PATCH | Refills: 0 | Status: SHIPPED | OUTPATIENT
Start: 2024-04-07

## 2024-04-07 RX ADMIN — BACITRACIN 1 SMALL APPLICATION: 500 OINTMENT TOPICAL at 00:19

## 2024-04-07 NOTE — ED PROVIDER NOTES
History  Chief Complaint   Patient presents with    Arm Pain     Pt reports right arm pain with increased pain upon movement. Pt reports pain has been persistent for a couple days. Pt denies chest pain, nausea, vomiting, or SOB.      Patient presents for evaluation of right arm pain that been going on for several days.  Denies any chest pain or shortness of breath.  Pain is worse when she moves it.  Cannot recall any injury or trauma.  Patient also wants the wound on her left index finger evaluated.      History provided by:  Patient   used: No    Arm Pain      Prior to Admission Medications   Prescriptions Last Dose Informant Patient Reported? Taking?   Biotin 5 MG TABS   Yes No   Sig: Take 1 tablet by mouth   Emollient (cetaphil) cream   No No   Sig: Apply topically as needed for dry skin   HYDROcodone-acetaminophen (NORCO) 5-325 mg per tablet   No No   Sig: Take 1 tablet by mouth every 6 (six) hours as needed for pain for up to 12 dosesMax Daily Amount: 4 tablets   Patient not taking: No sig reported   Multiple Vitamin (MULTIVITAMIN) tablet   Yes No   Sig: Take 1 tablet by mouth daily   albuterol (2.5 mg/3 mL) 0.083 % nebulizer solution  Other (Specify) Yes No   Sig: Take 2.5 mg by nebulization every 6 (six) hours as needed for wheezing or shortness of breath   aspirin 81 MG tablet   Yes No   Sig: Take 81 mg by mouth daily.   atorvastatin (LIPITOR) 20 mg tablet  Self Yes No   Sig: Take 40 mg by mouth daily   busPIRone (BUSPAR) 15 mg tablet  Other (Specify) Yes No   Sig: Take 15 mg by mouth 2 (two) times a day   cyclobenzaprine (FLEXERIL) 10 mg tablet   Yes No   Sig: Take 10 mg by mouth 3 (three) times a day as needed for muscle spasms   Patient not taking: Reported on 8/3/2022   desvenlafaxine succinate (PRISTIQ) 50 mg 24 hr tablet   Yes No   Sig: Take 50 mg by mouth daily   fluticasone (FLONASE) 50 mcg/act nasal spray   Yes No   Si spray into each nostril daily   Patient not taking:  Reported on 3/26/2024   hydrOXYzine pamoate (VISTARIL) 25 mg capsule   No No   Sig: Take 1 capsule (25 mg total) by mouth 3 (three) times a day as needed for anxiety for up to 4 days   ketotifen (ZADITOR) 0.025 % ophthalmic solution   Yes No   Si drop 2 (two) times a day.   Patient not taking: Reported on 8/3/2022   lamoTRIgine (LaMICtal) 150 MG tablet  Care Giver Yes No   Sig: Take 200 mg by mouth daily   loratadine (CLARITIN) 10 mg tablet   Yes No   Sig: Take 10 mg by mouth daily   lurasidone (LATUDA) 40 mg tablet   Yes No   Sig: Take 80 mg by mouth daily with dinner    Patient not taking: Reported on 8/3/2022   meclizine (ANTIVERT) 25 mg tablet   Yes No   Sig: Take 25 mg by mouth every 8 (eight) hours as needed for dizziness   Patient not taking: Reported on 8/3/2022   montelukast (SINGULAIR) 10 mg tablet   Yes No   Sig: Take 10 mg by mouth daily at bedtime.   naproxen (EC NAPROSYN) 500 MG EC tablet   No No   Sig: Take 1 tablet (500 mg total) by mouth 2 (two) times a day with meals   Patient not taking: No sig reported   pantoprazole (PROTONIX) 40 mg tablet   Yes No   Sig: Take 40 mg by mouth daily.   propranolol (INDERAL) 40 mg tablet   Yes No   Sig: Take 10 mg by mouth daily    tolterodine (DETROL LA) 4 mg 24 hr capsule   Yes No   Sig: Take 4 mg by mouth daily.   traZODone (DESYREL) 50 mg tablet   Yes No   Sig: Take 100 mg by mouth daily at bedtime as needed    Patient not taking: Reported on 8/3/2022   umeclidinium bromide (INCRUSE ELLIPTA) 62.5 mcg/inh AEPB inhaler  Care Giver Yes No   Sig: Inhale 1 puff daily   venlafaxine (EFFEXOR-XR) 37.5 mg 24 hr capsule   Yes No   Sig: Take 75 mg by mouth daily     Patient not taking: Reported on 8/3/2022   ziprasidone (GEODON) 80 mg capsule   Yes No   Sig: Take 80 mg by mouth every evening      Facility-Administered Medications: None       Past Medical History:   Diagnosis Date    Asthma     Bladder incontinence     Chronic pain     back    COPD (chronic obstructive  pulmonary disease) (HCC)     GERD (gastroesophageal reflux disease)     Hyperlipidemia     Psychiatric disorder     bipolar       Past Surgical History:   Procedure Laterality Date    TUBAL LIGATION         Family History   Problem Relation Age of Onset    No Known Problems Mother     No Known Problems Sister     No Known Problems Daughter     No Known Problems Maternal Aunt      I have reviewed and agree with the history as documented.    E-Cigarette/Vaping    E-Cigarette Use Never User      E-Cigarette/Vaping Substances     Social History     Tobacco Use    Smoking status: Every Day     Current packs/day: 1.00     Types: Cigarettes    Smokeless tobacco: Never   Vaping Use    Vaping status: Never Used   Substance Use Topics    Alcohol use: No    Drug use: No       Review of Systems   All other systems reviewed and are negative.      Physical Exam  Physical Exam  Vitals and nursing note reviewed.   Constitutional:       General: She is not in acute distress.  Cardiovascular:      Rate and Rhythm: Normal rate and regular rhythm.   Pulmonary:      Effort: Pulmonary effort is normal. No respiratory distress.      Breath sounds: Normal breath sounds.   Musculoskeletal:         General: Tenderness present. No deformity. Normal range of motion.        Arms:    Skin:         Neurological:      Mental Status: She is alert.         Vital Signs  ED Triage Vitals   Temperature Pulse Respirations Blood Pressure SpO2   04/06/24 2307 04/06/24 2307 04/06/24 2307 04/06/24 2307 04/06/24 2307   97.8 °F (36.6 °C) 83 20 164/98 98 %      Temp Source Heart Rate Source Patient Position - Orthostatic VS BP Location FiO2 (%)   04/06/24 2307 04/06/24 2307 04/07/24 0015 04/07/24 0015 --   Oral Monitor Lying Left arm       Pain Score       04/06/24 2323       8           Vitals:    04/06/24 2307 04/07/24 0015   BP: 164/98 (!) 146/109   Pulse: 83 85   Patient Position - Orthostatic VS:  Lying         Visual Acuity      ED  Medications  Medications   HYDROcodone-acetaminophen (NORCO) 5-325 mg per tablet 1 tablet (1 tablet Oral Given 4/6/24 0695)   bacitracin topical ointment 1 small application (1 small application Topical Given 4/7/24 0019)       Diagnostic Studies  Results Reviewed       None                   XR shoulder 2+ views RIGHT    (Results Pending)              Procedures  ECG 12 Lead Documentation Only    Date/Time: 4/6/2024 11:09 PM    Performed by: Babar Kelly DO  Authorized by: Babar Kelly DO    ECG reviewed by me, the ED Provider: yes    Patient location:  ED  Interpretation:     Interpretation: abnormal    Rate:     ECG rate:  76    ECG rate assessment: normal    Rhythm:     Rhythm: sinus rhythm    Ectopy:     Ectopy: none    Conduction:     Conduction: abnormal      Abnormal conduction: incomplete RBBB    ST segments:     ST segments:  Normal  T waves:     T waves: normal    Other findings:     Other findings: LVH             ED Course                               SBIRT 22yo+      Flowsheet Row Most Recent Value   Initial Alcohol Screen: US AUDIT-C     1. How often do you have a drink containing alcohol? 0 Filed at: 04/06/2024 2305   2. How many drinks containing alcohol do you have on a typical day you are drinking?  0 Filed at: 04/06/2024 2305   3a. Male UNDER 65: How often do you have five or more drinks on one occasion? 0 Filed at: 04/06/2024 2305   3b. FEMALE Any Age, or MALE 65+: How often do you have 4 or more drinks on one occassion? 0 Filed at: 04/06/2024 2305   Audit-C Score 0 Filed at: 04/06/2024 2305   JOHN: How many times in the past year have you...    Used an illegal drug or used a prescription medication for non-medical reasons? Never Filed at: 04/06/2024 2305                      Medical Decision Making  Pulse ox 97% on room air indicating adequate oxygenation.  Xray R shoulder: no fx or dislocation as read by me    Amount and/or Complexity of Data Reviewed  Radiology: ordered and independent  interpretation performed.    Risk  OTC drugs.  Prescription drug management.             Disposition  Final diagnoses:   Right shoulder pain   Visit for wound check     Time reflects when diagnosis was documented in both MDM as applicable and the Disposition within this note       Time User Action Codes Description Comment    4/6/2024 11:50 PM Babar Kelly [M25.511] Right shoulder pain     4/7/2024 12:03 AM Babar Kelly [Z51.89] Visit for wound check           ED Disposition       ED Disposition   Discharge    Condition   Stable    Date/Time   Sat Apr 6, 2024 2350    Comment   Rosmery Vargas discharge to home/self care.                   Follow-up Information    None         Discharge Medication List as of 4/7/2024 12:04 AM        START taking these medications    Details   Diclofenac Sodium (VOLTAREN) 1 % Apply 2 g topically 4 (four) times a day, Starting Sun 4/7/2024, Normal      neomycin-bacitracin-polymyxin (NEOSPORIN) 5-400-5,000 ointment Apply topically in the morning, Starting Sun 4/7/2024, Normal           CONTINUE these medications which have NOT CHANGED    Details   albuterol (2.5 mg/3 mL) 0.083 % nebulizer solution Take 2.5 mg by nebulization every 6 (six) hours as needed for wheezing or shortness of breath, Historical Med      aspirin 81 MG tablet Take 81 mg by mouth daily., Until Discontinued, Historical Med      atorvastatin (LIPITOR) 20 mg tablet Take 40 mg by mouth daily, Historical Med      Biotin 5 MG TABS Take 1 tablet by mouth, Historical Med      busPIRone (BUSPAR) 15 mg tablet Take 15 mg by mouth 2 (two) times a day, Historical Med      cyclobenzaprine (FLEXERIL) 10 mg tablet Take 10 mg by mouth 3 (three) times a day as needed for muscle spasms, Historical Med      desvenlafaxine succinate (PRISTIQ) 50 mg 24 hr tablet Take 50 mg by mouth daily, Historical Med      Emollient (cetaphil) cream Apply topically as needed for dry skin, Starting Tue 4/2/2024, Until Thu 5/2/2024 at 2359,  Normal      fluticasone (FLONASE) 50 mcg/act nasal spray 1 spray into each nostril daily, Historical Med      HYDROcodone-acetaminophen (NORCO) 5-325 mg per tablet Take 1 tablet by mouth every 6 (six) hours as needed for pain for up to 12 dosesMax Daily Amount: 4 tablets, Starting Fri 8/30/2019, Print      hydrOXYzine pamoate (VISTARIL) 25 mg capsule Take 1 capsule (25 mg total) by mouth 3 (three) times a day as needed for anxiety for up to 4 days, Starting Sat 11/30/2019, Until Wed 12/4/2019, Normal      ketotifen (ZADITOR) 0.025 % ophthalmic solution 1 drop 2 (two) times a day., Until Discontinued, Historical Med      lamoTRIgine (LaMICtal) 150 MG tablet Take 200 mg by mouth daily, Historical Med      loratadine (CLARITIN) 10 mg tablet Take 10 mg by mouth daily, Historical Med      lurasidone (LATUDA) 40 mg tablet Take 80 mg by mouth daily with dinner , Historical Med      meclizine (ANTIVERT) 25 mg tablet Take 25 mg by mouth every 8 (eight) hours as needed for dizziness, Historical Med      montelukast (SINGULAIR) 10 mg tablet Take 10 mg by mouth daily at bedtime., Until Discontinued, Historical Med      Multiple Vitamin (MULTIVITAMIN) tablet Take 1 tablet by mouth daily, Historical Med      naproxen (EC NAPROSYN) 500 MG EC tablet Take 1 tablet (500 mg total) by mouth 2 (two) times a day with meals, Starting Sun 6/30/2019, Normal      pantoprazole (PROTONIX) 40 mg tablet Take 40 mg by mouth daily., Until Discontinued, Historical Med      propranolol (INDERAL) 40 mg tablet Take 10 mg by mouth daily , Historical Med      tolterodine (DETROL LA) 4 mg 24 hr capsule Take 4 mg by mouth daily., Until Discontinued, Historical Med      traZODone (DESYREL) 50 mg tablet Take 100 mg by mouth daily at bedtime as needed , Historical Med      umeclidinium bromide (INCRUSE ELLIPTA) 62.5 mcg/inh AEPB inhaler Inhale 1 puff daily, Historical Med      venlafaxine (EFFEXOR-XR) 37.5 mg 24 hr capsule Take 75 mg by mouth daily  , Until  Discontinued, Historical Med      ziprasidone (GEODON) 80 mg capsule Take 80 mg by mouth every evening, Historical Med                 PDMP Review       None            ED Provider  Electronically Signed by             Bbaar Kelly DO  04/07/24 4780

## 2024-04-08 LAB
ATRIAL RATE: 76 BPM
PR INTERVAL: 120 MS
QRS AXIS: -22 DEGREES
QRSD INTERVAL: 94 MS
QT INTERVAL: 414 MS
QTC INTERVAL: 465 MS
T WAVE AXIS: 43 DEGREES
VENTRICULAR RATE: 76 BPM

## 2024-04-08 PROCEDURE — 93010 ELECTROCARDIOGRAM REPORT: CPT | Performed by: INTERNAL MEDICINE

## 2024-04-15 ENCOUNTER — HOSPITAL ENCOUNTER (EMERGENCY)
Facility: HOSPITAL | Age: 64
Discharge: HOME | End: 2024-04-15
Attending: EMERGENCY MEDICINE | Admitting: EMERGENCY MEDICINE
Payer: COMMERCIAL

## 2024-04-15 VITALS
TEMPERATURE: 98.2 F | OXYGEN SATURATION: 95 % | DIASTOLIC BLOOD PRESSURE: 74 MMHG | HEART RATE: 78 BPM | WEIGHT: 185 LBS | RESPIRATION RATE: 20 BRPM | BODY MASS INDEX: 28.13 KG/M2 | SYSTOLIC BLOOD PRESSURE: 128 MMHG

## 2024-04-15 DIAGNOSIS — Z48.02 VISIT FOR SUTURE REMOVAL: ICD-10-CM

## 2024-04-15 DIAGNOSIS — R42 LIGHTHEADEDNESS: ICD-10-CM

## 2024-04-15 DIAGNOSIS — M25.519 SHOULDER PAIN: ICD-10-CM

## 2024-04-15 DIAGNOSIS — R11.0 NAUSEA: Primary | ICD-10-CM

## 2024-04-15 LAB
2HR DELTA HS TROPONIN: -1 NG/L
ALBUMIN SERPL BCP-MCNC: 3.7 G/DL (ref 3.5–5)
ALP SERPL-CCNC: 110 U/L (ref 34–104)
ALT SERPL W P-5'-P-CCNC: 14 U/L (ref 7–52)
ANION GAP SERPL CALCULATED.3IONS-SCNC: 8 MMOL/L (ref 4–13)
AST SERPL W P-5'-P-CCNC: 17 U/L (ref 13–39)
BASOPHILS # BLD AUTO: 0.07 THOUSANDS/ÂΜL (ref 0–0.1)
BASOPHILS NFR BLD AUTO: 1 % (ref 0–1)
BILIRUB SERPL-MCNC: 0.36 MG/DL (ref 0.2–1)
BUN SERPL-MCNC: 20 MG/DL (ref 5–25)
CALCIUM SERPL-MCNC: 9.4 MG/DL (ref 8.4–10.2)
CARDIAC TROPONIN I PNL SERPL HS: 7 NG/L
CARDIAC TROPONIN I PNL SERPL HS: 8 NG/L
CHLORIDE SERPL-SCNC: 103 MMOL/L (ref 96–108)
CO2 SERPL-SCNC: 28 MMOL/L (ref 21–32)
CREAT SERPL-MCNC: 0.82 MG/DL (ref 0.6–1.3)
EOSINOPHIL # BLD AUTO: 0.17 THOUSAND/ÂΜL (ref 0–0.61)
EOSINOPHIL NFR BLD AUTO: 2 % (ref 0–6)
ERYTHROCYTE [DISTWIDTH] IN BLOOD BY AUTOMATED COUNT: 15.4 % (ref 11.6–15.1)
GFR SERPL CREATININE-BSD FRML MDRD: 76 ML/MIN/1.73SQ M
GLUCOSE SERPL-MCNC: 111 MG/DL (ref 65–140)
HCT VFR BLD AUTO: 37.8 % (ref 34.8–46.1)
HGB BLD-MCNC: 12.3 G/DL (ref 11.5–15.4)
IMM GRANULOCYTES # BLD AUTO: 0.04 THOUSAND/UL (ref 0–0.2)
IMM GRANULOCYTES NFR BLD AUTO: 0 % (ref 0–2)
LIPASE SERPL-CCNC: 20 U/L (ref 11–82)
LYMPHOCYTES # BLD AUTO: 3.03 THOUSANDS/ÂΜL (ref 0.6–4.47)
LYMPHOCYTES NFR BLD AUTO: 29 % (ref 14–44)
MCH RBC QN AUTO: 29.1 PG (ref 26.8–34.3)
MCHC RBC AUTO-ENTMCNC: 32.5 G/DL (ref 31.4–37.4)
MCV RBC AUTO: 89 FL (ref 82–98)
MONOCYTES # BLD AUTO: 0.84 THOUSAND/ÂΜL (ref 0.17–1.22)
MONOCYTES NFR BLD AUTO: 8 % (ref 4–12)
NEUTROPHILS # BLD AUTO: 6.37 THOUSANDS/ÂΜL (ref 1.85–7.62)
NEUTS SEG NFR BLD AUTO: 60 % (ref 43–75)
NRBC BLD AUTO-RTO: 0 /100 WBCS
PLATELET # BLD AUTO: 450 THOUSANDS/UL (ref 149–390)
PMV BLD AUTO: 8.7 FL (ref 8.9–12.7)
POTASSIUM SERPL-SCNC: 3.9 MMOL/L (ref 3.5–5.3)
PROT SERPL-MCNC: 6.8 G/DL (ref 6.4–8.4)
RBC # BLD AUTO: 4.23 MILLION/UL (ref 3.81–5.12)
SODIUM SERPL-SCNC: 139 MMOL/L (ref 135–147)
WBC # BLD AUTO: 10.52 THOUSAND/UL (ref 4.31–10.16)

## 2024-04-15 PROCEDURE — 85025 COMPLETE CBC W/AUTO DIFF WBC: CPT | Performed by: EMERGENCY MEDICINE

## 2024-04-15 PROCEDURE — 99283 EMERGENCY DEPT VISIT LOW MDM: CPT

## 2024-04-15 PROCEDURE — 36415 COLL VENOUS BLD VENIPUNCTURE: CPT | Performed by: EMERGENCY MEDICINE

## 2024-04-15 PROCEDURE — 93005 ELECTROCARDIOGRAM TRACING: CPT

## 2024-04-15 PROCEDURE — 99284 EMERGENCY DEPT VISIT MOD MDM: CPT | Performed by: EMERGENCY MEDICINE

## 2024-04-15 PROCEDURE — 83690 ASSAY OF LIPASE: CPT | Performed by: EMERGENCY MEDICINE

## 2024-04-15 PROCEDURE — 96361 HYDRATE IV INFUSION ADD-ON: CPT

## 2024-04-15 PROCEDURE — 96374 THER/PROPH/DIAG INJ IV PUSH: CPT

## 2024-04-15 PROCEDURE — 80053 COMPREHEN METABOLIC PANEL: CPT | Performed by: EMERGENCY MEDICINE

## 2024-04-15 PROCEDURE — 84484 ASSAY OF TROPONIN QUANT: CPT | Performed by: EMERGENCY MEDICINE

## 2024-04-15 RX ORDER — TRAMADOL HYDROCHLORIDE 50 MG/1
TABLET ORAL
Qty: 15 TABLET | Refills: 0 | Status: SHIPPED | OUTPATIENT
Start: 2024-04-15

## 2024-04-15 RX ORDER — ONDANSETRON 2 MG/ML
4 INJECTION INTRAMUSCULAR; INTRAVENOUS ONCE
Status: COMPLETED | OUTPATIENT
Start: 2024-04-15 | End: 2024-04-15

## 2024-04-15 RX ORDER — TRAMADOL HYDROCHLORIDE 50 MG/1
50 TABLET ORAL ONCE
Status: COMPLETED | OUTPATIENT
Start: 2024-04-15 | End: 2024-04-15

## 2024-04-15 RX ORDER — ACETAMINOPHEN 325 MG/1
650 TABLET ORAL ONCE
Status: COMPLETED | OUTPATIENT
Start: 2024-04-15 | End: 2024-04-15

## 2024-04-15 RX ADMIN — ACETAMINOPHEN 650 MG: 325 TABLET ORAL at 23:30

## 2024-04-15 RX ADMIN — SODIUM CHLORIDE 500 ML: 0.9 INJECTION, SOLUTION INTRAVENOUS at 20:03

## 2024-04-15 RX ADMIN — TRAMADOL HYDROCHLORIDE 50 MG: 50 TABLET, FILM COATED ORAL at 23:30

## 2024-04-15 RX ADMIN — ONDANSETRON 4 MG: 2 INJECTION INTRAMUSCULAR; INTRAVENOUS at 20:04

## 2024-04-15 NOTE — ED PROVIDER NOTES
History  Chief Complaint   Patient presents with    Nausea     Pt reported dry heaves, and dizziness earlier today, believes its from her lidocaine patches.      64 yo female has been using a lidocaine patch for right shoulder pain and tonight she got nauseous and lightheaded after she put it on.  She feels better since taking it off but is still a little nausea.  She also wants me to take out left finger stitches that were placed 13 days ago.  And she wants me to prescribe something else for her shoulder pain.  No vomiting.  No syncope.  No chest or belly pain.  No diarrhea.  Last BM today.      History provided by:  Patient   used: No    Nausea  The primary symptoms include nausea. Primary symptoms do not include fever, abdominal pain, vomiting, diarrhea, dysuria, myalgias or rash.   The illness does not include back pain.       Prior to Admission Medications   Prescriptions Last Dose Informant Patient Reported? Taking?   Biotin 5 MG TABS   Yes No   Sig: Take 1 tablet by mouth   Diclofenac Sodium (VOLTAREN) 1 %   No No   Sig: Apply 2 g topically 4 (four) times a day   Emollient (cetaphil) cream   No No   Sig: Apply topically as needed for dry skin   HYDROcodone-acetaminophen (NORCO) 5-325 mg per tablet   No No   Sig: Take 1 tablet by mouth every 6 (six) hours as needed for pain for up to 12 dosesMax Daily Amount: 4 tablets   Patient not taking: No sig reported   Multiple Vitamin (MULTIVITAMIN) tablet   Yes No   Sig: Take 1 tablet by mouth daily   albuterol (2.5 mg/3 mL) 0.083 % nebulizer solution  Other (Specify) Yes No   Sig: Take 2.5 mg by nebulization every 6 (six) hours as needed for wheezing or shortness of breath   aspirin 81 MG tablet   Yes No   Sig: Take 81 mg by mouth daily.   atorvastatin (LIPITOR) 20 mg tablet  Self Yes No   Sig: Take 40 mg by mouth daily   busPIRone (BUSPAR) 15 mg tablet  Other (Specify) Yes No   Sig: Take 15 mg by mouth 2 (two) times a day   cyclobenzaprine  (FLEXERIL) 10 mg tablet   Yes No   Sig: Take 10 mg by mouth 3 (three) times a day as needed for muscle spasms   Patient not taking: Reported on 8/3/2022   desvenlafaxine succinate (PRISTIQ) 50 mg 24 hr tablet   Yes No   Sig: Take 50 mg by mouth daily   fluticasone (FLONASE) 50 mcg/act nasal spray   Yes No   Si spray into each nostril daily   Patient not taking: Reported on 3/26/2024   hydrOXYzine pamoate (VISTARIL) 25 mg capsule   No No   Sig: Take 1 capsule (25 mg total) by mouth 3 (three) times a day as needed for anxiety for up to 4 days   ketotifen (ZADITOR) 0.025 % ophthalmic solution   Yes No   Si drop 2 (two) times a day.   Patient not taking: Reported on 8/3/2022   lamoTRIgine (LaMICtal) 150 MG tablet  Care Giver Yes No   Sig: Take 200 mg by mouth daily   lidocaine (Lidoderm) 5 %   No No   Sig: Apply 1 patch topically over 12 hours daily Remove & Discard patch within 12 hours or as directed by MD   loratadine (CLARITIN) 10 mg tablet   Yes No   Sig: Take 10 mg by mouth daily   lurasidone (LATUDA) 40 mg tablet   Yes No   Sig: Take 80 mg by mouth daily with dinner    Patient not taking: Reported on 8/3/2022   meclizine (ANTIVERT) 25 mg tablet   Yes No   Sig: Take 25 mg by mouth every 8 (eight) hours as needed for dizziness   Patient not taking: Reported on 8/3/2022   montelukast (SINGULAIR) 10 mg tablet   Yes No   Sig: Take 10 mg by mouth daily at bedtime.   naproxen (EC NAPROSYN) 500 MG EC tablet   No No   Sig: Take 1 tablet (500 mg total) by mouth 2 (two) times a day with meals   Patient not taking: No sig reported   neomycin-bacitracin-polymyxin (NEOSPORIN) 5-400-5,000 ointment   No No   Sig: Apply topically in the morning   pantoprazole (PROTONIX) 40 mg tablet   Yes No   Sig: Take 40 mg by mouth daily.   propranolol (INDERAL) 40 mg tablet   Yes No   Sig: Take 10 mg by mouth daily    tolterodine (DETROL LA) 4 mg 24 hr capsule   Yes No   Sig: Take 4 mg by mouth daily.   traZODone (DESYREL) 50 mg  tablet   Yes No   Sig: Take 100 mg by mouth daily at bedtime as needed    Patient not taking: Reported on 8/3/2022   umeclidinium bromide (INCRUSE ELLIPTA) 62.5 mcg/inh AEPB inhaler  Care Giver Yes No   Sig: Inhale 1 puff daily   venlafaxine (EFFEXOR-XR) 37.5 mg 24 hr capsule   Yes No   Sig: Take 75 mg by mouth daily     Patient not taking: Reported on 8/3/2022   ziprasidone (GEODON) 80 mg capsule   Yes No   Sig: Take 80 mg by mouth every evening      Facility-Administered Medications: None       Past Medical History:   Diagnosis Date    Asthma     Bladder incontinence     Chronic pain     back    COPD (chronic obstructive pulmonary disease) (formerly [facility])     GERD (gastroesophageal reflux disease)     Hyperlipidemia     Psychiatric disorder     bipolar       Past Surgical History:   Procedure Laterality Date    TUBAL LIGATION         Family History   Problem Relation Age of Onset    No Known Problems Mother     No Known Problems Sister     No Known Problems Daughter     No Known Problems Maternal Aunt      I have reviewed and agree with the history as documented.    E-Cigarette/Vaping    E-Cigarette Use Never User      E-Cigarette/Vaping Substances    Nicotine No     THC No     CBD No     Flavoring No     Other No     Unknown No      Social History     Tobacco Use    Smoking status: Every Day     Current packs/day: 1.00     Types: Cigarettes    Smokeless tobacco: Never   Vaping Use    Vaping status: Never Used   Substance Use Topics    Alcohol use: No    Drug use: No       Review of Systems   Constitutional: Negative.  Negative for fever.   HENT: Negative.     Eyes: Negative.    Respiratory: Negative.  Negative for cough and shortness of breath.    Cardiovascular: Negative.  Negative for chest pain.   Gastrointestinal:  Positive for nausea. Negative for abdominal pain, diarrhea and vomiting.   Genitourinary: Negative.  Negative for dysuria and flank pain.   Musculoskeletal: Negative.  Negative for back pain and myalgias.    Skin: Negative.  Negative for rash.   Neurological:  Positive for dizziness and light-headedness. Negative for headaches.   Hematological:  Does not bruise/bleed easily.   Psychiatric/Behavioral: Negative.     All other systems reviewed and are negative.      Physical Exam  Physical Exam  Vitals and nursing note reviewed.   Constitutional:       General: She is not in acute distress.     Appearance: She is well-developed. She is not ill-appearing or diaphoretic.   HENT:      Head: Normocephalic and atraumatic.   Eyes:      Extraocular Movements: Extraocular movements intact.      Conjunctiva/sclera: Conjunctivae normal.      Pupils: Pupils are equal, round, and reactive to light.   Cardiovascular:      Rate and Rhythm: Normal rate and regular rhythm.      Heart sounds: Normal heart sounds. No murmur heard.  Pulmonary:      Effort: Pulmonary effort is normal. No respiratory distress.      Breath sounds: Normal breath sounds.   Abdominal:      General: Bowel sounds are normal. There is no distension.      Palpations: Abdomen is soft.      Tenderness: There is no abdominal tenderness.   Musculoskeletal:         General: No deformity. Normal range of motion.      Cervical back: Normal range of motion and neck supple.      Right lower leg: No edema.      Left lower leg: No edema.   Skin:     General: Skin is warm and dry.      Coloration: Skin is not pale.      Findings: No rash.      Comments: Left index fingers - 3 sutures in place, wound looks fine   Neurological:      General: No focal deficit present.      Mental Status: She is alert and oriented to person, place, and time.      Cranial Nerves: No cranial nerve deficit.      Motor: No weakness.   Psychiatric:         Mood and Affect: Mood normal.         Behavior: Behavior normal.         Vital Signs  ED Triage Vitals [04/15/24 1928]   Temperature Pulse Respirations Blood Pressure SpO2   98.2 °F (36.8 °C) 82 18 158/91 98 %      Temp Source Heart Rate Source  Patient Position - Orthostatic VS BP Location FiO2 (%)   Oral Monitor Sitting Left arm --      Pain Score       No Pain           Vitals:    04/15/24 2045 04/15/24 2115 04/15/24 2145 04/15/24 2215   BP: 127/73 109/65 122/81 128/74   Pulse: 74 76 80 78   Patient Position - Orthostatic VS: Sitting Sitting Sitting          Visual Acuity      ED Medications  Medications   sodium chloride 0.9 % bolus 500 mL (0 mL Intravenous Stopped 4/15/24 2042)   ondansetron (ZOFRAN) injection 4 mg (4 mg Intravenous Given 4/15/24 2004)       Diagnostic Studies  Results Reviewed       Procedure Component Value Units Date/Time    HS Troponin I 2hr [[id number]]  (Normal) Collected: 04/15/24 2222    Lab Status: Final result Specimen: Blood from Arm, Left Updated: 04/15/24 2257     hs TnI 2hr 7 ng/L      Delta 2hr hsTnI -1 ng/L     HS Troponin I 4hr [[id number]]     Lab Status: No result Specimen: Blood     HS Troponin 0hr (reflex protocol) [[id number]]  (Normal) Collected: 04/15/24 2003    Lab Status: Final result Specimen: Blood from Arm, Left Updated: 04/15/24 2036     hs TnI 0hr 8 ng/L     Comprehensive metabolic panel [[id number]]  (Abnormal) Collected: 04/15/24 2003    Lab Status: Final result Specimen: Blood from Arm, Left Updated: 04/15/24 2029     Sodium 139 mmol/L      Potassium 3.9 mmol/L      Chloride 103 mmol/L      CO2 28 mmol/L      ANION GAP 8 mmol/L      BUN 20 mg/dL      Creatinine 0.82 mg/dL      Glucose 111 mg/dL      Calcium 9.4 mg/dL      AST 17 U/L      ALT 14 U/L      Alkaline Phosphatase 110 U/L      Total Protein 6.8 g/dL      Albumin 3.7 g/dL      Total Bilirubin 0.36 mg/dL      eGFR 76 ml/min/1.73sq m     Narrative:      [employer] guidelines for Chronic Kidney Disease (CKD):     Stage 1 with normal or high GFR (GFR > 90 mL/min/1.73 square meters)    Stage 2 Mild CKD (GFR = 60-89 mL/min/1.73 square meters)    Stage 3A Moderate CKD (GFR = 45-59 mL/min/1.73 square meters)    Stage 3B Moderate  CKD (GFR = 30-44 mL/min/1.73 square meters)    Stage 4 Severe CKD (GFR = 15-29 mL/min/1.73 square meters)    Stage 5 End Stage CKD (GFR <15 mL/min/1.73 square meters)  Note: GFR calculation is accurate only with a steady state creatinine    Lipase [[id number]]  (Normal) Collected: 04/15/24 2003    Lab Status: Final result Specimen: Blood from Arm, Left Updated: 04/15/24 2029     Lipase 20 u/L     CBC and differential [[id number]]  (Abnormal) Collected: 04/15/24 2003    Lab Status: Final result Specimen: Blood from Arm, Left Updated: 04/15/24 2012     WBC 10.52 Thousand/uL      RBC 4.23 Million/uL      Hemoglobin 12.3 g/dL      Hematocrit 37.8 %      MCV 89 fL      MCH 29.1 pg      MCHC 32.5 g/dL      RDW 15.4 %      MPV 8.7 fL      Platelets 450 Thousands/uL      nRBC 0 /100 WBCs      Segmented % 60 %      Immature Grans % 0 %      Lymphocytes % 29 %      Monocytes % 8 %      Eosinophils Relative 2 %      Basophils Relative 1 %      Absolute Neutrophils 6.37 Thousands/µL      Absolute Immature Grans 0.04 Thousand/uL      Absolute Lymphocytes 3.03 Thousands/µL      Absolute Monocytes 0.84 Thousand/µL      Eosinophils Absolute 0.17 Thousand/µL      Basophils Absolute 0.07 Thousands/µL                    No orders to display              Procedures  Procedures         ED Course                                             Medical Decision Making  Pt. Feels better, evaluation negative.  Will discharge.  She has appointment with her doctor in 2 days.  Advised rest, fluids.  She specifically asking for pain medicine so will try a few ultram but advised she should try to stick with tylenol.    Amount and/or Complexity of Data Reviewed  Labs: ordered.    Risk  Prescription drug management.             Disposition  Final diagnoses:   Nausea   Lightheadedness   Shoulder pain   Visit for suture removal     Time reflects when diagnosis was documented in both MDM as applicable and the Disposition within this note       Time User  Action Codes Description Comment    4/15/2024 11:00 PM April Dowling Add [R11.0] Nausea     4/15/2024 11:00 PM April Dowling Add [R42] Lightheadedness     4/15/2024 11:00 PM April Dowling Add [M25.519] Shoulder pain     4/15/2024 11:01 PM April Dowling Add [Z48.02] Visit for suture removal           ED Disposition       ED Disposition   Discharge    Condition   Stable    Date/Time   Mon Apr 15, 2024 11:00 PM    Comment   Rosmery Vargas discharge to home/self care.                   Follow-up Information    None         Patient's Medications   Discharge Prescriptions    TRAMADOL (ULTRAM) 50 MG TABLET    1 po tid prn pain       Start Date: 4/15/2024 End Date: --       Order Dose: --       Quantity: 15 tablet    Refills: 0       No discharge procedures on file.    PDMP Review       None            ED Provider  Electronically Signed by             April Dowling MD  04/15/24 4504

## 2024-04-16 NOTE — DISCHARGE INSTRUCTIONS
Your tests are ok at this time.  Rest as needed.  Keep hydrated with fluids.  You can try the ultram for severe pain but it may be safer to stick with tylenol for pain.  See your doctor as planned.

## 2024-04-21 LAB
ATRIAL RATE: 77 BPM
P AXIS: 17 DEGREES
PR INTERVAL: 174 MS
QRS AXIS: -19 DEGREES
QRSD INTERVAL: 94 MS
QT INTERVAL: 390 MS
QTC INTERVAL: 441 MS
T WAVE AXIS: 58 DEGREES
VENTRICULAR RATE: 77 BPM

## 2024-04-21 PROCEDURE — 93010 ELECTROCARDIOGRAM REPORT: CPT | Performed by: INTERNAL MEDICINE

## 2024-04-22 ENCOUNTER — APPOINTMENT (EMERGENCY)
Dept: RADIOLOGY | Facility: HOSPITAL | Age: 64
End: 2024-04-22
Payer: COMMERCIAL

## 2024-04-22 ENCOUNTER — HOSPITAL ENCOUNTER (EMERGENCY)
Facility: HOSPITAL | Age: 64
Discharge: HOME | End: 2024-04-22
Attending: EMERGENCY MEDICINE
Payer: COMMERCIAL

## 2024-04-22 VITALS
SYSTOLIC BLOOD PRESSURE: 174 MMHG | HEART RATE: 83 BPM | TEMPERATURE: 98.4 F | DIASTOLIC BLOOD PRESSURE: 88 MMHG | OXYGEN SATURATION: 94 % | RESPIRATION RATE: 18 BRPM

## 2024-04-22 DIAGNOSIS — J44.1 COPD EXACERBATION (HCC): Primary | ICD-10-CM

## 2024-04-22 LAB
ALBUMIN SERPL BCP-MCNC: 3.9 G/DL (ref 3.5–5)
ALP SERPL-CCNC: 94 U/L (ref 34–104)
ALT SERPL W P-5'-P-CCNC: 14 U/L (ref 7–52)
ANION GAP SERPL CALCULATED.3IONS-SCNC: 9 MMOL/L (ref 4–13)
AST SERPL W P-5'-P-CCNC: 19 U/L (ref 13–39)
BASOPHILS # BLD AUTO: 0.06 THOUSANDS/ÂΜL (ref 0–0.1)
BASOPHILS NFR BLD AUTO: 1 % (ref 0–1)
BILIRUB SERPL-MCNC: 0.5 MG/DL (ref 0.2–1)
BUN SERPL-MCNC: 11 MG/DL (ref 5–25)
CALCIUM SERPL-MCNC: 9.2 MG/DL (ref 8.4–10.2)
CHLORIDE SERPL-SCNC: 99 MMOL/L (ref 96–108)
CO2 SERPL-SCNC: 29 MMOL/L (ref 21–32)
CREAT SERPL-MCNC: 0.68 MG/DL (ref 0.6–1.3)
EOSINOPHIL # BLD AUTO: 0.23 THOUSAND/ÂΜL (ref 0–0.61)
EOSINOPHIL NFR BLD AUTO: 4 % (ref 0–6)
ERYTHROCYTE [DISTWIDTH] IN BLOOD BY AUTOMATED COUNT: 15.4 % (ref 11.6–15.1)
FLUAV RNA RESP QL NAA+PROBE: NEGATIVE
FLUBV RNA RESP QL NAA+PROBE: NEGATIVE
GFR SERPL CREATININE-BSD FRML MDRD: 94 ML/MIN/1.73SQ M
GLUCOSE SERPL-MCNC: 87 MG/DL (ref 65–140)
HCT VFR BLD AUTO: 37.8 % (ref 34.8–46.1)
HGB BLD-MCNC: 12.2 G/DL (ref 11.5–15.4)
IMM GRANULOCYTES # BLD AUTO: 0.02 THOUSAND/UL (ref 0–0.2)
IMM GRANULOCYTES NFR BLD AUTO: 0 % (ref 0–2)
LYMPHOCYTES # BLD AUTO: 1.04 THOUSANDS/ÂΜL (ref 0.6–4.47)
LYMPHOCYTES NFR BLD AUTO: 19 % (ref 14–44)
MCH RBC QN AUTO: 29 PG (ref 26.8–34.3)
MCHC RBC AUTO-ENTMCNC: 32.3 G/DL (ref 31.4–37.4)
MCV RBC AUTO: 90 FL (ref 82–98)
MONOCYTES # BLD AUTO: 0.78 THOUSAND/ÂΜL (ref 0.17–1.22)
MONOCYTES NFR BLD AUTO: 15 % (ref 4–12)
NEUTROPHILS # BLD AUTO: 3.24 THOUSANDS/ÂΜL (ref 1.85–7.62)
NEUTS SEG NFR BLD AUTO: 61 % (ref 43–75)
NRBC BLD AUTO-RTO: 0 /100 WBCS
PLATELET # BLD AUTO: 473 THOUSANDS/UL (ref 149–390)
PMV BLD AUTO: 9.3 FL (ref 8.9–12.7)
POTASSIUM SERPL-SCNC: 3.9 MMOL/L (ref 3.5–5.3)
PROT SERPL-MCNC: 6.9 G/DL (ref 6.4–8.4)
RBC # BLD AUTO: 4.2 MILLION/UL (ref 3.81–5.12)
RSV RNA RESP QL NAA+PROBE: NEGATIVE
SARS-COV-2 RNA RESP QL NAA+PROBE: NEGATIVE
SODIUM SERPL-SCNC: 137 MMOL/L (ref 135–147)
WBC # BLD AUTO: 5.37 THOUSAND/UL (ref 4.31–10.16)

## 2024-04-22 PROCEDURE — 93005 ELECTROCARDIOGRAM TRACING: CPT

## 2024-04-22 PROCEDURE — 96374 THER/PROPH/DIAG INJ IV PUSH: CPT

## 2024-04-22 PROCEDURE — 36415 COLL VENOUS BLD VENIPUNCTURE: CPT | Performed by: EMERGENCY MEDICINE

## 2024-04-22 PROCEDURE — 80053 COMPREHEN METABOLIC PANEL: CPT | Performed by: EMERGENCY MEDICINE

## 2024-04-22 PROCEDURE — 71045 X-RAY EXAM CHEST 1 VIEW: CPT

## 2024-04-22 PROCEDURE — 94640 AIRWAY INHALATION TREATMENT: CPT

## 2024-04-22 PROCEDURE — 96375 TX/PRO/DX INJ NEW DRUG ADDON: CPT

## 2024-04-22 PROCEDURE — 99285 EMERGENCY DEPT VISIT HI MDM: CPT | Performed by: EMERGENCY MEDICINE

## 2024-04-22 PROCEDURE — 0241U HB NFCT DS VIR RESP RNA 4 TRGT: CPT | Performed by: EMERGENCY MEDICINE

## 2024-04-22 PROCEDURE — 85025 COMPLETE CBC W/AUTO DIFF WBC: CPT | Performed by: EMERGENCY MEDICINE

## 2024-04-22 PROCEDURE — 99285 EMERGENCY DEPT VISIT HI MDM: CPT

## 2024-04-22 RX ORDER — IPRATROPIUM BROMIDE AND ALBUTEROL SULFATE 2.5; .5 MG/3ML; MG/3ML
3 SOLUTION RESPIRATORY (INHALATION) ONCE
Status: COMPLETED | OUTPATIENT
Start: 2024-04-22 | End: 2024-04-22

## 2024-04-22 RX ORDER — PREDNISONE 20 MG/1
40 TABLET ORAL DAILY
Qty: 14 TABLET | Refills: 0 | Status: SHIPPED | OUTPATIENT
Start: 2024-04-22 | End: 2024-04-29

## 2024-04-22 RX ORDER — FAMOTIDINE 10 MG/ML
20 INJECTION, SOLUTION INTRAVENOUS ONCE
Status: COMPLETED | OUTPATIENT
Start: 2024-04-22 | End: 2024-04-22

## 2024-04-22 RX ORDER — ACETAMINOPHEN 10 MG/ML
1000 INJECTION, SOLUTION INTRAVENOUS ONCE
Status: COMPLETED | OUTPATIENT
Start: 2024-04-22 | End: 2024-04-22

## 2024-04-22 RX ORDER — PREDNISONE 20 MG/1
40 TABLET ORAL ONCE
Status: COMPLETED | OUTPATIENT
Start: 2024-04-22 | End: 2024-04-22

## 2024-04-22 RX ADMIN — IPRATROPIUM BROMIDE AND ALBUTEROL SULFATE 3 ML: .5; 3 SOLUTION RESPIRATORY (INHALATION) at 17:58

## 2024-04-22 RX ADMIN — PREDNISONE 40 MG: 20 TABLET ORAL at 16:59

## 2024-04-22 RX ADMIN — FAMOTIDINE 20 MG: 10 INJECTION, SOLUTION INTRAVENOUS at 18:01

## 2024-04-22 RX ADMIN — ACETAMINOPHEN 1000 MG: 10 INJECTION INTRAVENOUS at 18:02

## 2024-04-22 RX ADMIN — IPRATROPIUM BROMIDE AND ALBUTEROL SULFATE 3 ML: .5; 3 SOLUTION RESPIRATORY (INHALATION) at 16:59

## 2024-04-22 NOTE — DISCHARGE INSTRUCTIONS
Use the prescribed prednisone for the next week.  We have provided you a spacer to go with your inhaler.  Continue breathing treatments with nebulizer.  If you feel any significant worsening shortness of breath, chest pain, worsening productive cough, shaking chills, return to the emergency department.

## 2024-04-22 NOTE — ED PROVIDER NOTES
History  Chief Complaint   Patient presents with    Shortness of Breath     Pt states she has been short of breathe since yesterday afternoon took her albuterol at 2pm     HPI  Patient is a 63-year-old female history of COPD on no home with requirement, hyperlipidemia, bipolar disorder presenting for evaluation of 2 days of cough, wheezing, shortness of breath.  Patient states that the cough has been productive of white sputum.  Patient denies fevers, chills, rigors, constitutional symptoms.  Patient states that she has been using an inhaler prescribed by her primary care provider with transient relief of shortness of breath but worsening of the cough.  Patient denies any recent travel or sick contacts.  Patient denies chest pain.  Prior to Admission Medications   Prescriptions Last Dose Informant Patient Reported? Taking?   Biotin 5 MG TABS   Yes No   Sig: Take 1 tablet by mouth   Diclofenac Sodium (VOLTAREN) 1 %   No No   Sig: Apply 2 g topically 4 (four) times a day   Emollient (cetaphil) cream   No No   Sig: Apply topically as needed for dry skin   HYDROcodone-acetaminophen (NORCO) 5-325 mg per tablet   No No   Sig: Take 1 tablet by mouth every 6 (six) hours as needed for pain for up to 12 dosesMax Daily Amount: 4 tablets   Patient not taking: No sig reported   Multiple Vitamin (MULTIVITAMIN) tablet   Yes No   Sig: Take 1 tablet by mouth daily   albuterol (2.5 mg/3 mL) 0.083 % nebulizer solution  Other (Specify) Yes No   Sig: Take 2.5 mg by nebulization every 6 (six) hours as needed for wheezing or shortness of breath   aspirin 81 MG tablet   Yes No   Sig: Take 81 mg by mouth daily.   atorvastatin (LIPITOR) 20 mg tablet  Self Yes No   Sig: Take 40 mg by mouth daily   busPIRone (BUSPAR) 15 mg tablet  Other (Specify) Yes No   Sig: Take 15 mg by mouth 2 (two) times a day   cyclobenzaprine (FLEXERIL) 10 mg tablet   Yes No   Sig: Take 10 mg by mouth 3 (three) times a day as needed for muscle spasms   Patient not  taking: Reported on 8/3/2022   desvenlafaxine succinate (PRISTIQ) 50 mg 24 hr tablet   Yes No   Sig: Take 50 mg by mouth daily   fluticasone (FLONASE) 50 mcg/act nasal spray   Yes No   Si spray into each nostril daily   Patient not taking: Reported on 3/26/2024   hydrOXYzine pamoate (VISTARIL) 25 mg capsule   No No   Sig: Take 1 capsule (25 mg total) by mouth 3 (three) times a day as needed for anxiety for up to 4 days   ketotifen (ZADITOR) 0.025 % ophthalmic solution   Yes No   Si drop 2 (two) times a day.   Patient not taking: Reported on 8/3/2022   lamoTRIgine (LaMICtal) 150 MG tablet  Care Giver Yes No   Sig: Take 200 mg by mouth daily   lidocaine (Lidoderm) 5 %   No No   Sig: Apply 1 patch topically over 12 hours daily Remove & Discard patch within 12 hours or as directed by MD   loratadine (CLARITIN) 10 mg tablet   Yes No   Sig: Take 10 mg by mouth daily   lurasidone (LATUDA) 40 mg tablet   Yes No   Sig: Take 80 mg by mouth daily with dinner    Patient not taking: Reported on 8/3/2022   meclizine (ANTIVERT) 25 mg tablet   Yes No   Sig: Take 25 mg by mouth every 8 (eight) hours as needed for dizziness   Patient not taking: Reported on 8/3/2022   montelukast (SINGULAIR) 10 mg tablet   Yes No   Sig: Take 10 mg by mouth daily at bedtime.   naproxen (EC NAPROSYN) 500 MG EC tablet   No No   Sig: Take 1 tablet (500 mg total) by mouth 2 (two) times a day with meals   Patient not taking: No sig reported   neomycin-bacitracin-polymyxin (NEOSPORIN) 5-400-5,000 ointment   No No   Sig: Apply topically in the morning   pantoprazole (PROTONIX) 40 mg tablet   Yes No   Sig: Take 40 mg by mouth daily.   propranolol (INDERAL) 40 mg tablet   Yes No   Sig: Take 10 mg by mouth daily    tolterodine (DETROL LA) 4 mg 24 hr capsule   Yes No   Sig: Take 4 mg by mouth daily.   traMADol (ULTRAM) 50 mg tablet   No No   Si po tid prn pain   traZODone (DESYREL) 50 mg tablet   Yes No   Sig: Take 100 mg by mouth daily at bedtime as  needed    Patient not taking: Reported on 8/3/2022   umeclidinium bromide (INCRUSE ELLIPTA) 62.5 mcg/inh AEPB inhaler  Care Giver Yes No   Sig: Inhale 1 puff daily   venlafaxine (EFFEXOR-XR) 37.5 mg 24 hr capsule   Yes No   Sig: Take 75 mg by mouth daily     Patient not taking: Reported on 8/3/2022   ziprasidone (GEODON) 80 mg capsule   Yes No   Sig: Take 80 mg by mouth every evening      Facility-Administered Medications: None       Past Medical History:   Diagnosis Date    Asthma     Bladder incontinence     Chronic pain     back    COPD (chronic obstructive pulmonary disease) ([facility])     GERD (gastroesophageal reflux disease)     Hyperlipidemia     Psychiatric disorder     bipolar       Past Surgical History:   Procedure Laterality Date    TUBAL LIGATION         Family History   Problem Relation Age of Onset    No Known Problems Mother     No Known Problems Sister     No Known Problems Daughter     No Known Problems Maternal Aunt      I have reviewed and agree with the history as documented.    E-Cigarette/Vaping    E-Cigarette Use Never User      E-Cigarette/Vaping Substances    Nicotine No     THC No     CBD No     Flavoring No     Other No     Unknown No      Social History     Tobacco Use    Smoking status: Former     Current packs/day: 1.00     Types: Cigarettes    Smokeless tobacco: Never   Vaping Use    Vaping status: Never Used   Substance Use Topics    Alcohol use: No    Drug use: No       Review of Systems   Constitutional:  Negative for chills, fatigue and fever.   Respiratory:  Positive for cough, shortness of breath and wheezing. Negative for chest tightness.    Cardiovascular:  Negative for chest pain.   Gastrointestinal:  Negative for diarrhea, nausea and vomiting.   Musculoskeletal:  Negative for arthralgias and myalgias.   Neurological:  Negative for headaches.   Psychiatric/Behavioral:  Negative for confusion.    All other systems reviewed and are negative.      Physical Exam  Physical  Exam  Vitals and nursing note reviewed.   Constitutional:       General: She is not in acute distress.     Appearance: Normal appearance. She is not ill-appearing, toxic-appearing or diaphoretic.      Comments: Well-appearing, nontoxic with nondistressed   HENT:      Head: Normocephalic and atraumatic.      Comments: Moist mucous membranes     Right Ear: External ear normal.      Left Ear: External ear normal.   Eyes:      General:         Right eye: No discharge.         Left eye: No discharge.   Cardiovascular:      Comments: Regular rate and rhythm, no murmurs rubs or gallops.  Extremities warm and well-perfused without mottling  Pulmonary:      Effort: No respiratory distress.      Comments: Mildly increased work of breathing, respiratory rate in the mid 20s.  Diffuse wheezing in all lung fields.  No rales or rhonchi.  Satting 94% on room air indicating adequate oxygenation  Abdominal:      General: There is no distension.      Comments: Abdomen soft, nontender, nondistended without rigidity, rebound, guarding   Musculoskeletal:         General: No deformity.      Cervical back: Normal range of motion.      Comments: No lower extremity swelling, erythema, or calf tenderness   Skin:     Findings: No lesion or rash.   Neurological:      Mental Status: She is alert and oriented to person, place, and time. Mental status is at baseline.      Comments: Awake, alert, pleasant, interactive   Psychiatric:         Mood and Affect: Mood and affect normal.         Vital Signs  ED Triage Vitals [04/22/24 1625]   Temperature Pulse Respirations Blood Pressure SpO2   98.4 °F (36.9 °C) 83 18 (!) 174/88 94 %      Temp Source Heart Rate Source Patient Position - Orthostatic VS BP Location FiO2 (%)   Temporal Monitor Lying Right arm --      Pain Score       --           Vitals:    04/22/24 1625   BP: (!) 174/88   Pulse: 83   Patient Position - Orthostatic VS: Lying         Visual Acuity      ED Medications  Medications    ipratropium-albuterol (DUO-NEB) 0.5-2.5 mg/3 mL inhalation solution 3 mL (3 mL Nebulization Given 4/22/24 1659)   predniSONE tablet 40 mg (40 mg Oral Given 4/22/24 1659)   ipratropium-albuterol (DUO-NEB) 0.5-2.5 mg/3 mL inhalation solution 3 mL (3 mL Nebulization Given 4/22/24 1758)   Famotidine (PF) (PEPCID) injection 20 mg (20 mg Intravenous Given 4/22/24 1801)   acetaminophen (Ofirmev) injection 1,000 mg (1,000 mg Intravenous New Bag 4/22/24 1802)       Diagnostic Studies  Results Reviewed       Procedure Component Value Units Date/Time    COVID/FLU/RSV [[id number]]  (Normal) Collected: 04/22/24 1659    Lab Status: Final result Specimen: Nares from Nose Updated: 04/22/24 1754     SARS-CoV-2 Negative     INFLUENZA A PCR Negative     INFLUENZA B PCR Negative     RSV PCR Negative    Narrative:      FOR PEDIATRIC PATIENTS - copy/paste COVID Guidelines URL to browser: [email or web address]    SARS-CoV-2 assay is a Nucleic Acid Amplification assay intended for the  qualitative detection of nucleic acid from SARS-CoV-2 in nasopharyngeal  swabs. Results are for the presumptive identification of SARS-CoV-2 RNA.    Positive results are indicative of infection with SARS-CoV-2, the virus  causing COVID-19, but do not rule out bacterial infection or co-infection  with other viruses. Laboratories within the [place] and its  territories are required to report all positive results to the appropriate  public health authorities. Negative results do not preclude SARS-CoV-2  infection and should not be used as the sole basis for treatment or other  patient management decisions. Negative results must be combined with  clinical observations, patient history, and epidemiological information.  This test has not been FDA cleared or approved.    This test has been authorized by FDA under an Emergency Use Authorization  (EUA). This test is only authorized for the duration of time  the  declaration that circumstances exist justifying the authorization of the  emergency use of an in vitro diagnostic tests for detection of SARS-CoV-2  virus and/or diagnosis of COVID-19 infection under section 564(b)(1) of  the Act, 21 U.S.C. 360bbb-3(b)(1), unless the authorization is terminated  or revoked sooner. The test has been validated but independent review by FDA  and CLIA is pending.    Test performed using [employer]pert: This RT-PCR assay targets N2,  a region unique to SARS-CoV-2. A conserved region in the E-gene was chosen  for pan-Sarbecovirus detection which includes SARS-CoV-2.    According to CMS-2020-01-R, this platform meets the definition of high-throughput technology.    Comprehensive metabolic panel [[id number]] Collected: 04/22/24 1659    Lab Status: Final result Specimen: Blood from Arm, Left Updated: 04/22/24 1725     Sodium 137 mmol/L      Potassium 3.9 mmol/L      Chloride 99 mmol/L      CO2 29 mmol/L      ANION GAP 9 mmol/L      BUN 11 mg/dL      Creatinine 0.68 mg/dL      Glucose 87 mg/dL      Calcium 9.2 mg/dL      AST 19 U/L      ALT 14 U/L      Alkaline Phosphatase 94 U/L      Total Protein 6.9 g/dL      Albumin 3.9 g/dL      Total Bilirubin 0.50 mg/dL      eGFR 94 ml/min/1.73sq m     Narrative:      National Kidney Disease Foundation guidelines for Chronic Kidney Disease (CKD):     Stage 1 with normal or high GFR (GFR > 90 mL/min/1.73 square meters)    Stage 2 Mild CKD (GFR = 60-89 mL/min/1.73 square meters)    Stage 3A Moderate CKD (GFR = 45-59 mL/min/1.73 square meters)    Stage 3B Moderate CKD (GFR = 30-44 mL/min/1.73 square meters)    Stage 4 Severe CKD (GFR = 15-29 mL/min/1.73 square meters)    Stage 5 End Stage CKD (GFR <15 mL/min/1.73 square meters)  Note: GFR calculation is accurate only with a steady state creatinine    CBC and differential [[id number]]  (Abnormal) Collected: 04/22/24 1659    Lab Status: Final result Specimen: Blood from Arm, Left Updated: 04/22/24  1708     WBC 5.37 Thousand/uL      RBC 4.20 Million/uL      Hemoglobin 12.2 g/dL      Hematocrit 37.8 %      MCV 90 fL      MCH 29.0 pg      MCHC 32.3 g/dL      RDW 15.4 %      MPV 9.3 fL      Platelets 473 Thousands/uL      nRBC 0 /100 WBCs      Segmented % 61 %      Immature Grans % 0 %      Lymphocytes % 19 %      Monocytes % 15 %      Eosinophils Relative 4 %      Basophils Relative 1 %      Absolute Neutrophils 3.24 Thousands/µL      Absolute Immature Grans 0.02 Thousand/uL      Absolute Lymphocytes 1.04 Thousands/µL      Absolute Monocytes 0.78 Thousand/µL      Eosinophils Absolute 0.23 Thousand/µL      Basophils Absolute 0.06 Thousands/µL                    XR chest 1 view portable   ED Interpretation by Moe Trejo MD (04/22 1753)   No acute cardiopulmonary abnormality.                 Procedures  Procedures         ED Course                               SBIRT 20yo+      Flowsheet Row Most Recent Value   Initial Alcohol Screen: US AUDIT-C     1. How often do you have a drink containing alcohol? 0 Filed at: 04/22/2024 1626   2. How many drinks containing alcohol do you have on a typical day you are drinking?  0 Filed at: 04/22/2024 1626   3b. FEMALE Any Age, or MALE 65+: How often do you have 4 or more drinks on one occassion? 0 Filed at: 04/22/2024 1626   Audit-C Score 0 Filed at: 04/22/2024 1626   JOHN: How many times in the past year have you...    Used an illegal drug or used a prescription medication for non-medical reasons? Never Filed at: 04/22/2024 1626                      Medical Decision Making  I obtained history from the patient.  I reviewed external medical documentation.  Patient with 5 evaluations in the last month for multiple complaints including shortness of breath, knee pain, nausea, finger injury.  Patient with presentation concerning for COPD exacerbation given her diffuse wheezing, shortness of breath, cough.  I ordered and independently interpreted chest x-ray to evaluate for  pneumonia, pneumothorax, or effusion.  I ordered and reviewed lab work including CBC, CMP.  I ordered a COVID, flu, RSV swab.  I treated patient with DuoNeb x 2 and prednisone.  Patient states that she feels a lot better after 2 breathing treatments.  Saturations remaining above 92% on ambulatory pulse ox without significant dyspnea.  Provided with spacer, prescription for prednisone, instructions to continue with home breathing treatments.  Discharged with return precautions and instructions to follow-up with primary care provider in the next week for reassessment.    Amount and/or Complexity of Data Reviewed  Labs: ordered.  Radiology: ordered and independent interpretation performed.    Risk  Prescription drug management.             Disposition  Final diagnoses:   COPD exacerbation (HCC)     Time reflects when diagnosis was documented in both MDM as applicable and the Disposition within this note       Time User Action Codes Description Comment    4/22/2024  6:22 PM Moe Trejo Add [J44.1] COPD exacerbation (HCC)           ED Disposition       ED Disposition   Discharge    Condition   Stable    Date/Time   Mon Apr 22, 2024 1821    Comment   Rosmery Vargas discharge to home/self care.                   Follow-up Information       Follow up With Specialties Details Why Contact Info Additional Information    [facility] Emergency Department Emergency Medicine  If symptoms worsen [address] [place] [id number]  [phone] [facility] Emergency Department, [address], [place], [address]            Patient's Medications   Discharge Prescriptions    PREDNISONE 20 MG TABLET    Take 2 tablets (40 mg total) by mouth daily for 7 days       Start Date: 4/22/2024 End Date: 4/29/2024       Order Dose: 40 mg       Quantity: 14 tablet    Refills: 0       No discharge procedures on file.    PDMP Review       None            ED Provider  Electronically  Signed by             Moe Trejo MD  04/22/24 1829

## 2024-04-24 LAB
ATRIAL RATE: 81 BPM
P AXIS: 25 DEGREES
PR INTERVAL: 134 MS
QRS AXIS: -3 DEGREES
QRSD INTERVAL: 90 MS
QT INTERVAL: 400 MS
QTC INTERVAL: 464 MS
T WAVE AXIS: 63 DEGREES
VENTRICULAR RATE: 81 BPM

## 2024-04-24 PROCEDURE — 93010 ELECTROCARDIOGRAM REPORT: CPT | Performed by: INTERNAL MEDICINE

## 2024-06-19 ENCOUNTER — APPOINTMENT (EMERGENCY)
Dept: RADIOLOGY | Facility: HOSPITAL | Age: 64
End: 2024-06-19
Payer: COMMERCIAL

## 2024-06-19 ENCOUNTER — HOSPITAL ENCOUNTER (EMERGENCY)
Facility: HOSPITAL | Age: 64
Discharge: HOME | End: 2024-06-19
Attending: STUDENT IN AN ORGANIZED HEALTH CARE EDUCATION/TRAINING PROGRAM
Payer: COMMERCIAL

## 2024-06-19 VITALS
HEART RATE: 80 BPM | RESPIRATION RATE: 20 BRPM | TEMPERATURE: 98.7 F | SYSTOLIC BLOOD PRESSURE: 119 MMHG | OXYGEN SATURATION: 98 % | DIASTOLIC BLOOD PRESSURE: 65 MMHG

## 2024-06-19 DIAGNOSIS — S62.009A SCAPHOID FRACTURE: Primary | ICD-10-CM

## 2024-06-19 PROCEDURE — 99284 EMERGENCY DEPT VISIT MOD MDM: CPT | Performed by: STUDENT IN AN ORGANIZED HEALTH CARE EDUCATION/TRAINING PROGRAM

## 2024-06-19 PROCEDURE — 99283 EMERGENCY DEPT VISIT LOW MDM: CPT

## 2024-06-19 PROCEDURE — 73110 X-RAY EXAM OF WRIST: CPT

## 2024-06-19 PROCEDURE — 73130 X-RAY EXAM OF HAND: CPT

## 2024-06-19 RX ORDER — OXYCODONE HYDROCHLORIDE 5 MG/1
5 TABLET ORAL EVERY 6 HOURS PRN
Qty: 7 TABLET | Refills: 0 | Status: SHIPPED | OUTPATIENT
Start: 2024-06-19

## 2024-06-19 RX ORDER — OXYCODONE HYDROCHLORIDE 5 MG/1
5 TABLET ORAL ONCE
Status: COMPLETED | OUTPATIENT
Start: 2024-06-19 | End: 2024-06-19

## 2024-06-19 RX ADMIN — OXYCODONE HYDROCHLORIDE 5 MG: 5 TABLET ORAL at 16:53

## 2024-06-19 NOTE — DISCHARGE INSTRUCTIONS
You were seen in the ED for a wrist/hand injury. Your Xray showed a possible fracture. You were placed in a splint. Please follow up with orthopedics.  Please call to schedule an appointment. Return to the ED for any worsening pain, numbness, tingling or for any other new or concerning symptoms.     Oxycodone was sent to your pharmacy. Please take as prescribed. Do not drink or drive on this medicaiton.

## 2024-06-19 NOTE — ED PROVIDER NOTES
History  Chief Complaint   Patient presents with    Hand Pain     Pt accidentally slammed l hand on the door now reports of l hand pain     Pt is a 62 YO F, Pmhx including COPD, GERD, who presents to the ED for left hand pain. States occurred just prior to arrival. Was trying to open her house door when she accidentally got her left hand caught in the door. Now presents for further evaluation. Pain worse with movement and palpation, releived with rest. No other modifying factors. No numbness. No other associated symptoms.           Prior to Admission Medications   Prescriptions Last Dose Informant Patient Reported? Taking?   Biotin 5 MG TABS   Yes No   Sig: Take 1 tablet by mouth   Diclofenac Sodium (VOLTAREN) 1 %   No No   Sig: Apply 2 g topically 4 (four) times a day   Emollient (cetaphil) cream   No No   Sig: Apply topically as needed for dry skin   HYDROcodone-acetaminophen (NORCO) 5-325 mg per tablet   No No   Sig: Take 1 tablet by mouth every 6 (six) hours as needed for pain for up to 12 dosesMax Daily Amount: 4 tablets   Patient not taking: No sig reported   Multiple Vitamin (MULTIVITAMIN) tablet   Yes No   Sig: Take 1 tablet by mouth daily   albuterol (2.5 mg/3 mL) 0.083 % nebulizer solution  Other (Specify) Yes No   Sig: Take 2.5 mg by nebulization every 6 (six) hours as needed for wheezing or shortness of breath   aspirin 81 MG tablet   Yes No   Sig: Take 81 mg by mouth daily.   atorvastatin (LIPITOR) 20 mg tablet  Self Yes No   Sig: Take 40 mg by mouth daily   busPIRone (BUSPAR) 15 mg tablet  Other (Specify) Yes No   Sig: Take 15 mg by mouth 2 (two) times a day   cyclobenzaprine (FLEXERIL) 10 mg tablet   Yes No   Sig: Take 10 mg by mouth 3 (three) times a day as needed for muscle spasms   Patient not taking: Reported on 8/3/2022   desvenlafaxine succinate (PRISTIQ) 50 mg 24 hr tablet   Yes No   Sig: Take 50 mg by mouth daily   fluticasone (FLONASE) 50 mcg/act nasal spray   Yes No   Si spray into each  nostril daily   Patient not taking: Reported on 3/26/2024   hydrOXYzine pamoate (VISTARIL) 25 mg capsule   No No   Sig: Take 1 capsule (25 mg total) by mouth 3 (three) times a day as needed for anxiety for up to 4 days   ketotifen (ZADITOR) 0.025 % ophthalmic solution   Yes No   Si drop 2 (two) times a day.   Patient not taking: Reported on 8/3/2022   lamoTRIgine (LaMICtal) 150 MG tablet  Care Giver Yes No   Sig: Take 200 mg by mouth daily   lidocaine (Lidoderm) 5 %   No No   Sig: Apply 1 patch topically over 12 hours daily Remove & Discard patch within 12 hours or as directed by MD   loratadine (CLARITIN) 10 mg tablet   Yes No   Sig: Take 10 mg by mouth daily   lurasidone (LATUDA) 40 mg tablet   Yes No   Sig: Take 80 mg by mouth daily with dinner    Patient not taking: Reported on 8/3/2022   meclizine (ANTIVERT) 25 mg tablet   Yes No   Sig: Take 25 mg by mouth every 8 (eight) hours as needed for dizziness   Patient not taking: Reported on 8/3/2022   montelukast (SINGULAIR) 10 mg tablet   Yes No   Sig: Take 10 mg by mouth daily at bedtime.   naproxen (EC NAPROSYN) 500 MG EC tablet   No No   Sig: Take 1 tablet (500 mg total) by mouth 2 (two) times a day with meals   Patient not taking: No sig reported   neomycin-bacitracin-polymyxin (NEOSPORIN) 5-400-5,000 ointment   No No   Sig: Apply topically in the morning   pantoprazole (PROTONIX) 40 mg tablet   Yes No   Sig: Take 40 mg by mouth daily.   propranolol (INDERAL) 40 mg tablet   Yes No   Sig: Take 10 mg by mouth daily    tolterodine (DETROL LA) 4 mg 24 hr capsule   Yes No   Sig: Take 4 mg by mouth daily.   traMADol (ULTRAM) 50 mg tablet   No No   Si po tid prn pain   traZODone (DESYREL) 50 mg tablet   Yes No   Sig: Take 100 mg by mouth daily at bedtime as needed    Patient not taking: Reported on 8/3/2022   umeclidinium bromide (INCRUSE ELLIPTA) 62.5 mcg/inh AEPB inhaler  Care Giver Yes No   Sig: Inhale 1 puff daily   venlafaxine (EFFEXOR-XR) 37.5 mg 24 hr  capsule   Yes No   Sig: Take 75 mg by mouth daily     Patient not taking: Reported on 8/3/2022   ziprasidone (GEODON) 80 mg capsule   Yes No   Sig: Take 80 mg by mouth every evening      Facility-Administered Medications: None       Past Medical History:   Diagnosis Date    Asthma     Bladder incontinence     Chronic pain     back    COPD (chronic obstructive pulmonary disease) ([facility])     GERD (gastroesophageal reflux disease)     Hyperlipidemia     Psychiatric disorder     bipolar       Past Surgical History:   Procedure Laterality Date    TUBAL LIGATION         Family History   Problem Relation Age of Onset    No Known Problems Mother     No Known Problems Sister     No Known Problems Daughter     No Known Problems Maternal Aunt      I have reviewed and agree with the history as documented.    E-Cigarette/Vaping    E-Cigarette Use Never User      E-Cigarette/Vaping Substances    Nicotine No     THC No     CBD No     Flavoring No     Other No     Unknown No      Social History     Tobacco Use    Smoking status: Former     Current packs/day: 1.00     Types: Cigarettes    Smokeless tobacco: Never   Vaping Use    Vaping status: Never Used   Substance Use Topics    Alcohol use: No    Drug use: No       Review of Systems   Musculoskeletal:         Left hand pain     All other systems reviewed and are negative.      Physical Exam  Physical Exam  Vitals and nursing note reviewed.   Constitutional:       General: She is not in acute distress.     Appearance: She is well-developed. She is not ill-appearing, toxic-appearing or diaphoretic.   HENT:      Head: Normocephalic and atraumatic.      Right Ear: External ear normal.      Left Ear: External ear normal.      Nose: Nose normal.   Eyes:      General: Lids are normal. No scleral icterus.  Cardiovascular:      Rate and Rhythm: Normal rate and regular rhythm.   Pulmonary:      Effort: Pulmonary effort is normal. No respiratory distress.   Musculoskeletal:         General: No  deformity. Normal range of motion.        Hands:       Cervical back: Normal range of motion and neck supple.      Comments: Snuffbox tenderness.  2+ radial pulse.  Full range of motion of all digits.  Sensation intact.   Skin:     General: Skin is warm and dry.   Neurological:      General: No focal deficit present.      Mental Status: She is alert.   Psychiatric:         Mood and Affect: Mood normal.         Behavior: Behavior normal.       Vital Signs  ED Triage Vitals [06/19/24 1637]   Temperature Pulse Respirations Blood Pressure SpO2   98.7 °F (37.1 °C) 80 20 119/65 98 %      Temp Source Heart Rate Source Patient Position - Orthostatic VS BP Location FiO2 (%)   Oral -- -- -- --      Pain Score       8           Vitals:    06/19/24 1637   BP: 119/65   Pulse: 80         Visual Acuity      ED Medications  Medications   oxyCODONE (ROXICODONE) IR tablet 5 mg (5 mg Oral Given 6/19/24 1653)       Diagnostic Studies  Results Reviewed       None                   XR wrist 3+ views LEFT   ED Interpretation by Tristian Shin DO (06/19 1707)   Abnormal   Questionable cortical defect to the mid scaphoid best appreciated on the AP view       by Padmini Baez (06/19 4800)      XR hand 3+ views LEFT   ED Interpretation by Tristian Shin DO (06/19 1707)   No acute osseous abnormality       by Padmini Baez (06/19 1773)                 Procedures  Procedures         ED Course                                             Medical Decision Making  Patient is a 63 y.o. female who presents to the ED for left hand pain after an injury. Pt is non-toxic, well appearing. Vitals are stable. On exam there is swelling and bruising to the dorsum of the left hand.     Differential includes but is not limited to: fracture, contusion. Presentation not consistent with dislocation.     X-rays obtained which showed questionable fracture of the scaphoid.  Given focal tenderness at this point will place in splint and discharge with  "orthopedic/hand surgery follow-up.  Return precautions discussed.  Patient verbalized understanding and agreed to plan of care.               Portions of the record may have been created with voice recognition software. Occasional wrong word or \"sound a like\" substitutions may have occurred due to the inherent limitations of voice recognition software. Read the chart carefully and recognize, using context, where substitutions have occurred.    Amount and/or Complexity of Data Reviewed  Radiology: ordered and independent interpretation performed.    Risk  Prescription drug management.             Disposition  Final diagnoses:   Scaphoid fracture     Time reflects when diagnosis was documented in both MDM as applicable and the Disposition within this note       Time User Action Codes Description Comment    6/19/2024  4:46 PM Tristian Shin Add [S62.009A] Scaphoid fracture           ED Disposition       ED Disposition   Discharge    Condition   Stable    Date/Time   Wed Jun 19, 2024  4:46 PM    Comment   Rosmery Vargas discharge to home/self care.                   Follow-up Information       Follow up With Specialties Details Why Contact Info Additional Information    Uriel Verma MD    [address]  [address]  [facility] [address]  [phone]       [facility] Emergency Medicine   [address] [facility] [address]  [phone] [facility] Emergency Department, [address] [place] [address], [phone]    [facility] Schedule an appointment as soon as possible for a visit   [address] [facility] [address]  [facility] [address]  [phone] [facility] [place], [address] [place] [address] [address], [place], [address]   [phone]            Discharge Medication List as of 6/19/2024  5:00 PM        CONTINUE these medications " which have NOT CHANGED    Details   albuterol (2.5 mg/3 mL) 0.083 % nebulizer solution Take 2.5 mg by nebulization every 6 (six) hours as needed for wheezing or shortness of breath, Historical Med      aspirin 81 MG tablet Take 81 mg by mouth daily., Until Discontinued, Historical Med      atorvastatin (LIPITOR) 20 mg tablet Take 40 mg by mouth daily, Historical Med      Biotin 5 MG TABS Take 1 tablet by mouth, Historical Med      busPIRone (BUSPAR) 15 mg tablet Take 15 mg by mouth 2 (two) times a day, Historical Med      cyclobenzaprine (FLEXERIL) 10 mg tablet Take 10 mg by mouth 3 (three) times a day as needed for muscle spasms, Historical Med      desvenlafaxine succinate (PRISTIQ) 50 mg 24 hr tablet Take 50 mg by mouth daily, Historical Med      Diclofenac Sodium (VOLTAREN) 1 % Apply 2 g topically 4 (four) times a day, Starting Sun 4/7/2024, Normal      Emollient (cetaphil) cream Apply topically as needed for dry skin, Starting Tue 4/2/2024, Until u 5/2/2024 at 2359, Normal      fluticasone (FLONASE) 50 mcg/act nasal spray 1 spray into each nostril daily, Historical Med      HYDROcodone-acetaminophen (NORCO) 5-325 mg per tablet Take 1 tablet by mouth every 6 (six) hours as needed for pain for up to 12 dosesMax Daily Amount: 4 tablets, Starting Fri 8/30/2019, Print      hydrOXYzine pamoate (VISTARIL) 25 mg capsule Take 1 capsule (25 mg total) by mouth 3 (three) times a day as needed for anxiety for up to 4 days, Starting Sat 11/30/2019, Until Wed 12/4/2019, Normal      ketotifen (ZADITOR) 0.025 % ophthalmic solution 1 drop 2 (two) times a day., Until Discontinued, Historical Med      lamoTRIgine (LaMICtal) 150 MG tablet Take 200 mg by mouth daily, Historical Med      lidocaine (Lidoderm) 5 % Apply 1 patch topically over 12 hours daily Remove & Discard patch within 12 hours or as directed by MD, Starting Moodus 4/7/2024, Normal      loratadine (CLARITIN) 10 mg tablet Take 10 mg by mouth daily, Historical Med       lurasidone (LATUDA) 40 mg tablet Take 80 mg by mouth daily with dinner , Historical Med      meclizine (ANTIVERT) 25 mg tablet Take 25 mg by mouth every 8 (eight) hours as needed for dizziness, Historical Med      montelukast (SINGULAIR) 10 mg tablet Take 10 mg by mouth daily at bedtime., Until Discontinued, Historical Med      Multiple Vitamin (MULTIVITAMIN) tablet Take 1 tablet by mouth daily, Historical Med      naproxen (EC NAPROSYN) 500 MG EC tablet Take 1 tablet (500 mg total) by mouth 2 (two) times a day with meals, Starting Sun 6/30/2019, Normal      neomycin-bacitracin-polymyxin (NEOSPORIN) 5-400-5,000 ointment Apply topically in the morning, Starting Sun 4/7/2024, Normal      pantoprazole (PROTONIX) 40 mg tablet Take 40 mg by mouth daily., Until Discontinued, Historical Med      propranolol (INDERAL) 40 mg tablet Take 10 mg by mouth daily , Historical Med      tolterodine (DETROL LA) 4 mg 24 hr capsule Take 4 mg by mouth daily., Until Discontinued, Historical Med      traMADol (ULTRAM) 50 mg tablet 1 po tid prn pain, Normal      traZODone (DESYREL) 50 mg tablet Take 100 mg by mouth daily at bedtime as needed , Historical Med      umeclidinium bromide (INCRUSE ELLIPTA) 62.5 mcg/inh AEPB inhaler Inhale 1 puff daily, Historical Med      venlafaxine (EFFEXOR-XR) 37.5 mg 24 hr capsule Take 75 mg by mouth daily  , Until Discontinued, Historical Med      ziprasidone (GEODON) 80 mg capsule Take 80 mg by mouth every evening, Historical Med                 PDMP Review       None            ED Provider  Electronically Signed by             Tristian Shin DO  06/19/24 1957

## 2024-06-20 ENCOUNTER — TELEPHONE (OUTPATIENT)
Age: 64
End: 2024-06-20

## 2024-06-20 NOTE — TELEPHONE ENCOUNTER
Patient is being referred to a orthopedics. Please schedule accordingly.    [facility]   [phone]

## 2024-06-25 ENCOUNTER — APPOINTMENT (OUTPATIENT)
Dept: RADIOLOGY | Facility: CLINIC | Age: 64
End: 2024-06-25
Payer: COMMERCIAL

## 2024-06-25 ENCOUNTER — OFFICE VISIT (OUTPATIENT)
Dept: OBGYN CLINIC | Facility: CLINIC | Age: 64
End: 2024-06-25
Payer: COMMERCIAL

## 2024-06-25 VITALS
HEIGHT: 68 IN | WEIGHT: 185 LBS | DIASTOLIC BLOOD PRESSURE: 76 MMHG | HEART RATE: 80 BPM | SYSTOLIC BLOOD PRESSURE: 115 MMHG | BODY MASS INDEX: 28.04 KG/M2

## 2024-06-25 DIAGNOSIS — S62.009A SCAPHOID FRACTURE: ICD-10-CM

## 2024-06-25 PROCEDURE — 29075 APPL CST ELBW FNGR SHORT ARM: CPT | Performed by: PHYSICIAN ASSISTANT

## 2024-06-25 PROCEDURE — 73130 X-RAY EXAM OF HAND: CPT

## 2024-06-25 PROCEDURE — 73110 X-RAY EXAM OF WRIST: CPT

## 2024-06-25 PROCEDURE — 99203 OFFICE O/P NEW LOW 30 MIN: CPT | Performed by: PHYSICIAN ASSISTANT

## 2024-06-25 NOTE — PROGRESS NOTES
Cast - thumb spica  ASSESSMENT/PLAN:    Assessment:   Fracture Left Scaphoid    Plan:   ANDREAS SQUIRES in Cast    Follow Up:  5 weeks    To Do Next Visit:  X-rays of the  left  wrist and Scaphoid and Cast/splint off prior to x-ray    General Discussions:  Fracture - Nonoperative Care: The physiology of a fractured bone was discussed with the patient today.  With nondisplaced or minimally displaced fractures, conservative treatment often results in a functional recovery.  Typically, these fractures are immobilized in either a cast or splint depending on the pattern.  Radiographs are typically taken at intervals throughout the fracture healing to ensure that muscular forces do not cause loss of reduction or alignment.  If the fracture loses its alignment, surgical intervention may be required to stabilize it.  Medical conditions such as diabetes, osteoporosis, vitamin D deficiency, and a history of or exposure to smoking may delay or prevent fracture healing.    _____________________________________________________  CHIEF COMPLAINT:  Chief Complaint   Patient presents with    Left Wrist - Pain         SUBJECTIVE:  Rosmery Vargas is a 63 y.o. female who presents with pain in the left wrist and hand following a door slamming onto her wrist. Patient sustained the injury on 6/19/2024 and was seen in the ER. They were suspicious for a scaphoid fracture and she was placed in a thumb spica splint. Patient notes that the pain is improved with the splint. She denies any numbness or tingling. She denies any acute complaints.     PAST MEDICAL HISTORY:  Past Medical History:   Diagnosis Date    Asthma     Bladder incontinence     Chronic pain     back    COPD (chronic obstructive pulmonary disease) ([facility])     GERD (gastroesophageal reflux disease)     Hyperlipidemia     Psychiatric disorder     bipolar       PAST SURGICAL HISTORY:  Past Surgical History:   Procedure Laterality Date    TUBAL LIGATION         FAMILY HISTORY:  Family  History   Problem Relation Age of Onset    No Known Problems Mother     No Known Problems Sister     No Known Problems Daughter     No Known Problems Maternal Aunt        SOCIAL HISTORY:  Social History     Tobacco Use    Smoking status: Former     Current packs/day: 1.00     Types: Cigarettes    Smokeless tobacco: Never   Vaping Use    Vaping status: Never Used   Substance Use Topics    Alcohol use: No    Drug use: No       MEDICATIONS:    Current Outpatient Medications:     albuterol (2.5 mg/3 mL) 0.083 % nebulizer solution, Take 2.5 mg by nebulization every 6 (six) hours as needed for wheezing or shortness of breath, Disp: , Rfl:     aspirin 81 MG tablet, Take 81 mg by mouth daily., Disp: , Rfl:     atorvastatin (LIPITOR) 20 mg tablet, Take 40 mg by mouth daily, Disp: , Rfl:     Biotin 5 MG TABS, Take 1 tablet by mouth, Disp: , Rfl:     busPIRone (BUSPAR) 15 mg tablet, Take 15 mg by mouth 2 (two) times a day, Disp: , Rfl:     desvenlafaxine succinate (PRISTIQ) 50 mg 24 hr tablet, Take 50 mg by mouth daily, Disp: , Rfl:     Diclofenac Sodium (VOLTAREN) 1 %, Apply 2 g topically 4 (four) times a day, Disp: 100 g, Rfl: 0    lamoTRIgine (LaMICtal) 150 MG tablet, Take 200 mg by mouth daily, Disp: , Rfl:     lidocaine (Lidoderm) 5 %, Apply 1 patch topically over 12 hours daily Remove & Discard patch within 12 hours or as directed by MD, Disp: 15 patch, Rfl: 0    loratadine (CLARITIN) 10 mg tablet, Take 10 mg by mouth daily, Disp: , Rfl:     montelukast (SINGULAIR) 10 mg tablet, Take 10 mg by mouth daily at bedtime., Disp: , Rfl:     Multiple Vitamin (MULTIVITAMIN) tablet, Take 1 tablet by mouth daily, Disp: , Rfl:     neomycin-bacitracin-polymyxin (NEOSPORIN) 5-400-5,000 ointment, Apply topically in the morning, Disp: 28 g, Rfl: 0    oxyCODONE (Roxicodone) 5 immediate release tablet, Take 1 tablet (5 mg total) by mouth every 6 (six) hours as needed for moderate pain Max Daily Amount: 20 mg, Disp: 7 tablet, Rfl: 0     pantoprazole (PROTONIX) 40 mg tablet, Take 40 mg by mouth daily., Disp: , Rfl:     propranolol (INDERAL) 40 mg tablet, Take 10 mg by mouth daily , Disp: , Rfl:     tolterodine (DETROL LA) 4 mg 24 hr capsule, Take 4 mg by mouth daily., Disp: , Rfl:     traMADol (ULTRAM) 50 mg tablet, 1 po tid prn pain, Disp: 15 tablet, Rfl: 0    umeclidinium bromide (INCRUSE ELLIPTA) 62.5 mcg/inh AEPB inhaler, Inhale 1 puff daily, Disp: , Rfl:     ziprasidone (GEODON) 80 mg capsule, Take 80 mg by mouth every evening, Disp: , Rfl:     cyclobenzaprine (FLEXERIL) 10 mg tablet, Take 10 mg by mouth 3 (three) times a day as needed for muscle spasms (Patient not taking: Reported on 8/3/2022), Disp: , Rfl:     Emollient (cetaphil) cream, Apply topically as needed for dry skin, Disp: 85 g, Rfl: 0    fluticasone (FLONASE) 50 mcg/act nasal spray, 1 spray into each nostril daily (Patient not taking: Reported on 3/26/2024), Disp: , Rfl:     HYDROcodone-acetaminophen (NORCO) 5-325 mg per tablet, Take 1 tablet by mouth every 6 (six) hours as needed for pain for up to 12 dosesMax Daily Amount: 4 tablets (Patient not taking: Reported on 11/30/2019), Disp: 12 tablet, Rfl: 0    hydrOXYzine pamoate (VISTARIL) 25 mg capsule, Take 1 capsule (25 mg total) by mouth 3 (three) times a day as needed for anxiety for up to 4 days, Disp: 12 capsule, Rfl: 0    ketotifen (ZADITOR) 0.025 % ophthalmic solution, 1 drop 2 (two) times a day. (Patient not taking: Reported on 8/3/2022), Disp: , Rfl:     lurasidone (LATUDA) 40 mg tablet, Take 80 mg by mouth daily with dinner  (Patient not taking: Reported on 8/3/2022), Disp: , Rfl:     meclizine (ANTIVERT) 25 mg tablet, Take 25 mg by mouth every 8 (eight) hours as needed for dizziness (Patient not taking: Reported on 8/3/2022), Disp: , Rfl:     naproxen (EC NAPROSYN) 500 MG EC tablet, Take 1 tablet (500 mg total) by mouth 2 (two) times a day with meals (Patient not taking: Reported on 11/30/2019), Disp: 20 tablet, Rfl: 0    " traZODone (DESYREL) 50 mg tablet, Take 100 mg by mouth daily at bedtime as needed  (Patient not taking: Reported on 8/3/2022), Disp: , Rfl:     venlafaxine (EFFEXOR-XR) 37.5 mg 24 hr capsule, Take 75 mg by mouth daily   (Patient not taking: Reported on 8/3/2022), Disp: , Rfl:     ALLERGIES:  No Known Allergies    REVIEW OF SYSTEMS:  Pertinent items are noted in HPI.  A comprehensive review of systems was negative.    LABS:  HgA1c: No results found for: \"HGBA1C\"  BMP:   Lab Results   Component Value Date    CALCIUM 9.2 04/22/2024    K 3.9 04/22/2024    CO2 29 04/22/2024    CL 99 04/22/2024    BUN 11 04/22/2024    CREATININE 0.68 04/22/2024         _____________________________________________________  PHYSICAL EXAMINATION:  Vital signs: /76   Pulse 80   Ht 5' 8\" (1.727 m)   Wt 83.9 kg (185 lb)   BMI 28.13 kg/m²   General: well developed and well nourished, alert, oriented times 3, and appears comfortable  Psychiatric: Normal  HEENT: Trachea Midline, No torticollis  Cardiovascular: No discernable arrhythmia  Pulmonary: No wheezing or stridor  Abdomen: No rebound or guarding  Extremities: No peripheral edema  Skin: No masses, erythema, lacerations, fluctation, ulcerations  Neurovascular: Sensation Intact to the Median, Ulnar, Radial Nerve, Motor Intact to the Median, Ulnar, Radial Nerve, and Pulses Intact    MUSCULOSKELETAL EXAMINATION:  LEFT SIDE:  Wrist:  tenderness over anatomic snuff box. Able to make a full fist. Pain with wrist ROM    _____________________________________________________  STUDIES REVIEWED:  Images were reviewed in PACS and demonstrate: suspected scaphoid fracture without displacement or collapse      PROCEDURES PERFORMED:  Cast application    Date/Time: 6/25/2024 2:30 PM    Performed by: Bernard Scanlon PA-C  Authorized by: Bernard Scanlon PA-C  Universal Protocol:  Consent: Verbal consent obtained.  Consent given by: patient    Procedure details:     Laterality:  " Left    Location:  Wrist    Supplies:  Cotton padding and fiberglass

## 2024-06-26 ENCOUNTER — TELEPHONE (OUTPATIENT)
Age: 64
End: 2024-06-26

## 2024-06-26 NOTE — TELEPHONE ENCOUNTER
Caller: Self    Doctor: Alyse Scanlon    Reason for call: Patient states provider would send something for pain, pharmacy has not received script yet    [facility] - [place], [place] - [address] [place] [address] [[id number]]     Call back#: [phone]

## 2024-06-27 DIAGNOSIS — S62.002A CLOSED NONDISPLACED FRACTURE OF SCAPHOID OF LEFT WRIST, UNSPECIFIED PORTION OF SCAPHOID, INITIAL ENCOUNTER: Primary | ICD-10-CM

## 2024-06-27 RX ORDER — KETOROLAC TROMETHAMINE 10 MG/1
10 TABLET, FILM COATED ORAL EVERY 6 HOURS PRN
Qty: 10 TABLET | Refills: 0 | Status: SHIPPED | OUTPATIENT
Start: 2024-06-27 | End: 2024-06-30

## 2024-06-27 NOTE — TELEPHONE ENCOUNTER
Caller: Rosmery     Doctor: Bernard Scanlon    Reason for call: Calling back was supposed to have something called into her pharmacy for pain yesterday but nothing has been sent in to pharmacy yet    Call back#: [phone]

## 2024-07-30 ENCOUNTER — APPOINTMENT (OUTPATIENT)
Dept: RADIOLOGY | Facility: CLINIC | Age: 64
End: 2024-07-30
Payer: COMMERCIAL

## 2024-07-30 ENCOUNTER — OFFICE VISIT (OUTPATIENT)
Dept: OBGYN CLINIC | Facility: CLINIC | Age: 64
End: 2024-07-30
Payer: COMMERCIAL

## 2024-07-30 VITALS
BODY MASS INDEX: 28.04 KG/M2 | HEART RATE: 76 BPM | HEIGHT: 68 IN | DIASTOLIC BLOOD PRESSURE: 70 MMHG | WEIGHT: 185 LBS | SYSTOLIC BLOOD PRESSURE: 110 MMHG

## 2024-07-30 DIAGNOSIS — S62.002A CLOSED NONDISPLACED FRACTURE OF SCAPHOID OF LEFT WRIST, UNSPECIFIED PORTION OF SCAPHOID, INITIAL ENCOUNTER: Primary | ICD-10-CM

## 2024-07-30 DIAGNOSIS — S62.002A CLOSED NONDISPLACED FRACTURE OF SCAPHOID OF LEFT WRIST, UNSPECIFIED PORTION OF SCAPHOID, INITIAL ENCOUNTER: ICD-10-CM

## 2024-07-30 PROCEDURE — 99213 OFFICE O/P EST LOW 20 MIN: CPT | Performed by: PHYSICIAN ASSISTANT

## 2024-07-30 PROCEDURE — 73130 X-RAY EXAM OF HAND: CPT

## 2024-07-31 NOTE — PROGRESS NOTES
ASSESSMENT/PLAN:    Assessment:   Fracture left scaphoid - healing    Plan:   Thumb spica bracing for the left wrist - wear continuously  Start PT for ROM activities and then can progress to strengthening    Follow Up:  6  week(s)    To Do Next Visit:  Left scaphoid xrays    _____________________________________________________  CHIEF COMPLAINT:  Chief Complaint   Patient presents with    Left Hand - Pain         SUBJECTIVE:  Rosmery Vargas is a 63 y.o. female who presents for follow up regarding left scaphoid fracture. She denies any significant pain in the wrist in the cast. She denies any new or acute complaints.     PAST MEDICAL HISTORY:  Past Medical History:   Diagnosis Date    Asthma     Bladder incontinence     Chronic pain     back    COPD (chronic obstructive pulmonary disease) (HCC)     GERD (gastroesophageal reflux disease)     Hyperlipidemia     Psychiatric disorder     bipolar       PAST SURGICAL HISTORY:  Past Surgical History:   Procedure Laterality Date    TUBAL LIGATION         FAMILY HISTORY:  Family History   Problem Relation Age of Onset    No Known Problems Mother     No Known Problems Sister     No Known Problems Daughter     No Known Problems Maternal Aunt        SOCIAL HISTORY:  Social History     Tobacco Use    Smoking status: Former     Current packs/day: 1.00     Types: Cigarettes    Smokeless tobacco: Never   Vaping Use    Vaping status: Never Used   Substance Use Topics    Alcohol use: No    Drug use: No       MEDICATIONS:    Current Outpatient Medications:     albuterol (2.5 mg/3 mL) 0.083 % nebulizer solution, Take 2.5 mg by nebulization every 6 (six) hours as needed for wheezing or shortness of breath, Disp: , Rfl:     aspirin 81 MG tablet, Take 81 mg by mouth daily., Disp: , Rfl:     atorvastatin (LIPITOR) 20 mg tablet, Take 40 mg by mouth daily, Disp: , Rfl:     Biotin 5 MG TABS, Take 1 tablet by mouth, Disp: , Rfl:     busPIRone (BUSPAR) 15 mg tablet, Take 15 mg by mouth 2 (two)  times a day, Disp: , Rfl:     desvenlafaxine succinate (PRISTIQ) 50 mg 24 hr tablet, Take 50 mg by mouth daily, Disp: , Rfl:     Diclofenac Sodium (VOLTAREN) 1 %, Apply 2 g topically 4 (four) times a day, Disp: 100 g, Rfl: 0    fluticasone (FLONASE) 50 mcg/act nasal spray, 1 spray into each nostril daily, Disp: , Rfl:     lamoTRIgine (LaMICtal) 150 MG tablet, Take 200 mg by mouth daily, Disp: , Rfl:     Multiple Vitamin (MULTIVITAMIN) tablet, Take 1 tablet by mouth daily, Disp: , Rfl:     pantoprazole (PROTONIX) 40 mg tablet, Take 40 mg by mouth daily., Disp: , Rfl:     propranolol (INDERAL) 40 mg tablet, Take 10 mg by mouth daily , Disp: , Rfl:     tolterodine (DETROL LA) 4 mg 24 hr capsule, Take 4 mg by mouth daily., Disp: , Rfl:     umeclidinium bromide (INCRUSE ELLIPTA) 62.5 mcg/inh AEPB inhaler, Inhale 1 puff daily, Disp: , Rfl:     ziprasidone (GEODON) 80 mg capsule, Take 80 mg by mouth every evening, Disp: , Rfl:     cyclobenzaprine (FLEXERIL) 10 mg tablet, Take 10 mg by mouth 3 (three) times a day as needed for muscle spasms (Patient not taking: Reported on 8/3/2022), Disp: , Rfl:     Emollient (cetaphil) cream, Apply topically as needed for dry skin, Disp: 85 g, Rfl: 0    HYDROcodone-acetaminophen (NORCO) 5-325 mg per tablet, Take 1 tablet by mouth every 6 (six) hours as needed for pain for up to 12 dosesMax Daily Amount: 4 tablets (Patient not taking: Reported on 11/30/2019), Disp: 12 tablet, Rfl: 0    hydrOXYzine pamoate (VISTARIL) 25 mg capsule, Take 1 capsule (25 mg total) by mouth 3 (three) times a day as needed for anxiety for up to 4 days, Disp: 12 capsule, Rfl: 0    ketorolac (TORADOL) 10 mg tablet, Take 1 tablet (10 mg total) by mouth every 6 (six) hours as needed for moderate pain for up to 3 days, Disp: 10 tablet, Rfl: 0    ketotifen (ZADITOR) 0.025 % ophthalmic solution, 1 drop 2 (two) times a day. (Patient not taking: Reported on 8/3/2022), Disp: , Rfl:     lidocaine (Lidoderm) 5 %, Apply 1  "patch topically over 12 hours daily Remove & Discard patch within 12 hours or as directed by MD (Patient not taking: Reported on 7/30/2024), Disp: 15 patch, Rfl: 0    loratadine (CLARITIN) 10 mg tablet, Take 10 mg by mouth daily (Patient not taking: Reported on 7/30/2024), Disp: , Rfl:     lurasidone (LATUDA) 40 mg tablet, Take 80 mg by mouth daily with dinner  (Patient not taking: Reported on 8/3/2022), Disp: , Rfl:     meclizine (ANTIVERT) 25 mg tablet, Take 25 mg by mouth every 8 (eight) hours as needed for dizziness (Patient not taking: Reported on 8/3/2022), Disp: , Rfl:     montelukast (SINGULAIR) 10 mg tablet, Take 10 mg by mouth daily at bedtime. (Patient not taking: Reported on 7/30/2024), Disp: , Rfl:     naproxen (EC NAPROSYN) 500 MG EC tablet, Take 1 tablet (500 mg total) by mouth 2 (two) times a day with meals (Patient not taking: Reported on 11/30/2019), Disp: 20 tablet, Rfl: 0    neomycin-bacitracin-polymyxin (NEOSPORIN) 5-400-5,000 ointment, Apply topically in the morning (Patient not taking: Reported on 7/30/2024), Disp: 28 g, Rfl: 0    oxyCODONE (Roxicodone) 5 immediate release tablet, Take 1 tablet (5 mg total) by mouth every 6 (six) hours as needed for moderate pain Max Daily Amount: 20 mg (Patient not taking: Reported on 7/30/2024), Disp: 7 tablet, Rfl: 0    traMADol (ULTRAM) 50 mg tablet, 1 po tid prn pain (Patient not taking: Reported on 7/30/2024), Disp: 15 tablet, Rfl: 0    traZODone (DESYREL) 50 mg tablet, Take 100 mg by mouth daily at bedtime as needed  (Patient not taking: Reported on 8/3/2022), Disp: , Rfl:     venlafaxine (EFFEXOR-XR) 37.5 mg 24 hr capsule, Take 75 mg by mouth daily   (Patient not taking: Reported on 8/3/2022), Disp: , Rfl:     ALLERGIES:  No Known Allergies    REVIEW OF SYSTEMS:  Pertinent items are noted in HPI.  A comprehensive review of systems was negative.    LABS:  HgA1c: No results found for: \"HGBA1C\"  BMP:   Lab Results   Component Value Date    CALCIUM 9.2 " "04/22/2024    K 3.9 04/22/2024    CO2 29 04/22/2024    CL 99 04/22/2024    BUN 11 04/22/2024    CREATININE 0.68 04/22/2024           _____________________________________________________  PHYSICAL EXAMINATION:  Vital signs: /70   Pulse 76   Ht 5' 8\" (1.727 m)   Wt 83.9 kg (185 lb)   BMI 28.13 kg/m²   General: well developed and well nourished, alert, oriented times 3, and appears comfortable  Psychiatric: Normal  HEENT: Trachea Midline, No torticollis  Cardiovascular: No discernable arrhythmia  Pulmonary: No wheezing or stridor  Abdomen: No rebound or guarding  Extremities: No peripheral edema  Skin: No masses, erythema, lacerations, fluctation, ulcerations  Neurovascular: Sensation Intact to the Median, Ulnar, Radial Nerve, Motor Intact to the Median, Ulnar, Radial Nerve, and Pulses Intact    MUSCULOSKELETAL EXAMINATION:  Left wrist:  Wrist pain on exam including in the anatomic snuff box    _____________________________________________________  STUDIES REVIEWED:  Images were reviewed in PACS and demonstrate: healing appearance of left scaphoid fracture      PROCEDURES PERFORMED:  Procedures  No Procedures performed today    "

## 2024-10-05 ENCOUNTER — APPOINTMENT (EMERGENCY)
Dept: RADIOLOGY | Facility: HOSPITAL | Age: 64
End: 2024-10-05
Payer: COMMERCIAL

## 2024-10-05 ENCOUNTER — HOSPITAL ENCOUNTER (EMERGENCY)
Facility: HOSPITAL | Age: 64
Discharge: HOME | End: 2024-10-05
Attending: EMERGENCY MEDICINE
Payer: COMMERCIAL

## 2024-10-05 VITALS
DIASTOLIC BLOOD PRESSURE: 74 MMHG | OXYGEN SATURATION: 96 % | RESPIRATION RATE: 17 BRPM | SYSTOLIC BLOOD PRESSURE: 126 MMHG | HEART RATE: 62 BPM | TEMPERATURE: 98.3 F

## 2024-10-05 DIAGNOSIS — N39.0 UTI (URINARY TRACT INFECTION): Primary | ICD-10-CM

## 2024-10-05 LAB
2HR DELTA HS TROPONIN: 0 NG/L
ALBUMIN SERPL BCG-MCNC: 4.1 G/DL (ref 3.5–5)
ALP SERPL-CCNC: 142 U/L (ref 34–104)
ALT SERPL W P-5'-P-CCNC: 8 U/L (ref 7–52)
ANION GAP SERPL CALCULATED.3IONS-SCNC: 6 MMOL/L (ref 4–13)
AST SERPL W P-5'-P-CCNC: 11 U/L (ref 13–39)
BACTERIA UR QL AUTO: ABNORMAL /HPF
BASOPHILS # BLD AUTO: 0.09 THOUSANDS/ΜL (ref 0–0.1)
BASOPHILS NFR BLD AUTO: 1 % (ref 0–1)
BILIRUB SERPL-MCNC: 0.56 MG/DL (ref 0.2–1)
BILIRUB UR QL STRIP: NEGATIVE
BUN SERPL-MCNC: 14 MG/DL (ref 5–25)
CALCIUM SERPL-MCNC: 9.4 MG/DL (ref 8.4–10.2)
CARDIAC TROPONIN I PNL SERPL HS: 4 NG/L
CARDIAC TROPONIN I PNL SERPL HS: 4 NG/L
CHLORIDE SERPL-SCNC: 95 MMOL/L (ref 96–108)
CLARITY UR: CLEAR
CO2 SERPL-SCNC: 30 MMOL/L (ref 21–32)
COLOR UR: YELLOW
CREAT SERPL-MCNC: 0.76 MG/DL (ref 0.6–1.3)
EOSINOPHIL # BLD AUTO: 0.27 THOUSAND/ΜL (ref 0–0.61)
EOSINOPHIL NFR BLD AUTO: 2 % (ref 0–6)
ERYTHROCYTE [DISTWIDTH] IN BLOOD BY AUTOMATED COUNT: 13.8 % (ref 11.6–15.1)
FLUAV AG UPPER RESP QL IA.RAPID: NEGATIVE
FLUBV AG UPPER RESP QL IA.RAPID: NEGATIVE
GFR SERPL CREATININE-BSD FRML MDRD: 83 ML/MIN/1.73SQ M
GLUCOSE SERPL-MCNC: 100 MG/DL (ref 65–140)
GLUCOSE UR STRIP-MCNC: NEGATIVE MG/DL
HCT VFR BLD AUTO: 41.8 % (ref 34.8–46.1)
HGB BLD-MCNC: 13.3 G/DL (ref 11.5–15.4)
HGB UR QL STRIP.AUTO: ABNORMAL
IMM GRANULOCYTES # BLD AUTO: 0.05 THOUSAND/UL (ref 0–0.2)
IMM GRANULOCYTES NFR BLD AUTO: 0 % (ref 0–2)
KETONES UR STRIP-MCNC: NEGATIVE MG/DL
LEUKOCYTE ESTERASE UR QL STRIP: ABNORMAL
LYMPHOCYTES # BLD AUTO: 3.4 THOUSANDS/ΜL (ref 0.6–4.47)
LYMPHOCYTES NFR BLD AUTO: 23 % (ref 14–44)
MAGNESIUM SERPL-MCNC: 2 MG/DL (ref 1.9–2.7)
MCH RBC QN AUTO: 28.7 PG (ref 26.8–34.3)
MCHC RBC AUTO-ENTMCNC: 31.8 G/DL (ref 31.4–37.4)
MCV RBC AUTO: 90 FL (ref 82–98)
MONOCYTES # BLD AUTO: 1 THOUSAND/ΜL (ref 0.17–1.22)
MONOCYTES NFR BLD AUTO: 7 % (ref 4–12)
NEUTROPHILS # BLD AUTO: 10 THOUSANDS/ΜL (ref 1.85–7.62)
NEUTS SEG NFR BLD AUTO: 67 % (ref 43–75)
NITRITE UR QL STRIP: NEGATIVE
NON-SQ EPI CELLS URNS QL MICRO: ABNORMAL /HPF
NRBC BLD AUTO-RTO: 0 /100 WBCS
PH UR STRIP.AUTO: 6 [PH]
PLATELET # BLD AUTO: 479 THOUSANDS/UL (ref 149–390)
PMV BLD AUTO: 9.3 FL (ref 8.9–12.7)
POTASSIUM SERPL-SCNC: 4.3 MMOL/L (ref 3.5–5.3)
PROT SERPL-MCNC: 7.3 G/DL (ref 6.4–8.4)
PROT UR STRIP-MCNC: ABNORMAL MG/DL
RBC # BLD AUTO: 4.63 MILLION/UL (ref 3.81–5.12)
RBC #/AREA URNS AUTO: ABNORMAL /HPF
SARS-COV+SARS-COV-2 AG RESP QL IA.RAPID: NEGATIVE
SODIUM SERPL-SCNC: 131 MMOL/L (ref 135–147)
SP GR UR STRIP.AUTO: 1.01 (ref 1–1.03)
UROBILINOGEN UR STRIP-ACNC: <2 MG/DL
WBC # BLD AUTO: 14.81 THOUSAND/UL (ref 4.31–10.16)
WBC #/AREA URNS AUTO: ABNORMAL /HPF

## 2024-10-05 PROCEDURE — 36415 COLL VENOUS BLD VENIPUNCTURE: CPT | Performed by: EMERGENCY MEDICINE

## 2024-10-05 PROCEDURE — 81001 URINALYSIS AUTO W/SCOPE: CPT | Performed by: EMERGENCY MEDICINE

## 2024-10-05 PROCEDURE — 96374 THER/PROPH/DIAG INJ IV PUSH: CPT

## 2024-10-05 PROCEDURE — 71045 X-RAY EXAM CHEST 1 VIEW: CPT

## 2024-10-05 PROCEDURE — 87804 INFLUENZA ASSAY W/OPTIC: CPT | Performed by: EMERGENCY MEDICINE

## 2024-10-05 PROCEDURE — 87147 CULTURE TYPE IMMUNOLOGIC: CPT | Performed by: EMERGENCY MEDICINE

## 2024-10-05 PROCEDURE — 93005 ELECTROCARDIOGRAM TRACING: CPT

## 2024-10-05 PROCEDURE — 87086 URINE CULTURE/COLONY COUNT: CPT | Performed by: EMERGENCY MEDICINE

## 2024-10-05 PROCEDURE — 99285 EMERGENCY DEPT VISIT HI MDM: CPT | Performed by: EMERGENCY MEDICINE

## 2024-10-05 PROCEDURE — 80053 COMPREHEN METABOLIC PANEL: CPT | Performed by: EMERGENCY MEDICINE

## 2024-10-05 PROCEDURE — 87077 CULTURE AEROBIC IDENTIFY: CPT | Performed by: EMERGENCY MEDICINE

## 2024-10-05 PROCEDURE — 85025 COMPLETE CBC W/AUTO DIFF WBC: CPT | Performed by: EMERGENCY MEDICINE

## 2024-10-05 PROCEDURE — 83735 ASSAY OF MAGNESIUM: CPT | Performed by: EMERGENCY MEDICINE

## 2024-10-05 PROCEDURE — 84484 ASSAY OF TROPONIN QUANT: CPT | Performed by: EMERGENCY MEDICINE

## 2024-10-05 PROCEDURE — 87811 SARS-COV-2 COVID19 W/OPTIC: CPT | Performed by: EMERGENCY MEDICINE

## 2024-10-05 PROCEDURE — 99284 EMERGENCY DEPT VISIT MOD MDM: CPT

## 2024-10-05 RX ORDER — FAMOTIDINE 10 MG/ML
20 INJECTION, SOLUTION INTRAVENOUS ONCE
Status: COMPLETED | OUTPATIENT
Start: 2024-10-05 | End: 2024-10-05

## 2024-10-05 RX ORDER — CEPHALEXIN 500 MG/1
500 CAPSULE ORAL ONCE
Status: COMPLETED | OUTPATIENT
Start: 2024-10-05 | End: 2024-10-05

## 2024-10-05 RX ORDER — CEPHALEXIN 500 MG/1
500 CAPSULE ORAL EVERY 12 HOURS SCHEDULED
Qty: 10 CAPSULE | Refills: 0 | Status: SHIPPED | OUTPATIENT
Start: 2024-10-05 | End: 2024-10-10

## 2024-10-05 RX ADMIN — CEPHALEXIN 500 MG: 500 CAPSULE ORAL at 13:07

## 2024-10-05 RX ADMIN — FAMOTIDINE 20 MG: 10 INJECTION, SOLUTION INTRAVENOUS at 10:38

## 2024-10-05 NOTE — ED PROVIDER NOTES
Final diagnoses:   UTI (urinary tract infection)     ED Disposition       ED Disposition   Discharge    Condition   Stable    Date/Time   Sat Oct 5, 2024  1:03 PM    Comment   Rosmery Vargas discharge to home/self care.                   Assessment & Plan       Medical Decision Making  64-year-old female with complaint of indigestion, dysuria, fever.  Patient evaluated for acute viral illness versus ACS versus acute UTI.  Labs, EKG, chest x-ray ordered and reviewed.  Large leukocytes and white blood cells noted on urinalysis.  Will start patient on course of Keflex.  Delta troponin is 0, and low concern for ACS at this time.  Indigestion improved with Pepcid.  Patient to continue Protonix as previously prescribed.  Will discharge patient to home.    Amount and/or Complexity of Data Reviewed  Labs: ordered.  Radiology: ordered and independent interpretation performed.    Risk  Prescription drug management.             Medications   Famotidine (PF) (PEPCID) injection 20 mg (20 mg Intravenous Given 10/5/24 1038)   cephalexin (KEFLEX) capsule 500 mg (500 mg Oral Given 10/5/24 1307)       ED Risk Strat Scores                           SBIRT 20yo+      Flowsheet Row Most Recent Value   Initial Alcohol Screen: US AUDIT-C     1. How often do you have a drink containing alcohol? 0 Filed at: 10/05/2024 1022   2. How many drinks containing alcohol do you have on a typical day you are drinking?  0 Filed at: 10/05/2024 1022   3b. FEMALE Any Age, or MALE 65+: How often do you have 4 or more drinks on one occassion? 0 Filed at: 10/05/2024 1022   Audit-C Score 0 Filed at: 10/05/2024 1022   JOHN: How many times in the past year have you...    Used an illegal drug or used a prescription medication for non-medical reasons? Never Filed at: 10/05/2024 1022                            History of Present Illness       Chief Complaint   Patient presents with    Flu Symptoms    Urinary Retention     Pt states started today feels like she  "needs to urinate and can't, that she is not emptying fully. Burns when pees.     Abdominal Pain     Pt states today started with lower abd pain started 6am \"just there\" unable to describe pain. Bowels loose x5       Past Medical History:   Diagnosis Date    Asthma     Bladder incontinence     Chronic pain     back    COPD (chronic obstructive pulmonary disease) (HCC)     GERD (gastroesophageal reflux disease)     Hyperlipidemia     Psychiatric disorder     bipolar      Past Surgical History:   Procedure Laterality Date    TUBAL LIGATION        Family History   Problem Relation Age of Onset    No Known Problems Mother     No Known Problems Sister     No Known Problems Daughter     No Known Problems Maternal Aunt       Social History     Tobacco Use    Smoking status: Former     Current packs/day: 0.50     Average packs/day: 0.5 packs/day for 0.4 years (0.2 ttl pk-yrs)     Types: Cigarettes     Start date: 05/2024    Smokeless tobacco: Never   Vaping Use    Vaping status: Never Used   Substance Use Topics    Alcohol use: No    Drug use: No      E-Cigarette/Vaping    E-Cigarette Use Never User       E-Cigarette/Vaping Substances    Nicotine No     THC No     CBD No     Flavoring No     Other No     Unknown No       I have reviewed and agree with the history as documented.     Patient is a 64-year-old female with a history of chronic pain, COPD, hyperlipidemia, bipolar disorder that presents emergency department with complaint of diarrhea x 5 episodes, \"indigestion\", malaise, dysuria.  All symptoms started this morning.  Patient states that she was asymptomatic last night.  She denies sick contacts.  She denies abdominal pain.      History provided by:  Patient   used: No        Review of Systems   Constitutional:  Negative for chills and fever.   Respiratory:  Negative for cough, chest tightness and shortness of breath.    Gastrointestinal:  Positive for diarrhea. Negative for abdominal pain, nausea " and vomiting.   Genitourinary:  Negative for dysuria, frequency, hematuria and urgency.   Musculoskeletal:  Negative for back pain, neck pain and neck stiffness.   Skin:  Negative for color change, pallor, rash and wound.   All other systems reviewed and are negative.          Objective       ED Triage Vitals [10/05/24 1002]   Temperature Pulse Blood Pressure Respirations SpO2 Patient Position - Orthostatic VS   98.3 °F (36.8 °C) 68 114/69 18 98 % --      Temp Source Heart Rate Source BP Location FiO2 (%) Pain Score    Oral Monitor -- -- 4      Vitals      Date and Time Temp Pulse SpO2 Resp BP Pain Score FACES Pain Rating User   10/05/24 1300 -- 62 96 % 17 126/74 -- -- CS   10/05/24 1245 -- 63 94 % 19 -- -- --    10/05/24 1230 -- 67 95 % 20 150/80 -- --    10/05/24 1215 -- 64 96 % 21 120/79 -- --    10/05/24 1145 -- 65 94 % 22 -- -- --    10/05/24 1130 -- 62 94 % 22 128/79 -- -- CS   10/05/24 1115 -- 63 94 % 22 130/78 -- --    10/05/24 1100 -- 63 96 % 20 120/66 -- -- [facility]   10/05/24 1045 -- 64 96 % 20 -- -- --    10/05/24 1015 -- 66 97 % -- 113/76 -- -- [facility]   10/05/24 1002 98.3 °F (36.8 °C) 68 98 % 18 114/69 4 -- [facility]            Physical Exam  Vitals and nursing note reviewed.   Constitutional:       General: She is not in acute distress.     Appearance: She is well-developed. She is not diaphoretic.   HENT:      Head: Normocephalic and atraumatic.   Eyes:      Conjunctiva/sclera: Conjunctivae normal.      Pupils: Pupils are equal, round, and reactive to light.   Cardiovascular:      Rate and Rhythm: Normal rate and regular rhythm.      Heart sounds: Normal heart sounds. No murmur heard.  Pulmonary:      Effort: Pulmonary effort is normal. No respiratory distress.      Breath sounds: Normal breath sounds.   Abdominal:      General: Abdomen is protuberant. Bowel sounds are normal. There is no distension.      Palpations: Abdomen is soft.      Tenderness: There is no abdominal tenderness.   Musculoskeletal:          General: No deformity. Normal range of motion.      Cervical back: Normal range of motion and neck supple.   Skin:     General: Skin is warm and dry.      Capillary Refill: Capillary refill takes less than 2 seconds.      Coloration: Skin is not pale.      Findings: No rash.   Neurological:      General: No focal deficit present.      Mental Status: She is alert and oriented to person, place, and time.      Cranial Nerves: No cranial nerve deficit.   Psychiatric:         Mood and Affect: Mood is anxious.         Behavior: Behavior normal.         Results Reviewed       Procedure Component Value Units Date/Time    Urine Microscopic [[id number]]  (Abnormal) Collected: 10/05/24 1212    Lab Status: Final result Specimen: Urine, Clean Catch Updated: 10/05/24 1256     RBC, UA 2-4 /hpf      WBC, UA 20-30 /hpf      Epithelial Cells Occasional /hpf      Bacteria, UA Occasional /hpf     Urine culture [[id number]] Collected: 10/05/24 1212    Lab Status: In process Specimen: Urine, Clean Catch Updated: 10/05/24 1256    HS Troponin I 2hr [[id number]]  (Normal) Collected: 10/05/24 1211    Lab Status: Final result Specimen: Blood from Line, Venous Updated: 10/05/24 1238     hs TnI 2hr 4 ng/L      Delta 2hr hsTnI 0 ng/L     UA w Reflex to Microscopic w Reflex to Culture [[id number]]  (Abnormal) Collected: 10/05/24 1212    Lab Status: Final result Specimen: Urine, Clean Catch Updated: 10/05/24 1220     Color, UA Yellow     Clarity, UA Clear     Specific Gravity, UA 1.015     pH, UA 6.0     Leukocytes, UA Large     Nitrite, UA Negative     Protein, UA Trace mg/dl      Glucose, UA Negative mg/dl      Ketones, UA Negative mg/dl      Urobilinogen, UA <2.0 mg/dl      Bilirubin, UA Negative     Occult Blood, UA Moderate    HS Troponin 0hr (reflex protocol) [[id number]]  (Normal) Collected: 10/05/24 1025    Lab Status: Final result Specimen: Blood from Arm, Left Updated: 10/05/24 1059     hs TnI 0hr 4 ng/L     FLU/COVID Rapid Antigen (30  min. TAT) - Preferred screening test in ED [[id number]]  (Normal) Collected: 10/05/24 1025    Lab Status: Final result Specimen: Nares from Nose Updated: 10/05/24 1058     SARS COV Rapid Antigen Negative     Influenza A Rapid Antigen Negative     Influenza B Rapid Antigen Negative    Narrative:      This test has been performed using the [employer] Carlotta 2 FLU+SARS Antigen test under the Emergency Use Authorization (EUA). This test has been validated by the  and verified by the performing laboratory. The Carlotta uses lateral flow immunofluorescent sandwich assay to detect SARS-COV, Influenza A and Influenza B Antigen.     The [employer] Carlotta 2 SARS Antigen test does not differentiate between SARS-CoV and SARS-CoV-2.     Negative results are presumptive and may be confirmed with a molecular assay, if necessary, for patient management. Negative results do not rule out SARS-CoV-2 or influenza infection and should not be used as the sole basis for treatment or patient management decisions. A negative test result may occur if the level of antigen in a sample is below the limit of detection of this test.     Positive results are indicative of the presence of viral antigens, but do not rule out bacterial infection or co-infection with other viruses.     All test results should be used as an adjunct to clinical observations and other information available to the provider.    FOR PEDIATRIC PATIENTS - copy/paste COVID Guidelines URL to browser: [email or web address]    Comprehensive metabolic panel [[id number]]  (Abnormal) Collected: 10/05/24 1025    Lab Status: Final result Specimen: Blood from Arm, Left Updated: 10/05/24 1051     Sodium 131 mmol/L      Potassium 4.3 mmol/L      Chloride 95 mmol/L      CO2 30 mmol/L      ANION GAP 6 mmol/L      BUN 14 mg/dL      Creatinine 0.76 mg/dL      Glucose 100 mg/dL      Calcium 9.4 mg/dL      AST 11 U/L      ALT 8 U/L       Alkaline Phosphatase 142 U/L      Total Protein 7.3 g/dL      Albumin 4.1 g/dL      Total Bilirubin 0.56 mg/dL      eGFR 83 ml/min/1.73sq m     Narrative:      National Kid[employer] Disease Foundation guidelines for Chronic Kidney Disease (CKD):     Stage 1 with normal or high GFR (GFR > 90 mL/min/1.73 square meters)    Stage 2 Mild CKD (GFR = 60-89 mL/min/1.73 square meters)    Stage 3A Moderate CKD (GFR = 45-59 mL/min/1.73 square meters)    Stage 3B Moderate CKD (GFR = 30-44 mL/min/1.73 square meters)    Stage 4 Severe CKD (GFR = 15-29 mL/min/1.73 square meters)    Stage 5 End Stage CKD (GFR <15 mL/min/1.73 square meters)  Note: GFR calculation is accurate only with a steady state creatinine    Magnesium [[id number]]  (Normal) Collected: 10/05/24 1025    Lab Status: Final result Specimen: Blood from Arm, Left Updated: 10/05/24 1051     Magnesium 2.0 mg/dL     CBC and differential [[id number]]  (Abnormal) Collected: 10/05/24 1025    Lab Status: Final result Specimen: Blood from Arm, Left Updated: 10/05/24 1035     WBC 14.81 Thousand/uL      RBC 4.63 Million/uL      Hemoglobin 13.3 g/dL      Hematocrit 41.8 %      MCV 90 fL      MCH 28.7 pg      MCHC 31.8 g/dL      RDW 13.8 %      MPV 9.3 fL      Platelets 479 Thousands/uL      nRBC 0 /100 WBCs      Segmented % 67 %      Immature Grans % 0 %      Lymphocytes % 23 %      Monocytes % 7 %      Eosinophils Relative 2 %      Basophils Relative 1 %      Absolute Neutrophils 10.00 Thousands/µL      Absolute Immature Grans 0.05 Thousand/uL      Absolute Lymphocytes 3.40 Thousands/µL      Absolute Monocytes 1.00 Thousand/µL      Eosinophils Absolute 0.27 Thousand/µL      Basophils Absolute 0.09 Thousands/µL             XR chest 1 view portable   ED Interpretation by Jay Rodrigez DO (10/05 1206)   nad      Final Interpretation by Valorie Kirk MD (10/05 1440)      No acute cardiopulmonary disease.            Workstation performed: [id number]             ECG 12 Lead  Documentation Only    Date/Time: 10/5/2024 10:35 AM    Performed by: Jay Rodrigez DO  Authorized by: Jay Rodrigez DO    Indications / Diagnosis:  Indigestion  ECG reviewed by me, the ED Provider: yes    Patient location:  ED  Previous ECG:     Previous ECG:  Unavailable    Comparison to cardiac monitor: Yes    Interpretation:     Interpretation: normal    Rate:     ECG rate:  63bpm    ECG rate assessment: normal    Rhythm:     Rhythm: sinus rhythm    Ectopy:     Ectopy: none    QRS:     QRS axis:  Normal  Conduction:     Conduction: normal    ST segments:     ST segments:  Normal  T waves:     T waves: normal        ED Medication and Procedure Management   Prior to Admission Medications   Prescriptions Last Dose Informant Patient Reported? Taking?   Biotin 5 MG TABS 10/5/2024  Yes Yes   Sig: Take 1 tablet by mouth   Diclofenac Sodium (VOLTAREN) 1 % Not Taking  No No   Sig: Apply 2 g topically 4 (four) times a day   Patient not taking: Reported on 10/5/2024   Emollient (cetaphil) cream   No No   Sig: Apply topically as needed for dry skin   Multiple Vitamin (MULTIVITAMIN) tablet 10/5/2024  Yes Yes   Sig: Take 1 tablet by mouth daily   albuterol (2.5 mg/3 mL) 0.083 % nebulizer solution 10/4/2024 Other (Specify) Yes Yes   Sig: Take 2.5 mg by nebulization every 6 (six) hours as needed for wheezing or shortness of breath   aspirin 81 MG tablet 10/5/2024  Yes Yes   Sig: Take 81 mg by mouth daily.   atorvastatin (LIPITOR) 20 mg tablet 10/5/2024 Self Yes Yes   Sig: Take 40 mg by mouth daily   busPIRone (BUSPAR) 15 mg tablet 10/5/2024 Other (Specify) Yes Yes   Sig: Take 15 mg by mouth 3 (three) times a day   desvenlafaxine succinate (PRISTIQ) 50 mg 24 hr tablet 10/5/2024  Yes Yes   Sig: Take 50 mg by mouth daily   fluticasone (FLONASE) 50 mcg/act nasal spray Past Month  Yes Yes   Si spray into each nostril daily   ketorolac (TORADOL) 10 mg tablet   No No   Sig: Take 1 tablet (10 mg total) by mouth every  6 (six) hours as needed for moderate pain for up to 3 days   ketotifen (ZADITOR) 0.025 % ophthalmic solution Past Month  Yes Yes   Si drop 2 (two) times a day   lamoTRIgine (LaMICtal) 150 MG tablet 10/5/2024 Care Giver Yes Yes   Sig: Take 200 mg by mouth daily   lidocaine (Lidoderm) 5 % Not Taking  No No   Sig: Apply 1 patch topically over 12 hours daily Remove & Discard patch within 12 hours or as directed by MD   Patient not taking: Reported on 10/5/2024   loratadine (CLARITIN) 10 mg tablet Not Taking  Yes No   Sig: Take 10 mg by mouth daily   Patient not taking: Reported on 10/5/2024   lurasidone (LATUDA) 40 mg tablet Not Taking  Yes No   Sig: Take 80 mg by mouth daily with dinner    Patient not taking: Reported on 8/3/2022   meclizine (ANTIVERT) 25 mg tablet Not Taking  Yes No   Sig: Take 25 mg by mouth every 8 (eight) hours as needed for dizziness   Patient not taking: Reported on 8/3/2022   montelukast (SINGULAIR) 10 mg tablet Not Taking  Yes No   Sig: Take 10 mg by mouth daily at bedtime.   Patient not taking: Reported on 2024   naproxen (EC NAPROSYN) 500 MG EC tablet Not Taking  No No   Sig: Take 1 tablet (500 mg total) by mouth 2 (two) times a day with meals   Patient not taking: Reported on 2019   neomycin-bacitracin-polymyxin (NEOSPORIN) 5-400-5,000 ointment Not Taking  No No   Sig: Apply topically in the morning   Patient not taking: Reported on 2024   oxyCODONE (Roxicodone) 5 immediate release tablet Not Taking  No No   Sig: Take 1 tablet (5 mg total) by mouth every 6 (six) hours as needed for moderate pain Max Daily Amount: 20 mg   Patient not taking: Reported on 2024   pantoprazole (PROTONIX) 40 mg tablet 10/5/2024  Yes Yes   Sig: Take 40 mg by mouth daily.   propranolol (INDERAL) 40 mg tablet 10/5/2024  Yes Yes   Sig: Take 10 mg by mouth daily    tolterodine (DETROL LA) 4 mg 24 hr capsule 10/5/2024  Yes Yes   Sig: Take 4 mg by mouth daily.   traMADol (ULTRAM) 50 mg tablet Not  Taking  No No   Si po tid prn pain   Patient not taking: Reported on 2024   traZODone (DESYREL) 50 mg tablet Not Taking  Yes No   Sig: Take 100 mg by mouth daily at bedtime as needed    Patient not taking: Reported on 8/3/2022   umeclidinium bromide (INCRUSE ELLIPTA) 62.5 mcg/inh AEPB inhaler 10/5/2024 Care Giver Yes Yes   Sig: Inhale 1 puff daily   venlafaxine (EFFEXOR-XR) 37.5 mg 24 hr capsule Not Taking  Yes No   Sig: Take 75 mg by mouth daily     Patient not taking: Reported on 8/3/2022   ziprasidone (GEODON) 80 mg capsule 10/5/2024  Yes Yes   Sig: Take 80 mg by mouth every evening      Facility-Administered Medications: None     Discharge Medication List as of 10/5/2024  1:04 PM        START taking these medications    Details   cephalexin (KEFLEX) 500 mg capsule Take 1 capsule (500 mg total) by mouth every 12 (twelve) hours for 5 days, Starting Sat 10/5/2024, Until u 10/10/2024, Normal           CONTINUE these medications which have NOT CHANGED    Details   albuterol (2.5 mg/3 mL) 0.083 % nebulizer solution Take 2.5 mg by nebulization every 6 (six) hours as needed for wheezing or shortness of breath, Historical Med      aspirin 81 MG tablet Take 81 mg by mouth daily., Until Discontinued, Historical Med      atorvastatin (LIPITOR) 20 mg tablet Take 40 mg by mouth daily, Historical Med      Biotin 5 MG TABS Take 1 tablet by mouth, Historical Med      busPIRone (BUSPAR) 15 mg tablet Take 15 mg by mouth 3 (three) times a day, Historical Med      desvenlafaxine succinate (PRISTIQ) 50 mg 24 hr tablet Take 50 mg by mouth daily, Historical Med      fluticasone (FLONASE) 50 mcg/act nasal spray 1 spray into each nostril daily, Historical Med      ketotifen (ZADITOR) 0.025 % ophthalmic solution 1 drop 2 (two) times a day, Historical Med      lamoTRIgine (LaMICtal) 150 MG tablet Take 200 mg by mouth daily, Historical Med      Multiple Vitamin (MULTIVITAMIN) tablet Take 1 tablet by mouth daily, Historical Med       pantoprazole (PROTONIX) 40 mg tablet Take 40 mg by mouth daily., Until Discontinued, Historical Med      propranolol (INDERAL) 40 mg tablet Take 10 mg by mouth daily , Historical Med      tolterodine (DETROL LA) 4 mg 24 hr capsule Take 4 mg by mouth daily., Until Discontinued, Historical Med      umeclidinium bromide (INCRUSE ELLIPTA) 62.5 mcg/inh AEPB inhaler Inhale 1 puff daily, Historical Med      ziprasidone (GEODON) 80 mg capsule Take 80 mg by mouth every evening, Historical Med      Diclofenac Sodium (VOLTAREN) 1 % Apply 2 g topically 4 (four) times a day, Starting Sun 4/7/2024, Normal      Emollient (cetaphil) cream Apply topically as needed for dry skin, Starting Tue 4/2/2024, Until u 5/2/2024 at 2359, Normal      ketorolac (TORADOL) 10 mg tablet Take 1 tablet (10 mg total) by mouth every 6 (six) hours as needed for moderate pain for up to 3 days, Starting Thu 6/27/2024, Until Sun 6/30/2024 at 2359, Normal      lidocaine (Lidoderm) 5 % Apply 1 patch topically over 12 hours daily Remove & Discard patch within 12 hours or as directed by MD, Starting [place] 4/7/2024, Normal      loratadine (CLARITIN) 10 mg tablet Take 10 mg by mouth daily, Historical Med      lurasidone (LATUDA) 40 mg tablet Take 80 mg by mouth daily with dinner , Historical Med      meclizine (ANTIVERT) 25 mg tablet Take 25 mg by mouth every 8 (eight) hours as needed for dizziness, Historical Med      montelukast (SINGULAIR) 10 mg tablet Take 10 mg by mouth daily at bedtime., Until Discontinued, Historical Med      naproxen (EC NAPROSYN) 500 MG EC tablet Take 1 tablet (500 mg total) by mouth 2 (two) times a day with meals, Starting Sun 6/30/2019, Normal      neomycin-bacitracin-polymyxin (NEOSPORIN) 5-400-5,000 ointment Apply topically in the morning, Starting Sun 4/7/2024, Normal      oxyCODONE (Roxicodone) 5 immediate release tablet Take 1 tablet (5 mg total) by mouth every 6 (six) hours as needed for moderate pain Max Daily Amount: 20 mg,  Starting Wed 6/19/2024, Normal      traMADol (ULTRAM) 50 mg tablet 1 po tid prn pain, Normal      traZODone (DESYREL) 50 mg tablet Take 100 mg by mouth daily at bedtime as needed , Historical Med      venlafaxine (EFFEXOR-XR) 37.5 mg 24 hr capsule Take 75 mg by mouth daily  , Until Discontinued, Historical Med           No discharge procedures on file.  ED SEPSIS DOCUMENTATION   Time reflects when diagnosis was documented in both MDM as applicable and the Disposition within this note       Time User Action Codes Description Comment    10/5/2024  1:03 PM Jay Rodrigez [N39.0] UTI (urinary tract infection)                  Jay Rodrigez DO  10/05/24 6527

## 2024-10-08 LAB
ATRIAL RATE: 63 BPM
BACTERIA UR CULT: ABNORMAL
PR INTERVAL: 116 MS
QRS AXIS: -14 DEGREES
QRSD INTERVAL: 96 MS
QT INTERVAL: 448 MS
QTC INTERVAL: 458 MS
T WAVE AXIS: 39 DEGREES
VENTRICULAR RATE: 63 BPM

## 2024-10-08 PROCEDURE — 93010 ELECTROCARDIOGRAM REPORT: CPT | Performed by: INTERNAL MEDICINE

## 2024-11-05 ENCOUNTER — HOSPITAL ENCOUNTER (EMERGENCY)
Facility: HOSPITAL | Age: 64
Discharge: HOME | End: 2024-11-05
Attending: EMERGENCY MEDICINE
Payer: COMMERCIAL

## 2024-11-05 ENCOUNTER — APPOINTMENT (EMERGENCY)
Dept: RADIOLOGY | Facility: HOSPITAL | Age: 64
End: 2024-11-05
Payer: COMMERCIAL

## 2024-11-05 VITALS
OXYGEN SATURATION: 97 % | HEART RATE: 75 BPM | SYSTOLIC BLOOD PRESSURE: 144 MMHG | DIASTOLIC BLOOD PRESSURE: 84 MMHG | TEMPERATURE: 98.4 F | RESPIRATION RATE: 17 BRPM

## 2024-11-05 DIAGNOSIS — M25.529 ELBOW PAIN: Primary | ICD-10-CM

## 2024-11-05 PROCEDURE — 99284 EMERGENCY DEPT VISIT MOD MDM: CPT | Performed by: EMERGENCY MEDICINE

## 2024-11-05 PROCEDURE — 99283 EMERGENCY DEPT VISIT LOW MDM: CPT

## 2024-11-05 PROCEDURE — 73080 X-RAY EXAM OF ELBOW: CPT

## 2024-11-05 RX ORDER — ACETAMINOPHEN 325 MG/1
975 TABLET ORAL ONCE
Status: COMPLETED | OUTPATIENT
Start: 2024-11-05 | End: 2024-11-05

## 2024-11-05 RX ORDER — IBUPROFEN 600 MG/1
600 TABLET, FILM COATED ORAL ONCE
Status: COMPLETED | OUTPATIENT
Start: 2024-11-05 | End: 2024-11-05

## 2024-11-05 RX ADMIN — IBUPROFEN 600 MG: 600 TABLET, FILM COATED ORAL at 13:02

## 2024-11-05 RX ADMIN — ACETAMINOPHEN 975 MG: 325 TABLET ORAL at 13:02

## 2024-11-05 NOTE — ED PROVIDER NOTES
Time reflects when diagnosis was documented in both MDM as applicable and the Disposition within this note       Time User Action Codes Description Comment    11/5/2024  1:04 PM Daphnie Petty Balwinder [M25.529] Elbow pain           ED Disposition       ED Disposition   Discharge    Condition   Stable    Date/Time   Tue Nov 5, 2024 12:52 PM    Comment   Rosmery GARDINER Sam discharge to home/self care.                   Assessment & Plan       Medical Decision Making  Patient evaluated with imaging.  I reviewed the results and discussed them with the patient.  Patient discharged with appropriate instructions medications and follow-up.  Patient verbalized understanding had no further questions at the time of discharge.  Patient had stable vital signs and well-appearing at the time of discharge.applied sling    Problems Addressed:  Elbow pain: acute illness or injury    Amount and/or Complexity of Data Reviewed  External Data Reviewed: notes.  Radiology: ordered and independent interpretation performed. Decision-making details documented in ED Course.     Details: No acute fracture    Risk  OTC drugs.  Prescription drug management.             Medications   acetaminophen (TYLENOL) tablet 975 mg (975 mg Oral Given 11/5/24 1302)   ibuprofen (MOTRIN) tablet 600 mg (600 mg Oral Given 11/5/24 1302)       ED Risk Strat Scores                                               History of Present Illness       Chief Complaint   Patient presents with    Arm Pain     Patient states she lost her balance and fell.  C/o left arm pain from her fingertips to her shoulder.  Takes ASA.       Past Medical History:   Diagnosis Date    Asthma     Bladder incontinence     Chronic pain     back    COPD (chronic obstructive pulmonary disease) (HCC)     GERD (gastroesophageal reflux disease)     Hyperlipidemia     Psychiatric disorder     bipolar      Past Surgical History:   Procedure Laterality Date    TUBAL LIGATION        Family History   Problem  Relation Age of Onset    No Known Problems Mother     No Known Problems Sister     No Known Problems Daughter     No Known Problems Maternal Aunt       Social History     Tobacco Use    Smoking status: Former     Current packs/day: 0.50     Average packs/day: 0.5 packs/day for 0.5 years (0.3 ttl pk-yrs)     Types: Cigarettes     Start date: 05/2024    Smokeless tobacco: Never   Vaping Use    Vaping status: Never Used   Substance Use Topics    Alcohol use: No    Drug use: No      E-Cigarette/Vaping    E-Cigarette Use Never User       E-Cigarette/Vaping Substances    Nicotine No     THC No     CBD No     Flavoring No     Other No     Unknown No       I have reviewed and agree with the history as documented.     64-year-old female presents with left elbow pain after she tripped and fell onto that side she denies any head injury loss of consciousness neck pain back pain abdominal pain she ambulated after the event no other injuries or complaints.      History provided by:  Patient   used: No        Review of Systems   Constitutional: Negative.    HENT: Negative.     Eyes: Negative.    Respiratory: Negative.     Cardiovascular: Negative.    Gastrointestinal: Negative.    Endocrine: Negative.    Genitourinary: Negative.    Musculoskeletal:  Positive for arthralgias, joint swelling and myalgias.   Skin: Negative.    Allergic/Immunologic: Negative.    Neurological: Negative.    Hematological: Negative.    Psychiatric/Behavioral: Negative.     All other systems reviewed and are negative.          Objective       ED Triage Vitals [11/05/24 1219]   Temperature Pulse Blood Pressure Respirations SpO2 Patient Position - Orthostatic VS   98.4 °F (36.9 °C) 75 144/84 17 97 % Lying      Temp Source Heart Rate Source BP Location FiO2 (%) Pain Score    Oral Monitor Right arm -- 10 - Worst Possible Pain      Vitals      Date and Time Temp Pulse SpO2 Resp BP Pain Score FACES Pain Rating User   11/05/24 1302 -- -- --  -- -- 10 - Worst Possible Pain -- MANISHA   11/05/24 1219 98.4 °F (36.9 °C) 75 97 % 17 144/84 10 - Worst Possible Pain -- AC            Physical Exam  Constitutional:       Appearance: Normal appearance.   HENT:      Head: Normocephalic and atraumatic.      Nose: Nose normal.      Mouth/Throat:      Mouth: Mucous membranes are moist.   Eyes:      Extraocular Movements: Extraocular movements intact.      Pupils: Pupils are equal, round, and reactive to light.   Cardiovascular:      Rate and Rhythm: Normal rate and regular rhythm.   Pulmonary:      Effort: Pulmonary effort is normal.      Breath sounds: Normal breath sounds.   Abdominal:      General: Abdomen is flat. Bowel sounds are normal.      Palpations: Abdomen is soft.   Musculoskeletal:         General: Normal range of motion.      Cervical back: Normal range of motion and neck supple.      Comments: Left upper extremity: Tenderness noted along the olecranon/elbow range of motion of the elbow joint is intact restricted by pain radial pulses intact radial nerve anterior interosseous nerve intact.  Axillary nerve is intact.  No open wounds no bruising no deformity noted   Skin:     General: Skin is warm.      Capillary Refill: Capillary refill takes less than 2 seconds.   Neurological:      General: No focal deficit present.      Mental Status: She is alert and oriented to person, place, and time. Mental status is at baseline.   Psychiatric:         Mood and Affect: Mood normal.         Thought Content: Thought content normal.         Results Reviewed       None            XR elbow 3+ vw LEFT    (Results Pending)       Procedures    ED Medication and Procedure Management   Prior to Admission Medications   Prescriptions Last Dose Informant Patient Reported? Taking?   Biotin 5 MG TABS   Yes No   Sig: Take 1 tablet by mouth   Diclofenac Sodium (VOLTAREN) 1 %   No No   Sig: Apply 2 g topically 4 (four) times a day   Patient not taking: Reported on 10/5/2024   Emollient  (cetaphil) cream   No No   Sig: Apply topically as needed for dry skin   Multiple Vitamin (MULTIVITAMIN) tablet   Yes No   Sig: Take 1 tablet by mouth daily   albuterol (2.5 mg/3 mL) 0.083 % nebulizer solution  Other (Specify) Yes No   Sig: Take 2.5 mg by nebulization every 6 (six) hours as needed for wheezing or shortness of breath   aspirin 81 MG tablet   Yes No   Sig: Take 81 mg by mouth daily.   atorvastatin (LIPITOR) 20 mg tablet  Self Yes No   Sig: Take 40 mg by mouth daily   busPIRone (BUSPAR) 15 mg tablet  Other (Specify) Yes No   Sig: Take 15 mg by mouth 3 (three) times a day   desvenlafaxine succinate (PRISTIQ) 50 mg 24 hr tablet   Yes No   Sig: Take 50 mg by mouth daily   fluticasone (FLONASE) 50 mcg/act nasal spray   Yes No   Si spray into each nostril daily   ketorolac (TORADOL) 10 mg tablet   No No   Sig: Take 1 tablet (10 mg total) by mouth every 6 (six) hours as needed for moderate pain for up to 3 days   ketotifen (ZADITOR) 0.025 % ophthalmic solution   Yes No   Si drop 2 (two) times a day   lamoTRIgine (LaMICtal) 150 MG tablet  Care Giver Yes No   Sig: Take 200 mg by mouth daily   lidocaine (Lidoderm) 5 %   No No   Sig: Apply 1 patch topically over 12 hours daily Remove & Discard patch within 12 hours or as directed by MD   Patient not taking: Reported on 10/5/2024   loratadine (CLARITIN) 10 mg tablet   Yes No   Sig: Take 10 mg by mouth daily   Patient not taking: Reported on 10/5/2024   lurasidone (LATUDA) 40 mg tablet   Yes No   Sig: Take 80 mg by mouth daily with dinner    Patient not taking: Reported on 8/3/2022   meclizine (ANTIVERT) 25 mg tablet   Yes No   Sig: Take 25 mg by mouth every 8 (eight) hours as needed for dizziness   Patient not taking: Reported on 8/3/2022   montelukast (SINGULAIR) 10 mg tablet   Yes No   Sig: Take 10 mg by mouth daily at bedtime.   Patient not taking: Reported on 2024   naproxen (EC NAPROSYN) 500 MG EC tablet   No No   Sig: Take 1 tablet (500 mg  total) by mouth 2 (two) times a day with meals   Patient not taking: Reported on 2019   neomycin-bacitracin-polymyxin (NEOSPORIN) 5-400-5,000 ointment   No No   Sig: Apply topically in the morning   Patient not taking: Reported on 2024   oxyCODONE (Roxicodone) 5 immediate release tablet   No No   Sig: Take 1 tablet (5 mg total) by mouth every 6 (six) hours as needed for moderate pain Max Daily Amount: 20 mg   Patient not taking: Reported on 2024   pantoprazole (PROTONIX) 40 mg tablet   Yes No   Sig: Take 40 mg by mouth daily.   propranolol (INDERAL) 40 mg tablet   Yes No   Sig: Take 10 mg by mouth daily    tolterodine (DETROL LA) 4 mg 24 hr capsule   Yes No   Sig: Take 4 mg by mouth daily.   traMADol (ULTRAM) 50 mg tablet   No No   Si po tid prn pain   Patient not taking: Reported on 2024   traZODone (DESYREL) 50 mg tablet   Yes No   Sig: Take 100 mg by mouth daily at bedtime as needed    Patient not taking: Reported on 8/3/2022   umeclidinium bromide (INCRUSE ELLIPTA) 62.5 mcg/inh AEPB inhaler  Care Giver Yes No   Sig: Inhale 1 puff daily   venlafaxine (EFFEXOR-XR) 37.5 mg 24 hr capsule   Yes No   Sig: Take 75 mg by mouth daily     Patient not taking: Reported on 8/3/2022   ziprasidone (GEODON) 80 mg capsule   Yes No   Sig: Take 80 mg by mouth every evening      Facility-Administered Medications: None     Discharge Medication List as of 2024  1:05 PM        CONTINUE these medications which have NOT CHANGED    Details   albuterol (2.5 mg/3 mL) 0.083 % nebulizer solution Take 2.5 mg by nebulization every 6 (six) hours as needed for wheezing or shortness of breath, Historical Med      aspirin 81 MG tablet Take 81 mg by mouth daily., Until Discontinued, Historical Med      atorvastatin (LIPITOR) 20 mg tablet Take 40 mg by mouth daily, Historical Med      Biotin 5 MG TABS Take 1 tablet by mouth, Historical Med      busPIRone (BUSPAR) 15 mg tablet Take 15 mg by mouth 3 (three) times a day,  Historical Med      desvenlafaxine succinate (PRISTIQ) 50 mg 24 hr tablet Take 50 mg by mouth daily, Historical Med      Diclofenac Sodium (VOLTAREN) 1 % Apply 2 g topically 4 (four) times a day, Starting Sun 4/7/2024, Normal      Emollient (cetaphil) cream Apply topically as needed for dry skin, Starting Tue 4/2/2024, Until Thu 5/2/2024 at 2359, Normal      fluticasone (FLONASE) 50 mcg/act nasal spray 1 spray into each nostril daily, Historical Med      ketorolac (TORADOL) 10 mg tablet Take 1 tablet (10 mg total) by mouth every 6 (six) hours as needed for moderate pain for up to 3 days, Starting u 6/27/2024, Until Sun 6/30/2024 at 2359, Normal      ketotifen (ZADITOR) 0.025 % ophthalmic solution 1 drop 2 (two) times a day, Historical Med      lamoTRIgine (LaMICtal) 150 MG tablet Take 200 mg by mouth daily, Historical Med      lidocaine (Lidoderm) 5 % Apply 1 patch topically over 12 hours daily Remove & Discard patch within 12 hours or as directed by MD, Starting Sun 4/7/2024, Normal      loratadine (CLARITIN) 10 mg tablet Take 10 mg by mouth daily, Historical Med      lurasidone (LATUDA) 40 mg tablet Take 80 mg by mouth daily with dinner , Historical Med      meclizine (ANTIVERT) 25 mg tablet Take 25 mg by mouth every 8 (eight) hours as needed for dizziness, Historical Med      montelukast (SINGULAIR) 10 mg tablet Take 10 mg by mouth daily at bedtime., Until Discontinued, Historical Med      Multiple Vitamin (MULTIVITAMIN) tablet Take 1 tablet by mouth daily, Historical Med      naproxen (EC NAPROSYN) 500 MG EC tablet Take 1 tablet (500 mg total) by mouth 2 (two) times a day with meals, Starting Sun 6/30/2019, Normal      neomycin-bacitracin-polymyxin (NEOSPORIN) 5-400-5,000 ointment Apply topically in the morning, Starting Sun 4/7/2024, Normal      oxyCODONE (Roxicodone) 5 immediate release tablet Take 1 tablet (5 mg total) by mouth every 6 (six) hours as needed for moderate pain Max Daily Amount: 20 mg,  Starting Wed 6/19/2024, Normal      pantoprazole (PROTONIX) 40 mg tablet Take 40 mg by mouth daily., Until Discontinued, Historical Med      propranolol (INDERAL) 40 mg tablet Take 10 mg by mouth daily , Historical Med      tolterodine (DETROL LA) 4 mg 24 hr capsule Take 4 mg by mouth daily., Until Discontinued, Historical Med      traMADol (ULTRAM) 50 mg tablet 1 po tid prn pain, Normal      traZODone (DESYREL) 50 mg tablet Take 100 mg by mouth daily at bedtime as needed , Historical Med      umeclidinium bromide (INCRUSE ELLIPTA) 62.5 mcg/inh AEPB inhaler Inhale 1 puff daily, Historical Med      venlafaxine (EFFEXOR-XR) 37.5 mg 24 hr capsule Take 75 mg by mouth daily  , Until Discontinued, Historical Med      ziprasidone (GEODON) 80 mg capsule Take 80 mg by mouth every evening, Historical Med           No discharge procedures on file.  ED SEPSIS DOCUMENTATION   Time reflects when diagnosis was documented in both MDM as applicable and the Disposition within this note       Time User Action Codes Description Comment    11/5/2024  1:04 PM Daphnie Petty Add [M25.529] Elbow pain                  Daphnie Petty,   11/05/24 1421

## 2024-11-26 ENCOUNTER — HOSPITAL ENCOUNTER (EMERGENCY)
Facility: HOSPITAL | Age: 64
Discharge: HOME | End: 2024-11-27
Attending: EMERGENCY MEDICINE
Payer: COMMERCIAL

## 2024-11-26 ENCOUNTER — APPOINTMENT (EMERGENCY)
Dept: RADIOLOGY | Facility: HOSPITAL | Age: 64
End: 2024-11-26
Payer: COMMERCIAL

## 2024-11-26 DIAGNOSIS — R11.0 NAUSEA: ICD-10-CM

## 2024-11-26 DIAGNOSIS — N39.0 UTI (URINARY TRACT INFECTION): ICD-10-CM

## 2024-11-26 DIAGNOSIS — R53.1 GENERALIZED WEAKNESS: Primary | ICD-10-CM

## 2024-11-26 LAB
BACTERIA UR QL AUTO: ABNORMAL /HPF
BASOPHILS # BLD AUTO: 0.06 THOUSANDS/ΜL (ref 0–0.1)
BASOPHILS NFR BLD AUTO: 1 % (ref 0–1)
BILIRUB UR QL STRIP: NEGATIVE
CLARITY UR: ABNORMAL
COLOR UR: YELLOW
EOSINOPHIL # BLD AUTO: 0.57 THOUSAND/ΜL (ref 0–0.61)
EOSINOPHIL NFR BLD AUTO: 5 % (ref 0–6)
ERYTHROCYTE [DISTWIDTH] IN BLOOD BY AUTOMATED COUNT: 13.2 % (ref 11.6–15.1)
GLUCOSE UR STRIP-MCNC: NEGATIVE MG/DL
HCT VFR BLD AUTO: 38.3 % (ref 34.8–46.1)
HGB BLD-MCNC: 12.8 G/DL (ref 11.5–15.4)
HGB UR QL STRIP.AUTO: NEGATIVE
IMM GRANULOCYTES # BLD AUTO: 0.03 THOUSAND/UL (ref 0–0.2)
IMM GRANULOCYTES NFR BLD AUTO: 0 % (ref 0–2)
KETONES UR STRIP-MCNC: NEGATIVE MG/DL
LEUKOCYTE ESTERASE UR QL STRIP: ABNORMAL
LYMPHOCYTES # BLD AUTO: 4.58 THOUSANDS/ΜL (ref 0.6–4.47)
LYMPHOCYTES NFR BLD AUTO: 40 % (ref 14–44)
MCH RBC QN AUTO: 29.5 PG (ref 26.8–34.3)
MCHC RBC AUTO-ENTMCNC: 33.4 G/DL (ref 31.4–37.4)
MCV RBC AUTO: 88 FL (ref 82–98)
MONOCYTES # BLD AUTO: 0.92 THOUSAND/ΜL (ref 0.17–1.22)
MONOCYTES NFR BLD AUTO: 8 % (ref 4–12)
NEUTROPHILS # BLD AUTO: 5.42 THOUSANDS/ΜL (ref 1.85–7.62)
NEUTS SEG NFR BLD AUTO: 46 % (ref 43–75)
NITRITE UR QL STRIP: NEGATIVE
NON-SQ EPI CELLS URNS QL MICRO: ABNORMAL /HPF
NRBC BLD AUTO-RTO: 0 /100 WBCS
PH UR STRIP.AUTO: 6 [PH]
PLATELET # BLD AUTO: 436 THOUSANDS/UL (ref 149–390)
PMV BLD AUTO: 9.3 FL (ref 8.9–12.7)
PROT UR STRIP-MCNC: ABNORMAL MG/DL
RBC # BLD AUTO: 4.34 MILLION/UL (ref 3.81–5.12)
RBC #/AREA URNS AUTO: ABNORMAL /HPF
SP GR UR STRIP.AUTO: 1.02 (ref 1–1.03)
UROBILINOGEN UR STRIP-ACNC: <2 MG/DL
WBC # BLD AUTO: 11.58 THOUSAND/UL (ref 4.31–10.16)
WBC #/AREA URNS AUTO: ABNORMAL /HPF

## 2024-11-26 PROCEDURE — 80053 COMPREHEN METABOLIC PANEL: CPT | Performed by: EMERGENCY MEDICINE

## 2024-11-26 PROCEDURE — 36415 COLL VENOUS BLD VENIPUNCTURE: CPT | Performed by: EMERGENCY MEDICINE

## 2024-11-26 PROCEDURE — 83690 ASSAY OF LIPASE: CPT | Performed by: EMERGENCY MEDICINE

## 2024-11-26 PROCEDURE — 93005 ELECTROCARDIOGRAM TRACING: CPT

## 2024-11-26 PROCEDURE — 96361 HYDRATE IV INFUSION ADD-ON: CPT

## 2024-11-26 PROCEDURE — 84484 ASSAY OF TROPONIN QUANT: CPT | Performed by: EMERGENCY MEDICINE

## 2024-11-26 PROCEDURE — 81001 URINALYSIS AUTO W/SCOPE: CPT | Performed by: EMERGENCY MEDICINE

## 2024-11-26 PROCEDURE — 87804 INFLUENZA ASSAY W/OPTIC: CPT | Performed by: EMERGENCY MEDICINE

## 2024-11-26 PROCEDURE — 73660 X-RAY EXAM OF TOE(S): CPT

## 2024-11-26 PROCEDURE — 85025 COMPLETE CBC W/AUTO DIFF WBC: CPT | Performed by: EMERGENCY MEDICINE

## 2024-11-26 PROCEDURE — 71045 X-RAY EXAM CHEST 1 VIEW: CPT

## 2024-11-26 PROCEDURE — 99285 EMERGENCY DEPT VISIT HI MDM: CPT

## 2024-11-26 PROCEDURE — 96375 TX/PRO/DX INJ NEW DRUG ADDON: CPT

## 2024-11-26 PROCEDURE — 99285 EMERGENCY DEPT VISIT HI MDM: CPT | Performed by: EMERGENCY MEDICINE

## 2024-11-26 PROCEDURE — 87811 SARS-COV-2 COVID19 W/OPTIC: CPT | Performed by: EMERGENCY MEDICINE

## 2024-11-26 RX ORDER — ONDANSETRON 2 MG/ML
4 INJECTION INTRAMUSCULAR; INTRAVENOUS ONCE
Status: COMPLETED | OUTPATIENT
Start: 2024-11-26 | End: 2024-11-26

## 2024-11-26 RX ORDER — CEFTRIAXONE 1 G/50ML
1000 INJECTION, SOLUTION INTRAVENOUS ONCE
Status: COMPLETED | OUTPATIENT
Start: 2024-11-27 | End: 2024-11-27

## 2024-11-26 RX ADMIN — SODIUM CHLORIDE 500 ML: 0.9 INJECTION, SOLUTION INTRAVENOUS at 23:31

## 2024-11-26 RX ADMIN — ONDANSETRON 4 MG: 2 INJECTION INTRAMUSCULAR; INTRAVENOUS at 23:32

## 2024-11-27 VITALS
SYSTOLIC BLOOD PRESSURE: 128 MMHG | RESPIRATION RATE: 18 BRPM | DIASTOLIC BLOOD PRESSURE: 80 MMHG | HEART RATE: 77 BPM | TEMPERATURE: 98.3 F | OXYGEN SATURATION: 91 %

## 2024-11-27 LAB
2HR DELTA HS TROPONIN: 9 NG/L
4HR DELTA HS TROPONIN: 14 NG/L
ALBUMIN SERPL BCG-MCNC: 4 G/DL (ref 3.5–5)
ALP SERPL-CCNC: 133 U/L (ref 34–104)
ALT SERPL W P-5'-P-CCNC: 8 U/L (ref 7–52)
ANION GAP SERPL CALCULATED.3IONS-SCNC: 9 MMOL/L (ref 4–13)
AST SERPL W P-5'-P-CCNC: 12 U/L (ref 13–39)
BILIRUB SERPL-MCNC: 0.4 MG/DL (ref 0.2–1)
BUN SERPL-MCNC: 18 MG/DL (ref 5–25)
CALCIUM SERPL-MCNC: 9.5 MG/DL (ref 8.4–10.2)
CARDIAC TROPONIN I PNL SERPL HS: 17 NG/L (ref ?–50)
CARDIAC TROPONIN I PNL SERPL HS: 22 NG/L (ref ?–50)
CARDIAC TROPONIN I PNL SERPL HS: 8 NG/L (ref ?–50)
CHLORIDE SERPL-SCNC: 101 MMOL/L (ref 96–108)
CO2 SERPL-SCNC: 28 MMOL/L (ref 21–32)
CREAT SERPL-MCNC: 0.81 MG/DL (ref 0.6–1.3)
FLUAV AG UPPER RESP QL IA.RAPID: NEGATIVE
FLUBV AG UPPER RESP QL IA.RAPID: NEGATIVE
GFR SERPL CREATININE-BSD FRML MDRD: 77 ML/MIN/1.73SQ M
GLUCOSE SERPL-MCNC: 122 MG/DL (ref 65–140)
LIPASE SERPL-CCNC: 20 U/L (ref 11–82)
POTASSIUM SERPL-SCNC: 3.6 MMOL/L (ref 3.5–5.3)
PROT SERPL-MCNC: 7 G/DL (ref 6.4–8.4)
SARS-COV+SARS-COV-2 AG RESP QL IA.RAPID: NEGATIVE
SODIUM SERPL-SCNC: 138 MMOL/L (ref 135–147)

## 2024-11-27 PROCEDURE — 84484 ASSAY OF TROPONIN QUANT: CPT | Performed by: EMERGENCY MEDICINE

## 2024-11-27 PROCEDURE — 87077 CULTURE AEROBIC IDENTIFY: CPT | Performed by: EMERGENCY MEDICINE

## 2024-11-27 PROCEDURE — 96365 THER/PROPH/DIAG IV INF INIT: CPT

## 2024-11-27 PROCEDURE — 36415 COLL VENOUS BLD VENIPUNCTURE: CPT | Performed by: EMERGENCY MEDICINE

## 2024-11-27 PROCEDURE — 87086 URINE CULTURE/COLONY COUNT: CPT | Performed by: EMERGENCY MEDICINE

## 2024-11-27 RX ORDER — ONDANSETRON 4 MG/1
4 TABLET, ORALLY DISINTEGRATING ORAL EVERY 8 HOURS PRN
Qty: 20 TABLET | Refills: 0 | Status: SHIPPED | OUTPATIENT
Start: 2024-11-27 | End: 2024-12-13

## 2024-11-27 RX ORDER — CEFDINIR 300 MG/1
300 CAPSULE ORAL EVERY 12 HOURS SCHEDULED
Qty: 14 CAPSULE | Refills: 0 | Status: SHIPPED | OUTPATIENT
Start: 2024-11-27 | End: 2024-12-04

## 2024-11-27 RX ORDER — GINSENG 100 MG
1 CAPSULE ORAL ONCE
Status: COMPLETED | OUTPATIENT
Start: 2024-11-27 | End: 2024-11-27

## 2024-11-27 RX ADMIN — BACITRACIN ZINC 1 SMALL APPLICATION: 500 OINTMENT TOPICAL at 00:20

## 2024-11-27 RX ADMIN — CEFTRIAXONE 1000 MG: 1 INJECTION, SOLUTION INTRAVENOUS at 00:05

## 2024-11-27 NOTE — ED PROVIDER NOTES
Time reflects when diagnosis was documented in both MDM as applicable and the Disposition within this note       Time User Action Codes Description Comment    11/27/2024 12:01 AM April Dowling Add [R53.1] Generalized weakness     11/27/2024 12:01 AM April Dowling Add [R11.0] Nausea     11/27/2024 12:01 AM April Dowling Add [N39.0] UTI (urinary tract infection)           ED Disposition       None          Assessment & Plan       Medical Decision Making  Differential includes but not limited to atypical ACS, infection, dehydration, electrolyte imbalance.  There is no evident of toe infection.  Prior records reviewed.  Pt. Did have UTI 2 months ago, culture grew out staph saprophyticus, treated with with Keflex.  0000- WBC 11.5, UA with WBC/bacteria c/w UTI, covid/flu negative, CMP ok, lipase negative.  Trop pending.  Will give abx for UTI.  0010 - initial trop 8.    0115 - 2 hour trop due in 10 minutes.  Plan to discharge on abx if delta trop ok. Signed out to night doctor.    Amount and/or Complexity of Data Reviewed  Labs: ordered.  Radiology: ordered.    Risk  OTC drugs.  Prescription drug management.             Medications   ondansetron (ZOFRAN) injection 4 mg (4 mg Intravenous Given 11/26/24 2332)   sodium chloride 0.9 % bolus 500 mL (0 mL Intravenous Stopped 11/27/24 0002)   cefTRIAXone (ROCEPHIN) IVPB (premix in dextrose) 1,000 mg 50 mL (0 mg Intravenous Stopped 11/27/24 0035)   bacitracin topical ointment 1 small application (1 small application Topical Given 11/27/24 0020)       ED Risk Strat Scores                                               History of Present Illness       Chief Complaint   Patient presents with    Weakness - Generalized     Pt comes in stating that she has had generalized weakness early this afternoon with Nausea no vomiting. Pt took acetaminophen before coming.     Toe Pain     Pt stubbed her toe yesterday states that she has some toe pain from that        Past Medical  History:   Diagnosis Date    Asthma     Bladder incontinence     Chronic pain     back    COPD (chronic obstructive pulmonary disease) (HCC)     GERD (gastroesophageal reflux disease)     Hyperlipidemia     Psychiatric disorder     bipolar      Past Surgical History:   Procedure Laterality Date    TUBAL LIGATION        Family History   Problem Relation Age of Onset    No Known Problems Mother     No Known Problems Sister     No Known Problems Daughter     No Known Problems Maternal Aunt       Social History     Tobacco Use    Smoking status: Every Day     Current packs/day: 0.50     Average packs/day: 0.5 packs/day for 0.6 years (0.3 ttl pk-yrs)     Types: Cigarettes     Start date: 05/2024    Smokeless tobacco: Never   Vaping Use    Vaping status: Never Used   Substance Use Topics    Alcohol use: No    Drug use: No      E-Cigarette/Vaping    E-Cigarette Use Never User       E-Cigarette/Vaping Substances    Nicotine No     THC No     CBD No     Flavoring No     Other No     Unknown No       I have reviewed and agree with the history as documented.     63 yo female c/o not feeling well today.  She notes nausea and lightheadedness and general weakness.  No pain anywhere.  No vomiting or diarrhea.  No cough or congestion.  No dysuria.  She did injure her left great toe a few days ago and wripped the toenail and she is worried it could be infected and spreading through her body.  Pt. Is a smoker.      History provided by:  Patient   used: No    Toe Pain  Associated symptoms: fatigue    Associated symptoms: no abdominal pain, no chest pain, no congestion, no cough, no diarrhea, no fever, no rash and no vomiting        Review of Systems   Constitutional:  Positive for fatigue. Negative for fever.   HENT:  Negative for congestion.    Respiratory:  Negative for cough.    Cardiovascular:  Negative for chest pain.   Gastrointestinal:  Negative for abdominal pain, diarrhea and vomiting.   Skin:  Positive  for wound. Negative for rash.   Neurological:  Positive for weakness and light-headedness. Negative for syncope.   All other systems reviewed and are negative.          Objective       ED Triage Vitals   Temperature Pulse Blood Pressure Respirations SpO2 Patient Position - Orthostatic VS   11/26/24 2312 11/26/24 2312 11/26/24 2312 11/26/24 2312 11/26/24 2312 11/27/24 0030   98.3 °F (36.8 °C) 82 149/95 18 97 % Lying      Temp Source Heart Rate Source BP Location FiO2 (%) Pain Score    11/26/24 2312 11/27/24 0030 11/27/24 0030 -- --    Oral Monitor Right arm        Vitals      Date and Time Temp Pulse SpO2 Resp BP Pain Score FACES Pain Rating User   11/27/24 0030 -- 71 93 % 18 130/83 -- --    11/26/24 2312 98.3 °F (36.8 °C) 82 97 % 18 149/95 -- -- CAC            Physical Exam  Vitals and nursing note reviewed.   Constitutional:       General: She is not in acute distress.     Appearance: She is well-developed. She is not ill-appearing or diaphoretic.   HENT:      Head: Normocephalic and atraumatic.   Eyes:      Conjunctiva/sclera: Conjunctivae normal.   Cardiovascular:      Rate and Rhythm: Normal rate and regular rhythm.      Heart sounds: Normal heart sounds. No murmur heard.  Pulmonary:      Effort: Pulmonary effort is normal. No respiratory distress.      Breath sounds: Normal breath sounds.   Abdominal:      General: Bowel sounds are normal. There is no distension.      Palpations: Abdomen is soft.      Tenderness: There is no abdominal tenderness.   Musculoskeletal:         General: No deformity. Normal range of motion.      Cervical back: Normal range of motion and neck supple.      Right lower leg: No edema.      Left lower leg: No edema.      Comments: Left great toe no sts or redness or warmth.  About a quarted of the nail is ripped off at the lateral side.  No paronychia.   Skin:     General: Skin is warm and dry.      Coloration: Skin is not pale.      Findings: No rash.   Neurological:      Mental  Status: She is alert.      Cranial Nerves: No cranial nerve deficit.   Psychiatric:         Behavior: Behavior normal.         Results Reviewed       Procedure Component Value Units Date/Time    HS Troponin I 2hr [[id number]] Collected: 11/27/24 0108    Lab Status: No result Specimen: Blood from Arm, Left     HS Troponin 0hr (reflex protocol) [[id number]]  (Normal) Collected: 11/26/24 2326    Lab Status: Final result Specimen: Blood from Arm, Left Updated: 11/27/24 0005     hs TnI 0hr 8 ng/L     HS Troponin I 4hr [[id number]]     Lab Status: No result Specimen: Blood     Comprehensive metabolic panel [[id number]]  (Abnormal) Collected: 11/26/24 2326    Lab Status: Final result Specimen: Blood from Arm, Left Updated: 11/27/24 0001     Sodium 138 mmol/L      Potassium 3.6 mmol/L      Chloride 101 mmol/L      CO2 28 mmol/L      ANION GAP 9 mmol/L      BUN 18 mg/dL      Creatinine 0.81 mg/dL      Glucose 122 mg/dL      Calcium 9.5 mg/dL      AST 12 U/L      ALT 8 U/L      Alkaline Phosphatase 133 U/L      Total Protein 7.0 g/dL      Albumin 4.0 g/dL      Total Bilirubin 0.40 mg/dL      eGFR 77 ml/min/1.73sq m     Narrative:      National Kid[employer] guidelines for Chronic Kidney Disease (CKD):     Stage 1 with normal or high GFR (GFR > 90 mL/min/1.73 square meters)    Stage 2 Mild CKD (GFR = 60-89 mL/min/1.73 square meters)    Stage 3A Moderate CKD (GFR = 45-59 mL/min/1.73 square meters)    Stage 3B Moderate CKD (GFR = 30-44 mL/min/1.73 square meters)    Stage 4 Severe CKD (GFR = 15-29 mL/min/1.73 square meters)    Stage 5 End Stage CKD (GFR <15 mL/min/1.73 square meters)  Note: GFR calculation is accurate only with a steady state creatinine    Lipase [[id number]]  (Normal) Collected: 11/26/24 2326    Lab Status: Final result Specimen: Blood from Arm, Left Updated: 11/27/24 0001     Lipase 20 u/L     FLU/COVID Rapid Antigen (30 min. TAT) - Preferred screening test in ED [[id number]]  (Normal) Collected: 11/26/24  2326    Lab Status: Final result Specimen: Nares from Nose Updated: 11/27/24 0000     SARS COV Rapid Antigen Negative     Influenza A Rapid Antigen Negative     Influenza B Rapid Antigen Negative    Narrative:      This test has been performed using the [employer] Carlotta 2 FLU+SARS Antigen test under the Emergency Use Authorization (EUA). This test has been validated by the  and verified by the performing laboratory. The Carlotta uses lateral flow immunofluorescent sandwich assay to detect SARS-COV, Influenza A and Influenza B Antigen.     The [employer] Carlotta 2 SARS Antigen test does not differentiate between SARS-CoV and SARS-CoV-2.     Negative results are presumptive and may be confirmed with a molecular assay, if necessary, for patient management. Negative results do not rule out SARS-CoV-2 or influenza infection and should not be used as the sole basis for treatment or patient management decisions. A negative test result may occur if the level of antigen in a sample is below the limit of detection of this test.     Positive results are indicative of the presence of viral antigens, but do not rule out bacterial infection or co-infection with other viruses.     All test results should be used as an adjunct to clinical observations and other information available to the provider.    FOR PEDIATRIC PATIENTS - copy/paste COVID Guidelines URL to browser: [email or web address]    Urine Microscopic [[id number]]  (Abnormal) Collected: 11/26/24 2335    Lab Status: Final result Specimen: Urine, Clean Catch Updated: 11/26/24 2356     RBC, UA 1-2 /hpf      WBC, UA 20-30 /hpf      Epithelial Cells Occasional /hpf      Bacteria, UA Moderate /hpf     UA (URINE) with reflex to Scope [[id number]]  (Abnormal) Collected: 11/26/24 2335    Lab Status: Final result Specimen: Urine, Clean Catch Updated: 11/26/24 2340     Color, UA Yellow     Clarity, UA Slightly Cloudy     Specific  Gravity, UA 1.020     pH, UA 6.0     Leukocytes, UA Large     Nitrite, UA Negative     Protein, UA Trace mg/dl      Glucose, UA Negative mg/dl      Ketones, UA Negative mg/dl      Urobilinogen, UA <2.0 mg/dl      Bilirubin, UA Negative     Occult Blood, UA Negative    Urine culture [[id number]] Collected: 11/26/24 2335    Lab Status: In process Specimen: Urine, Clean Catch Updated: 11/26/24 2338    CBC and differential [[id number]]  (Abnormal) Collected: 11/26/24 2326    Lab Status: Final result Specimen: Blood from Arm, Left Updated: 11/26/24 2332     WBC 11.58 Thousand/uL      RBC 4.34 Million/uL      Hemoglobin 12.8 g/dL      Hematocrit 38.3 %      MCV 88 fL      MCH 29.5 pg      MCHC 33.4 g/dL      RDW 13.2 %      MPV 9.3 fL      Platelets 436 Thousands/uL      nRBC 0 /100 WBCs      Segmented % 46 %      Immature Grans % 0 %      Lymphocytes % 40 %      Monocytes % 8 %      Eosinophils Relative 5 %      Basophils Relative 1 %      Absolute Neutrophils 5.42 Thousands/µL      Absolute Immature Grans 0.03 Thousand/uL      Absolute Lymphocytes 4.58 Thousands/µL      Absolute Monocytes 0.92 Thousand/µL      Eosinophils Absolute 0.57 Thousand/µL      Basophils Absolute 0.06 Thousands/µL             XR toe great min 2 views LEFT    (Results Pending)   XR chest 1 view portable    (Results Pending)       ECG 12 Lead Documentation Only    Date/Time: 11/26/2024 11:20 PM    Performed by: April Dowling MD  Authorized by: April Dowling MD    Indications / Diagnosis:  Weakness  ECG reviewed by me, the ED Provider: yes    Patient location:  ED  Previous ECG:     Previous ECG:  Compared to current    Similarity:  No change  Interpretation:     Interpretation: abnormal    Rate:     ECG rate:  78    ECG rate assessment: normal    Rhythm:     Rhythm: sinus rhythm    Ectopy:     Ectopy: none    QRS:     QRS axis:  Normal  Conduction:     Conduction: abnormal      Abnormal conduction: incomplete RBBB    ST segments:     ST segments:   Normal  T waves:     T waves: normal        ED Medication and Procedure Management   Prior to Admission Medications   Prescriptions Last Dose Informant Patient Reported? Taking?   Biotin 5 MG TABS   Yes No   Sig: Take 1 tablet by mouth   Diclofenac Sodium (VOLTAREN) 1 %   No No   Sig: Apply 2 g topically 4 (four) times a day   Patient not taking: Reported on 10/5/2024   Emollient (cetaphil) cream   No No   Sig: Apply topically as needed for dry skin   Multiple Vitamin (MULTIVITAMIN) tablet   Yes No   Sig: Take 1 tablet by mouth daily   albuterol (2.5 mg/3 mL) 0.083 % nebulizer solution  Other (Specify) Yes No   Sig: Take 2.5 mg by nebulization every 6 (six) hours as needed for wheezing or shortness of breath   aspirin 81 MG tablet   Yes No   Sig: Take 81 mg by mouth daily.   atorvastatin (LIPITOR) 20 mg tablet  Self Yes No   Sig: Take 40 mg by mouth daily   busPIRone (BUSPAR) 15 mg tablet  Other (Specify) Yes No   Sig: Take 15 mg by mouth 3 (three) times a day   desvenlafaxine succinate (PRISTIQ) 50 mg 24 hr tablet   Yes No   Sig: Take 50 mg by mouth daily   fluticasone (FLONASE) 50 mcg/act nasal spray   Yes No   Si spray into each nostril daily   ketorolac (TORADOL) 10 mg tablet   No No   Sig: Take 1 tablet (10 mg total) by mouth every 6 (six) hours as needed for moderate pain for up to 3 days   ketotifen (ZADITOR) 0.025 % ophthalmic solution   Yes No   Si drop 2 (two) times a day   lamoTRIgine (LaMICtal) 150 MG tablet  Care Giver Yes No   Sig: Take 200 mg by mouth daily   lidocaine (Lidoderm) 5 %   No No   Sig: Apply 1 patch topically over 12 hours daily Remove & Discard patch within 12 hours or as directed by MD   Patient not taking: Reported on 10/5/2024   loratadine (CLARITIN) 10 mg tablet   Yes No   Sig: Take 10 mg by mouth daily   Patient not taking: Reported on 10/5/2024   lurasidone (LATUDA) 40 mg tablet   Yes No   Sig: Take 80 mg by mouth daily with dinner    Patient not taking: Reported on  8/3/2022   meclizine (ANTIVERT) 25 mg tablet   Yes No   Sig: Take 25 mg by mouth every 8 (eight) hours as needed for dizziness   Patient not taking: Reported on 8/3/2022   montelukast (SINGULAIR) 10 mg tablet   Yes No   Sig: Take 10 mg by mouth daily at bedtime.   Patient not taking: Reported on 2024   naproxen (EC NAPROSYN) 500 MG EC tablet   No No   Sig: Take 1 tablet (500 mg total) by mouth 2 (two) times a day with meals   Patient not taking: Reported on 2019   neomycin-bacitracin-polymyxin (NEOSPORIN) 5-400-5,000 ointment   No No   Sig: Apply topically in the morning   Patient not taking: Reported on 2024   oxyCODONE (Roxicodone) 5 immediate release tablet   No No   Sig: Take 1 tablet (5 mg total) by mouth every 6 (six) hours as needed for moderate pain Max Daily Amount: 20 mg   Patient not taking: Reported on 2024   pantoprazole (PROTONIX) 40 mg tablet   Yes No   Sig: Take 40 mg by mouth daily.   propranolol (INDERAL) 40 mg tablet   Yes No   Sig: Take 10 mg by mouth daily    tolterodine (DETROL LA) 4 mg 24 hr capsule   Yes No   Sig: Take 4 mg by mouth daily.   traMADol (ULTRAM) 50 mg tablet   No No   Si po tid prn pain   Patient not taking: Reported on 2024   traZODone (DESYREL) 50 mg tablet   Yes No   Sig: Take 100 mg by mouth daily at bedtime as needed    Patient not taking: Reported on 8/3/2022   umeclidinium bromide (INCRUSE ELLIPTA) 62.5 mcg/inh AEPB inhaler  Care Giver Yes No   Sig: Inhale 1 puff daily   venlafaxine (EFFEXOR-XR) 37.5 mg 24 hr capsule   Yes No   Sig: Take 75 mg by mouth daily     Patient not taking: Reported on 8/3/2022   ziprasidone (GEODON) 80 mg capsule   Yes No   Sig: Take 80 mg by mouth every evening      Facility-Administered Medications: None     Patient's Medications   Discharge Prescriptions    No medications on file     No discharge procedures on file.  ED SEPSIS DOCUMENTATION   Time reflects when diagnosis was documented in both MDM as applicable  and the Disposition within this note       Time User Action Codes Description Comment    11/27/2024 12:01 AM April Dowling [R53.1] Generalized weakness     11/27/2024 12:01 AM April Dowling [R11.0] Nausea     11/27/2024 12:01 AM April Dowling [N39.0] UTI (urinary tract infection)                  April Dowling MD  11/27/24 0116

## 2024-11-28 ENCOUNTER — RESULTS FOLLOW-UP (OUTPATIENT)
Dept: EMERGENCY DEPT | Facility: HOSPITAL | Age: 64
End: 2024-11-28

## 2024-11-28 ENCOUNTER — HOSPITAL ENCOUNTER (EMERGENCY)
Facility: HOSPITAL | Age: 64
Discharge: HOME | End: 2024-11-28
Attending: EMERGENCY MEDICINE
Payer: COMMERCIAL

## 2024-11-28 ENCOUNTER — APPOINTMENT (EMERGENCY)
Dept: RADIOLOGY | Facility: HOSPITAL | Age: 64
End: 2024-11-28
Payer: COMMERCIAL

## 2024-11-28 VITALS
BODY MASS INDEX: 28.04 KG/M2 | HEART RATE: 79 BPM | WEIGHT: 185 LBS | RESPIRATION RATE: 20 BRPM | HEIGHT: 68 IN | DIASTOLIC BLOOD PRESSURE: 88 MMHG | TEMPERATURE: 97.8 F | SYSTOLIC BLOOD PRESSURE: 136 MMHG | OXYGEN SATURATION: 96 %

## 2024-11-28 DIAGNOSIS — J40 BRONCHITIS: ICD-10-CM

## 2024-11-28 DIAGNOSIS — R20.2 PARESTHESIAS: Primary | ICD-10-CM

## 2024-11-28 LAB
2HR DELTA HS TROPONIN: -2 NG/L
ALBUMIN SERPL BCG-MCNC: 4.3 G/DL (ref 3.5–5)
ALP SERPL-CCNC: 133 U/L (ref 34–104)
ALT SERPL W P-5'-P-CCNC: 9 U/L (ref 7–52)
ANION GAP SERPL CALCULATED.3IONS-SCNC: 10 MMOL/L (ref 4–13)
AST SERPL W P-5'-P-CCNC: 15 U/L (ref 13–39)
ATRIAL RATE: 78 BPM
BASOPHILS # BLD AUTO: 0.07 THOUSANDS/ΜL (ref 0–0.1)
BASOPHILS NFR BLD AUTO: 1 % (ref 0–1)
BILIRUB SERPL-MCNC: 0.65 MG/DL (ref 0.2–1)
BNP SERPL-MCNC: 22 PG/ML (ref 0–100)
BUN SERPL-MCNC: 15 MG/DL (ref 5–25)
CALCIUM SERPL-MCNC: 10 MG/DL (ref 8.4–10.2)
CARDIAC TROPONIN I PNL SERPL HS: 6 NG/L (ref ?–50)
CARDIAC TROPONIN I PNL SERPL HS: 8 NG/L (ref ?–50)
CHLORIDE SERPL-SCNC: 101 MMOL/L (ref 96–108)
CO2 SERPL-SCNC: 26 MMOL/L (ref 21–32)
CREAT SERPL-MCNC: 0.82 MG/DL (ref 0.6–1.3)
EOSINOPHIL # BLD AUTO: 0.26 THOUSAND/ΜL (ref 0–0.61)
EOSINOPHIL NFR BLD AUTO: 2 % (ref 0–6)
ERYTHROCYTE [DISTWIDTH] IN BLOOD BY AUTOMATED COUNT: 13.3 % (ref 11.6–15.1)
FLUAV AG UPPER RESP QL IA.RAPID: NEGATIVE
FLUBV AG UPPER RESP QL IA.RAPID: NEGATIVE
GFR SERPL CREATININE-BSD FRML MDRD: 76 ML/MIN/1.73SQ M
GLUCOSE SERPL-MCNC: 97 MG/DL (ref 65–140)
HCT VFR BLD AUTO: 38.9 % (ref 34.8–46.1)
HGB BLD-MCNC: 13 G/DL (ref 11.5–15.4)
IMM GRANULOCYTES # BLD AUTO: 0.03 THOUSAND/UL (ref 0–0.2)
IMM GRANULOCYTES NFR BLD AUTO: 0 % (ref 0–2)
LYMPHOCYTES # BLD AUTO: 3.76 THOUSANDS/ΜL (ref 0.6–4.47)
LYMPHOCYTES NFR BLD AUTO: 33 % (ref 14–44)
MAGNESIUM SERPL-MCNC: 1.8 MG/DL (ref 1.9–2.7)
MCH RBC QN AUTO: 29.1 PG (ref 26.8–34.3)
MCHC RBC AUTO-ENTMCNC: 33.4 G/DL (ref 31.4–37.4)
MCV RBC AUTO: 87 FL (ref 82–98)
MONOCYTES # BLD AUTO: 0.87 THOUSAND/ΜL (ref 0.17–1.22)
MONOCYTES NFR BLD AUTO: 8 % (ref 4–12)
NEUTROPHILS # BLD AUTO: 6.39 THOUSANDS/ΜL (ref 1.85–7.62)
NEUTS SEG NFR BLD AUTO: 56 % (ref 43–75)
NRBC BLD AUTO-RTO: 0 /100 WBCS
PLATELET # BLD AUTO: 473 THOUSANDS/UL (ref 149–390)
PMV BLD AUTO: 9.3 FL (ref 8.9–12.7)
POTASSIUM SERPL-SCNC: 4.1 MMOL/L (ref 3.5–5.3)
PR INTERVAL: 98 MS
PROT SERPL-MCNC: 7.5 G/DL (ref 6.4–8.4)
QRS AXIS: -11 DEGREES
QRSD INTERVAL: 98 MS
QT INTERVAL: 426 MS
QTC INTERVAL: 485 MS
RBC # BLD AUTO: 4.46 MILLION/UL (ref 3.81–5.12)
SARS-COV+SARS-COV-2 AG RESP QL IA.RAPID: NEGATIVE
SODIUM SERPL-SCNC: 137 MMOL/L (ref 135–147)
T WAVE AXIS: 55 DEGREES
VENTRICULAR RATE: 78 BPM
WBC # BLD AUTO: 11.38 THOUSAND/UL (ref 4.31–10.16)

## 2024-11-28 PROCEDURE — 84484 ASSAY OF TROPONIN QUANT: CPT | Performed by: EMERGENCY MEDICINE

## 2024-11-28 PROCEDURE — 85025 COMPLETE CBC W/AUTO DIFF WBC: CPT | Performed by: EMERGENCY MEDICINE

## 2024-11-28 PROCEDURE — 83880 ASSAY OF NATRIURETIC PEPTIDE: CPT | Performed by: EMERGENCY MEDICINE

## 2024-11-28 PROCEDURE — 83735 ASSAY OF MAGNESIUM: CPT | Performed by: EMERGENCY MEDICINE

## 2024-11-28 PROCEDURE — 80053 COMPREHEN METABOLIC PANEL: CPT | Performed by: EMERGENCY MEDICINE

## 2024-11-28 PROCEDURE — 71045 X-RAY EXAM CHEST 1 VIEW: CPT

## 2024-11-28 PROCEDURE — 93005 ELECTROCARDIOGRAM TRACING: CPT

## 2024-11-28 PROCEDURE — 87804 INFLUENZA ASSAY W/OPTIC: CPT | Performed by: EMERGENCY MEDICINE

## 2024-11-28 PROCEDURE — 36415 COLL VENOUS BLD VENIPUNCTURE: CPT | Performed by: EMERGENCY MEDICINE

## 2024-11-28 PROCEDURE — 96365 THER/PROPH/DIAG IV INF INIT: CPT

## 2024-11-28 PROCEDURE — 96375 TX/PRO/DX INJ NEW DRUG ADDON: CPT

## 2024-11-28 PROCEDURE — 99285 EMERGENCY DEPT VISIT HI MDM: CPT

## 2024-11-28 PROCEDURE — 87811 SARS-COV-2 COVID19 W/OPTIC: CPT | Performed by: EMERGENCY MEDICINE

## 2024-11-28 PROCEDURE — 99284 EMERGENCY DEPT VISIT MOD MDM: CPT | Performed by: EMERGENCY MEDICINE

## 2024-11-28 PROCEDURE — 94640 AIRWAY INHALATION TREATMENT: CPT

## 2024-11-28 PROCEDURE — 93010 ELECTROCARDIOGRAM REPORT: CPT | Performed by: INTERNAL MEDICINE

## 2024-11-28 RX ORDER — IPRATROPIUM BROMIDE AND ALBUTEROL SULFATE 2.5; .5 MG/3ML; MG/3ML
3 SOLUTION RESPIRATORY (INHALATION) ONCE
Status: COMPLETED | OUTPATIENT
Start: 2024-11-28 | End: 2024-11-28

## 2024-11-28 RX ORDER — METHYLPREDNISOLONE SODIUM SUCCINATE 125 MG/2ML
80 INJECTION, POWDER, LYOPHILIZED, FOR SOLUTION INTRAMUSCULAR; INTRAVENOUS ONCE
Status: COMPLETED | OUTPATIENT
Start: 2024-11-28 | End: 2024-11-28

## 2024-11-28 RX ORDER — ALBUTEROL SULFATE 90 UG/1
2 INHALANT RESPIRATORY (INHALATION) EVERY 6 HOURS PRN
Qty: 8.5 G | Refills: 0 | Status: SHIPPED | OUTPATIENT
Start: 2024-11-28

## 2024-11-28 RX ORDER — PREDNISONE 20 MG/1
40 TABLET ORAL DAILY
Qty: 10 TABLET | Refills: 0 | Status: SHIPPED | OUTPATIENT
Start: 2024-11-28 | End: 2024-12-03

## 2024-11-28 RX ORDER — HYDROXYZINE PAMOATE 50 MG/1
50 CAPSULE ORAL DAILY
COMMUNITY
End: 2024-12-13

## 2024-11-28 RX ORDER — ALPRAZOLAM 0.25 MG/1
0.25 TABLET ORAL DAILY PRN
COMMUNITY
End: 2024-12-13

## 2024-11-28 RX ORDER — ACETAMINOPHEN 10 MG/ML
1000 INJECTION, SOLUTION INTRAVENOUS ONCE
Status: COMPLETED | OUTPATIENT
Start: 2024-11-28 | End: 2024-11-28

## 2024-11-28 RX ORDER — METOCLOPRAMIDE 10 MG/1
10 TABLET ORAL EVERY 6 HOURS
Qty: 10 TABLET | Refills: 0 | Status: SHIPPED | OUTPATIENT
Start: 2024-11-28 | End: 2024-12-01

## 2024-11-28 RX ADMIN — ACETAMINOPHEN 1000 MG: 10 INJECTION INTRAVENOUS at 16:28

## 2024-11-28 RX ADMIN — IPRATROPIUM BROMIDE AND ALBUTEROL SULFATE 3 ML: .5; 3 SOLUTION RESPIRATORY (INHALATION) at 16:29

## 2024-11-28 RX ADMIN — METHYLPREDNISOLONE SODIUM SUCCINATE 80 MG: 125 INJECTION, POWDER, FOR SOLUTION INTRAMUSCULAR; INTRAVENOUS at 16:29

## 2024-11-28 NOTE — ED PROVIDER NOTES
Time reflects when diagnosis was documented in both MDM as applicable and the Disposition within this note       Time User Action Codes Description Comment    11/28/2024  5:59 PM Malinda Daphnie Add [R20.2] Paresthesias     11/28/2024  5:59 PM MalindaZainabDaphnie Add [J40] Bronchitis           ED Disposition       ED Disposition   Discharge    Condition   Stable    Date/Time   u Nov 28, 2024  5:59 PM    Comment   Rosmeyr ZULEYKA Sam discharge to home/self care.                   Assessment & Plan       Medical Decision Making  Patient evaluated with EKG labs imaging discharged with follow-up with her PCP with appropriate medication she verbalized understanding had no further questions at time of discharge    Problems Addressed:  Bronchitis: acute illness or injury  Paresthesias: acute illness or injury    Amount and/or Complexity of Data Reviewed  External Data Reviewed: notes.  Labs: ordered. Decision-making details documented in ED Course.  Radiology: ordered. Decision-making details documented in ED Course.  ECG/medicine tests: ordered and independent interpretation performed. Decision-making details documented in ED Course.    Risk  Prescription drug management.             Medications   ipratropium-albuterol (DUO-NEB) 0.5-2.5 mg/3 mL inhalation solution 3 mL (3 mL Nebulization Given 11/28/24 1629)   methylPREDNISolone sodium succinate (Solu-MEDROL) injection 80 mg (80 mg Intravenous Given 11/28/24 1629)   acetaminophen (Ofirmev) injection 1,000 mg (0 mg Intravenous Stopped 11/28/24 1644)       ED Risk Strat Scores   HEART Risk Score      Flowsheet Row Most Recent Value   Heart Score Risk Calculator    History 0 Filed at: 11/28/2024 2016   ECG 1 Filed at: 11/28/2024 2016   Age 1 Filed at: 11/28/2024 2016   Risk Factors 1 Filed at: 11/28/2024 2016   Troponin 0 Filed at: 11/28/2024 2016   HEART Score 3 Filed at: 11/28/2024 2016                              SBIRT 22yo+      Flowsheet Row Most Recent Value   Initial Alcohol  Screen: US AUDIT-C     1. How often do you have a drink containing alcohol? 0 Filed at: 11/28/2024 1615   2. How many drinks containing alcohol do you have on a typical day you are drinking?  0 Filed at: 11/28/2024 1615   3a. Male UNDER 65: How often do you have five or more drinks on one occasion? 0 Filed at: 11/28/2024 1615   3b. FEMALE Any Age, or MALE 65+: How often do you have 4 or more drinks on one occassion? 0 Filed at: 11/28/2024 1615   Audit-C Score 0 Filed at: 11/28/2024 1615   JOHN: How many times in the past year have you...    Used an illegal drug or used a prescription medication for non-medical reasons? Never Filed at: 11/28/2024 1615                            History of Present Illness       Chief Complaint   Patient presents with    Numbness     Patient comes via EMS due to numbness to left arm that has gone,was recently here and treated for UTI,states she feels the medicine prescribed may be interfering with her other medications and causing her to have these symptoms, but when ER MD came in and now has a multiple amount of complaints       Past Medical History:   Diagnosis Date    Asthma     Bladder incontinence     Chronic pain     back    COPD (chronic obstructive pulmonary disease) (HCC)     GERD (gastroesophageal reflux disease)     Hyperlipidemia     Psychiatric disorder     bipolar      Past Surgical History:   Procedure Laterality Date    TUBAL LIGATION        Family History   Problem Relation Age of Onset    No Known Problems Mother     No Known Problems Sister     No Known Problems Daughter     No Known Problems Maternal Aunt       Social History     Tobacco Use    Smoking status: Every Day     Current packs/day: 0.50     Average packs/day: 0.5 packs/day for 0.6 years (0.3 ttl pk-yrs)     Types: Cigarettes     Start date: 05/2024    Smokeless tobacco: Never   Vaping Use    Vaping status: Never Used   Substance Use Topics    Alcohol use: No    Drug use: No      E-Cigarette/Vaping     E-Cigarette Use Never User       E-Cigarette/Vaping Substances    Nicotine No     THC No     CBD No     Flavoring No     Other No     Unknown No       I have reviewed and agree with the history as documented.     64-year-old female presents with pleuritic chest pain productive cough and feels numbness and tingling in her left lower leg upper arm and some face which is now getting relief now that she is less anxious as in the ER.  Denies any headache slurred speech expressive aphasia focal weakness no coordination difficulty no head trauma noted.  She is a smoker.  Recently treated for urinary tract infection.      History provided by:  Patient   used: No        Review of Systems   Constitutional: Negative.    HENT: Negative.     Eyes: Negative.    Respiratory:  Positive for cough.    Cardiovascular:  Positive for chest pain.   Gastrointestinal: Negative.    Endocrine: Negative.    Genitourinary: Negative.    Musculoskeletal: Negative.    Skin: Negative.    Allergic/Immunologic: Negative.    Neurological:  Positive for numbness.   Hematological: Negative.    Psychiatric/Behavioral: Negative.     All other systems reviewed and are negative.          Objective       ED Triage Vitals   Temperature Pulse Blood Pressure Respirations SpO2 Patient Position - Orthostatic VS   11/28/24 1602 11/28/24 1602 11/28/24 1602 11/28/24 1602 11/28/24 1602 11/28/24 1613   98.9 °F (37.2 °C) 84 (!) 150/103 19 95 % Lying      Temp Source Heart Rate Source BP Location FiO2 (%) Pain Score    11/28/24 1613 11/28/24 1602 11/28/24 1602 -- --    Oral Monitor Left arm        Vitals      Date and Time Temp Pulse SpO2 Resp BP Pain Score FACES Pain Rating User   11/28/24 1804 97.8 °F (36.6 °C) 79 96 % 20 136/88 -- -- AG   11/28/24 1615 -- 86 95 % 20 129/92 -- -- AG   11/28/24 1613 98.8 °F (37.1 °C) 81 95 % 20 150/103 -- -- MF   11/28/24 1602 98.9 °F (37.2 °C) 84 95 % 19 150/103 -- -- JK            Physical Exam  Vitals and  nursing note reviewed.   Constitutional:       Appearance: Normal appearance.   HENT:      Head: Normocephalic and atraumatic.      Nose: Nose normal.      Mouth/Throat:      Mouth: Mucous membranes are moist.   Eyes:      Extraocular Movements: Extraocular movements intact.      Pupils: Pupils are equal, round, and reactive to light.   Cardiovascular:      Rate and Rhythm: Normal rate and regular rhythm.   Pulmonary:      Effort: Pulmonary effort is normal.      Breath sounds: Normal breath sounds.   Abdominal:      General: Abdomen is flat. Bowel sounds are normal.      Palpations: Abdomen is soft.   Musculoskeletal:         General: Normal range of motion.      Cervical back: Normal range of motion and neck supple.   Skin:     General: Skin is warm.      Capillary Refill: Capillary refill takes less than 2 seconds.   Neurological:      General: No focal deficit present.      Mental Status: She is alert and oriented to person, place, and time. Mental status is at baseline.   Psychiatric:         Mood and Affect: Mood normal.         Thought Content: Thought content normal.         Results Reviewed       Procedure Component Value Units Date/Time    HS Troponin I 2hr [[id number]]  (Normal) Collected: 11/28/24 1726    Lab Status: Final result Specimen: Blood from Hand, Left Updated: 11/28/24 1757     hs TnI 2hr 6 ng/L      Delta 2hr hsTnI -2 ng/L     B-Type Natriuretic Peptide(BNP) [[id number]]  (Normal) Collected: 11/28/24 1628    Lab Status: Final result Specimen: Blood from Line, Venous Updated: 11/28/24 1716     BNP 22 pg/mL     HS Troponin 0hr (reflex protocol) [[id number]]  (Normal) Collected: 11/28/24 1628    Lab Status: Final result Specimen: Blood from Line, Venous Updated: 11/28/24 1702     hs TnI 0hr 8 ng/L     Comprehensive metabolic panel [[id number]]  (Abnormal) Collected: 11/28/24 1628    Lab Status: Final result Specimen: Blood from Line, Venous Updated: 11/28/24 1701     Sodium 137 mmol/L      Potassium  4.1 mmol/L      Chloride 101 mmol/L      CO2 26 mmol/L      ANION GAP 10 mmol/L      BUN 15 mg/dL      Creatinine 0.82 mg/dL      Glucose 97 mg/dL      Calcium 10.0 mg/dL      AST 15 U/L      ALT 9 U/L      Alkaline Phosphatase 133 U/L      Total Protein 7.5 g/dL      Albumin 4.3 g/dL      Total Bilirubin 0.65 mg/dL      eGFR 76 ml/min/1.73sq m     Narrative:      National Kid[employer] guidelines for Chronic Kidney Disease (CKD):     Stage 1 with normal or high GFR (GFR > 90 mL/min/1.73 square meters)    Stage 2 Mild CKD (GFR = 60-89 mL/min/1.73 square meters)    Stage 3A Moderate CKD (GFR = 45-59 mL/min/1.73 square meters)    Stage 3B Moderate CKD (GFR = 30-44 mL/min/1.73 square meters)    Stage 4 Severe CKD (GFR = 15-29 mL/min/1.73 square meters)    Stage 5 End Stage CKD (GFR <15 mL/min/1.73 square meters)  Note: GFR calculation is accurate only with a steady state creatinine    Magnesium [[id number]]  (Abnormal) Collected: 11/28/24 1628    Lab Status: Final result Specimen: Blood from Line, Venous Updated: 11/28/24 1701     Magnesium 1.8 mg/dL     FLU/COVID Rapid Antigen (30 min. TAT) - Preferred screening test in ED [[id number]]  (Normal) Collected: 11/28/24 1628    Lab Status: Final result Specimen: Nares from Nose Updated: 11/28/24 1657     SARS COV Rapid Antigen Negative     Influenza A Rapid Antigen Negative     Influenza B Rapid Antigen Negative    Narrative:      This test has been performed using the Quidel Carlotta 2 FLU+SARS Antigen test under the Emergency Use Authorization (EUA). This test has been validated by the  and verified by the performing laboratory. The Carlotta uses lateral flow immunofluorescent sandwich assay to detect SARS-COV, Influenza A and Influenza B Antigen.     The Quidel Carlotta 2 SARS Antigen test does not differentiate between SARS-CoV and SARS-CoV-2.     Negative results are presumptive and may be confirmed with a molecular assay, if necessary, for patient  management. Negative results do not rule out SARS-CoV-2 or influenza infection and should not be used as the sole basis for treatment or patient management decisions. A negative test result may occur if the level of antigen in a sample is below the limit of detection of this test.     Positive results are indicative of the presence of viral antigens, but do not rule out bacterial infection or co-infection with other viruses.     All test results should be used as an adjunct to clinical observations and other information available to the provider.    FOR PEDIATRIC PATIENTS - copy/paste COVID Guidelines URL to browser: [email or web address]    CBC and differential [[id number]]  (Abnormal) Collected: 11/28/24 1628    Lab Status: Final result Specimen: Blood from Line, Venous Updated: 11/28/24 1641     WBC 11.38 Thousand/uL      RBC 4.46 Million/uL      Hemoglobin 13.0 g/dL      Hematocrit 38.9 %      MCV 87 fL      MCH 29.1 pg      MCHC 33.4 g/dL      RDW 13.3 %      MPV 9.3 fL      Platelets 473 Thousands/uL      nRBC 0 /100 WBCs      Segmented % 56 %      Immature Grans % 0 %      Lymphocytes % 33 %      Monocytes % 8 %      Eosinophils Relative 2 %      Basophils Relative 1 %      Absolute Neutrophils 6.39 Thousands/µL      Absolute Immature Grans 0.03 Thousand/uL      Absolute Lymphocytes 3.76 Thousands/µL      Absolute Monocytes 0.87 Thousand/µL      Eosinophils Absolute 0.26 Thousand/µL      Basophils Absolute 0.07 Thousands/µL             XR chest 1 view portable   Final Interpretation by Valorie Kirk MD (11/28 1809)      No acute cardiopulmonary disease.            Workstation performed: [id number]             ECG 12 Lead Documentation Only    Date/Time: 11/28/2024 8:16 PM    Performed by: Daphnie Petty DO  Authorized by: Daphnie Petty DO    ECG reviewed by me, the ED Provider: yes    Patient location:  ED  Previous ECG:     Previous ECG:   Unavailable    Comparison to cardiac monitor: Yes    Interpretation:     Interpretation: non-specific    Rate:     ECG rate assessment: normal    Rhythm:     Rhythm: sinus rhythm    Ectopy:     Ectopy: none    QRS:     QRS axis:  Normal  Conduction:     Conduction: normal    ST segments:     ST segments:  Non-specific  T waves:     T waves: non-specific        ED Medication and Procedure Management   Prior to Admission Medications   Prescriptions Last Dose Informant Patient Reported? Taking?   ALPRAZolam (XANAX) 0.25 mg tablet   Yes Yes   Sig: Take 0.25 mg by mouth daily as needed for anxiety   Biotin 5 MG TABS   Yes Yes   Sig: Take 1 tablet by mouth   Diclofenac Sodium (VOLTAREN) 1 %   No No   Sig: Apply 2 g topically 4 (four) times a day   Patient not taking: Reported on 10/5/2024   Emollient (cetaphil) cream   No No   Sig: Apply topically as needed for dry skin   Multiple Vitamin (MULTIVITAMIN) tablet   Yes Yes   Sig: Take 1 tablet by mouth daily   Umeclidinium-Vilanterol (ANORO ELLIPTA IN)   Yes Yes   Sig: Inhale 1 Units every 4 (four) hours as needed (sob)   albuterol (2.5 mg/3 mL) 0.083 % nebulizer solution  Other (Specify) Yes Yes   Sig: Take 2.5 mg by nebulization every 6 (six) hours as needed for wheezing or shortness of breath   aspirin 81 MG tablet   Yes Yes   Sig: Take 81 mg by mouth daily.   atorvastatin (LIPITOR) 20 mg tablet  Self Yes Yes   Sig: Take 40 mg by mouth daily   busPIRone (BUSPAR) 15 mg tablet  Other (Specify) Yes Yes   Sig: Take 15 mg by mouth 3 (three) times a day   cefdinir (OMNICEF) 300 mg capsule   No Yes   Sig: Take 1 capsule (300 mg total) by mouth every 12 (twelve) hours for 7 days   desvenlafaxine succinate (PRISTIQ) 50 mg 24 hr tablet   Yes Yes   Sig: Take 50 mg by mouth daily   fluticasone (FLONASE) 50 mcg/act nasal spray   Yes Yes   Si spray into each nostril daily   hydrOXYzine pamoate (VISTARIL) 50 mg capsule   Yes Yes   Sig: Take 50 mg by mouth daily   ketorolac (TORADOL)  10 mg tablet   No No   Sig: Take 1 tablet (10 mg total) by mouth every 6 (six) hours as needed for moderate pain for up to 3 days   ketotifen (ZADITOR) 0.025 % ophthalmic solution   Yes Yes   Si drop 2 (two) times a day   lamoTRIgine (LaMICtal) 150 MG tablet  Care Giver Yes Yes   Sig: Take 200 mg by mouth daily   lidocaine (Lidoderm) 5 %   No No   Sig: Apply 1 patch topically over 12 hours daily Remove & Discard patch within 12 hours or as directed by MD   Patient not taking: Reported on 10/5/2024   loratadine (CLARITIN) 10 mg tablet   Yes Yes   Sig: Take 10 mg by mouth daily   lurasidone (LATUDA) 40 mg tablet   Yes No   Sig: Take 80 mg by mouth daily with dinner    Patient not taking: Reported on 8/3/2022   meclizine (ANTIVERT) 25 mg tablet   Yes No   Sig: Take 25 mg by mouth every 8 (eight) hours as needed for dizziness   Patient not taking: Reported on 8/3/2022   montelukast (SINGULAIR) 10 mg tablet   Yes No   Sig: Take 10 mg by mouth daily at bedtime.   Patient not taking: Reported on 2024   naproxen (EC NAPROSYN) 500 MG EC tablet   No No   Sig: Take 1 tablet (500 mg total) by mouth 2 (two) times a day with meals   Patient not taking: Reported on 2019   neomycin-bacitracin-polymyxin (NEOSPORIN) 5-400-5,000 ointment   No No   Sig: Apply topically in the morning   Patient not taking: Reported on 2024   ondansetron (ZOFRAN-ODT) 4 mg disintegrating tablet   No Yes   Sig: Take 1 tablet (4 mg total) by mouth every 8 (eight) hours as needed for nausea or vomiting   oxyCODONE (Roxicodone) 5 immediate release tablet   No No   Sig: Take 1 tablet (5 mg total) by mouth every 6 (six) hours as needed for moderate pain Max Daily Amount: 20 mg   Patient not taking: Reported on 2024   pantoprazole (PROTONIX) 40 mg tablet   Yes Yes   Sig: Take 40 mg by mouth daily.   propranolol (INDERAL) 40 mg tablet   Yes Yes   Sig: Take 10 mg by mouth daily    tolterodine (DETROL LA) 4 mg 24 hr capsule   Yes Yes   Sig:  Take 4 mg by mouth daily.   traMADol (ULTRAM) 50 mg tablet   No No   Si po tid prn pain   Patient not taking: Reported on 2024   traZODone (DESYREL) 50 mg tablet   Yes No   Sig: Take 100 mg by mouth daily at bedtime as needed    Patient not taking: Reported on 8/3/2022   umeclidinium bromide (INCRUSE ELLIPTA) 62.5 mcg/inh AEPB inhaler  Care Giver Yes Yes   Sig: Inhale 1 puff daily   venlafaxine (EFFEXOR-XR) 37.5 mg 24 hr capsule   Yes No   Sig: Take 75 mg by mouth daily     Patient not taking: Reported on 8/3/2022   ziprasidone (GEODON) 80 mg capsule   Yes Yes   Sig: Take 80 mg by mouth every evening      Facility-Administered Medications: None     Discharge Medication List as of 2024  5:59 PM        START taking these medications    Details   albuterol (ProAir HFA) 90 mcg/act inhaler Inhale 2 puffs every 6 (six) hours as needed for wheezing, Starting 2024, Normal      predniSONE 20 mg tablet Take 2 tablets (40 mg total) by mouth daily for 5 days, Starting 2024, Until Tue 12/3/2024, Normal           CONTINUE these medications which have NOT CHANGED    Details   albuterol (2.5 mg/3 mL) 0.083 % nebulizer solution Take 2.5 mg by nebulization every 6 (six) hours as needed for wheezing or shortness of breath, Historical Med      ALPRAZolam (XANAX) 0.25 mg tablet Take 0.25 mg by mouth daily as needed for anxiety, Historical Med      aspirin 81 MG tablet Take 81 mg by mouth daily., Until Discontinued, Historical Med      atorvastatin (LIPITOR) 20 mg tablet Take 40 mg by mouth daily, Historical Med      Biotin 5 MG TABS Take 1 tablet by mouth, Historical Med      busPIRone (BUSPAR) 15 mg tablet Take 15 mg by mouth 3 (three) times a day, Historical Med      cefdinir (OMNICEF) 300 mg capsule Take 1 capsule (300 mg total) by mouth every 12 (twelve) hours for 7 days, Starting 2024, Until 2024, Normal      desvenlafaxine succinate (PRISTIQ) 50 mg 24 hr tablet Take 50 mg by  mouth daily, Historical Med      fluticasone (FLONASE) 50 mcg/act nasal spray 1 spray into each nostril daily, Historical Med      hydrOXYzine pamoate (VISTARIL) 50 mg capsule Take 50 mg by mouth daily, Historical Med      ketotifen (ZADITOR) 0.025 % ophthalmic solution 1 drop 2 (two) times a day, Historical Med      lamoTRIgine (LaMICtal) 150 MG tablet Take 200 mg by mouth daily, Historical Med      loratadine (CLARITIN) 10 mg tablet Take 10 mg by mouth daily, Historical Med      Multiple Vitamin (MULTIVITAMIN) tablet Take 1 tablet by mouth daily, Historical Med      ondansetron (ZOFRAN-ODT) 4 mg disintegrating tablet Take 1 tablet (4 mg total) by mouth every 8 (eight) hours as needed for nausea or vomiting, Starting Wed 11/27/2024, Normal      pantoprazole (PROTONIX) 40 mg tablet Take 40 mg by mouth daily., Until Discontinued, Historical Med      propranolol (INDERAL) 40 mg tablet Take 10 mg by mouth daily , Historical Med      tolterodine (DETROL LA) 4 mg 24 hr capsule Take 4 mg by mouth daily., Until Discontinued, Historical Med      umeclidinium bromide (INCRUSE ELLIPTA) 62.5 mcg/inh AEPB inhaler Inhale 1 puff daily, Historical Med      Umeclidinium-Vilanterol (ANORO ELLIPTA IN) Inhale 1 Units every 4 (four) hours as needed (sob), Historical Med      ziprasidone (GEODON) 80 mg capsule Take 80 mg by mouth every evening, Historical Med      Diclofenac Sodium (VOLTAREN) 1 % Apply 2 g topically 4 (four) times a day, Starting Sun 4/7/2024, Normal      Emollient (cetaphil) cream Apply topically as needed for dry skin, Starting Tue 4/2/2024, Until Thu 5/2/2024 at 2359, Normal      ketorolac (TORADOL) 10 mg tablet Take 1 tablet (10 mg total) by mouth every 6 (six) hours as needed for moderate pain for up to 3 days, Starting Thu 6/27/2024, Until Sun 6/30/2024 at 2359, Normal      lidocaine (Lidoderm) 5 % Apply 1 patch topically over 12 hours daily Remove & Discard patch within 12 hours or as directed by MD, Starting Sun  4/7/2024, Normal      lurasidone (LATUDA) 40 mg tablet Take 80 mg by mouth daily with dinner , Historical Med      meclizine (ANTIVERT) 25 mg tablet Take 25 mg by mouth every 8 (eight) hours as needed for dizziness, Historical Med      montelukast (SINGULAIR) 10 mg tablet Take 10 mg by mouth daily at bedtime., Until Discontinued, Historical Med      naproxen (EC NAPROSYN) 500 MG EC tablet Take 1 tablet (500 mg total) by mouth 2 (two) times a day with meals, Starting Sun 6/30/2019, Normal      neomycin-bacitracin-polymyxin (NEOSPORIN) 5-400-5,000 ointment Apply topically in the morning, Starting Sun 4/7/2024, Normal      oxyCODONE (Roxicodone) 5 immediate release tablet Take 1 tablet (5 mg total) by mouth every 6 (six) hours as needed for moderate pain Max Daily Amount: 20 mg, Starting Wed 6/19/2024, Normal      traMADol (ULTRAM) 50 mg tablet 1 po tid prn pain, Normal      traZODone (DESYREL) 50 mg tablet Take 100 mg by mouth daily at bedtime as needed , Historical Med      venlafaxine (EFFEXOR-XR) 37.5 mg 24 hr capsule Take 75 mg by mouth daily  , Until Discontinued, Historical Med           No discharge procedures on file.  ED SEPSIS DOCUMENTATION   Time reflects when diagnosis was documented in both MDM as applicable and the Disposition within this note       Time User Action Codes Description Comment    11/28/2024  5:59 PM Daphnie Petty [R20.2] Paresthesias     11/28/2024  5:59 PM Daphnie Petty [J40] Bronchitis                  Daphnie Petty DO  11/28/24 2017

## 2024-11-29 LAB
ATRIAL RATE: 74 BPM
BACTERIA UR CULT: ABNORMAL
BACTERIA UR CULT: ABNORMAL
PR INTERVAL: 98 MS
QRS AXIS: -3 DEGREES
QRSD INTERVAL: 100 MS
QT INTERVAL: 424 MS
QTC INTERVAL: 470 MS
T WAVE AXIS: 58 DEGREES
VENTRICULAR RATE: 74 BPM

## 2024-11-29 PROCEDURE — 93010 ELECTROCARDIOGRAM REPORT: CPT | Performed by: INTERNAL MEDICINE

## 2024-11-30 ENCOUNTER — APPOINTMENT (EMERGENCY)
Dept: RADIOLOGY | Facility: HOSPITAL | Age: 64
End: 2024-11-30
Payer: COMMERCIAL

## 2024-11-30 ENCOUNTER — HOSPITAL ENCOUNTER (EMERGENCY)
Facility: HOSPITAL | Age: 64
Discharge: HOME | End: 2024-11-30
Attending: STUDENT IN AN ORGANIZED HEALTH CARE EDUCATION/TRAINING PROGRAM
Payer: COMMERCIAL

## 2024-11-30 ENCOUNTER — HOSPITAL ENCOUNTER (EMERGENCY)
Facility: HOSPITAL | Age: 64
Discharge: HOME | End: 2024-11-30
Attending: EMERGENCY MEDICINE
Payer: COMMERCIAL

## 2024-11-30 VITALS
SYSTOLIC BLOOD PRESSURE: 154 MMHG | HEART RATE: 76 BPM | RESPIRATION RATE: 22 BRPM | HEIGHT: 69 IN | BODY MASS INDEX: 30.4 KG/M2 | WEIGHT: 205.25 LBS | DIASTOLIC BLOOD PRESSURE: 90 MMHG | OXYGEN SATURATION: 95 % | TEMPERATURE: 97.9 F

## 2024-11-30 VITALS
TEMPERATURE: 98 F | HEART RATE: 72 BPM | RESPIRATION RATE: 16 BRPM | SYSTOLIC BLOOD PRESSURE: 145 MMHG | DIASTOLIC BLOOD PRESSURE: 75 MMHG | OXYGEN SATURATION: 98 %
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
## 2024-11-30 DIAGNOSIS — S63.501A SPRAIN OF RIGHT WRIST, INITIAL ENCOUNTER: ICD-10-CM

## 2024-11-30 DIAGNOSIS — M25.539 WRIST PAIN: Primary | ICD-10-CM

## 2024-11-30 PROCEDURE — 73110 X-RAY EXAM OF WRIST: CPT

## 2024-11-30 PROCEDURE — 99284 EMERGENCY DEPT VISIT MOD MDM: CPT | Performed by: EMERGENCY MEDICINE

## 2024-11-30 PROCEDURE — 99283 EMERGENCY DEPT VISIT LOW MDM: CPT

## 2024-11-30 PROCEDURE — 73090 X-RAY EXAM OF FOREARM: CPT

## 2024-11-30 PROCEDURE — 99283 EMERGENCY DEPT VISIT LOW MDM: CPT | Performed by: STUDENT IN AN ORGANIZED HEALTH CARE EDUCATION/TRAINING PROGRAM

## 2024-11-30 PROCEDURE — 99284 EMERGENCY DEPT VISIT MOD MDM: CPT

## 2024-11-30 RX ORDER — ACETAMINOPHEN 325 MG/1
650 TABLET ORAL ONCE
Status: COMPLETED | OUTPATIENT
Start: 2024-11-30 | End: 2024-11-30

## 2024-11-30 RX ADMIN — ACETAMINOPHEN 650 MG: 325 TABLET ORAL at 01:02

## 2024-11-30 NOTE — ED PROVIDER NOTES
Time reflects when diagnosis was documented in both MDM as applicable and the Disposition within this note       Time User Action Codes Description Comment    11/30/2024  1:24 AM April Dowling Add [W19.XXXA] Fall, initial encounter     11/30/2024  1:24 AM April Dowling Add [S63.501A] Sprain of right wrist, initial encounter           ED Disposition       ED Disposition   Discharge    Condition   Stable    Date/Time   Sat Nov 30, 2024  1:28 AM    Comment   Rosmery Vargas discharge to home/self care.                   Assessment & Plan       Medical Decision Making  Xrays negative for fracture.  Advised RICE, tylenol/advil prn, follow up outpt. - pt. Already has ambulatory referral to ortho ordered.    Amount and/or Complexity of Data Reviewed  Radiology: ordered and independent interpretation performed.    Risk  OTC drugs.             Medications   acetaminophen (TYLENOL) tablet 650 mg (650 mg Oral Given 11/30/24 0102)       ED Risk Strat Scores                           SBIRT 20yo+      Flowsheet Row Most Recent Value   Initial Alcohol Screen: US AUDIT-C     1. How often do you have a drink containing alcohol? 0 Filed at: 11/30/2024 0049   2. How many drinks containing alcohol do you have on a typical day you are drinking?  0 Filed at: 11/30/2024 0049   3b. FEMALE Any Age, or MALE 65+: How often do you have 4 or more drinks on one occassion? 0 Filed at: 11/30/2024 0049   Audit-C Score 0 Filed at: 11/30/2024 0049   JOHN: How many times in the past year have you...    Used an illegal drug or used a prescription medication for non-medical reasons? Never Filed at: 11/30/2024 0049                            History of Present Illness       Chief Complaint   Patient presents with    Fall     Pt fell pta onto R wrist pain. Negative head strike, negative LOC. Pt not on thinners.     Arm Pain       Past Medical History:   Diagnosis Date    Asthma     Bladder incontinence     Chronic pain     back    COPD (chronic  obstructive pulmonary disease) (HCC)     GERD (gastroesophageal reflux disease)     Hyperlipidemia     Psychiatric disorder     bipolar      Past Surgical History:   Procedure Laterality Date    TUBAL LIGATION        Family History   Problem Relation Age of Onset    No Known Problems Mother     No Known Problems Sister     No Known Problems Daughter     No Known Problems Maternal Aunt       Social History     Tobacco Use    Smoking status: Every Day     Current packs/day: 0.50     Average packs/day: 0.5 packs/day for 0.6 years (0.3 ttl pk-yrs)     Types: Cigarettes     Start date: 05/2024    Smokeless tobacco: Never   Vaping Use    Vaping status: Never Used   Substance Use Topics    Alcohol use: No    Drug use: No      E-Cigarette/Vaping    E-Cigarette Use Never User       E-Cigarette/Vaping Substances    Nicotine No     THC No     CBD No     Flavoring No     Other No     Unknown No       I have reviewed and agree with the history as documented.     63 yo female says she slipped walking in socks at home and fell onto right outstretched hand just prior to arrival tonight.  She says her hand was bent under here and twisted.  She c/o pain in wrist and up forearm into elbow.  She did not hit head.  No LOC.  No neck pain.  She has been seen here twice in the last 4 days for various complaints and being treated for UTI.  She arrives via ambulance.      History provided by:  Patient   used: No    Fall  Associated symptoms: no headaches, no neck pain and no vomiting    Arm Pain  Associated symptoms: no cough, no diarrhea, no fever, no headaches and no vomiting        Review of Systems   Constitutional:  Negative for fever.   Respiratory:  Negative for cough.    Gastrointestinal:  Negative for diarrhea and vomiting.   Musculoskeletal:  Positive for arthralgias. Negative for neck pain.        Right arm/wrist pain   Neurological:  Negative for dizziness and headaches.           Objective       ED Triage  Vitals [11/30/24 0046]   Temperature Pulse Blood Pressure Respirations SpO2 Patient Position - Orthostatic VS   97.9 °F (36.6 °C) 76 154/90 22 95 % Sitting      Temp Source Heart Rate Source BP Location FiO2 (%) Pain Score    Oral Monitor Left arm -- 10 - Worst Possible Pain      Vitals      Date and Time Temp Pulse SpO2 Resp BP Pain Score FACES Pain Rating User   11/30/24 0046 97.9 °F (36.6 °C) 76 95 % 22 154/90 10 - Worst Possible Pain -- AM            Physical Exam  Vitals reviewed.   Constitutional:       General: She is not in acute distress.     Appearance: She is not ill-appearing.   HENT:      Head: Normocephalic and atraumatic.   Cardiovascular:      Rate and Rhythm: Normal rate and regular rhythm.      Heart sounds: Normal heart sounds.   Pulmonary:      Effort: Pulmonary effort is normal. No respiratory distress.      Breath sounds: Normal breath sounds.   Musculoskeletal:         General: Tenderness present. No swelling or deformity. Normal range of motion.      Cervical back: Normal range of motion and neck supple. No tenderness.      Right lower leg: No edema.      Left lower leg: No edema.      Comments: Right upper extremity  + ttp across dorsum of wrist and distal ulna.  Radial pulse palpable.  Forearm mildly tender diffusely to palpation.  Flex/ext at elbow intact.  No sts.  Shoulder not tender.  M/s intact   Skin:     General: Skin is warm and dry.      Findings: No erythema or rash.   Neurological:      General: No focal deficit present.      Mental Status: She is alert and oriented to person, place, and time.   Psychiatric:         Mood and Affect: Mood normal.         Behavior: Behavior normal.         Results Reviewed       None            XR wrist 3+ views RIGHT   ED Interpretation by April Dowling MD (11/30 0127)   negative      XR forearm 2 views RIGHT   ED Interpretation by April Dowling MD (11/30 0126)   negative          Procedures    ED Medication and Procedure Management   Prior to  Admission Medications   Prescriptions Last Dose Informant Patient Reported? Taking?   ALPRAZolam (XANAX) 0.25 mg tablet   Yes No   Sig: Take 0.25 mg by mouth daily as needed for anxiety   Biotin 5 MG TABS   Yes No   Sig: Take 1 tablet by mouth   Diclofenac Sodium (VOLTAREN) 1 %   No No   Sig: Apply 2 g topically 4 (four) times a day   Patient not taking: Reported on 10/5/2024   Emollient (cetaphil) cream   No No   Sig: Apply topically as needed for dry skin   Multiple Vitamin (MULTIVITAMIN) tablet   Yes No   Sig: Take 1 tablet by mouth daily   Umeclidinium-Vilanterol (ANORO ELLIPTA IN)   Yes No   Sig: Inhale 1 Units every 4 (four) hours as needed (sob)   albuterol (2.5 mg/3 mL) 0.083 % nebulizer solution  Other (Specify) Yes No   Sig: Take 2.5 mg by nebulization every 6 (six) hours as needed for wheezing or shortness of breath   albuterol (ProAir HFA) 90 mcg/act inhaler   No No   Sig: Inhale 2 puffs every 6 (six) hours as needed for wheezing   aspirin 81 MG tablet   Yes No   Sig: Take 81 mg by mouth daily.   atorvastatin (LIPITOR) 20 mg tablet  Self Yes No   Sig: Take 40 mg by mouth daily   busPIRone (BUSPAR) 15 mg tablet  Other (Specify) Yes No   Sig: Take 15 mg by mouth 3 (three) times a day   cefdinir (OMNICEF) 300 mg capsule   No No   Sig: Take 1 capsule (300 mg total) by mouth every 12 (twelve) hours for 7 days   desvenlafaxine succinate (PRISTIQ) 50 mg 24 hr tablet   Yes No   Sig: Take 50 mg by mouth daily   fluticasone (FLONASE) 50 mcg/act nasal spray   Yes No   Si spray into each nostril daily   hydrOXYzine pamoate (VISTARIL) 50 mg capsule   Yes No   Sig: Take 50 mg by mouth daily   ketorolac (TORADOL) 10 mg tablet   No No   Sig: Take 1 tablet (10 mg total) by mouth every 6 (six) hours as needed for moderate pain for up to 3 days   ketotifen (ZADITOR) 0.025 % ophthalmic solution   Yes No   Si drop 2 (two) times a day   lamoTRIgine (LaMICtal) 150 MG tablet  Care Giver Yes No   Sig: Take 200 mg by  mouth daily   lidocaine (Lidoderm) 5 %   No No   Sig: Apply 1 patch topically over 12 hours daily Remove & Discard patch within 12 hours or as directed by MD   Patient not taking: Reported on 10/5/2024   loratadine (CLARITIN) 10 mg tablet   Yes No   Sig: Take 10 mg by mouth daily   lurasidone (LATUDA) 40 mg tablet   Yes No   Sig: Take 80 mg by mouth daily with dinner    Patient not taking: Reported on 8/3/2022   meclizine (ANTIVERT) 25 mg tablet   Yes No   Sig: Take 25 mg by mouth every 8 (eight) hours as needed for dizziness   Patient not taking: Reported on 8/3/2022   metoclopramide (REGLAN) 10 mg tablet   No No   Sig: Take 1 tablet (10 mg total) by mouth every 6 (six) hours for 3 days   montelukast (SINGULAIR) 10 mg tablet   Yes No   Sig: Take 10 mg by mouth daily at bedtime.   Patient not taking: Reported on 2024   naproxen (EC NAPROSYN) 500 MG EC tablet   No No   Sig: Take 1 tablet (500 mg total) by mouth 2 (two) times a day with meals   Patient not taking: Reported on 2019   neomycin-bacitracin-polymyxin (NEOSPORIN) 5-400-5,000 ointment   No No   Sig: Apply topically in the morning   Patient not taking: Reported on 2024   ondansetron (ZOFRAN-ODT) 4 mg disintegrating tablet   No No   Sig: Take 1 tablet (4 mg total) by mouth every 8 (eight) hours as needed for nausea or vomiting   oxyCODONE (Roxicodone) 5 immediate release tablet   No No   Sig: Take 1 tablet (5 mg total) by mouth every 6 (six) hours as needed for moderate pain Max Daily Amount: 20 mg   Patient not taking: Reported on 2024   pantoprazole (PROTONIX) 40 mg tablet   Yes No   Sig: Take 40 mg by mouth daily.   predniSONE 20 mg tablet   No No   Sig: Take 2 tablets (40 mg total) by mouth daily for 5 days   propranolol (INDERAL) 40 mg tablet   Yes No   Sig: Take 10 mg by mouth daily    tolterodine (DETROL LA) 4 mg 24 hr capsule   Yes No   Sig: Take 4 mg by mouth daily.   traMADol (ULTRAM) 50 mg tablet   No No   Si po tid prn pain    Patient not taking: Reported on 7/30/2024   traZODone (DESYREL) 50 mg tablet   Yes No   Sig: Take 100 mg by mouth daily at bedtime as needed    Patient not taking: Reported on 8/3/2022   umeclidinium bromide (INCRUSE ELLIPTA) 62.5 mcg/inh AEPB inhaler  Care Giver Yes No   Sig: Inhale 1 puff daily   venlafaxine (EFFEXOR-XR) 37.5 mg 24 hr capsule   Yes No   Sig: Take 75 mg by mouth daily     Patient not taking: Reported on 8/3/2022   ziprasidone (GEODON) 80 mg capsule   Yes No   Sig: Take 80 mg by mouth every evening      Facility-Administered Medications: None     Patient's Medications   Discharge Prescriptions    No medications on file     No discharge procedures on file.  ED SEPSIS DOCUMENTATION   Time reflects when diagnosis was documented in both MDM as applicable and the Disposition within this note       Time User Action Codes Description Comment    11/30/2024  1:24 AM April Dowling Add [W19.XXXA] Fall, initial encounter     11/30/2024  1:24 AM April Dowling Add [S63.501A] Sprain of right wrist, initial encounter                  April Dowling MD  11/30/24 6216

## 2024-11-30 NOTE — DISCHARGE INSTRUCTIONS
Your Xrays do not show any broken bones.  The wrist/arm is likely sprained or bruised inside from the impact.  You can use the splint for support and compression.  Rest and elevate and ice the wrist off and on.  Use tylenol and/or advil for discomfort.

## 2024-12-01 NOTE — DISCHARGE INSTRUCTIONS
As discussed please follow-up with orthopedics.  Return to the emergency room for any worsening pain, numbness, tingling, weakness, or any other new or concerning symptoms.

## 2024-12-01 NOTE — ED PROVIDER NOTES
Time reflects when diagnosis was documented in both MDM as applicable and the Disposition within this note       Time User Action Codes Description Comment    11/30/2024  9:13 PM Tristian Shin Add [M25.539] Wrist pain           ED Disposition       ED Disposition   Discharge    Condition   Stable    Date/Time   Sat Nov 30, 2024  9:13 PM    Comment   Rosmery GARDINER Sam discharge to home/self care.                   Assessment & Plan       Medical Decision Making  Patient is a 64 y.o. female who presents to the ED for right wrist pain.  Patient is nontoxic, well-appearing.  X-rays from earlier this a.m. reviewed.  Did not show any fracture.    Differential includes but is not limited to: Wrist sprain/strain.  Presentation not consistent with fracture, dislocation.    Plan: Reassurance, discharged with orthopedic follow-up                            Medications - No data to display    ED Risk Strat Scores                           SBIRT 20yo+      Flowsheet Row Most Recent Value   Initial Alcohol Screen: US AUDIT-C     1. How often do you have a drink containing alcohol? 0 Filed at: 11/30/2024 2114   2. How many drinks containing alcohol do you have on a typical day you are drinking?  0 Filed at: 11/30/2024 2114   3b. FEMALE Any Age, or MALE 65+: How often do you have 4 or more drinks on one occassion? 0 Filed at: 11/30/2024 2114   Audit-C Score 0 Filed at: 11/30/2024 2114   JOHN: How many times in the past year have you...    Used an illegal drug or used a prescription medication for non-medical reasons? Never Filed at: 11/30/2024 2114                            History of Present Illness       Chief Complaint   Patient presents with    Wrist Injury     Pt here earlier, states that she was given a wrist brace and that she took it off to use her hand and now it hurt, wanted to make sure that she did not hurt it by doing so.       Past Medical History:   Diagnosis Date    Asthma     Bladder incontinence     Chronic pain      back    COPD (chronic obstructive pulmonary disease) (HCC)     GERD (gastroesophageal reflux disease)     Hyperlipidemia     Psychiatric disorder     bipolar      Past Surgical History:   Procedure Laterality Date    TUBAL LIGATION        Family History   Problem Relation Age of Onset    No Known Problems Mother     No Known Problems Sister     No Known Problems Daughter     No Known Problems Maternal Aunt       Social History     Tobacco Use    Smoking status: Every Day     Current packs/day: 0.50     Average packs/day: 0.5 packs/day for 0.6 years (0.3 ttl pk-yrs)     Types: Cigarettes     Start date: 05/2024    Smokeless tobacco: Never   Vaping Use    Vaping status: Never Used   Substance Use Topics    Alcohol use: No    Drug use: No      E-Cigarette/Vaping    E-Cigarette Use Never User       E-Cigarette/Vaping Substances    Nicotine No     THC No     CBD No     Flavoring No     Other No     Unknown No       I have reviewed and agree with the history as documented.     Patient is a 64-year-old female who presents the [facility] apartment for persistent right wrist pain.  Patient fell yesterday and was seen in the emergency department.  X-rays were negative.  She was placed in a removable splint.  Patient states she removed it and her wrist/arm began hurting again.  She was returned to the emergency department for further evaluation.  Patient denies any further injuries.  No numbness, tingling, loss.  Full range of motion of the right upper extremity.  No other complaints or concerns.          Review of Systems   Musculoskeletal:         Right wrist pain   All other systems reviewed and are negative.          Objective       ED Triage Vitals [11/30/24 2113]   Temperature Pulse Blood Pressure Respirations SpO2 Patient Position - Orthostatic VS   98 °F (36.7 °C) 85 155/70 16 97 % Sitting      Temp Source Heart Rate Source BP Location FiO2 (%) Pain Score    Oral Monitor Left arm -- 3      Vitals      Date and Time  Temp Pulse SpO2 Resp BP Pain Score FACES Pain Rating User   11/30/24 2113 98 °F (36.7 °C) 85 97 % 16 155/70 3 -- CM            Physical Exam  Vitals and nursing note reviewed.   Constitutional:       General: She is not in acute distress.     Appearance: She is well-developed. She is not ill-appearing, toxic-appearing or diaphoretic.   HENT:      Head: Normocephalic and atraumatic.      Right Ear: External ear normal.      Left Ear: External ear normal.      Nose: Nose normal.   Eyes:      General: Lids are normal. No scleral icterus.  Cardiovascular:      Rate and Rhythm: Normal rate and regular rhythm.   Pulmonary:      Effort: Pulmonary effort is normal. No respiratory distress.   Musculoskeletal:        Arms:       Comments: Full range of motion of the right upper extremity.  Normal cap refill.  Full range of motion of all digits.   Skin:     General: Skin is warm and dry.   Neurological:      Mental Status: She is alert.         Results Reviewed       None            No orders to display       Procedures    ED Medication and Procedure Management   Prior to Admission Medications   Prescriptions Last Dose Informant Patient Reported? Taking?   ALPRAZolam (XANAX) 0.25 mg tablet   Yes No   Sig: Take 0.25 mg by mouth daily as needed for anxiety   Biotin 5 MG TABS   Yes No   Sig: Take 1 tablet by mouth   Diclofenac Sodium (VOLTAREN) 1 %   No No   Sig: Apply 2 g topically 4 (four) times a day   Patient not taking: Reported on 10/5/2024   Emollient (cetaphil) cream   No No   Sig: Apply topically as needed for dry skin   Multiple Vitamin (MULTIVITAMIN) tablet   Yes No   Sig: Take 1 tablet by mouth daily   Umeclidinium-Vilanterol (ANORO ELLIPTA IN)   Yes No   Sig: Inhale 1 Units every 4 (four) hours as needed (sob)   albuterol (2.5 mg/3 mL) 0.083 % nebulizer solution  Other (Specify) Yes No   Sig: Take 2.5 mg by nebulization every 6 (six) hours as needed for wheezing or shortness of breath   albuterol (ProAir HFA) 90  mcg/act inhaler   No No   Sig: Inhale 2 puffs every 6 (six) hours as needed for wheezing   aspirin 81 MG tablet   Yes No   Sig: Take 81 mg by mouth daily.   atorvastatin (LIPITOR) 20 mg tablet  Self Yes No   Sig: Take 40 mg by mouth daily   busPIRone (BUSPAR) 15 mg tablet  Other (Specify) Yes No   Sig: Take 15 mg by mouth 3 (three) times a day   cefdinir (OMNICEF) 300 mg capsule   No No   Sig: Take 1 capsule (300 mg total) by mouth every 12 (twelve) hours for 7 days   desvenlafaxine succinate (PRISTIQ) 50 mg 24 hr tablet   Yes No   Sig: Take 50 mg by mouth daily   fluticasone (FLONASE) 50 mcg/act nasal spray   Yes No   Si spray into each nostril daily   hydrOXYzine pamoate (VISTARIL) 50 mg capsule   Yes No   Sig: Take 50 mg by mouth daily   ketorolac (TORADOL) 10 mg tablet   No No   Sig: Take 1 tablet (10 mg total) by mouth every 6 (six) hours as needed for moderate pain for up to 3 days   ketotifen (ZADITOR) 0.025 % ophthalmic solution   Yes No   Si drop 2 (two) times a day   lamoTRIgine (LaMICtal) 150 MG tablet  Care Giver Yes No   Sig: Take 200 mg by mouth daily   lidocaine (Lidoderm) 5 %   No No   Sig: Apply 1 patch topically over 12 hours daily Remove & Discard patch within 12 hours or as directed by MD   Patient not taking: Reported on 10/5/2024   loratadine (CLARITIN) 10 mg tablet   Yes No   Sig: Take 10 mg by mouth daily   lurasidone (LATUDA) 40 mg tablet   Yes No   Sig: Take 80 mg by mouth daily with dinner    Patient not taking: Reported on 8/3/2022   meclizine (ANTIVERT) 25 mg tablet   Yes No   Sig: Take 25 mg by mouth every 8 (eight) hours as needed for dizziness   Patient not taking: Reported on 8/3/2022   metoclopramide (REGLAN) 10 mg tablet   No No   Sig: Take 1 tablet (10 mg total) by mouth every 6 (six) hours for 3 days   montelukast (SINGULAIR) 10 mg tablet   Yes No   Sig: Take 10 mg by mouth daily at bedtime.   Patient not taking: Reported on 2024   naproxen (EC NAPROSYN) 500 MG EC  tablet   No No   Sig: Take 1 tablet (500 mg total) by mouth 2 (two) times a day with meals   Patient not taking: Reported on 2019   neomycin-bacitracin-polymyxin (NEOSPORIN) 5-400-5,000 ointment   No No   Sig: Apply topically in the morning   Patient not taking: Reported on 2024   ondansetron (ZOFRAN-ODT) 4 mg disintegrating tablet   No No   Sig: Take 1 tablet (4 mg total) by mouth every 8 (eight) hours as needed for nausea or vomiting   oxyCODONE (Roxicodone) 5 immediate release tablet   No No   Sig: Take 1 tablet (5 mg total) by mouth every 6 (six) hours as needed for moderate pain Max Daily Amount: 20 mg   Patient not taking: Reported on 2024   pantoprazole (PROTONIX) 40 mg tablet   Yes No   Sig: Take 40 mg by mouth daily.   predniSONE 20 mg tablet   No No   Sig: Take 2 tablets (40 mg total) by mouth daily for 5 days   propranolol (INDERAL) 40 mg tablet   Yes No   Sig: Take 10 mg by mouth daily    tolterodine (DETROL LA) 4 mg 24 hr capsule   Yes No   Sig: Take 4 mg by mouth daily.   traMADol (ULTRAM) 50 mg tablet   No No   Si po tid prn pain   Patient not taking: Reported on 2024   traZODone (DESYREL) 50 mg tablet   Yes No   Sig: Take 100 mg by mouth daily at bedtime as needed    Patient not taking: Reported on 8/3/2022   umeclidinium bromide (INCRUSE ELLIPTA) 62.5 mcg/inh AEPB inhaler  Care Giver Yes No   Sig: Inhale 1 puff daily   venlafaxine (EFFEXOR-XR) 37.5 mg 24 hr capsule   Yes No   Sig: Take 75 mg by mouth daily     Patient not taking: Reported on 8/3/2022   ziprasidone (GEODON) 80 mg capsule   Yes No   Sig: Take 80 mg by mouth every evening      Facility-Administered Medications: None     Patient's Medications   Discharge Prescriptions    No medications on file     No discharge procedures on file.  ED SEPSIS DOCUMENTATION   Time reflects when diagnosis was documented in both MDM as applicable and the Disposition within this note       Time User Action Codes Description Comment     11/30/2024  9:13 PM Tristian Shin Add [M25.539] Wrist pain                  Tristian Shin,   11/30/24 2141

## 2024-12-02 ENCOUNTER — HOSPITAL ENCOUNTER (EMERGENCY)
Facility: HOSPITAL | Age: 64
Discharge: HOME | End: 2024-12-02
Attending: EMERGENCY MEDICINE
Payer: COMMERCIAL

## 2024-12-02 VITALS
DIASTOLIC BLOOD PRESSURE: 59 MMHG | RESPIRATION RATE: 18 BRPM | HEIGHT: 69 IN | SYSTOLIC BLOOD PRESSURE: 129 MMHG | OXYGEN SATURATION: 95 % | BODY MASS INDEX: 29.91 KG/M2 | HEART RATE: 87 BPM | TEMPERATURE: 98.2 F | WEIGHT: 201.94 LBS

## 2024-12-02 DIAGNOSIS — Z71.1 WORRIED WELL: Primary | ICD-10-CM

## 2024-12-02 DIAGNOSIS — N39.0 UTI (URINARY TRACT INFECTION): ICD-10-CM

## 2024-12-02 PROCEDURE — 99284 EMERGENCY DEPT VISIT MOD MDM: CPT | Performed by: EMERGENCY MEDICINE

## 2024-12-02 PROCEDURE — 93005 ELECTROCARDIOGRAM TRACING: CPT

## 2024-12-02 PROCEDURE — 99285 EMERGENCY DEPT VISIT HI MDM: CPT

## 2024-12-02 NOTE — ED PROVIDER NOTES
Time reflects when diagnosis was documented in both MDM as applicable and the Disposition within this note       Time User Action Codes Description Comment    12/2/2024  6:52 AM Hernando Degroot [Z71.1] Worried well     12/2/2024  6:52 AM Hernando Degroot Add [N39.0] UTI (urinary tract infection)           ED Disposition       ED Disposition   Discharge    Condition   Stable    Date/Time   Mon Dec 2, 2024  6:52 AM    Comment   Rosmery Vargas discharge to home/self care.                   Assessment & Plan       Medical Decision Making  64-year-old female presenting to ED today.  Unfortunately at this time I think this patient's medical literacy is very low.  She was concerned about her antibiotic use.  I told her that she needs to take the antibiotics to continue treatment for urinary tract infection.  Told her that she probably did not pee this morning because her bladder is empty given that her bladder scan was only for 40 cc.  Encouraged her to follow-up with her primary care doctor.  I did do an EKG to evaluate for any possible arrhythmias which caused her weakness however patient was amatory here in the department without any difficulty.  Encouraged outpatient follow-up with PCP and patient was discharged home.             Medications - No data to display    ED Risk Strat Scores                           SBIRT 20yo+      Flowsheet Row Most Recent Value   Initial Alcohol Screen: US AUDIT-C     1. How often do you have a drink containing alcohol? 0 Filed at: 12/02/2024 0635   2. How many drinks containing alcohol do you have on a typical day you are drinking?  0 Filed at: 12/02/2024 0635   3b. FEMALE Any Age, or MALE 65+: How often do you have 4 or more drinks on one occassion? 0 Filed at: 12/02/2024 0635   Audit-C Score 0 Filed at: 12/02/2024 0635   JOHN: How many times in the past year have you...    Used an illegal drug or used a prescription medication for non-medical reasons? Never Filed at: 12/02/2024 0635                             History of Present Illness       Chief Complaint   Patient presents with    Weakness - Generalized     Pt arrived from home via squad with complaints of generalized weakness that started this morning, also lighteheadedness. Pt sts she couldn't urinate and had uti and medication was stopped.       Past Medical History:   Diagnosis Date    Asthma     Bladder incontinence     Chronic pain     back    COPD (chronic obstructive pulmonary disease) (HCC)     GERD (gastroesophageal reflux disease)     Hyperlipidemia     Psychiatric disorder     bipolar      Past Surgical History:   Procedure Laterality Date    TUBAL LIGATION        Family History   Problem Relation Age of Onset    No Known Problems Mother     No Known Problems Sister     No Known Problems Daughter     No Known Problems Maternal Aunt       Social History     Tobacco Use    Smoking status: Every Day     Current packs/day: 0.50     Average packs/day: 0.5 packs/day for 0.6 years (0.3 ttl pk-yrs)     Types: Cigarettes     Start date: 05/2024    Smokeless tobacco: Never   Vaping Use    Vaping status: Never Used   Substance Use Topics    Alcohol use: No    Drug use: No      E-Cigarette/Vaping    E-Cigarette Use Never User       E-Cigarette/Vaping Substances    Nicotine No     THC No     CBD No     Flavoring No     Other No     Unknown No       I have reviewed and agree with the history as documented.     64-year-old female past medical history significant for UTI that was diagnosed on the 26th presenting to ED today because she had concerns about her urination.  She says that she tried to go use the bathroom the morning and could not go.  Bladder scan here for 43 cc.  Was also complaining of weakness from 2 medications which were Zofran and metoclopramide.  No chest pain or shortness of breath.  No nausea or vomiting.        Review of Systems   Constitutional:  Negative for chills and fever.   HENT:  Negative for ear pain and sore  throat.    Eyes:  Negative for pain and visual disturbance.   Respiratory:  Negative for cough and shortness of breath.    Cardiovascular:  Negative for chest pain and palpitations.   Gastrointestinal:  Negative for abdominal pain and vomiting.   Genitourinary:  Negative for dysuria and hematuria.   Musculoskeletal:  Negative for arthralgias and back pain.   Skin:  Negative for color change and rash.   Neurological:  Negative for seizures and syncope.   All other systems reviewed and are negative.          Objective       ED Triage Vitals [12/02/24 0633]   Temperature Pulse Blood Pressure Respirations SpO2 Patient Position - Orthostatic VS   98.2 °F (36.8 °C) 84 106/77 20 94 % Sitting      Temp Source Heart Rate Source BP Location FiO2 (%) Pain Score    Oral Monitor Right arm -- --      Vitals      Date and Time Temp Pulse SpO2 Resp BP Pain Score FACES Pain Rating User   12/02/24 0633 98.2 °F (36.8 °C) 84 94 % 20 106/77 -- -- AM            Physical Exam  Vitals and nursing note reviewed.   Constitutional:       General: She is not in acute distress.     Appearance: Normal appearance. She is well-developed. She is not ill-appearing.   HENT:      Head: Normocephalic and atraumatic.      Right Ear: External ear normal.      Left Ear: External ear normal.      Nose: Nose normal.      Mouth/Throat:      Mouth: Mucous membranes are moist.      Pharynx: Oropharynx is clear. No oropharyngeal exudate.   Eyes:      General:         Right eye: No discharge.         Left eye: No discharge.      Extraocular Movements: Extraocular movements intact.      Conjunctiva/sclera: Conjunctivae normal.      Pupils: Pupils are equal, round, and reactive to light.   Cardiovascular:      Rate and Rhythm: Normal rate and regular rhythm.      Heart sounds: Normal heart sounds. No murmur heard.     No friction rub. No gallop.   Pulmonary:      Effort: Pulmonary effort is normal. No respiratory distress.      Breath sounds: Normal breath sounds.  No stridor. No wheezing.   Abdominal:      General: Bowel sounds are normal. There is no distension.      Palpations: Abdomen is soft.      Tenderness: There is no abdominal tenderness.   Musculoskeletal:         General: No swelling. Normal range of motion.      Cervical back: Normal range of motion and neck supple. No rigidity.   Skin:     General: Skin is warm and dry.      Capillary Refill: Capillary refill takes less than 2 seconds.   Neurological:      General: No focal deficit present.      Mental Status: She is alert and oriented to person, place, and time. Mental status is at baseline.      Cranial Nerves: No cranial nerve deficit.      Sensory: No sensory deficit.      Motor: No weakness.      Gait: Gait normal.   Psychiatric:         Mood and Affect: Mood normal.         Behavior: Behavior normal.         Results Reviewed       None            No orders to display       ECG 12 Lead Documentation Only    Date/Time: 12/2/2024 6:43 AM    Performed by: Hernando Degroot MD  Authorized by: Hernando Degroot MD    Indications / Diagnosis:  Weakness  ECG reviewed by me, the ED Provider: yes    Patient location:  ED  Previous ECG:     Previous ECG:  Unavailable    Comparison to cardiac monitor: No    Interpretation:     Interpretation: normal    Rate:     ECG rate assessment: normal    Rhythm:     Rhythm: sinus rhythm    Ectopy:     Ectopy: none    QRS:     QRS axis:  Normal    QRS intervals:  Normal  Conduction:     Conduction: normal    ST segments:     ST segments:  Normal  T waves:     T waves: normal        ED Medication and Procedure Management   Prior to Admission Medications   Prescriptions Last Dose Informant Patient Reported? Taking?   ALPRAZolam (XANAX) 0.25 mg tablet   Yes No   Sig: Take 0.25 mg by mouth daily as needed for anxiety   Biotin 5 MG TABS   Yes No   Sig: Take 1 tablet by mouth   Diclofenac Sodium (VOLTAREN) 1 %   No No   Sig: Apply 2 g topically 4 (four) times a day   Patient not taking: Reported  on 10/5/2024   Emollient (cetaphil) cream   No No   Sig: Apply topically as needed for dry skin   Multiple Vitamin (MULTIVITAMIN) tablet   Yes No   Sig: Take 1 tablet by mouth daily   Umeclidinium-Vilanterol (ANORO ELLIPTA IN)   Yes No   Sig: Inhale 1 Units every 4 (four) hours as needed (sob)   albuterol (2.5 mg/3 mL) 0.083 % nebulizer solution  Other (Specify) Yes No   Sig: Take 2.5 mg by nebulization every 6 (six) hours as needed for wheezing or shortness of breath   albuterol (ProAir HFA) 90 mcg/act inhaler   No No   Sig: Inhale 2 puffs every 6 (six) hours as needed for wheezing   aspirin 81 MG tablet   Yes No   Sig: Take 81 mg by mouth daily.   atorvastatin (LIPITOR) 20 mg tablet  Self Yes No   Sig: Take 40 mg by mouth daily   busPIRone (BUSPAR) 15 mg tablet  Other (Specify) Yes No   Sig: Take 15 mg by mouth 3 (three) times a day   cefdinir (OMNICEF) 300 mg capsule   No No   Sig: Take 1 capsule (300 mg total) by mouth every 12 (twelve) hours for 7 days   desvenlafaxine succinate (PRISTIQ) 50 mg 24 hr tablet   Yes No   Sig: Take 50 mg by mouth daily   fluticasone (FLONASE) 50 mcg/act nasal spray   Yes No   Si spray into each nostril daily   hydrOXYzine pamoate (VISTARIL) 50 mg capsule   Yes No   Sig: Take 50 mg by mouth daily   ketorolac (TORADOL) 10 mg tablet   No No   Sig: Take 1 tablet (10 mg total) by mouth every 6 (six) hours as needed for moderate pain for up to 3 days   ketotifen (ZADITOR) 0.025 % ophthalmic solution   Yes No   Si drop 2 (two) times a day   lamoTRIgine (LaMICtal) 150 MG tablet  Care Giver Yes No   Sig: Take 200 mg by mouth daily   lidocaine (Lidoderm) 5 %   No No   Sig: Apply 1 patch topically over 12 hours daily Remove & Discard patch within 12 hours or as directed by MD   Patient not taking: Reported on 10/5/2024   loratadine (CLARITIN) 10 mg tablet   Yes No   Sig: Take 10 mg by mouth daily   lurasidone (LATUDA) 40 mg tablet   Yes No   Sig: Take 80 mg by mouth daily with dinner     Patient not taking: Reported on 8/3/2022   meclizine (ANTIVERT) 25 mg tablet   Yes No   Sig: Take 25 mg by mouth every 8 (eight) hours as needed for dizziness   Patient not taking: Reported on 8/3/2022   metoclopramide (REGLAN) 10 mg tablet   No No   Sig: Take 1 tablet (10 mg total) by mouth every 6 (six) hours for 3 days   montelukast (SINGULAIR) 10 mg tablet   Yes No   Sig: Take 10 mg by mouth daily at bedtime.   Patient not taking: Reported on 2024   naproxen (EC NAPROSYN) 500 MG EC tablet   No No   Sig: Take 1 tablet (500 mg total) by mouth 2 (two) times a day with meals   Patient not taking: Reported on 2019   neomycin-bacitracin-polymyxin (NEOSPORIN) 5-400-5,000 ointment   No No   Sig: Apply topically in the morning   Patient not taking: Reported on 2024   ondansetron (ZOFRAN-ODT) 4 mg disintegrating tablet   No No   Sig: Take 1 tablet (4 mg total) by mouth every 8 (eight) hours as needed for nausea or vomiting   oxyCODONE (Roxicodone) 5 immediate release tablet   No No   Sig: Take 1 tablet (5 mg total) by mouth every 6 (six) hours as needed for moderate pain Max Daily Amount: 20 mg   Patient not taking: Reported on 2024   pantoprazole (PROTONIX) 40 mg tablet   Yes No   Sig: Take 40 mg by mouth daily.   predniSONE 20 mg tablet   No No   Sig: Take 2 tablets (40 mg total) by mouth daily for 5 days   propranolol (INDERAL) 40 mg tablet   Yes No   Sig: Take 10 mg by mouth daily    tolterodine (DETROL LA) 4 mg 24 hr capsule   Yes No   Sig: Take 4 mg by mouth daily.   traMADol (ULTRAM) 50 mg tablet   No No   Si po tid prn pain   Patient not taking: Reported on 2024   traZODone (DESYREL) 50 mg tablet   Yes No   Sig: Take 100 mg by mouth daily at bedtime as needed    Patient not taking: Reported on 8/3/2022   umeclidinium bromide (INCRUSE ELLIPTA) 62.5 mcg/inh AEPB inhaler  Care Giver Yes No   Sig: Inhale 1 puff daily   venlafaxine (EFFEXOR-XR) 37.5 mg 24 hr capsule   Yes No   Sig: Take  75 mg by mouth daily     Patient not taking: Reported on 8/3/2022   ziprasidone (GEODON) 80 mg capsule   Yes No   Sig: Take 80 mg by mouth every evening      Facility-Administered Medications: None     Patient's Medications   Discharge Prescriptions    No medications on file     No discharge procedures on file.  ED SEPSIS DOCUMENTATION   Time reflects when diagnosis was documented in both MDM as applicable and the Disposition within this note       Time User Action Codes Description Comment    12/2/2024  6:52 AM Hernando Degroot [Z71.1] Worried well     12/2/2024  6:52 AM Hernando Degroot [N39.0] UTI (urinary tract infection)                  Hernando Degroot MD  12/02/24 0654

## 2024-12-03 ENCOUNTER — HOSPITAL ENCOUNTER (EMERGENCY)
Facility: HOSPITAL | Age: 64
Discharge: HOME | End: 2024-12-03
Attending: EMERGENCY MEDICINE
Payer: COMMERCIAL

## 2024-12-03 VITALS
SYSTOLIC BLOOD PRESSURE: 178 MMHG | DIASTOLIC BLOOD PRESSURE: 103 MMHG | OXYGEN SATURATION: 95 % | RESPIRATION RATE: 20 BRPM | HEART RATE: 100 BPM | WEIGHT: 201.94 LBS | TEMPERATURE: 98.4 F | BODY MASS INDEX: 29.82 KG/M2

## 2024-12-03 DIAGNOSIS — Z13.9 ENCOUNTER FOR MEDICAL SCREENING EXAMINATION: Primary | ICD-10-CM

## 2024-12-03 LAB
BACTERIA UR QL AUTO: ABNORMAL /HPF
BILIRUB UR QL STRIP: NEGATIVE
CLARITY UR: CLEAR
COLOR UR: YELLOW
GLUCOSE UR STRIP-MCNC: NEGATIVE MG/DL
GRAN CASTS #/AREA URNS LPF: ABNORMAL /[LPF]
HGB UR QL STRIP.AUTO: NEGATIVE
KETONES UR STRIP-MCNC: ABNORMAL MG/DL
LEUKOCYTE ESTERASE UR QL STRIP: ABNORMAL
NITRITE UR QL STRIP: NEGATIVE
NON-SQ EPI CELLS URNS QL MICRO: ABNORMAL /HPF
PH UR STRIP.AUTO: 5.5 [PH]
PROT UR STRIP-MCNC: ABNORMAL MG/DL
RBC #/AREA URNS AUTO: ABNORMAL /HPF
SP GR UR STRIP.AUTO: >=1.03 (ref 1–1.03)
UROBILINOGEN UR STRIP-ACNC: 2 MG/DL
WBC #/AREA URNS AUTO: ABNORMAL /HPF

## 2024-12-03 PROCEDURE — 93005 ELECTROCARDIOGRAM TRACING: CPT

## 2024-12-03 PROCEDURE — 99285 EMERGENCY DEPT VISIT HI MDM: CPT | Performed by: EMERGENCY MEDICINE

## 2024-12-03 PROCEDURE — 81001 URINALYSIS AUTO W/SCOPE: CPT | Performed by: EMERGENCY MEDICINE

## 2024-12-03 PROCEDURE — 87086 URINE CULTURE/COLONY COUNT: CPT | Performed by: EMERGENCY MEDICINE

## 2024-12-03 PROCEDURE — 99285 EMERGENCY DEPT VISIT HI MDM: CPT

## 2024-12-04 NOTE — ED PROVIDER NOTES
Time reflects when diagnosis was documented in both MDM as applicable and the Disposition within this note       Time User Action Codes Description Comment    12/3/2024 10:18 PM Babar Kelly Add [Z13.9] Encounter for medical screening examination           ED Disposition       ED Disposition   Discharge    Condition   Stable    Date/Time   Tue Dec 3, 2024 10:18 PM    Comment   Rosmery GARDINER Sam discharge to home/self care.                   Assessment & Plan       Medical Decision Making  Pulse ox 95% on room air indicating adequate oxygenation.    Amount and/or Complexity of Data Reviewed  Labs: ordered.             Medications - No data to display    ED Risk Strat Scores                           SBIRT 22yo+      Flowsheet Row Most Recent Value   Initial Alcohol Screen: US AUDIT-C     1. How often do you have a drink containing alcohol? 0 Filed at: 12/03/2024 2106   Audit-C Score 0 Filed at: 12/03/2024 2106   JOHN: How many times in the past year have you...    Used an illegal drug or used a prescription medication for non-medical reasons? Never Filed at: 12/03/2024 2106                            History of Present Illness       Chief Complaint   Patient presents with    Chest Pain     Arrived by Marcus Morel and taken to waiting room and patient registered with abdominal pain and was seated in a wheelchair. She then has outburst where she states she is leaving and to call a ride home. She now states she is here for chest pain. Very difficult to interview as she rambles and interrupts interview with her own questions. States he chest pain started on way here. Patient was here yesterday and several days this week.        Past Medical History:   Diagnosis Date    Asthma     Bladder incontinence     Chronic pain     back    COPD (chronic obstructive pulmonary disease) (HCC)     GERD (gastroesophageal reflux disease)     Hyperlipidemia     Psychiatric disorder     bipolar      Past Surgical History:   Procedure  Laterality Date    TUBAL LIGATION        Family History   Problem Relation Age of Onset    No Known Problems Mother     No Known Problems Sister     No Known Problems Daughter     No Known Problems Maternal Aunt       Social History     Tobacco Use    Smoking status: Every Day     Current packs/day: 0.50     Average packs/day: 0.5 packs/day for 0.6 years (0.3 ttl pk-yrs)     Types: Cigarettes     Start date: 05/2024    Smokeless tobacco: Never   Vaping Use    Vaping status: Never Used   Substance Use Topics    Alcohol use: No    Drug use: No      E-Cigarette/Vaping    E-Cigarette Use Never User       E-Cigarette/Vaping Substances    Nicotine No     THC No     CBD No     Flavoring No     Other No     Unknown No       I have reviewed and agree with the history as documented.     Patient presents for evaluation via EMS for initially abdominal pain.  She was triaged to the waiting room because of ruckus out there and then started saying she had chest pain.  My walk into the room patient tells me that she has bedbugs and rambles tangentially.  She talks about various different medications she is on and how they think their interaction.  I offered the patient a psychiatric admission but she declined.  Her main concern seems to be around recent UTI diagnosis and not tolerating the medication.  She did take a few days of it but has some left.  Advised patient I can resend the urine culture have is positive I will give her a call.  Patient is happy with this plan.  Denies any suicidal homicidal ideations.      History provided by:  Patient and EMS personnel   used: No    Chest Pain      Review of Systems   All other systems reviewed and are negative.          Objective       ED Triage Vitals [12/03/24 2102]   Temperature Pulse Blood Pressure Respirations SpO2 Patient Position - Orthostatic VS   98.4 °F (36.9 °C) 100 (!) 178/103 20 95 % Sitting      Temp Source Heart Rate Source BP Location FiO2 (%) Pain  Score    Tympanic Monitor Left arm -- 4      Vitals      Date and Time Temp Pulse SpO2 Resp BP Pain Score FACES Pain Rating User   12/03/24 2102 98.4 °F (36.9 °C) 100 95 % 20 178/103 4 -- SW            Physical Exam  Vitals and nursing note reviewed.   Constitutional:       General: She is not in acute distress.  Cardiovascular:      Rate and Rhythm: Normal rate and regular rhythm.   Pulmonary:      Effort: Pulmonary effort is normal. No respiratory distress.      Breath sounds: Normal breath sounds.   Abdominal:      Tenderness: There is no abdominal tenderness.   Neurological:      General: No focal deficit present.      Mental Status: She is alert and oriented to person, place, and time.         Results Reviewed       Procedure Component Value Units Date/Time    Urine Microscopic [[id number]]  (Abnormal) Collected: 12/03/24 2143    Lab Status: Final result Specimen: Urine, Clean Catch Updated: 12/03/24 2200     RBC, UA None Seen /hpf      WBC, UA 30-50 /hpf      Epithelial Cells Moderate /hpf      Bacteria, UA Moderate /hpf      Granular Casts, UA 0-3    UA (URINE) with reflex to Scope [[id number]]  (Abnormal) Collected: 12/03/24 2143    Lab Status: Final result Specimen: Urine, Clean Catch Updated: 12/03/24 2150     Color, UA Yellow     Clarity, UA Clear     Specific Gravity, UA >=1.030     pH, UA 5.5     Leukocytes, UA Large     Nitrite, UA Negative     Protein, UA 30 (1+) mg/dl      Glucose, UA Negative mg/dl      Ketones, UA Trace mg/dl      Urobilinogen, UA 2.0 mg/dl      Bilirubin, UA Negative     Occult Blood, UA Negative    Urine culture [[id number]] Collected: 12/03/24 2144    Lab Status: In process Specimen: Urine, Clean Catch Updated: 12/03/24 2146            No orders to display       ECG 12 Lead Documentation Only    Date/Time: 12/3/2024 9:34 PM    Performed by: Babar Kelly DO  Authorized by: Babar Kelly DO    ECG reviewed by me, the ED Provider: yes    Patient location:  ED  Interpretation:      Interpretation: abnormal    Quality:     Tracing quality:  Limited by artifact  Rate:     ECG rate:  88    ECG rate assessment: normal    Rhythm:     Rhythm: sinus rhythm    Ectopy:     Ectopy: PVCs    ST segments:     ST segments:  Non-specific  T waves:     T waves: non-specific        ED Medication and Procedure Management   Prior to Admission Medications   Prescriptions Last Dose Informant Patient Reported? Taking?   ALPRAZolam (XANAX) 0.25 mg tablet   Yes No   Sig: Take 0.25 mg by mouth daily as needed for anxiety   Biotin 5 MG TABS   Yes No   Sig: Take 1 tablet by mouth   Diclofenac Sodium (VOLTAREN) 1 %   No No   Sig: Apply 2 g topically 4 (four) times a day   Patient not taking: Reported on 10/5/2024   Emollient (cetaphil) cream   No No   Sig: Apply topically as needed for dry skin   Multiple Vitamin (MULTIVITAMIN) tablet   Yes No   Sig: Take 1 tablet by mouth daily   Umeclidinium-Vilanterol (ANORO ELLIPTA IN)   Yes No   Sig: Inhale 1 Units every 4 (four) hours as needed (sob)   albuterol (2.5 mg/3 mL) 0.083 % nebulizer solution  Other (Specify) Yes No   Sig: Take 2.5 mg by nebulization every 6 (six) hours as needed for wheezing or shortness of breath   albuterol (ProAir HFA) 90 mcg/act inhaler   No No   Sig: Inhale 2 puffs every 6 (six) hours as needed for wheezing   aspirin 81 MG tablet   Yes No   Sig: Take 81 mg by mouth daily.   atorvastatin (LIPITOR) 20 mg tablet  Self Yes No   Sig: Take 40 mg by mouth daily   busPIRone (BUSPAR) 15 mg tablet  Other (Specify) Yes No   Sig: Take 15 mg by mouth 3 (three) times a day   cefdinir (OMNICEF) 300 mg capsule   No No   Sig: Take 1 capsule (300 mg total) by mouth every 12 (twelve) hours for 7 days   desvenlafaxine succinate (PRISTIQ) 50 mg 24 hr tablet   Yes No   Sig: Take 50 mg by mouth daily   fluticasone (FLONASE) 50 mcg/act nasal spray   Yes No   Si spray into each nostril daily   hydrOXYzine pamoate (VISTARIL) 50 mg capsule   Yes No   Sig: Take 50 mg by  mouth daily   ketorolac (TORADOL) 10 mg tablet   No No   Sig: Take 1 tablet (10 mg total) by mouth every 6 (six) hours as needed for moderate pain for up to 3 days   ketotifen (ZADITOR) 0.025 % ophthalmic solution   Yes No   Si drop 2 (two) times a day   lamoTRIgine (LaMICtal) 150 MG tablet  Care Giver Yes No   Sig: Take 200 mg by mouth daily   lidocaine (Lidoderm) 5 %   No No   Sig: Apply 1 patch topically over 12 hours daily Remove & Discard patch within 12 hours or as directed by MD   Patient not taking: Reported on 10/5/2024   loratadine (CLARITIN) 10 mg tablet   Yes No   Sig: Take 10 mg by mouth daily   lurasidone (LATUDA) 40 mg tablet   Yes No   Sig: Take 80 mg by mouth daily with dinner    Patient not taking: Reported on 8/3/2022   meclizine (ANTIVERT) 25 mg tablet   Yes No   Sig: Take 25 mg by mouth every 8 (eight) hours as needed for dizziness   Patient not taking: Reported on 8/3/2022   montelukast (SINGULAIR) 10 mg tablet   Yes No   Sig: Take 10 mg by mouth daily at bedtime.   Patient not taking: Reported on 2024   naproxen (EC NAPROSYN) 500 MG EC tablet   No No   Sig: Take 1 tablet (500 mg total) by mouth 2 (two) times a day with meals   Patient not taking: Reported on 2019   neomycin-bacitracin-polymyxin (NEOSPORIN) 5-400-5,000 ointment   No No   Sig: Apply topically in the morning   Patient not taking: Reported on 2024   ondansetron (ZOFRAN-ODT) 4 mg disintegrating tablet   No No   Sig: Take 1 tablet (4 mg total) by mouth every 8 (eight) hours as needed for nausea or vomiting   oxyCODONE (Roxicodone) 5 immediate release tablet   No No   Sig: Take 1 tablet (5 mg total) by mouth every 6 (six) hours as needed for moderate pain Max Daily Amount: 20 mg   Patient not taking: Reported on 2024   pantoprazole (PROTONIX) 40 mg tablet   Yes No   Sig: Take 40 mg by mouth daily.   predniSONE 20 mg tablet   No No   Sig: Take 2 tablets (40 mg total) by mouth daily for 5 days   propranolol  (INDERAL) 40 mg tablet   Yes No   Sig: Take 10 mg by mouth daily    tolterodine (DETROL LA) 4 mg 24 hr capsule   Yes No   Sig: Take 4 mg by mouth daily.   traMADol (ULTRAM) 50 mg tablet   No No   Si po tid prn pain   Patient not taking: Reported on 2024   traZODone (DESYREL) 50 mg tablet   Yes No   Sig: Take 100 mg by mouth daily at bedtime as needed    Patient not taking: Reported on 8/3/2022   umeclidinium bromide (INCRUSE ELLIPTA) 62.5 mcg/inh AEPB inhaler  Care Giver Yes No   Sig: Inhale 1 puff daily   venlafaxine (EFFEXOR-XR) 37.5 mg 24 hr capsule   Yes No   Sig: Take 75 mg by mouth daily     Patient not taking: Reported on 8/3/2022   ziprasidone (GEODON) 80 mg capsule   Yes No   Sig: Take 80 mg by mouth every evening      Facility-Administered Medications: None     Discharge Medication List as of 12/3/2024 10:20 PM        CONTINUE these medications which have NOT CHANGED    Details   albuterol (2.5 mg/3 mL) 0.083 % nebulizer solution Take 2.5 mg by nebulization every 6 (six) hours as needed for wheezing or shortness of breath, Historical Med      albuterol (ProAir HFA) 90 mcg/act inhaler Inhale 2 puffs every 6 (six) hours as needed for wheezing, Starting Thu 2024, Normal      ALPRAZolam (XANAX) 0.25 mg tablet Take 0.25 mg by mouth daily as needed for anxiety, Historical Med      aspirin 81 MG tablet Take 81 mg by mouth daily., Until Discontinued, Historical Med      atorvastatin (LIPITOR) 20 mg tablet Take 40 mg by mouth daily, Historical Med      Biotin 5 MG TABS Take 1 tablet by mouth, Historical Med      busPIRone (BUSPAR) 15 mg tablet Take 15 mg by mouth 3 (three) times a day, Historical Med      cefdinir (OMNICEF) 300 mg capsule Take 1 capsule (300 mg total) by mouth every 12 (twelve) hours for 7 days, Starting 2024, Until 2024, Normal      desvenlafaxine succinate (PRISTIQ) 50 mg 24 hr tablet Take 50 mg by mouth daily, Historical Med      Diclofenac Sodium (VOLTAREN) 1 %  Apply 2 g topically 4 (four) times a day, Starting Sun 4/7/2024, Normal      Emollient (cetaphil) cream Apply topically as needed for dry skin, Starting Tue 4/2/2024, Until Thu 5/2/2024 at 2359, Normal      fluticasone (FLONASE) 50 mcg/act nasal spray 1 spray into each nostril daily, Historical Med      hydrOXYzine pamoate (VISTARIL) 50 mg capsule Take 50 mg by mouth daily, Historical Med      ketorolac (TORADOL) 10 mg tablet Take 1 tablet (10 mg total) by mouth every 6 (six) hours as needed for moderate pain for up to 3 days, Starting u 6/27/2024, Until Sun 6/30/2024 at 2359, Normal      ketotifen (ZADITOR) 0.025 % ophthalmic solution 1 drop 2 (two) times a day, Historical Med      lamoTRIgine (LaMICtal) 150 MG tablet Take 200 mg by mouth daily, Historical Med      lidocaine (Lidoderm) 5 % Apply 1 patch topically over 12 hours daily Remove & Discard patch within 12 hours or as directed by MD, Starting Sun 4/7/2024, Normal      loratadine (CLARITIN) 10 mg tablet Take 10 mg by mouth daily, Historical Med      lurasidone (LATUDA) 40 mg tablet Take 80 mg by mouth daily with dinner , Historical Med      meclizine (ANTIVERT) 25 mg tablet Take 25 mg by mouth every 8 (eight) hours as needed for dizziness, Historical Med      montelukast (SINGULAIR) 10 mg tablet Take 10 mg by mouth daily at bedtime., Until Discontinued, Historical Med      Multiple Vitamin (MULTIVITAMIN) tablet Take 1 tablet by mouth daily, Historical Med      naproxen (EC NAPROSYN) 500 MG EC tablet Take 1 tablet (500 mg total) by mouth 2 (two) times a day with meals, Starting Sun 6/30/2019, Normal      neomycin-bacitracin-polymyxin (NEOSPORIN) 5-400-5,000 ointment Apply topically in the morning, Starting Sun 4/7/2024, Normal      ondansetron (ZOFRAN-ODT) 4 mg disintegrating tablet Take 1 tablet (4 mg total) by mouth every 8 (eight) hours as needed for nausea or vomiting, Starting Wed 11/27/2024, Normal      oxyCODONE (Roxicodone) 5 immediate release  tablet Take 1 tablet (5 mg total) by mouth every 6 (six) hours as needed for moderate pain Max Daily Amount: 20 mg, Starting Wed 6/19/2024, Normal      pantoprazole (PROTONIX) 40 mg tablet Take 40 mg by mouth daily., Until Discontinued, Historical Med      predniSONE 20 mg tablet Take 2 tablets (40 mg total) by mouth daily for 5 days, Starting Thu 11/28/2024, Until Tue 12/3/2024, Normal      propranolol (INDERAL) 40 mg tablet Take 10 mg by mouth daily , Historical Med      tolterodine (DETROL LA) 4 mg 24 hr capsule Take 4 mg by mouth daily., Until Discontinued, Historical Med      traMADol (ULTRAM) 50 mg tablet 1 po tid prn pain, Normal      traZODone (DESYREL) 50 mg tablet Take 100 mg by mouth daily at bedtime as needed , Historical Med      umeclidinium bromide (INCRUSE ELLIPTA) 62.5 mcg/inh AEPB inhaler Inhale 1 puff daily, Historical Med      Umeclidinium-Vilanterol (ANORO ELLIPTA IN) Inhale 1 Units every 4 (four) hours as needed (sob), Historical Med      venlafaxine (EFFEXOR-XR) 37.5 mg 24 hr capsule Take 75 mg by mouth daily  , Until Discontinued, Historical Med      ziprasidone (GEODON) 80 mg capsule Take 80 mg by mouth every evening, Historical Med           No discharge procedures on file.  ED SEPSIS DOCUMENTATION   Time reflects when diagnosis was documented in both MDM as applicable and the Disposition within this note       Time User Action Codes Description Comment    12/3/2024 10:18 PM Babar Kelly [Z13.9] Encounter for medical screening examination                  Babar Kelly DO  12/03/24 9566

## 2024-12-05 LAB
ATRIAL RATE: 76 BPM
ATRIAL RATE: 88 BPM
BACTERIA UR CULT: ABNORMAL
BACTERIA UR CULT: ABNORMAL
P AXIS: 58 DEGREES
P AXIS: 64 DEGREES
PR INTERVAL: 124 MS
PR INTERVAL: 126 MS
QRS AXIS: -16 DEGREES
QRS AXIS: 18 DEGREES
QRSD INTERVAL: 102 MS
QRSD INTERVAL: 88 MS
QT INTERVAL: 384 MS
QT INTERVAL: 426 MS
QTC INTERVAL: 465 MS
QTC INTERVAL: 479 MS
T WAVE AXIS: 67 DEGREES
T WAVE AXIS: 76 DEGREES
VENTRICULAR RATE: 76 BPM
VENTRICULAR RATE: 88 BPM

## 2024-12-05 PROCEDURE — 93010 ELECTROCARDIOGRAM REPORT: CPT | Performed by: INTERNAL MEDICINE

## 2024-12-06 ENCOUNTER — RESULTS FOLLOW-UP (OUTPATIENT)
Dept: EMERGENCY DEPT | Facility: HOSPITAL | Age: 64
End: 2024-12-06

## 2024-12-06 ENCOUNTER — APPOINTMENT (EMERGENCY)
Dept: RADIOLOGY | Facility: HOSPITAL | Age: 64
End: 2024-12-06
Payer: COMMERCIAL

## 2024-12-06 ENCOUNTER — HOSPITAL ENCOUNTER (EMERGENCY)
Facility: HOSPITAL | Age: 64
Discharge: HOME | End: 2024-12-06
Attending: EMERGENCY MEDICINE
Payer: COMMERCIAL

## 2024-12-06 VITALS
RESPIRATION RATE: 18 BRPM | OXYGEN SATURATION: 100 % | SYSTOLIC BLOOD PRESSURE: 126 MMHG | HEART RATE: 71 BPM | TEMPERATURE: 98.2 F | DIASTOLIC BLOOD PRESSURE: 75 MMHG

## 2024-12-06 DIAGNOSIS — S49.90XA SHOULDER INJURY: ICD-10-CM

## 2024-12-06 DIAGNOSIS — S63.509A WRIST SPRAIN: ICD-10-CM
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
## 2024-12-06 PROCEDURE — 73110 X-RAY EXAM OF WRIST: CPT

## 2024-12-06 PROCEDURE — 99284 EMERGENCY DEPT VISIT MOD MDM: CPT

## 2024-12-06 PROCEDURE — 73030 X-RAY EXAM OF SHOULDER: CPT

## 2024-12-06 PROCEDURE — 71101 X-RAY EXAM UNILAT RIBS/CHEST: CPT

## 2024-12-06 PROCEDURE — 99284 EMERGENCY DEPT VISIT MOD MDM: CPT | Performed by: EMERGENCY MEDICINE

## 2024-12-06 PROCEDURE — 73080 X-RAY EXAM OF ELBOW: CPT

## 2024-12-06 RX ORDER — IBUPROFEN 600 MG/1
600 TABLET, FILM COATED ORAL ONCE
Status: COMPLETED | OUTPATIENT
Start: 2024-12-06 | End: 2024-12-06

## 2024-12-06 RX ORDER — ACETAMINOPHEN 325 MG/1
975 TABLET ORAL ONCE
Status: COMPLETED | OUTPATIENT
Start: 2024-12-06 | End: 2024-12-06

## 2024-12-06 RX ADMIN — ACETAMINOPHEN 975 MG: 325 TABLET ORAL at 18:11

## 2024-12-06 RX ADMIN — IBUPROFEN 600 MG: 600 TABLET, FILM COATED ORAL at 18:11

## 2024-12-06 NOTE — ED PROVIDER NOTES
Time reflects when diagnosis was documented in both MDM as applicable and the Disposition within this note       Time User Action Codes Description Comment    12/6/2024  7:47 PM Daphnie Petty [W19.XXXA] Fall, initial encounter     12/6/2024  7:47 PM Daphnie Petty Add [S63.509A] Wrist sprain     12/6/2024  7:47 PM Daphnie Petty [S49.90XA] Shoulder injury           ED Disposition       ED Disposition   Discharge    Condition   Stable    Date/Time   Fri Dec 6, 2024  7:47 PM    Comment   Rosmery GARDINER Sam discharge to home/self care.                   Assessment & Plan       Medical Decision Making  Patient evaluated with imaging.  I reviewed the results and discussed them with the patient.  Patient discharged with appropriate instructions medications and follow-up.  Patient verbalized understanding had no further questions at the time of discharge.  Patient had stable vital signs and well-appearing at the time of discharge.  Given a static wrist brace and a shoulder sling.    Problems Addressed:  Fall, initial encounter: acute illness or injury  Shoulder injury: acute illness or injury  Wrist sprain: acute illness or injury    Amount and/or Complexity of Data Reviewed  External Data Reviewed: notes.  Radiology: ordered and independent interpretation performed. Decision-making details documented in ED Course.     Details: No acute injury    Risk  OTC drugs.  Prescription drug management.             Medications   acetaminophen (TYLENOL) tablet 975 mg (975 mg Oral Given 12/6/24 1811)   ibuprofen (MOTRIN) tablet 600 mg (600 mg Oral Given 12/6/24 1811)       ED Risk Strat Scores                                               History of Present Illness       Chief Complaint   Patient presents with    Fall     Pt states she fell just prior to arrival and hurt her right arm, denies head strike       Past Medical History:   Diagnosis Date    Asthma     Bladder incontinence     Chronic pain     back    COPD (chronic  obstructive pulmonary disease) (HCC)     GERD (gastroesophageal reflux disease)     Hyperlipidemia     Psychiatric disorder     bipolar      Past Surgical History:   Procedure Laterality Date    TUBAL LIGATION        Family History   Problem Relation Age of Onset    No Known Problems Mother     No Known Problems Sister     No Known Problems Daughter     No Known Problems Maternal Aunt       Social History     Tobacco Use    Smoking status: Every Day     Current packs/day: 0.50     Average packs/day: 0.5 packs/day for 0.6 years (0.3 ttl pk-yrs)     Types: Cigarettes     Start date: 05/2024    Smokeless tobacco: Never   Vaping Use    Vaping status: Never Used   Substance Use Topics    Alcohol use: No    Drug use: No      E-Cigarette/Vaping    E-Cigarette Use Never User       E-Cigarette/Vaping Substances    Nicotine No     THC No     CBD No     Flavoring No     Other No     Unknown No       I have reviewed and agree with the history as documented.     64-year-old female presents after a fall complaining of right-sided rib tenderness shoulder pain and wrist pain unable to move.  Denies hitting her head no loss of consciousness back pain nausea vomiting or any other symptoms not on blood thinners.  Patient has had multiple admissions to the ED for various complaints in the recent past.  She was even offered an admission to the psych however she declined and discharged.      History provided by:  Patient   used: No        Review of Systems   Constitutional: Negative.    HENT: Negative.     Eyes: Negative.    Respiratory: Negative.     Cardiovascular: Negative.    Gastrointestinal: Negative.    Endocrine: Negative.    Genitourinary: Negative.    Musculoskeletal:  Positive for arthralgias and myalgias.   Skin: Negative.    Allergic/Immunologic: Negative.    Neurological: Negative.    Hematological: Negative.    Psychiatric/Behavioral: Negative.     All other systems reviewed and are  negative.          Objective       ED Triage Vitals   Temperature Pulse Blood Pressure Respirations SpO2 Patient Position - Orthostatic VS   12/06/24 1809 12/06/24 1807 12/06/24 1809 12/06/24 1807 12/06/24 1807 12/06/24 1807   98.2 °F (36.8 °C) 71 126/75 18 100 % Sitting      Temp Source Heart Rate Source BP Location FiO2 (%) Pain Score    12/06/24 1809 12/06/24 1807 12/06/24 1807 -- 12/06/24 1807    Oral Monitor Right arm  8      Vitals      Date and Time Temp Pulse SpO2 Resp BP Pain Score FACES Pain Rating User   12/06/24 1811 -- -- -- -- -- 8 -- CHAY   12/06/24 1809 98.2 °F (36.8 °C) -- -- -- 126/75 -- -- CHAY   12/06/24 1807 -- 71 100 % 18 -- 8 -- CHAY            Physical Exam  Vitals and nursing note reviewed.   Constitutional:       Appearance: Normal appearance.   HENT:      Head: Normocephalic and atraumatic.      Nose: Nose normal.      Mouth/Throat:      Mouth: Mucous membranes are moist.   Eyes:      Extraocular Movements: Extraocular movements intact.      Pupils: Pupils are equal, round, and reactive to light.   Cardiovascular:      Rate and Rhythm: Normal rate and regular rhythm.   Pulmonary:      Effort: Pulmonary effort is normal.      Breath sounds: Normal breath sounds.   Abdominal:      General: Abdomen is flat. Bowel sounds are normal.      Palpations: Abdomen is soft.   Musculoskeletal:         General: Normal range of motion.      Cervical back: Normal range of motion and neck supple.      Comments: Tenderness noted along the right wrist elbow and shoulder as well as the right rib area.   Skin:     General: Skin is warm.      Capillary Refill: Capillary refill takes less than 2 seconds.   Neurological:      General: No focal deficit present.      Mental Status: She is alert and oriented to person, place, and time. Mental status is at baseline.   Psychiatric:         Mood and Affect: Mood normal.         Thought Content: Thought content normal.         Results Reviewed       None            XR  shoulder 2+ views RIGHT    (Results Pending)   XR wrist 3+ views RIGHT    (Results Pending)   XR elbow 3+ vw RIGHT    (Results Pending)   XR ribs with pa chest min 3 views RIGHT    (Results Pending)       Procedures    ED Medication and Procedure Management   Prior to Admission Medications   Prescriptions Last Dose Informant Patient Reported? Taking?   ALPRAZolam (XANAX) 0.25 mg tablet   Yes No   Sig: Take 0.25 mg by mouth daily as needed for anxiety   Biotin 5 MG TABS   Yes No   Sig: Take 1 tablet by mouth   Diclofenac Sodium (VOLTAREN) 1 %   No No   Sig: Apply 2 g topically 4 (four) times a day   Patient not taking: Reported on 10/5/2024   Emollient (cetaphil) cream   No No   Sig: Apply topically as needed for dry skin   Multiple Vitamin (MULTIVITAMIN) tablet   Yes No   Sig: Take 1 tablet by mouth daily   Umeclidinium-Vilanterol (ANORO ELLIPTA IN)   Yes No   Sig: Inhale 1 Units every 4 (four) hours as needed (sob)   albuterol (2.5 mg/3 mL) 0.083 % nebulizer solution  Other (Specify) Yes No   Sig: Take 2.5 mg by nebulization every 6 (six) hours as needed for wheezing or shortness of breath   albuterol (ProAir HFA) 90 mcg/act inhaler   No No   Sig: Inhale 2 puffs every 6 (six) hours as needed for wheezing   aspirin 81 MG tablet   Yes No   Sig: Take 81 mg by mouth daily.   atorvastatin (LIPITOR) 20 mg tablet  Self Yes No   Sig: Take 40 mg by mouth daily   busPIRone (BUSPAR) 15 mg tablet  Other (Specify) Yes No   Sig: Take 15 mg by mouth 3 (three) times a day   desvenlafaxine succinate (PRISTIQ) 50 mg 24 hr tablet   Yes No   Sig: Take 50 mg by mouth daily   fluticasone (FLONASE) 50 mcg/act nasal spray   Yes No   Si spray into each nostril daily   hydrOXYzine pamoate (VISTARIL) 50 mg capsule   Yes No   Sig: Take 50 mg by mouth daily   ketorolac (TORADOL) 10 mg tablet   No No   Sig: Take 1 tablet (10 mg total) by mouth every 6 (six) hours as needed for moderate pain for up to 3 days   ketotifen (ZADITOR) 0.025 %  ophthalmic solution   Yes No   Si drop 2 (two) times a day   lamoTRIgine (LaMICtal) 150 MG tablet  Care Giver Yes No   Sig: Take 200 mg by mouth daily   lidocaine (Lidoderm) 5 %   No No   Sig: Apply 1 patch topically over 12 hours daily Remove & Discard patch within 12 hours or as directed by MD   Patient not taking: Reported on 10/5/2024   loratadine (CLARITIN) 10 mg tablet   Yes No   Sig: Take 10 mg by mouth daily   lurasidone (LATUDA) 40 mg tablet   Yes No   Sig: Take 80 mg by mouth daily with dinner    Patient not taking: Reported on 8/3/2022   meclizine (ANTIVERT) 25 mg tablet   Yes No   Sig: Take 25 mg by mouth every 8 (eight) hours as needed for dizziness   Patient not taking: Reported on 8/3/2022   montelukast (SINGULAIR) 10 mg tablet   Yes No   Sig: Take 10 mg by mouth daily at bedtime.   Patient not taking: Reported on 2024   naproxen (EC NAPROSYN) 500 MG EC tablet   No No   Sig: Take 1 tablet (500 mg total) by mouth 2 (two) times a day with meals   Patient not taking: Reported on 2019   neomycin-bacitracin-polymyxin (NEOSPORIN) 5-400-5,000 ointment   No No   Sig: Apply topically in the morning   Patient not taking: Reported on 2024   ondansetron (ZOFRAN-ODT) 4 mg disintegrating tablet   No No   Sig: Take 1 tablet (4 mg total) by mouth every 8 (eight) hours as needed for nausea or vomiting   oxyCODONE (Roxicodone) 5 immediate release tablet   No No   Sig: Take 1 tablet (5 mg total) by mouth every 6 (six) hours as needed for moderate pain Max Daily Amount: 20 mg   Patient not taking: Reported on 2024   pantoprazole (PROTONIX) 40 mg tablet   Yes No   Sig: Take 40 mg by mouth daily.   propranolol (INDERAL) 40 mg tablet   Yes No   Sig: Take 10 mg by mouth daily    tolterodine (DETROL LA) 4 mg 24 hr capsule   Yes No   Sig: Take 4 mg by mouth daily.   traMADol (ULTRAM) 50 mg tablet   No No   Si po tid prn pain   Patient not taking: Reported on 2024   traZODone (DESYREL) 50 mg  tablet   Yes No   Sig: Take 100 mg by mouth daily at bedtime as needed    Patient not taking: Reported on 8/3/2022   umeclidinium bromide (INCRUSE ELLIPTA) 62.5 mcg/inh AEPB inhaler  Care Giver Yes No   Sig: Inhale 1 puff daily   venlafaxine (EFFEXOR-XR) 37.5 mg 24 hr capsule   Yes No   Sig: Take 75 mg by mouth daily     Patient not taking: Reported on 8/3/2022   ziprasidone (GEODON) 80 mg capsule   Yes No   Sig: Take 80 mg by mouth every evening      Facility-Administered Medications: None     Discharge Medication List as of 12/6/2024  7:47 PM        CONTINUE these medications which have NOT CHANGED    Details   albuterol (2.5 mg/3 mL) 0.083 % nebulizer solution Take 2.5 mg by nebulization every 6 (six) hours as needed for wheezing or shortness of breath, Historical Med      albuterol (ProAir HFA) 90 mcg/act inhaler Inhale 2 puffs every 6 (six) hours as needed for wheezing, Starting Thu 11/28/2024, Normal      ALPRAZolam (XANAX) 0.25 mg tablet Take 0.25 mg by mouth daily as needed for anxiety, Historical Med      aspirin 81 MG tablet Take 81 mg by mouth daily., Until Discontinued, Historical Med      atorvastatin (LIPITOR) 20 mg tablet Take 40 mg by mouth daily, Historical Med      Biotin 5 MG TABS Take 1 tablet by mouth, Historical Med      busPIRone (BUSPAR) 15 mg tablet Take 15 mg by mouth 3 (three) times a day, Historical Med      desvenlafaxine succinate (PRISTIQ) 50 mg 24 hr tablet Take 50 mg by mouth daily, Historical Med      Diclofenac Sodium (VOLTAREN) 1 % Apply 2 g topically 4 (four) times a day, Starting Sun 4/7/2024, Normal      Emollient (cetaphil) cream Apply topically as needed for dry skin, Starting Tue 4/2/2024, Until Thu 5/2/2024 at 2359, Normal      fluticasone (FLONASE) 50 mcg/act nasal spray 1 spray into each nostril daily, Historical Med      hydrOXYzine pamoate (VISTARIL) 50 mg capsule Take 50 mg by mouth daily, Historical Med      ketorolac (TORADOL) 10 mg tablet Take 1 tablet (10 mg total)  by mouth every 6 (six) hours as needed for moderate pain for up to 3 days, Starting Thu 6/27/2024, Until Sun 6/30/2024 at 2359, Normal      ketotifen (ZADITOR) 0.025 % ophthalmic solution 1 drop 2 (two) times a day, Historical Med      lamoTRIgine (LaMICtal) 150 MG tablet Take 200 mg by mouth daily, Historical Med      lidocaine (Lidoderm) 5 % Apply 1 patch topically over 12 hours daily Remove & Discard patch within 12 hours or as directed by MD, Starting [place] 4/7/2024, Normal      loratadine (CLARITIN) 10 mg tablet Take 10 mg by mouth daily, Historical Med      lurasidone (LATUDA) 40 mg tablet Take 80 mg by mouth daily with dinner , Historical Med      meclizine (ANTIVERT) 25 mg tablet Take 25 mg by mouth every 8 (eight) hours as needed for dizziness, Historical Med      montelukast (SINGULAIR) 10 mg tablet Take 10 mg by mouth daily at bedtime., Until Discontinued, Historical Med      Multiple Vitamin (MULTIVITAMIN) tablet Take 1 tablet by mouth daily, Historical Med      naproxen (EC NAPROSYN) 500 MG EC tablet Take 1 tablet (500 mg total) by mouth 2 (two) times a day with meals, Starting Sun 6/30/2019, Normal      neomycin-bacitracin-polymyxin (NEOSPORIN) 5-400-5,000 ointment Apply topically in the morning, Starting Sun 4/7/2024, Normal      ondansetron (ZOFRAN-ODT) 4 mg disintegrating tablet Take 1 tablet (4 mg total) by mouth every 8 (eight) hours as needed for nausea or vomiting, Starting Wed 11/27/2024, Normal      oxyCODONE (Roxicodone) 5 immediate release tablet Take 1 tablet (5 mg total) by mouth every 6 (six) hours as needed for moderate pain Max Daily Amount: 20 mg, Starting Wed 6/19/2024, Normal      pantoprazole (PROTONIX) 40 mg tablet Take 40 mg by mouth daily., Until Discontinued, Historical Med      propranolol (INDERAL) 40 mg tablet Take 10 mg by mouth daily , Historical Med      tolterodine (DETROL LA) 4 mg 24 hr capsule Take 4 mg by mouth daily., Until Discontinued, Historical Med      traMADol  (ULTRAM) 50 mg tablet 1 po tid prn pain, Normal      traZODone (DESYREL) 50 mg tablet Take 100 mg by mouth daily at bedtime as needed , Historical Med      umeclidinium bromide (INCRUSE ELLIPTA) 62.5 mcg/inh AEPB inhaler Inhale 1 puff daily, Historical Med      Umeclidinium-Vilanterol (ANORO ELLIPTA IN) Inhale 1 Units every 4 (four) hours as needed (sob), Historical Med      venlafaxine (EFFEXOR-XR) 37.5 mg 24 hr capsule Take 75 mg by mouth daily  , Until Discontinued, Historical Med      ziprasidone (GEODON) 80 mg capsule Take 80 mg by mouth every evening, Historical Med           No discharge procedures on file.  ED SEPSIS DOCUMENTATION   Time reflects when diagnosis was documented in both MDM as applicable and the Disposition within this note       Time User Action Codes Description Comment    12/6/2024  7:47 PM Daphnie Petty [W19.XXXA] Fall, initial encounter     12/6/2024  7:47 PM Daphnie Petty [S63.509A] Wrist sprain     12/6/2024  7:47 PM Daphnie Petty [S49.90XA] Shoulder injury                  Daphnie Petty, DO  12/07/24 0003

## 2024-12-07 ENCOUNTER — APPOINTMENT (EMERGENCY)
Dept: RADIOLOGY | Facility: HOSPITAL | Age: 64
DRG: 351 | End: 2024-12-07
Payer: COMMERCIAL

## 2024-12-07 ENCOUNTER — HOSPITAL ENCOUNTER (INPATIENT)
Facility: HOSPITAL | Age: 64
LOS: 6 days | DRG: 351 | End: 2024-12-13
Attending: EMERGENCY MEDICINE | Admitting: FAMILY MEDICINE
Payer: COMMERCIAL

## 2024-12-07 ENCOUNTER — HOSPITAL ENCOUNTER (EMERGENCY)
Facility: HOSPITAL | Age: 64
Discharge: HOME | DRG: 351 | End: 2024-12-07
Attending: EMERGENCY MEDICINE | Admitting: EMERGENCY MEDICINE
Payer: COMMERCIAL

## 2024-12-07 VITALS
TEMPERATURE: 98.7 F | SYSTOLIC BLOOD PRESSURE: 145 MMHG | HEART RATE: 87 BPM | OXYGEN SATURATION: 98 % | RESPIRATION RATE: 16 BRPM | DIASTOLIC BLOOD PRESSURE: 78 MMHG

## 2024-12-07 DIAGNOSIS — B96.89 BACTERIAL VAGINOSIS: ICD-10-CM

## 2024-12-07 DIAGNOSIS — Z72.0 TOBACCO ABUSE: ICD-10-CM

## 2024-12-07 DIAGNOSIS — M62.82 RHABDOMYOLYSIS: ICD-10-CM

## 2024-12-07 DIAGNOSIS — F29 PSYCHOSIS (HCC): ICD-10-CM

## 2024-12-07 DIAGNOSIS — F31.9 BIPOLAR I DISORDER (HCC): ICD-10-CM

## 2024-12-07 DIAGNOSIS — G92.8 TOXIC METABOLIC ENCEPHALOPATHY: ICD-10-CM

## 2024-12-07 DIAGNOSIS — R23.4 CRACKED SKIN: Primary | ICD-10-CM

## 2024-12-07 DIAGNOSIS — F41.9 ANXIETY: ICD-10-CM

## 2024-12-07 DIAGNOSIS — N76.0 BACTERIAL VAGINOSIS: ICD-10-CM

## 2024-12-07 DIAGNOSIS — N17.9 AKI (ACUTE KIDNEY INJURY) (HCC): Primary | ICD-10-CM

## 2024-12-07 DIAGNOSIS — R11.0 NAUSEA: ICD-10-CM

## 2024-12-07 DIAGNOSIS — D72.829 LEUKOCYTOSIS: ICD-10-CM

## 2024-12-07 DIAGNOSIS — R41.0 CONFUSION: ICD-10-CM

## 2024-12-07 DIAGNOSIS — N39.0 UTI (URINARY TRACT INFECTION): ICD-10-CM

## 2024-12-07 DIAGNOSIS — R29.6 MULTIPLE FALLS: ICD-10-CM

## 2024-12-07 PROBLEM — D75.839 THROMBOCYTOSIS: Status: ACTIVE | Noted: 2024-12-07

## 2024-12-07 PROBLEM — I77.819 AORTIC ECTASIA (HCC): Status: ACTIVE | Noted: 2024-12-07

## 2024-12-07 LAB
2HR DELTA HS TROPONIN: -1 NG/L
4HR DELTA HS TROPONIN: -2 NG/L
ALBUMIN SERPL BCG-MCNC: 4.2 G/DL (ref 3.5–5)
ALP SERPL-CCNC: 105 U/L (ref 34–104)
ALT SERPL W P-5'-P-CCNC: 32 U/L (ref 7–52)
AMPHETAMINES SERPL QL SCN: NEGATIVE
ANION GAP SERPL CALCULATED.3IONS-SCNC: 13 MMOL/L (ref 4–13)
APAP SERPL-MCNC: 3 UG/ML (ref 10–20)
APTT PPP: 26 SECONDS (ref 23–34)
AST SERPL W P-5'-P-CCNC: 42 U/L (ref 13–39)
BACTERIA UR QL AUTO: ABNORMAL /HPF
BARBITURATES UR QL: NEGATIVE
BASOPHILS # BLD AUTO: 0.06 THOUSANDS/ÂΜL (ref 0–0.1)
BASOPHILS NFR BLD AUTO: 0 % (ref 0–1)
BENZODIAZ UR QL: POSITIVE
BILIRUB SERPL-MCNC: 0.82 MG/DL (ref 0.2–1)
BILIRUB UR QL STRIP: NEGATIVE
BUN SERPL-MCNC: 34 MG/DL (ref 5–25)
CALCIUM SERPL-MCNC: 9.6 MG/DL (ref 8.4–10.2)
CARDIAC TROPONIN I PNL SERPL HS: 10 NG/L (ref ?–50)
CARDIAC TROPONIN I PNL SERPL HS: 11 NG/L (ref ?–50)
CARDIAC TROPONIN I PNL SERPL HS: 12 NG/L (ref ?–50)
CHLORIDE SERPL-SCNC: 98 MMOL/L (ref 96–108)
CK SERPL-CCNC: 701 U/L (ref 26–192)
CLARITY UR: CLEAR
CO2 SERPL-SCNC: 24 MMOL/L (ref 21–32)
COCAINE UR QL: NEGATIVE
COLOR UR: YELLOW
CREAT SERPL-MCNC: 1.71 MG/DL (ref 0.6–1.3)
EOSINOPHIL # BLD AUTO: 0.13 THOUSAND/ÂΜL (ref 0–0.61)
EOSINOPHIL NFR BLD AUTO: 1 % (ref 0–6)
ERYTHROCYTE [DISTWIDTH] IN BLOOD BY AUTOMATED COUNT: 13.6 % (ref 11.6–15.1)
ETHANOL SERPL-MCNC: <10 MG/DL
FENTANYL UR QL SCN: NEGATIVE
GFR SERPL CREATININE-BSD FRML MDRD: 31 ML/MIN/1.73SQ M
GLUCOSE SERPL-MCNC: 100 MG/DL (ref 65–140)
GLUCOSE UR STRIP-MCNC: NEGATIVE MG/DL
HCT VFR BLD AUTO: 35.7 % (ref 34.8–46.1)
HGB BLD-MCNC: 11.9 G/DL (ref 11.5–15.4)
HGB UR QL STRIP.AUTO: NEGATIVE
HYALINE CASTS #/AREA URNS LPF: ABNORMAL /LPF
HYDROCODONE UR QL SCN: NEGATIVE
IMM GRANULOCYTES # BLD AUTO: 0.11 THOUSAND/UL (ref 0–0.2)
IMM GRANULOCYTES NFR BLD AUTO: 1 % (ref 0–2)
INR PPP: 1.02 (ref 0.85–1.19)
KETONES UR STRIP-MCNC: ABNORMAL MG/DL
LEUKOCYTE ESTERASE UR QL STRIP: ABNORMAL
LYMPHOCYTES # BLD AUTO: 2.99 THOUSANDS/ÂΜL (ref 0.6–4.47)
LYMPHOCYTES NFR BLD AUTO: 16 % (ref 14–44)
MAGNESIUM SERPL-MCNC: 2.2 MG/DL (ref 1.9–2.7)
MCH RBC QN AUTO: 29.2 PG (ref 26.8–34.3)
MCHC RBC AUTO-ENTMCNC: 33.3 G/DL (ref 31.4–37.4)
MCV RBC AUTO: 88 FL (ref 82–98)
METHADONE UR QL: NEGATIVE
MONOCYTES # BLD AUTO: 2.09 THOUSAND/ÂΜL (ref 0.17–1.22)
MONOCYTES NFR BLD AUTO: 11 % (ref 4–12)
NEUTROPHILS # BLD AUTO: 13.93 THOUSANDS/ÂΜL (ref 1.85–7.62)
NEUTS SEG NFR BLD AUTO: 71 % (ref 43–75)
NITRITE UR QL STRIP: NEGATIVE
NON-SQ EPI CELLS URNS QL MICRO: ABNORMAL /HPF
NRBC BLD AUTO-RTO: 0 /100 WBCS
OPIATES UR QL SCN: NEGATIVE
OXYCODONE+OXYMORPHONE UR QL SCN: NEGATIVE
PCP UR QL: NEGATIVE
PH UR STRIP.AUTO: 5.5 [PH]
PLATELET # BLD AUTO: 465 THOUSANDS/UL (ref 149–390)
PMV BLD AUTO: 10 FL (ref 8.9–12.7)
POTASSIUM SERPL-SCNC: 3.7 MMOL/L (ref 3.5–5.3)
PROT SERPL-MCNC: 7.1 G/DL (ref 6.4–8.4)
PROT UR STRIP-MCNC: ABNORMAL MG/DL
PROTHROMBIN TIME: 13.9 SECONDS (ref 12.3–15)
RBC # BLD AUTO: 4.07 MILLION/UL (ref 3.81–5.12)
RBC #/AREA URNS AUTO: ABNORMAL /HPF
SALICYLATES SERPL-MCNC: <5 MG/DL (ref 3–20)
SODIUM SERPL-SCNC: 135 MMOL/L (ref 135–147)
SP GR UR STRIP.AUTO: >=1.03 (ref 1–1.03)
THC UR QL: NEGATIVE
UROBILINOGEN UR STRIP-ACNC: <2 MG/DL
WBC # BLD AUTO: 19.31 THOUSAND/UL (ref 4.31–10.16)
WBC #/AREA URNS AUTO: ABNORMAL /HPF

## 2024-12-07 PROCEDURE — 74176 CT ABD & PELVIS W/O CONTRAST: CPT

## 2024-12-07 PROCEDURE — 99285 EMERGENCY DEPT VISIT HI MDM: CPT | Performed by: EMERGENCY MEDICINE

## 2024-12-07 PROCEDURE — 85025 COMPLETE CBC W/AUTO DIFF WBC: CPT | Performed by: PHYSICIAN ASSISTANT

## 2024-12-07 PROCEDURE — 82550 ASSAY OF CK (CPK): CPT | Performed by: PHYSICIAN ASSISTANT

## 2024-12-07 PROCEDURE — 36415 COLL VENOUS BLD VENIPUNCTURE: CPT | Performed by: PHYSICIAN ASSISTANT

## 2024-12-07 PROCEDURE — 93005 ELECTROCARDIOGRAM TRACING: CPT

## 2024-12-07 PROCEDURE — 80143 DRUG ASSAY ACETAMINOPHEN: CPT | Performed by: PHYSICIAN ASSISTANT

## 2024-12-07 PROCEDURE — 99282 EMERGENCY DEPT VISIT SF MDM: CPT

## 2024-12-07 PROCEDURE — 99283 EMERGENCY DEPT VISIT LOW MDM: CPT | Performed by: EMERGENCY MEDICINE

## 2024-12-07 PROCEDURE — 81001 URINALYSIS AUTO W/SCOPE: CPT | Performed by: FAMILY MEDICINE

## 2024-12-07 PROCEDURE — 80179 DRUG ASSAY SALICYLATE: CPT | Performed by: PHYSICIAN ASSISTANT

## 2024-12-07 PROCEDURE — 85730 THROMBOPLASTIN TIME PARTIAL: CPT | Performed by: PHYSICIAN ASSISTANT

## 2024-12-07 PROCEDURE — 96360 HYDRATION IV INFUSION INIT: CPT

## 2024-12-07 PROCEDURE — 82077 ASSAY SPEC XCP UR&BREATH IA: CPT | Performed by: PHYSICIAN ASSISTANT

## 2024-12-07 PROCEDURE — 99223 1ST HOSP IP/OBS HIGH 75: CPT | Performed by: FAMILY MEDICINE

## 2024-12-07 PROCEDURE — 84484 ASSAY OF TROPONIN QUANT: CPT | Performed by: PHYSICIAN ASSISTANT

## 2024-12-07 PROCEDURE — 70450 CT HEAD/BRAIN W/O DYE: CPT

## 2024-12-07 PROCEDURE — 80053 COMPREHEN METABOLIC PANEL: CPT | Performed by: PHYSICIAN ASSISTANT

## 2024-12-07 PROCEDURE — 96361 HYDRATE IV INFUSION ADD-ON: CPT

## 2024-12-07 PROCEDURE — 83735 ASSAY OF MAGNESIUM: CPT | Performed by: PHYSICIAN ASSISTANT

## 2024-12-07 PROCEDURE — 85610 PROTHROMBIN TIME: CPT | Performed by: PHYSICIAN ASSISTANT

## 2024-12-07 PROCEDURE — 80307 DRUG TEST PRSMV CHEM ANLYZR: CPT | Performed by: FAMILY MEDICINE

## 2024-12-07 PROCEDURE — 99285 EMERGENCY DEPT VISIT HI MDM: CPT

## 2024-12-07 PROCEDURE — 84484 ASSAY OF TROPONIN QUANT: CPT | Performed by: FAMILY MEDICINE

## 2024-12-07 PROCEDURE — 71250 CT THORAX DX C-: CPT

## 2024-12-07 RX ORDER — PROPRANOLOL HCL 20 MG
10 TABLET ORAL DAILY
Status: DISCONTINUED | OUTPATIENT
Start: 2024-12-08 | End: 2024-12-14 | Stop reason: HOSPADM

## 2024-12-07 RX ORDER — DOCUSATE SODIUM 100 MG/1
100 CAPSULE, LIQUID FILLED ORAL 2 TIMES DAILY
Status: DISCONTINUED | OUTPATIENT
Start: 2024-12-07 | End: 2024-12-14 | Stop reason: HOSPADM

## 2024-12-07 RX ORDER — ALPRAZOLAM 0.25 MG/1
0.25 TABLET ORAL DAILY PRN
Status: DISCONTINUED | OUTPATIENT
Start: 2024-12-07 | End: 2024-12-08

## 2024-12-07 RX ORDER — PANTOPRAZOLE SODIUM 40 MG/1
40 TABLET, DELAYED RELEASE ORAL DAILY
Status: DISCONTINUED | OUTPATIENT
Start: 2024-12-08 | End: 2024-12-14 | Stop reason: HOSPADM

## 2024-12-07 RX ORDER — DESVENLAFAXINE 50 MG/1
50 TABLET, FILM COATED, EXTENDED RELEASE ORAL DAILY
Status: DISCONTINUED | OUTPATIENT
Start: 2024-12-08 | End: 2024-12-12

## 2024-12-07 RX ORDER — SODIUM CHLORIDE, SODIUM LACTATE, POTASSIUM CHLORIDE, CALCIUM CHLORIDE 600; 310; 30; 20 MG/100ML; MG/100ML; MG/100ML; MG/100ML
125 INJECTION, SOLUTION INTRAVENOUS CONTINUOUS
Status: DISCONTINUED | OUTPATIENT
Start: 2024-12-07 | End: 2024-12-08

## 2024-12-07 RX ORDER — CEFTRIAXONE 1 G/50ML
1000 INJECTION, SOLUTION INTRAVENOUS EVERY 24 HOURS
Status: DISCONTINUED | OUTPATIENT
Start: 2024-12-07 | End: 2024-12-07

## 2024-12-07 RX ORDER — BUSPIRONE HYDROCHLORIDE 5 MG/1
15 TABLET ORAL 3 TIMES DAILY
Status: DISCONTINUED | OUTPATIENT
Start: 2024-12-07 | End: 2024-12-14 | Stop reason: HOSPADM

## 2024-12-07 RX ORDER — ACETAMINOPHEN 325 MG/1
650 TABLET ORAL EVERY 6 HOURS PRN
Status: DISCONTINUED | OUTPATIENT
Start: 2024-12-07 | End: 2024-12-14 | Stop reason: HOSPADM

## 2024-12-07 RX ORDER — POLYETHYLENE GLYCOL 3350 17 G/17G
17 POWDER, FOR SOLUTION ORAL DAILY PRN
Status: DISCONTINUED | OUTPATIENT
Start: 2024-12-07 | End: 2024-12-14 | Stop reason: HOSPADM

## 2024-12-07 RX ORDER — HEPARIN SODIUM 5000 [USP'U]/ML
5000 INJECTION, SOLUTION INTRAVENOUS; SUBCUTANEOUS EVERY 12 HOURS SCHEDULED
Status: DISCONTINUED | OUTPATIENT
Start: 2024-12-07 | End: 2024-12-14 | Stop reason: HOSPADM

## 2024-12-07 RX ORDER — NICOTINE 21 MG/24HR
1 PATCH, TRANSDERMAL 24 HOURS TRANSDERMAL DAILY
Status: DISCONTINUED | OUTPATIENT
Start: 2024-12-08 | End: 2024-12-14 | Stop reason: HOSPADM

## 2024-12-07 RX ORDER — ZIPRASIDONE HYDROCHLORIDE 20 MG/1
80 CAPSULE ORAL EVERY EVENING
Status: DISCONTINUED | OUTPATIENT
Start: 2024-12-07 | End: 2024-12-10

## 2024-12-07 RX ORDER — OXYBUTYNIN CHLORIDE 5 MG/1
10 TABLET, EXTENDED RELEASE ORAL DAILY
Status: DISCONTINUED | OUTPATIENT
Start: 2024-12-08 | End: 2024-12-14 | Stop reason: HOSPADM

## 2024-12-07 RX ORDER — HYDROXYZINE HYDROCHLORIDE 25 MG/1
50 TABLET, FILM COATED ORAL DAILY
Status: DISCONTINUED | OUTPATIENT
Start: 2024-12-07 | End: 2024-12-10

## 2024-12-07 RX ORDER — ASPIRIN 81 MG/1
81 TABLET, CHEWABLE ORAL DAILY
Status: DISCONTINUED | OUTPATIENT
Start: 2024-12-08 | End: 2024-12-14 | Stop reason: HOSPADM

## 2024-12-07 RX ORDER — KETOTIFEN FUMARATE 0.35 MG/ML
1 SOLUTION/ DROPS OPHTHALMIC 2 TIMES DAILY
Status: DISCONTINUED | OUTPATIENT
Start: 2024-12-07 | End: 2024-12-14 | Stop reason: HOSPADM

## 2024-12-07 RX ORDER — ALBUTEROL SULFATE 0.83 MG/ML
2.5 SOLUTION RESPIRATORY (INHALATION) EVERY 6 HOURS PRN
Status: DISCONTINUED | OUTPATIENT
Start: 2024-12-07 | End: 2024-12-14 | Stop reason: HOSPADM

## 2024-12-07 RX ORDER — FLUTICASONE PROPIONATE 50 MCG
1 SPRAY, SUSPENSION (ML) NASAL DAILY
Status: DISCONTINUED | OUTPATIENT
Start: 2024-12-08 | End: 2024-12-14 | Stop reason: HOSPADM

## 2024-12-07 RX ORDER — LORATADINE 10 MG/1
10 TABLET ORAL DAILY
Status: DISCONTINUED | OUTPATIENT
Start: 2024-12-08 | End: 2024-12-14 | Stop reason: HOSPADM

## 2024-12-07 RX ORDER — ATORVASTATIN CALCIUM 40 MG/1
40 TABLET, FILM COATED ORAL DAILY
Status: DISCONTINUED | OUTPATIENT
Start: 2024-12-08 | End: 2024-12-14 | Stop reason: HOSPADM

## 2024-12-07 RX ORDER — ONDANSETRON 2 MG/ML
4 INJECTION INTRAMUSCULAR; INTRAVENOUS EVERY 6 HOURS PRN
Status: DISCONTINUED | OUTPATIENT
Start: 2024-12-07 | End: 2024-12-14 | Stop reason: HOSPADM

## 2024-12-07 RX ORDER — LAMOTRIGINE 100 MG/1
200 TABLET ORAL DAILY
Status: DISCONTINUED | OUTPATIENT
Start: 2024-12-08 | End: 2024-12-14 | Stop reason: HOSPADM

## 2024-12-07 RX ADMIN — HYDROXYZINE HYDROCHLORIDE 50 MG: 25 TABLET ORAL at 20:45

## 2024-12-07 RX ADMIN — SODIUM CHLORIDE 1000 ML: 0.9 INJECTION, SOLUTION INTRAVENOUS at 16:04

## 2024-12-07 RX ADMIN — DOCUSATE SODIUM 100 MG: 100 CAPSULE, LIQUID FILLED ORAL at 20:01

## 2024-12-07 RX ADMIN — HEPARIN SODIUM 5000 UNITS: 5000 INJECTION, SOLUTION INTRAVENOUS; SUBCUTANEOUS at 20:46

## 2024-12-07 RX ADMIN — ZIPRASIDONE HYDROCHLORIDE 80 MG: 20 CAPSULE ORAL at 20:00

## 2024-12-07 RX ADMIN — BUSPIRONE HYDROCHLORIDE 15 MG: 5 TABLET ORAL at 20:45

## 2024-12-07 RX ADMIN — SODIUM CHLORIDE, SODIUM LACTATE, POTASSIUM CHLORIDE, AND CALCIUM CHLORIDE 125 ML/HR: .6; .31; .03; .02 INJECTION, SOLUTION INTRAVENOUS at 18:02

## 2024-12-07 RX ADMIN — SODIUM CHLORIDE 1000 ML: 0.9 INJECTION, SOLUTION INTRAVENOUS at 12:33

## 2024-12-07 RX ADMIN — KETOTIFEN FUMARATE 1 DROP: 0.25 SOLUTION/ DROPS OPHTHALMIC at 20:02

## 2024-12-07 NOTE — ASSESSMENT & PLAN NOTE
Continue home medications.  As noted above, patient with bizarre behavior on admission.  Question as to whether this is related to underlying infection such as a urinary tract infection.  Continue to closely monitor mentation.  If patient continues to exhibit bizarre behavior and confusion will need to consult psychiatry to evaluate for potential underlying psychiatric cause of her symptoms.

## 2024-12-07 NOTE — ASSESSMENT & PLAN NOTE
Patient with multiple falls over the last day.  Imaging does not reveal any acute fractures.  Physical therapy and Occupational Therapy evaluations have been ordered.

## 2024-12-07 NOTE — ASSESSMENT & PLAN NOTE
Patient found on CT imaging to have a stable ascending aortic ectasia with maximum diameter of 4 cm.  Radiology recommending noncontrast CT follow-up in 12 months.

## 2024-12-07 NOTE — ASSESSMENT & PLAN NOTE
Patient with evidence of acute rhabdomyolysis with an elevated CK level of 701.  IV fluid boluses were ordered in the emergency room.  Patient will be continued on lactated ringer at 125ml/hr. Repeat CK level in the morning.

## 2024-12-07 NOTE — ASSESSMENT & PLAN NOTE
Patient with evidence of an acute kidney injury with a creatinine level of 1.71.  Likely secondary to dehydration and poor oral intake.  Continue IV fluid hydration with lactated ringer at 125ml/hr. Repeat blood work in the morning.

## 2024-12-07 NOTE — H&P
H&P - Hospitalist   Name: Rosmery Vargas 64 y.o. female I MRN: [id number]  Unit/Bed#: ED 07 I Date of Admission: 12/7/2024   Date of Service: 12/7/2024 I Hospital Day: 0     Assessment & Plan  Toxic metabolic encephalopathy  Patient was seen and examined in the emergency department and will be admitted to the hospital for further evaluation and management.  Patient presenting with increased confusion and falls at home.  Patient is exhibiting bizarre behavior in the emergency room.  Question as to whether this is from an underlying medical issue such as infection, acute kidney injury or rhabdomyolysis or if this is from underlying psychiatric illness.  Urinalysis is pending to evaluate for urinary tract infection.  Acute kidney injury (HCC)  Patient with evidence of an acute kidney injury with a creatinine level of 1.71.  Likely secondary to dehydration and poor oral intake.  Continue IV fluid hydration with lactated ringer at 125ml/hr. Repeat blood work in the morning.  Rhabdomyolysis  Patient with evidence of acute rhabdomyolysis with an elevated CK level of 701.  IV fluid boluses were ordered in the emergency room.  Patient will be continued on lactated ringer at 125ml/hr. Repeat CK level in the morning.  Leukocytosis  Patient with a white blood cell count of 19.31.  Some of this may be reactive however there is also a concern for possible infection such as a urinary tract infection as noted above.  Falls  Patient with multiple falls over the last day.  Imaging does not reveal any acute fractures.  Physical therapy and Occupational Therapy evaluations have been ordered.  Thrombocytosis  Patient with a platelet count of 465.  Likely reactive.  Will continue to monitor.  Aortic ectasia (HCC)  Patient found on CT imaging to have a stable ascending aortic ectasia with maximum diameter of 4 cm.  Radiology recommending noncontrast CT follow-up in 12 months.  Bipolar I disorder (HCC)  Continue home medications.  As noted  above, patient with bizarre behavior on admission.  Question as to whether this is related to underlying infection such as a urinary tract infection.  Continue to closely monitor mentation.  If patient continues to exhibit bizarre behavior and confusion will need to consult psychiatry to evaluate for potential underlying psychiatric cause of her symptoms.  Hyperlipidemia  Continue statin therapy.  Tobacco abuse  Nicotine patch ordered.    VTE Pharmacologic Prophylaxis:   Heparin prophylaxis  Code Status: Level 1 - Full Code     Anticipated Length of Stay: Patient will be admitted on an inpatient basis with an anticipated length of stay of greater than 2 midnights secondary to above.    History of Present Illness   Chief Complaint: Confusion.    Rosmery Vargas is a 64 y.o. female presenting to the emergency department with increased confusion.  Patient with an underlying history of bipolar disorder.  Patient has presented to the emergency room multiple times over the last day secondary to falls.  Patient presenting again after a fall noted to be acting more confused and bizarre in the emergency room.  Patient noted to be intermittently yelling and then whispering, unable to follow all commands or answer questions appropriately.  Blood work in the emergency room shows evidence of an acute kidney injury with a creatinine of 1.71.  Patient also with a CK of 701.  White blood cell count increased to 19.31.  14 point review of system unable to be obtained at this time secondary to patient's current clinical status/confusion.    Historical Information   Past Medical History:   Diagnosis Date    Asthma     Bladder incontinence     Chronic pain     back    COPD (chronic obstructive pulmonary disease) (HCC)     GERD (gastroesophageal reflux disease)     Hyperlipidemia     Psychiatric disorder     bipolar     Past Surgical History:   Procedure Laterality Date    TUBAL LIGATION       Social History     Tobacco Use    Smoking  status: Every Day     Current packs/day: 0.50     Average packs/day: 0.5 packs/day for 0.6 years (0.3 ttl pk-yrs)     Types: Cigarettes     Start date: 05/2024    Smokeless tobacco: Never   Vaping Use    Vaping status: Never Used   Substance and Sexual Activity    Alcohol use: No    Drug use: No    Sexual activity: Not on file     E-Cigarette/Vaping    E-Cigarette Use Never User      E-Cigarette/Vaping Substances    Nicotine No     THC No     CBD No     Flavoring No     Other No     Unknown No      Family History   Problem Relation Age of Onset    No Known Problems Mother     No Known Problems Sister     No Known Problems Daughter     No Known Problems Maternal Aunt      Social History:  Marital Status:      Meds/Allergies   Prior to Admission medications    Medication Sig Start Date End Date Taking? Authorizing Provider   albuterol (2.5 mg/3 mL) 0.083 % nebulizer solution Take 2.5 mg by nebulization every 6 (six) hours as needed for wheezing or shortness of breath    Historical Provider, MD   albuterol (ProAir HFA) 90 mcg/act inhaler Inhale 2 puffs every 6 (six) hours as needed for wheezing 11/28/24   Daphnie Petty, DO   ALPRAZolam (XANAX) 0.25 mg tablet Take 0.25 mg by mouth daily as needed for anxiety    Historical Provider, MD   aspirin 81 MG tablet Take 81 mg by mouth daily.    Historical Provider, MD   atorvastatin (LIPITOR) 20 mg tablet Take 40 mg by mouth daily    Historical Provider, MD   Biotin 5 MG TABS Take 1 tablet by mouth    Historical Provider, MD   busPIRone (BUSPAR) 15 mg tablet Take 15 mg by mouth 3 (three) times a day    Historical Provider, MD   desvenlafaxine succinate (PRISTIQ) 50 mg 24 hr tablet Take 50 mg by mouth daily    Historical Provider, MD   Diclofenac Sodium (VOLTAREN) 1 % Apply 2 g topically 4 (four) times a day  Patient not taking: Reported on 10/5/2024 4/7/24   Babar Kelly, DO   Emollient (cetaphil) cream Apply topically as needed for dry skin 4/2/24 5/2/24  Era  Kacey Macario PA-C   fluticasone (FLONASE) 50 mcg/act nasal spray 1 spray into each nostril daily    Historical Provider, MD   hydrOXYzine pamoate (VISTARIL) 50 mg capsule Take 50 mg by mouth daily    Historical Provider, MD   ketorolac (TORADOL) 10 mg tablet Take 1 tablet (10 mg total) by mouth every 6 (six) hours as needed for moderate pain for up to 3 days 6/27/24 6/30/24  Bernard Scanlon PA-C   ketotifen (ZADITOR) 0.025 % ophthalmic solution 1 drop 2 (two) times a day    Historical Provider, MD   lamoTRIgine (LaMICtal) 150 MG tablet Take 200 mg by mouth daily    Historical Provider, MD   lidocaine (Lidoderm) 5 % Apply 1 patch topically over 12 hours daily Remove & Discard patch within 12 hours or as directed by MD  Patient not taking: Reported on 10/5/2024 4/7/24   Bob Resendiz MD   loratadine (CLARITIN) 10 mg tablet Take 10 mg by mouth daily    Historical Provider, MD   lurasidone (LATUDA) 40 mg tablet Take 80 mg by mouth daily with dinner   Patient not taking: Reported on 8/3/2022    Historical Provider, MD   meclizine (ANTIVERT) 25 mg tablet Take 25 mg by mouth every 8 (eight) hours as needed for dizziness  Patient not taking: Reported on 8/3/2022    Historical Provider, MD   montelukast (SINGULAIR) 10 mg tablet Take 10 mg by mouth daily at bedtime.  Patient not taking: Reported on 7/30/2024    Historical Provider, MD   Multiple Vitamin (MULTIVITAMIN) tablet Take 1 tablet by mouth daily    Historical Provider, MD   naproxen (EC NAPROSYN) 500 MG EC tablet Take 1 tablet (500 mg total) by mouth 2 (two) times a day with meals  Patient not taking: Reported on 11/30/2019 6/30/19   Ibeth Garcia,    neomycin-bacitracin-polymyxin (NEOSPORIN) 5-400-5,000 ointment Apply topically in the morning  Patient not taking: Reported on 7/30/2024 4/7/24   Babar Kelly,    ondansetron (ZOFRAN-ODT) 4 mg disintegrating tablet Take 1 tablet (4 mg total) by mouth every 8 (eight) hours as needed for nausea or  vomiting 11/27/24   Bob Resendiz MD   oxyCODONE (Roxicodone) 5 immediate release tablet Take 1 tablet (5 mg total) by mouth every 6 (six) hours as needed for moderate pain Max Daily Amount: 20 mg  Patient not taking: Reported on 7/30/2024 6/19/24   Tristian Shin DO   pantoprazole (PROTONIX) 40 mg tablet Take 40 mg by mouth daily.    Historical Provider, MD   propranolol (INDERAL) 40 mg tablet Take 10 mg by mouth daily     Historical Provider, MD   tolterodine (DETROL LA) 4 mg 24 hr capsule Take 4 mg by mouth daily.    Historical Provider, MD   traMADol (ULTRAM) 50 mg tablet 1 po tid prn pain  Patient not taking: Reported on 7/30/2024 4/15/24   April Dowling MD   traZODone (DESYREL) 50 mg tablet Take 100 mg by mouth daily at bedtime as needed   Patient not taking: Reported on 8/3/2022    Historical Provider, MD   umeclidinium bromide (INCRUSE ELLIPTA) 62.5 mcg/inh AEPB inhaler Inhale 1 puff daily    Historical Provider, MD   Umeclidinium-Vilanterol (ANORO ELLIPTA IN) Inhale 1 Units every 4 (four) hours as needed (sob)    Historical Provider, MD   venlafaxine (EFFEXOR-XR) 37.5 mg 24 hr capsule Take 75 mg by mouth daily    Patient not taking: Reported on 8/3/2022    Historical Provider, MD   ziprasidone (GEODON) 80 mg capsule Take 80 mg by mouth every evening    Historical Provider, MD     No Known Allergies    Objective :  Temp:  [98.4 °F (36.9 °C)-98.7 °F (37.1 °C)] 98.4 °F (36.9 °C)  HR:  [83-97] 86  BP: (105-148)/(53-97) 109/53  Resp:  [16-21] 20  SpO2:  [93 %-98 %] 93 %  O2 Device: None (Room air)    Physical Exam  HENT:      Head: Normocephalic.      Mouth/Throat:      Mouth: Mucous membranes are moist.   Eyes:      Extraocular Movements: Extraocular movements intact.   Cardiovascular:      Rate and Rhythm: Normal rate.   Pulmonary:      Effort: Pulmonary effort is normal. No respiratory distress.   Abdominal:      Palpations: Abdomen is soft.      Tenderness: There is no abdominal tenderness.    Skin:     General: Skin is warm.   Neurological:      Mental Status: She is alert.      Comments: Confused   Psychiatric:      Comments: Erratic behavior          Lab Results: I have reviewed the following results:  Results from last 7 days   Lab Units 12/07/24  1229   WBC Thousand/uL 19.31*   HEMOGLOBIN g/dL 11.9   HEMATOCRIT % 35.7   PLATELETS Thousands/uL 465*   SEGS PCT % 71   LYMPHO PCT % 16   MONO PCT % 11   EOS PCT % 1     Results from last 7 days   Lab Units 12/07/24  1229   SODIUM mmol/L 135   POTASSIUM mmol/L 3.7   CHLORIDE mmol/L 98   CO2 mmol/L 24   BUN mg/dL 34*   CREATININE mg/dL 1.71*   ANION GAP mmol/L 13   CALCIUM mg/dL 9.6   ALBUMIN g/dL 4.2   TOTAL BILIRUBIN mg/dL 0.82   ALK PHOS U/L 105*   ALT U/L 32   AST U/L 42*   GLUCOSE RANDOM mg/dL 100     Results from last 7 days   Lab Units 12/07/24  1229   INR  1.02     Administrative Statements   I have spent a total time of 75 minutes in caring for this patient on the day of the visit/encounter including Diagnostic results, Prognosis, Risks and benefits of tx options, Instructions for management, Patient and family education, Importance of tx compliance, Risk factor reductions, Impressions, Counseling / Coordination of care, Documenting in the medical record, Reviewing / ordering tests, medicine, procedures  , Obtaining or reviewing history  , and Communicating with other healthcare professionals.    ** Please Note: This note has been constructed using a voice recognition system. **

## 2024-12-07 NOTE — ED PROVIDER NOTES
"Time reflects when diagnosis was documented in both MDM as applicable and the Disposition within this note       Time User Action Codes Description Comment    12/7/2024  4:11 PM Alisa Waller Add [N17.9] SHERON (acute kidney injury) (HCC)     12/7/2024  4:11 PM Alisa Waller Add [M62.82] Rhabdomyolysis     12/7/2024  4:11 PM Alisa Waller Add [R29.6] Multiple falls     12/7/2024  4:12 PM Alisa Waller Add [D72.829] Leukocytosis     12/7/2024  4:12 PM Alisa Waller Add [N76.0,  B96.89] Bacterial vaginosis           ED Disposition       ED Disposition   Admit    Condition   Stable    Date/Time   Sat Dec 7, 2024  4:11 PM    Comment   Case was discussed with Dr. Lara and the patient's admission status was agreed to be Admission Status: inpatient status to the service of Dr. Lara .               Assessment & Plan       Medical Decision Making  Patient has been seen for multiple falls, was was here yesterday with a fall, last evening as well, and EMS reports another fall today.  There is no signs of injuries. Patient randomly exhibiting odd behavior, waving intermittently and then yelling at times however is not aggressive, initially states that she accidentally called EMS however then cannot recall why she wanted to call EMS in the first place.  She is exhibiting some odd behavior and refusing to answer questions or follow directions at times, and then when you leave the room she is rambling to herself. Given inadequate history will perform broad workup. Elevated white count at 19,000 with an SHERON with an elevated creatinine of 1.71 and GFR of 31, as well as elevated CK at 701 suggesting some rhabdo.  CT imaging is generally unremarkable.  Pending urine from today.  Urinalysis from 12/3 showed bacterial vaginosis on urine culture.     I called patient's \"Significant other\" but he relays there is a restraining order against him from the patient and should not be talking with him.  "     Amount and/or Complexity of Data Reviewed  Labs: ordered.  Radiology: ordered.    Risk  Decision regarding hospitalization.        ED Course as of 12/07/24 1634   Sat Dec 07, 2024   1239 Calling patient's life partner Jeremy. Jeremy says patient has restraining order against him, and this would not be the first time the ED is calling him. Will remove patient from contact list.    1312 Called lab to add on CK and mag    1450 Attempting to obtain urine from patient    1559 Sent message to [facility]. Waiting on urine sample   1605 Bladder scan and straight cath ordered.    1609 Bladder scan 124cc, giving another liter NS       Medications   sodium chloride 0.9 % bolus 1,000 mL (1,000 mL Intravenous New Bag 12/7/24 1604)   albuterol inhalation solution 2.5 mg (has no administration in time range)   ALPRAZolam (XANAX) tablet 0.25 mg (has no administration in time range)   aspirin chewable tablet 81 mg (has no administration in time range)   atorvastatin (LIPITOR) tablet 40 mg (has no administration in time range)   busPIRone (BUSPAR) tablet 15 mg (has no administration in time range)   desvenlafaxine succinate (PRISTIQ) 24 hr tablet 50 mg (has no administration in time range)   fluticasone (FLONASE) 50 mcg/act nasal spray 1 spray (has no administration in time range)   hydrOXYzine HCL (ATARAX) tablet 50 mg (has no administration in time range)   Ketotifen Fumarate (ZADITOR) 0.035 % ophthalmic solution 1 drop (has no administration in time range)   lamoTRIgine (LaMICtal) tablet 200 mg (has no administration in time range)   loratadine (CLARITIN) tablet 10 mg (has no administration in time range)   multivitamin stress formula tablet 1 tablet (has no administration in time range)   ondansetron (ZOFRAN) injection 4 mg (has no administration in time range)   pantoprazole (PROTONIX) EC tablet 40 mg (has no administration in time range)   propranolol (INDERAL) tablet 10 mg (has no administration in time range)   oxybutynin  (DITROPAN-XL) 24 hr tablet 10 mg (has no administration in time range)   umeclidinium 62.5 mcg/actuation inhaler AEPB 1 puff (has no administration in time range)   ziprasidone (GEODON) capsule 80 mg (has no administration in time range)   lactated ringers infusion (has no administration in time range)   nicotine (NICODERM CQ) 14 mg/24hr TD 24 hr patch 1 patch (has no administration in time range)   acetaminophen (TYLENOL) tablet 650 mg (has no administration in time range)   docusate sodium (COLACE) capsule 100 mg (has no administration in time range)   polyethylene glycol (MIRALAX) packet 17 g (has no administration in time range)   heparin (porcine) subcutaneous injection 5,000 Units (has no administration in time range)   sodium chloride 0.9 % bolus 1,000 mL (0 mL Intravenous Stopped 12/7/24 1406)       ED Risk Strat Scores                           SBIRT 20yo+      Flowsheet Row Most Recent Value   Initial Alcohol Screen: US AUDIT-C     1. How often do you have a drink containing alcohol? 0 Filed at: 12/07/2024 1156   2. How many drinks containing alcohol do you have on a typical day you are drinking?  0 Filed at: 12/07/2024 1155   3a. Male UNDER 65: How often do you have five or more drinks on one occasion? 0 Filed at: 12/07/2024 1158   3b. FEMALE Any Age, or MALE 65+: How often do you have 4 or more drinks on one occassion? 0 Filed at: 12/07/2024 1157   Audit-C Score 0 Filed at: 12/07/2024 1159   JOHN: How many times in the past year have you...    Used an illegal drug or used a prescription medication for non-medical reasons? Never Filed at: 12/07/2024 1156                            History of Present Illness       Chief Complaint   Patient presents with    Fall     Pt presents with EMS for fall, unwitnessed.         Past Medical History:   Diagnosis Date    Asthma     Bladder incontinence     Chronic pain     back    COPD (chronic obstructive pulmonary disease) (HCC)     GERD (gastroesophageal reflux  disease)     Hyperlipidemia     Psychiatric disorder     bipolar      Past Surgical History:   Procedure Laterality Date    TUBAL LIGATION        Family History   Problem Relation Age of Onset    No Known Problems Mother     No Known Problems Sister     No Known Problems Daughter     No Known Problems Maternal Aunt       Social History     Tobacco Use    Smoking status: Every Day     Current packs/day: 0.50     Average packs/day: 0.5 packs/day for 0.6 years (0.3 ttl pk-yrs)     Types: Cigarettes     Start date: 05/2024    Smokeless tobacco: Never   Vaping Use    Vaping status: Never Used   Substance Use Topics    Alcohol use: No    Drug use: No      E-Cigarette/Vaping    E-Cigarette Use Never User       E-Cigarette/Vaping Substances    Nicotine No     THC No     CBD No     Flavoring No     Other No     Unknown No       I have reviewed and agree with the history as documented.     65 y/o female presenting today via EMS for a fall.  Patient has been seen here multiple times for similar complaints.  Patient states that calling EMS was a mistake however cannot tell me why she called EMS.  Patient is largely disheveled and cannot give an adequate history. She is tangential and has h/o bipolar.  Difficult to assess if this is patient's baseline. No signs of falls/trauma on exam.  Patient was seen here last evening and yesterday with a total of 11 visits in a 5 week period.        Review of Systems   Unable to perform ROS: Mental status change (unreliable historian)   Constitutional: Negative.  Negative for chills, fatigue and fever.   HENT: Negative.  Negative for congestion, postnasal drip, rhinorrhea and sore throat.    Eyes: Negative.    Respiratory: Negative.  Negative for cough, shortness of breath and wheezing.    Cardiovascular: Negative.    Gastrointestinal: Negative.  Negative for abdominal pain, diarrhea, nausea and vomiting.   Endocrine: Negative.    Genitourinary: Negative.    Musculoskeletal: Negative.     Skin: Negative.    Neurological: Negative.    Hematological: Negative.    Psychiatric/Behavioral: Negative.     All other systems reviewed and are negative.          Objective       ED Triage Vitals   Temperature Pulse Blood Pressure Respirations SpO2 Patient Position - Orthostatic VS   12/07/24 1156 12/07/24 1156 12/07/24 1156 12/07/24 1156 12/07/24 1156 12/07/24 1215   98.7 °F (37.1 °C) 88 125/88 18 98 % Lying      Temp Source Heart Rate Source BP Location FiO2 (%) Pain Score    12/07/24 1156 12/07/24 1215 12/07/24 1215 -- 12/07/24 1156    Oral Monitor Right arm  No Pain      Vitals      Date and Time Temp Pulse SpO2 Resp BP Pain Score FACES Pain Rating User   12/07/24 1600 98.4 °F (36.9 °C) 86 93 % 20 109/53 -- -- AG   12/07/24 1530 -- 97 96 % 20 143/81 -- -- AG   12/07/24 1500 -- 84 95 % 21 143/72 -- -- AG   12/07/24 1449 -- 83 96 % 20 147/77 -- -- AG   12/07/24 1400 -- 86 96 % 20 138/79 -- -- AG   12/07/24 1300 -- 95 95 % 17 148/97 -- -- AG   12/07/24 1215 -- 83 97 % 18 105/73 -- -- AG   12/07/24 1156 98.7 °F (37.1 °C) 88 98 % 18 125/88 No Pain -- DQ            Physical Exam  Vitals and nursing note reviewed.   Constitutional:       General: She is not in acute distress.     Appearance: Normal appearance. She is well-developed and normal weight. She is not diaphoretic.   HENT:      Head: Normocephalic and atraumatic.      Right Ear: External ear normal.      Left Ear: External ear normal.      Nose: Nose normal.      Mouth/Throat:      Pharynx: No oropharyngeal exudate.   Eyes:      General: No scleral icterus.        Right eye: No discharge.         Left eye: No discharge.      Conjunctiva/sclera: Conjunctivae normal.      Pupils: Pupils are equal, round, and reactive to light.   Cardiovascular:      Rate and Rhythm: Normal rate and regular rhythm.      Pulses: Normal pulses.      Heart sounds: Normal heart sounds. No murmur heard.     No friction rub. No gallop.   Pulmonary:      Effort: Pulmonary effort is  normal. No respiratory distress.      Breath sounds: Normal breath sounds. No stridor. No wheezing, rhonchi or rales.   Chest:      Chest wall: No tenderness.   Abdominal:      General: Abdomen is flat. Bowel sounds are normal. There is no distension.      Palpations: Abdomen is soft. There is no mass.      Tenderness: There is no abdominal tenderness. There is no right CVA tenderness, left CVA tenderness, guarding or rebound.      Hernia: No hernia is present.   Musculoskeletal:      Cervical back: Normal range of motion and neck supple.   Lymphadenopathy:      Cervical: No cervical adenopathy.   Skin:     General: Skin is warm and dry.      Capillary Refill: Capillary refill takes less than 2 seconds.      Coloration: Skin is not pale.      Findings: No erythema or rash.   Neurological:      General: No focal deficit present.      Mental Status: She is alert and oriented to person, place, and time. Mental status is at baseline.         Results Reviewed       Procedure Component Value Units Date/Time    HS Troponin I 4hr [[id number]] Collected: 12/07/24 1620    Lab Status: In process Specimen: Blood from Hand, Left Updated: 12/07/24 1627    HS Troponin I 2hr [[id number]]  (Normal) Collected: 12/07/24 1423    Lab Status: Final result Specimen: Blood from Hand, Left Updated: 12/07/24 1454     hs TnI 2hr 11 ng/L      Delta 2hr hsTnI -1 ng/L     Magnesium [[id number]]  (Normal) Collected: 12/07/24 1229    Lab Status: Final result Specimen: Blood from Arm, Left Updated: 12/07/24 1341     Magnesium 2.2 mg/dL     CK [[id number]]  (Abnormal) Collected: 12/07/24 1229    Lab Status: Final result Specimen: Blood from Arm, Left Updated: 12/07/24 1341     Total  U/L     Salicylate level [[id number]]  (Normal) Collected: 12/07/24 1229    Lab Status: Final result Specimen: Blood from Arm, Left Updated: 12/07/24 1330     Salicylate Lvl <5 mg/dL     Acetaminophen level-If concentration is detectable, please discuss with medical   on call. [[id number]]  (Abnormal) Collected: 12/07/24 1229    Lab Status: Final result Specimen: Blood from Arm, Left Updated: 12/07/24 1329     Acetaminophen Level 3 ug/mL     HS Troponin 0hr (reflex protocol) [[id number]]  (Normal) Collected: 12/07/24 1229    Lab Status: Final result Specimen: Blood from Arm, Left Updated: 12/07/24 1312     hs TnI 0hr 12 ng/L     Comprehensive metabolic panel [[id number]]  (Abnormal) Collected: 12/07/24 1229    Lab Status: Final result Specimen: Blood from Arm, Left Updated: 12/07/24 1305     Sodium 135 mmol/L      Potassium 3.7 mmol/L      Chloride 98 mmol/L      CO2 24 mmol/L      ANION GAP 13 mmol/L      BUN 34 mg/dL      Creatinine 1.71 mg/dL      Glucose 100 mg/dL      Calcium 9.6 mg/dL      AST 42 U/L      ALT 32 U/L      Alkaline Phosphatase 105 U/L      Total Protein 7.1 g/dL      Albumin 4.2 g/dL      Total Bilirubin 0.82 mg/dL      eGFR 31 ml/min/1.73sq m     Narrative:      [employer] guidelines for Chronic Kidney Disease (CKD):     Stage 1 with normal or high GFR (GFR > 90 mL/min/1.73 square meters)    Stage 2 Mild CKD (GFR = 60-89 mL/min/1.73 square meters)    Stage 3A Moderate CKD (GFR = 45-59 mL/min/1.73 square meters)    Stage 3B Moderate CKD (GFR = 30-44 mL/min/1.73 square meters)    Stage 4 Severe CKD (GFR = 15-29 mL/min/1.73 square meters)    Stage 5 End Stage CKD (GFR <15 mL/min/1.73 square meters)  Note: GFR calculation is accurate only with a steady state creatinine    Ethanol [[id number]]  (Normal) Collected: 12/07/24 1229    Lab Status: Final result Specimen: Blood from Arm, Left Updated: 12/07/24 1305     Ethanol Lvl <10 mg/dL     Protime-INR [[id number]]  (Normal) Collected: 12/07/24 1229    Lab Status: Final result Specimen: Blood from Arm, Left Updated: 12/07/24 1258     Protime 13.9 seconds      INR 1.02    Narrative:      INR Therapeutic Range    Indication                                             INR  Range      Atrial Fibrillation                                               2.0-3.0  Hypercoagulable State                                    2.0.2.3  Left Ventricular Asist Device                            2.0-3.0  Mechanical Heart Valve                                  -    Aortic(with afib, MI, embolism, HF, LA enlargement,    and/or coagulopathy)                                     2.0-3.0 (2.5-3.5)     Mitral                                                             2.5-3.5  Prosthetic/Bioprosthetic Heart Valve               2.0-3.0  Venous thromboembolism (VTE: VT, PE        2.0-3.0    APTT [[id number]]  (Normal) Collected: 12/07/24 1229    Lab Status: Final result Specimen: Blood from Arm, Left Updated: 12/07/24 1258     PTT 26 seconds     CBC and differential [[id number]]  (Abnormal) Collected: 12/07/24 1229    Lab Status: Final result Specimen: Blood from Arm, Left Updated: 12/07/24 1246     WBC 19.31 Thousand/uL      RBC 4.07 Million/uL      Hemoglobin 11.9 g/dL      Hematocrit 35.7 %      MCV 88 fL      MCH 29.2 pg      MCHC 33.3 g/dL      RDW 13.6 %      MPV 10.0 fL      Platelets 465 Thousands/uL      nRBC 0 /100 WBCs      Segmented % 71 %      Immature Grans % 1 %      Lymphocytes % 16 %      Monocytes % 11 %      Eosinophils Relative 1 %      Basophils Relative 0 %      Absolute Neutrophils 13.93 Thousands/µL      Absolute Immature Grans 0.11 Thousand/uL      Absolute Lymphocytes 2.99 Thousands/µL      Absolute Monocytes 2.09 Thousand/µL      Eosinophils Absolute 0.13 Thousand/µL      Basophils Absolute 0.06 Thousands/µL     UA w Reflex to Microscopic w Reflex to Culture [[id number]]     Lab Status: No result Specimen: Urine     Rapid drug screen, urine [[id number]]     Lab Status: No result Specimen: Urine             CT chest abdomen pelvis wo contrast   Final Interpretation by Ger Walker MD (12/07 1505)      CT chest:      No evidence of acute intrathoracic process.      No  definitive acute fracture allowing for motion artifact. If there is a high degree of clinical concern for fracture, repeat CT when patient is able to remain still may be considered.      Stable ascending aortic ectasia with maximum diameter of 4 cm. Noncontrast chest CT follow-up in 12 months is the recommendation.      Additional chronic findings and negatives as above.      CT abdomen and pelvis:      No evidence of acute abdominopelvic process.      No acute fracture.      Colonic diverticulosis.      Additional chronic findings and negatives as above.      This study demonstrates a finding requiring imaging follow-up and was documented as such in Epic.            Workstation performed: [id number]         CT head without contrast   Final Interpretation by Darrell Jovel DO (12/07 1309)      No acute intracranial abnormality.                  Workstation performed: [id number]             ECG 12 Lead Documentation Only    Date/Time: 12/7/2024 12:43 PM    Performed by: Alisa Waller PA-C  Authorized by: Alisa Waller PA-C    Indications / Diagnosis:  Ams  ECG reviewed by me, the ED Provider: yes    Patient location:  ED  Interpretation:     Interpretation: normal    Rate:     ECG rate:  84    ECG rate assessment: normal    Rhythm:     Rhythm: sinus rhythm    Ectopy:     Ectopy: PVCs    QRS:     QRS axis:  Normal    QRS intervals:  Normal  Conduction:     Conduction: normal    ST segments:     ST segments:  Normal  T waves:     T waves: normal        ED Medication and Procedure Management   Prior to Admission Medications   Prescriptions Last Dose Informant Patient Reported? Taking?   ALPRAZolam (XANAX) 0.25 mg tablet   Yes No   Sig: Take 0.25 mg by mouth daily as needed for anxiety   Biotin 5 MG TABS   Yes No   Sig: Take 1 tablet by mouth   Diclofenac Sodium (VOLTAREN) 1 %   No No   Sig: Apply 2 g topically 4 (four) times a day   Patient not taking: Reported on 10/5/2024   Emollient (cetaphil) cream    No No   Sig: Apply topically as needed for dry skin   Multiple Vitamin (MULTIVITAMIN) tablet   Yes No   Sig: Take 1 tablet by mouth daily   Umeclidinium-Vilanterol (ANORO ELLIPTA IN)   Yes No   Sig: Inhale 1 Units every 4 (four) hours as needed (sob)   albuterol (2.5 mg/3 mL) 0.083 % nebulizer solution  Other (Specify) Yes No   Sig: Take 2.5 mg by nebulization every 6 (six) hours as needed for wheezing or shortness of breath   albuterol (ProAir HFA) 90 mcg/act inhaler   No No   Sig: Inhale 2 puffs every 6 (six) hours as needed for wheezing   aspirin 81 MG tablet   Yes No   Sig: Take 81 mg by mouth daily.   atorvastatin (LIPITOR) 20 mg tablet  Self Yes No   Sig: Take 40 mg by mouth daily   busPIRone (BUSPAR) 15 mg tablet  Other (Specify) Yes No   Sig: Take 15 mg by mouth 3 (three) times a day   desvenlafaxine succinate (PRISTIQ) 50 mg 24 hr tablet   Yes No   Sig: Take 50 mg by mouth daily   fluticasone (FLONASE) 50 mcg/act nasal spray   Yes No   Si spray into each nostril daily   hydrOXYzine pamoate (VISTARIL) 50 mg capsule   Yes No   Sig: Take 50 mg by mouth daily   ketorolac (TORADOL) 10 mg tablet   No No   Sig: Take 1 tablet (10 mg total) by mouth every 6 (six) hours as needed for moderate pain for up to 3 days   ketotifen (ZADITOR) 0.025 % ophthalmic solution   Yes No   Si drop 2 (two) times a day   lamoTRIgine (LaMICtal) 150 MG tablet  Care Giver Yes No   Sig: Take 200 mg by mouth daily   lidocaine (Lidoderm) 5 %   No No   Sig: Apply 1 patch topically over 12 hours daily Remove & Discard patch within 12 hours or as directed by MD   Patient not taking: Reported on 10/5/2024   loratadine (CLARITIN) 10 mg tablet   Yes No   Sig: Take 10 mg by mouth daily   lurasidone (LATUDA) 40 mg tablet   Yes No   Sig: Take 80 mg by mouth daily with dinner    Patient not taking: Reported on 8/3/2022   meclizine (ANTIVERT) 25 mg tablet   Yes No   Sig: Take 25 mg by mouth every 8 (eight) hours as needed for dizziness    Patient not taking: Reported on 8/3/2022   montelukast (SINGULAIR) 10 mg tablet   Yes No   Sig: Take 10 mg by mouth daily at bedtime.   Patient not taking: Reported on 2024   naproxen (EC NAPROSYN) 500 MG EC tablet   No No   Sig: Take 1 tablet (500 mg total) by mouth 2 (two) times a day with meals   Patient not taking: Reported on 2019   neomycin-bacitracin-polymyxin (NEOSPORIN) 5-400-5,000 ointment   No No   Sig: Apply topically in the morning   Patient not taking: Reported on 2024   ondansetron (ZOFRAN-ODT) 4 mg disintegrating tablet   No No   Sig: Take 1 tablet (4 mg total) by mouth every 8 (eight) hours as needed for nausea or vomiting   oxyCODONE (Roxicodone) 5 immediate release tablet   No No   Sig: Take 1 tablet (5 mg total) by mouth every 6 (six) hours as needed for moderate pain Max Daily Amount: 20 mg   Patient not taking: Reported on 2024   pantoprazole (PROTONIX) 40 mg tablet   Yes No   Sig: Take 40 mg by mouth daily.   propranolol (INDERAL) 40 mg tablet   Yes No   Sig: Take 10 mg by mouth daily    tolterodine (DETROL LA) 4 mg 24 hr capsule   Yes No   Sig: Take 4 mg by mouth daily.   traMADol (ULTRAM) 50 mg tablet   No No   Si po tid prn pain   Patient not taking: Reported on 2024   traZODone (DESYREL) 50 mg tablet   Yes No   Sig: Take 100 mg by mouth daily at bedtime as needed    Patient not taking: Reported on 8/3/2022   umeclidinium bromide (INCRUSE ELLIPTA) 62.5 mcg/inh AEPB inhaler  Care Giver Yes No   Sig: Inhale 1 puff daily   venlafaxine (EFFEXOR-XR) 37.5 mg 24 hr capsule   Yes No   Sig: Take 75 mg by mouth daily     Patient not taking: Reported on 8/3/2022   ziprasidone (GEODON) 80 mg capsule   Yes No   Sig: Take 80 mg by mouth every evening      Facility-Administered Medications: None     Patient's Medications   Discharge Prescriptions    No medications on file     No discharge procedures on file.  ED SEPSIS DOCUMENTATION   Time reflects when diagnosis was  documented in both MDM as applicable and the Disposition within this note       Time User Action Codes Description Comment    12/7/2024  4:11 PM Alisa Waller [N17.9] SHERON (acute kidney injury) (HCC)     12/7/2024  4:11 PM Alisa Waller Add [M62.82] Rhabdomyolysis     12/7/2024  4:11 PM Alisa Waller Add [R29.6] Multiple falls     12/7/2024  4:12 PM Alisa Waller [D72.829] Leukocytosis     12/7/2024  4:12 PM Alisa Waller Add [N76.0,  B96.89] Bacterial vaginosis                  Alisa Waller PA-C  12/07/24 9416

## 2024-12-07 NOTE — ASSESSMENT & PLAN NOTE
Patient was seen and examined in the emergency department and will be admitted to the hospital for further evaluation and management.  Patient presenting with increased confusion and falls at home.  Patient is exhibiting bizarre behavior in the emergency room.  Question as to whether this is from an underlying medical issue such as infection, acute kidney injury or rhabdomyolysis or if this is from underlying psychiatric illness.  Urinalysis is pending to evaluate for urinary tract infection.

## 2024-12-07 NOTE — ASSESSMENT & PLAN NOTE
Patient with a white blood cell count of 19.31.  Some of this may be reactive however there is also a concern for possible infection such as a urinary tract infection as noted above.

## 2024-12-07 NOTE — PROGRESS NOTES
Urinalysis with trace leukocytes, negative nitrites and 0-1 WBC.  Urinalysis not indicative of an acute urinary tract infection. Will ask psychiatry to evaluate given concern for underlying psychiatric illness.

## 2024-12-08 LAB
ALBUMIN SERPL BCG-MCNC: 3.5 G/DL (ref 3.5–5)
ALP SERPL-CCNC: 86 U/L (ref 34–104)
ALT SERPL W P-5'-P-CCNC: 25 U/L (ref 7–52)
ANION GAP SERPL CALCULATED.3IONS-SCNC: 10 MMOL/L (ref 4–13)
AST SERPL W P-5'-P-CCNC: 29 U/L (ref 13–39)
ATRIAL RATE: 84 BPM
BASOPHILS # BLD AUTO: 0.04 THOUSANDS/ÂΜL (ref 0–0.1)
BASOPHILS NFR BLD AUTO: 0 % (ref 0–1)
BILIRUB SERPL-MCNC: 0.78 MG/DL (ref 0.2–1)
BUN SERPL-MCNC: 23 MG/DL (ref 5–25)
CALCIUM SERPL-MCNC: 8.5 MG/DL (ref 8.4–10.2)
CHLORIDE SERPL-SCNC: 104 MMOL/L (ref 96–108)
CK SERPL-CCNC: 424 U/L (ref 26–192)
CO2 SERPL-SCNC: 24 MMOL/L (ref 21–32)
CREAT SERPL-MCNC: 0.71 MG/DL (ref 0.6–1.3)
EOSINOPHIL # BLD AUTO: 0.07 THOUSAND/ÂΜL (ref 0–0.61)
EOSINOPHIL NFR BLD AUTO: 1 % (ref 0–6)
ERYTHROCYTE [DISTWIDTH] IN BLOOD BY AUTOMATED COUNT: 14 % (ref 11.6–15.1)
GFR SERPL CREATININE-BSD FRML MDRD: 90 ML/MIN/1.73SQ M
GLUCOSE SERPL-MCNC: 83 MG/DL (ref 65–140)
HCT VFR BLD AUTO: 29.5 % (ref 34.8–46.1)
HGB BLD-MCNC: 9.6 G/DL (ref 11.5–15.4)
IMM GRANULOCYTES # BLD AUTO: 0.04 THOUSAND/UL (ref 0–0.2)
IMM GRANULOCYTES NFR BLD AUTO: 0 % (ref 0–2)
LYMPHOCYTES # BLD AUTO: 3.04 THOUSANDS/ÂΜL (ref 0.6–4.47)
LYMPHOCYTES NFR BLD AUTO: 27 % (ref 14–44)
MAGNESIUM SERPL-MCNC: 1.8 MG/DL (ref 1.9–2.7)
MCH RBC QN AUTO: 29.2 PG (ref 26.8–34.3)
MCHC RBC AUTO-ENTMCNC: 32.5 G/DL (ref 31.4–37.4)
MCV RBC AUTO: 90 FL (ref 82–98)
MONOCYTES # BLD AUTO: 1.43 THOUSAND/ÂΜL (ref 0.17–1.22)
MONOCYTES NFR BLD AUTO: 13 % (ref 4–12)
NEUTROPHILS # BLD AUTO: 6.53 THOUSANDS/ÂΜL (ref 1.85–7.62)
NEUTS SEG NFR BLD AUTO: 59 % (ref 43–75)
NRBC BLD AUTO-RTO: 0 /100 WBCS
P AXIS: 58 DEGREES
PHOSPHATE SERPL-MCNC: 2.4 MG/DL (ref 2.3–4.1)
PLATELET # BLD AUTO: 398 THOUSANDS/UL (ref 149–390)
PMV BLD AUTO: 10 FL (ref 8.9–12.7)
POTASSIUM SERPL-SCNC: 3.8 MMOL/L (ref 3.5–5.3)
PR INTERVAL: 112 MS
PROT SERPL-MCNC: 5.6 G/DL (ref 6.4–8.4)
QRS AXIS: 7 DEGREES
QRSD INTERVAL: 102 MS
QT INTERVAL: 410 MS
QTC INTERVAL: 484 MS
RBC # BLD AUTO: 3.29 MILLION/UL (ref 3.81–5.12)
SODIUM SERPL-SCNC: 138 MMOL/L (ref 135–147)
T WAVE AXIS: 80 DEGREES
VENTRICULAR RATE: 84 BPM
WBC # BLD AUTO: 11.15 THOUSAND/UL (ref 4.31–10.16)

## 2024-12-08 PROCEDURE — 93010 ELECTROCARDIOGRAM REPORT: CPT | Performed by: INTERNAL MEDICINE

## 2024-12-08 PROCEDURE — 99254 IP/OBS CNSLTJ NEW/EST MOD 60: CPT | Performed by: GENERAL PRACTICE

## 2024-12-08 PROCEDURE — 82550 ASSAY OF CK (CPK): CPT | Performed by: FAMILY MEDICINE

## 2024-12-08 PROCEDURE — 83735 ASSAY OF MAGNESIUM: CPT | Performed by: FAMILY MEDICINE

## 2024-12-08 PROCEDURE — 85025 COMPLETE CBC W/AUTO DIFF WBC: CPT | Performed by: FAMILY MEDICINE

## 2024-12-08 PROCEDURE — 80053 COMPREHEN METABOLIC PANEL: CPT | Performed by: FAMILY MEDICINE

## 2024-12-08 PROCEDURE — 84100 ASSAY OF PHOSPHORUS: CPT | Performed by: FAMILY MEDICINE

## 2024-12-08 PROCEDURE — 99232 SBSQ HOSP IP/OBS MODERATE 35: CPT | Performed by: STUDENT IN AN ORGANIZED HEALTH CARE EDUCATION/TRAINING PROGRAM

## 2024-12-08 RX ORDER — CEFTRIAXONE 1 G/50ML
1000 INJECTION, SOLUTION INTRAVENOUS EVERY 24 HOURS
Status: DISCONTINUED | OUTPATIENT
Start: 2024-12-08 | End: 2024-12-11

## 2024-12-08 RX ORDER — LORAZEPAM 2 MG/ML
0.5 INJECTION INTRAMUSCULAR EVERY 6 HOURS PRN
Status: DISCONTINUED | OUTPATIENT
Start: 2024-12-08 | End: 2024-12-10

## 2024-12-08 RX ORDER — SODIUM CHLORIDE, SODIUM GLUCONATE, SODIUM ACETATE, POTASSIUM CHLORIDE, MAGNESIUM CHLORIDE, SODIUM PHOSPHATE, DIBASIC, AND POTASSIUM PHOSPHATE .53; .5; .37; .037; .03; .012; .00082 G/100ML; G/100ML; G/100ML; G/100ML; G/100ML; G/100ML; G/100ML
50 INJECTION, SOLUTION INTRAVENOUS CONTINUOUS
Status: DISCONTINUED | OUTPATIENT
Start: 2024-12-08 | End: 2024-12-09

## 2024-12-08 RX ADMIN — B-COMPLEX W/ C & FOLIC ACID TAB 1 TABLET: TAB at 09:42

## 2024-12-08 RX ADMIN — KETOTIFEN FUMARATE 1 DROP: 0.25 SOLUTION/ DROPS OPHTHALMIC at 18:51

## 2024-12-08 RX ADMIN — ZIPRASIDONE HYDROCHLORIDE 80 MG: 20 CAPSULE ORAL at 18:49

## 2024-12-08 RX ADMIN — SODIUM CHLORIDE, SODIUM LACTATE, POTASSIUM CHLORIDE, AND CALCIUM CHLORIDE 125 ML/HR: .6; .31; .03; .02 INJECTION, SOLUTION INTRAVENOUS at 04:42

## 2024-12-08 RX ADMIN — UMECLIDINIUM 1 PUFF: 62.5 AEROSOL, POWDER ORAL at 09:50

## 2024-12-08 RX ADMIN — DOCUSATE SODIUM 100 MG: 100 CAPSULE, LIQUID FILLED ORAL at 18:49

## 2024-12-08 RX ADMIN — SODIUM CHLORIDE, SODIUM GLUCONATE, SODIUM ACETATE, POTASSIUM CHLORIDE, MAGNESIUM CHLORIDE, SODIUM PHOSPHATE, DIBASIC, AND POTASSIUM PHOSPHATE 100 ML/HR: .53; .5; .37; .037; .03; .012; .00082 INJECTION, SOLUTION INTRAVENOUS at 10:35

## 2024-12-08 RX ADMIN — FLUTICASONE PROPIONATE 1 SPRAY: 50 SPRAY, METERED NASAL at 09:49

## 2024-12-08 RX ADMIN — HYDROXYZINE HYDROCHLORIDE 50 MG: 25 TABLET ORAL at 09:41

## 2024-12-08 RX ADMIN — LAMOTRIGINE 200 MG: 100 TABLET ORAL at 09:41

## 2024-12-08 RX ADMIN — ALPRAZOLAM 0.25 MG: 0.25 TABLET ORAL at 02:27

## 2024-12-08 RX ADMIN — PANTOPRAZOLE SODIUM 40 MG: 40 TABLET, DELAYED RELEASE ORAL at 09:41

## 2024-12-08 RX ADMIN — BUSPIRONE HYDROCHLORIDE 15 MG: 5 TABLET ORAL at 18:49

## 2024-12-08 RX ADMIN — ASPIRIN 81 MG CHEWABLE TABLET 81 MG: 81 TABLET CHEWABLE at 09:41

## 2024-12-08 RX ADMIN — HEPARIN SODIUM 5000 UNITS: 5000 INJECTION, SOLUTION INTRAVENOUS; SUBCUTANEOUS at 09:41

## 2024-12-08 RX ADMIN — NICOTINE 1 PATCH: 14 PATCH, EXTENDED RELEASE TRANSDERMAL at 09:40

## 2024-12-08 RX ADMIN — DESVENLAFAXINE 50 MG: 50 TABLET, FILM COATED, EXTENDED RELEASE ORAL at 09:49

## 2024-12-08 RX ADMIN — LORAZEPAM 0.5 MG: 2 INJECTION INTRAMUSCULAR; INTRAVENOUS at 13:02

## 2024-12-08 RX ADMIN — KETOTIFEN FUMARATE 1 DROP: 0.25 SOLUTION/ DROPS OPHTHALMIC at 09:49

## 2024-12-08 RX ADMIN — BUSPIRONE HYDROCHLORIDE 15 MG: 5 TABLET ORAL at 09:41

## 2024-12-08 RX ADMIN — ATORVASTATIN CALCIUM 40 MG: 40 TABLET, FILM COATED ORAL at 09:40

## 2024-12-08 RX ADMIN — CEFTRIAXONE 1000 MG: 1 INJECTION, SOLUTION INTRAVENOUS at 09:35

## 2024-12-08 RX ADMIN — DOCUSATE SODIUM 100 MG: 100 CAPSULE, LIQUID FILLED ORAL at 09:40

## 2024-12-08 RX ADMIN — SODIUM CHLORIDE, SODIUM GLUCONATE, SODIUM ACETATE, POTASSIUM CHLORIDE, MAGNESIUM CHLORIDE, SODIUM PHOSPHATE, DIBASIC, AND POTASSIUM PHOSPHATE 100 ML/HR: .53; .5; .37; .037; .03; .012; .00082 INJECTION, SOLUTION INTRAVENOUS at 21:29

## 2024-12-08 RX ADMIN — PROPRANOLOL HYDROCHLORIDE 10 MG: 20 TABLET ORAL at 09:41

## 2024-12-08 RX ADMIN — LORATADINE 10 MG: 10 TABLET ORAL at 09:42

## 2024-12-08 RX ADMIN — OXYBUTYNIN 10 MG: 5 TABLET, FILM COATED, EXTENDED RELEASE ORAL at 09:40

## 2024-12-08 NOTE — PLAN OF CARE
Problem: RESPIRATORY - ADULT  Goal: Achieves optimal ventilation and oxygenation  Description: INTERVENTIONS:  - Assess for changes in respiratory status  - Assess for changes in mentation and behavior  - Position to facilitate oxygenation and minimize respiratory effort  - Oxygen administered by appropriate delivery if ordered  - Initiate smoking cessation education as indicated  - Encourage broncho-pulmonary hygiene including cough, deep breathe, Incentive Spirometry  - Assess the need for suctioning and aspirate as needed  - Assess and instruct to report SOB or any respiratory difficulty  - Respiratory Therapy support as indicated  Outcome: Progressing     Problem: PAIN - ADULT  Goal: Verbalizes/displays adequate comfort level or baseline comfort level  Description: Interventions:  - Encourage patient to monitor pain and request assistance  - Assess pain using appropriate pain scale  - Administer analgesics based on type and severity of pain and evaluate response  - Implement non-pharmacological measures as appropriate and evaluate response  - Consider cultural and social influences on pain and pain management  - Notify physician/advanced practitioner if interventions unsuccessful or patient reports new pain  Outcome: Progressing     Problem: INFECTION - ADULT  Goal: Absence or prevention of progression during hospitalization  Description: INTERVENTIONS:  - Assess and monitor for signs and symptoms of infection  - Monitor lab/diagnostic results  - Monitor all insertion sites, i.e. indwelling lines, tubes, and drains  - Monitor endotracheal if appropriate and nasal secretions for changes in amount and color  - Fowler appropriate cooling/warming therapies per order  - Administer medications as ordered  - Instruct and encourage patient and family to use good hand hygiene technique  - Identify and instruct in appropriate isolation precautions for identified infection/condition  Outcome: Progressing

## 2024-12-08 NOTE — PLAN OF CARE
Problem: SAFETY ADULT  Goal: Patient will remain free of falls  Description: INTERVENTIONS:  - Educate patient/family on patient safety including physical limitations  - Instruct patient to call for assistance with activity   - Consult OT/PT to assist with strengthening/mobility   - Keep Call bell within reach  - Keep bed low and locked with side rails adjusted as appropriate  - Keep care items and personal belongings within reach  - Initiate and maintain comfort rounds  - Make Fall Risk Sign visible to staff  - Offer Toileting every 2 Hours, in advance of need  - Initiate/Maintain bed alarm  - Obtain necessary fall risk management equipment: walker prn  - Apply yellow socks and bracelet for high fall risk patients  - Consider moving patient to room near nurses station  12/7/2024 2340 by Kim Bourgeois RN  Outcome: Progressing  12/7/2024 2339 by Kim Bourgeois RN  Outcome: Progressing  Goal: Maintain or return to baseline ADL function  Description: INTERVENTIONS:  -  Assess patient's ability to carry out ADLs; assess patient's baseline for ADL function and identify physical deficits which impact ability to perform ADLs (bathing, care of mouth/teeth, toileting, grooming, dressing, etc.)  - Assess/evaluate cause of self-care deficits   - Assess range of motion  - Assess patient's mobility; develop plan if impaired  - Assess patient's need for assistive devices and provide as appropriate  - Encourage maximum independence but intervene and supervise when necessary  - Involve family in performance of ADLs  - Assess for home care needs following discharge   - Consider OT consult to assist with ADL evaluation and planning for discharge  - Provide patient education as appropriate  12/7/2024 2340 by Kim Bourgeois RN  Outcome: Progressing  12/7/2024 2339 by Kim Bourgeois RN  Outcome: Progressing  Goal: Maintains/Returns to pre admission functional level  Description: INTERVENTIONS:  - Perform AM-PAC 6 Click Basic  Mobility/ Daily Activity assessment daily.  - Set and communicate daily mobility goal to care team and patient/family/caregiver.   - Collaborate with rehabilitation services on mobility goals if consulted  - Perform Range of Motion 3 times a day.  - Reposition patient every 2 hours prn  - Stand patient 2 times a day  - Ambulate patient 2 times a day  - Out of bed to chair 2 times a day   - Out of bed for meals 2 times a day  - Out of bed for toileting  - Record patient progress and toleration of activity level   12/7/2024 2340 by Kim Bourgeois RN  Outcome: Progressing  12/7/2024 2339 by Kim Bourgeois RN  Outcome: Progressing

## 2024-12-08 NOTE — ASSESSMENT & PLAN NOTE
Patient with evidence of an acute kidney injury with a creatinine level of 1.71.  Likely secondary to dehydration and poor oral intake.      Continue IV fluids, kidney function improvement

## 2024-12-08 NOTE — CONSULTS
TeleConsultation - Behavioral Health   Name: Rosmery Vargas 64 y.o. female I MRN: [id number]  Unit/Bed#: [address] [place] [address] Date of Admission: 12/7/2024   Date of Service: 12/8/2024 I Hospital Day: 1  Inpatient consult to Psychiatry  Consult performed by: Kenna Ortiz MD  Consult ordered by: Odell Cunningham DO        Physician Requesting Consult: Odell Cunningham,*  Principal Problem:Toxic metabolic encephalopathy  Reason for Consult:  Psych Evaluation     Assessment & Plan     Delirium     Unspecified Mood Disorder      Patient presents with difficulty with sustained concentration and attention as well as confusion and recommend continued medical treatment. If patient's presentation does not improve with medical treatment recommend re consulting psychiatry    Recommend Delirium Precautions  Maintain sleep-wake cycle, avoid nighttime interruptions  Provide adequate pain control  Avoid urinary retention and constipation  Provide frequent and early mobilization  Provide frequent redirection and reorientation as needed  Avoid medications that may worsen or precipitate delirium such as tramadol, benzodiazepines, anticholinergics, and Benadryl  Redirect unwanted behaviors as first-line therapy, avoid physical restraints as able to  Continue to monitor     TREATMENT PLAN RECOMMENDATIONS:  Medications: None   PRN for sleep: None  PRN for agitation: None     Informed consent for the above medication has been obtained including discussion of the risks, benefits and alternatives: Yes    Disposition:  Defer     Legal Status Recommendation:  Voluntary     Multiple Antipsychotic Review: N/A    Psychotherapy/Psychoeducation: N/A to this case.    Other/Medical Work Up and/or treatment modality recommendations: N/A to this case.    Patient Caregiver/Family Education: N/A    Follow-up: Re-consult PRN    Report regarding the above Assessment and Treatment plan was provided to: Dr. Cunningham     History of Present Illness   "    Patient reports that she is in the hospital because she fell and that this all started several days ago. Patient is AxOx3 but struggles with sustained concentration and attention. Patient reports that she feels confused currently and very tangential on exam. Patient endorsed VH on the exam as \"some old matt\". Patient denied any current SI/HI or other acute psychiatric complaints.       Psychiatric Review Of Systems:  sleep: yes  appetite changes: yes  weight changes: no  energy/anergy: no  interest/pleasure/anhedonia: no  somatic symptoms: no  anxiety/panic: no  byron: no  guilty/hopeless: no  self injurious behavior/risky behavior: no    Historical Information   Past Psychiatric History:   Psychiatric Hospitalizations:   Several past inpatient psychiatric admissions  Outpatient Treatment History:   Yes  Suicide Attempts:   Yes, one attempt by overdose  History of self-harm:   None  Violence History:   no  Past Psychiatric medication trials: Several     Substance Abuse History:  Patient reports that she still smokes but that she has stopped drinking.       Family Psychiatric History:  Denied       Social History:   Education: high school diploma/GED  Learning Disabilities:  None   Marital history: single  Children: Yes   Living arrangement, social support: The patient lives in home with extended family.  Occupational History: unknown occupation  Functioning Relationships: good support system.  Other Pertinent History: None    Traumatic History:   Abuse: None  Other Traumatic Events: none    I have reviewed the patient's PMH, PSH, Social History, Family History, Meds, and Allergies    Meds/Allergies   Current Facility-Administered Medications   Medication Dose Route Frequency Provider Last Rate    acetaminophen  650 mg Oral Q6H PRN Cesar Lara MD      albuterol  2.5 mg Nebulization Q6H PRN Cesar Lara MD      aspirin  81 mg Oral Daily Cesar Lara MD      atorvastatin  40 mg Oral " Daily Cesar Lara MD      busPIRone  15 mg Oral TID Cesar Lara MD      cefTRIAXone  1,000 mg Intravenous Q24H Odell Cunningham DO 1,000 mg (12/08/24 0935)    desvenlafaxine succinate  50 mg Oral Daily Cesar Lara MD      docusate sodium  100 mg Oral BID Cesar Lara MD      fluticasone  1 spray Nasal Daily Cesar Lara MD      heparin (porcine)  5,000 Units Subcutaneous Q12H BEAR Cesar Lara MD      hydrOXYzine HCL  50 mg Oral Daily Cesar Lara MD      Ketotifen Fumarate  1 drop Both Eyes BID Cesar Lara MD      lamoTRIgine  200 mg Oral Daily Cesar Lara MD      loratadine  10 mg Oral Daily Cesar Lara MD      LORazepam  0.5 mg Intravenous Q6H PRN Odell Cunningham DO      multi-electrolyte  100 mL/hr Intravenous Continuous Odell Cunningham  mL/hr (12/08/24 1035)    multivitamin stress formula  1 tablet Oral Daily Cesar Lara MD      nicotine  1 patch Transdermal Daily Cesar Lara MD      ondansetron  4 mg Intravenous Q6H PRN Cesar Lara MD      oxybutynin  10 mg Oral Daily Cesar Lara MD      pantoprazole  40 mg Oral Daily Cesar Lara MD      polyethylene glycol  17 g Oral Daily PRN Cesar Lara MD      propranolol  10 mg Oral Daily Cesar Lara MD      umeclidinium  1 puff Inhalation Daily Cesar Lara MD      ziprasidone  80 mg Oral QPM Cesar Lara MD        No Known Allergies    Objective :  Temp:  [97.6 °F (36.4 °C)-98.4 °F (36.9 °C)] 98.4 °F (36.9 °C)  HR:  [89] 89  BP: (108-148)/(71-74) 148/74  Resp:  [22] 22  SpO2:  [95 %] 95 %    Mental Status Evaluation:  Appearance:  age appropriate   Behavior:  normal   Speech:  normal pitch and normal volume   Mood:  normal   Affect:  normal   Language: naming objects   Thought Process:  disorganized   Associations intact associations  "  Thought Content:  normal   Perceptual Disturbances: None   Risk Potential: Suicidal Ideations none  Homicidal Ideations none  Potential for Aggression No   Sensorium:  person, place, and time/date   Cognition:  recent and remote memory grossly intact   Consciousness:  alert    Attention: attention span and concentration were NOT age appropriate   Intellect: within normal limits   Fund of Knowledge: awareness of current events: President    Insight:  limited   Judgment: limited   Muscle Strength:  Muscle Tone: normal  NFT  normal   Gait/Station: normal gait/station   Motor Activity: no abnormal movements     Lab Results: I have reviewed the following lab results:   .     12/08/24  0534   WBC 11.15*   HGB 9.6*   HCT 29.5*   *   SODIUM 138   K 3.8      CO2 24   BUN 23   CREATININE 0.71   GLUC 83   MG 1.8*   PHOS 2.4   AST 29   ALT 25   ALB 3.5   TBILI 0.78   ALKPHOS 86      Results from last 7 days   Lab Units 12/07/24  1704   BARBITURATE UR  Negative   BENZODIAZEPINE UR  Positive*   THC UR  Negative   COCAINE UR  Negative   METHADONE URINE  Negative   OPIATE UR  Negative   PCP UR  Negative     Lipid Profile:   Lab Results   Component Value Date    CHOLESTEROL 129 09/03/2019    HDL 46 09/03/2019    TRIG 101 09/03/2019    LDLCALC 63 09/03/2019    NONHDLC 83 09/03/2019   Thyroid Studies: No results found for: \"QLD9STBJMFCM\", \"T3FREE\", \"FREET4\", \"H4WKOSM\", \"J4UCRQM\"  Ammonia:   Lab Results   Component Value Date    AMMONIA 22 11/15/2018     Drug Levels: No results found for: \"VALPROICTOT\", \"VALPROICACID\", \"LITHIUM\", \"CARBAMAZEPIN\", \"CLOZAPINE\", \"NCLOZIP\"    Imaging Results Review: No pertinent imaging studies reviewed.  Other Study Results Review: No additional pertinent studies reviewed.    Code Status: Level 1 - Full Code  Advance Directive and Living Will:      Power of :    POLST:      Screenings:   1. Nutrition Assessment (completed by Staff):   Nutrition  Feeding: Able to feed self (needs " encouragement or set up)  Appetite: Poor  2. Pain Screening  Pain Assessment  Pain Assessment Tool: 0-10  Pain Score: 0  3. Suicide Screening  ED Crisis Suicide Risk Assessment:      C-SSRS Screening (Nursing Assessment - recent):    C-SSRS Screening (Nursing Assessment - since last contact):      Suicide Risk Assessment completed by the Consultant: Based on today's assessment, Rosmery presents the following risk of harm to self: high.    Administrative Statements   VIRTUAL CARE DOCUMENTATION:     1. This service was provided via Telemedicine using Teams Virtual Rounding      2. Parties in the room with patient during teleconsult Patient only    3. Confidentiality My office door was closed     4. Participants No one else was in the room    5. Patient acknowledged consent and understanding of privacy and security of the  Telemedicine consult. I informed the patient that I have reviewed their record in Epic and presented the opportunity for them to ask any questions regarding the visit today.  The patient agreed to participate.    6. I have spent a total time of 30 minutes in caring for this patient on the day of the visit/encounter including Obtaining or reviewing history  , not including the time spent for establishing the audio/video connection.

## 2024-12-08 NOTE — PROGRESS NOTES
Progress Note - Hospitalist   Name: Rosmery Vargas 64 y.o. female I MRN: [id number]  Unit/Bed#: [address] [place] [address] Date of Admission: 12/7/2024   Date of Service: 12/8/2024 I Hospital Day: 1     Assessment & Plan  Toxic metabolic encephalopathy  Patient was seen and examined in the emergency department and will be admitted to the hospital for further evaluation and management.  Patient presenting with increased confusion and falls at home.  Patient is exhibiting bizarre behavior in the emergency room.  Question as to whether this is from an underlying medical issue such as infection, acute kidney injury or rhabdomyolysis or if this is from underlying psychiatric illness.    SHERON has resolved, continue to Isolyte 100 cc/hour  UTI treatment with ceftriaxone  Follow-up psychiatry evaluation recommended  Acute kidney injury (HCC)  Patient with evidence of an acute kidney injury with a creatinine level of 1.71.  Likely secondary to dehydration and poor oral intake.      Continue IV fluids, kidney function improvement  Rhabdomyolysis  Patient with evidence of acute rhabdomyolysis with an elevated CK level of 701.  IV fluid boluses were ordered in the emergency room.  Continue IV fluids.    CK level improvement, continue IV fluids  Leukocytosis  Continue to monitor, improvement, continue off antibiotic therapy  Falls  Patient with multiple falls over the last day.  Imaging does not reveal any acute fractures.  Physical therapy and Occupational Therapy evaluations have been ordered.  Thrombocytosis  Patient with a platelet count of 465.  Likely reactive.  Will continue to monitor.  Aortic ectasia (HCC)  Patient found on CT imaging to have a stable ascending aortic ectasia with maximum diameter of 4 cm.  Radiology recommending noncontrast CT follow-up in 12 months.  Bipolar I disorder (HCC)  Continue home medications.  As noted above, patient with bizarre behavior on admission.  Question as to whether this is related to underlying  infection such as a urinary tract infection.  Continue to closely monitor mentation.  If patient continues to exhibit bizarre behavior and confusion will need to consult psychiatry to evaluate for potential underlying psychiatric cause of her symptoms.  Hyperlipidemia  Continue statin therapy  Tobacco abuse  Nicotine patch ordered.    VTE Pharmacologic Prophylaxis:   Moderate Risk (Score 3-4) - Pharmacological DVT Prophylaxis Ordered: heparin.    Mobility:   Basic Mobility Inpatient Raw Score: 24  JH-HLM Goal: 8: Walk 250 feet or more  JH-HLM Achieved: 7: Walk 25 feet or more  JH-HLM Goal NOT achieved. Continue with multidisciplinary rounding and encourage appropriate mobility to improve upon JH-HLM goals.    Patient Centered Rounds: I performed bedside rounds with nursing staff today.   Discussions with Specialists or Other Care Team Provider: N/A    Education and Discussions with Family / Patient: N/A    Current Length of Stay: 1 day(s)  Current Patient Status: Inpatient   Certification Statement: The patient will continue to require additional inpatient hospital stay due to AMS, psychiatric consultation, SHERON  Discharge Plan: Anticipate discharge in 48 hrs to home with home services.    Code Status: Level 1 - Full Code    Subjective   Patient seen and examined at bedside this morning, answering questions with tangential response.  One-to-one continuous observation, follow-up psychiatric team consultation appreciated.    Objective :  Temp:  [97.6 °F (36.4 °C)-98.7 °F (37.1 °C)] 98.4 °F (36.9 °C)  HR:  [83-99] 89  BP: (105-148)/(53-97) 148/74  Resp:  [17-22] 22  SpO2:  [93 %-98 %] 95 %  O2 Device: None (Room air)    Body mass index is 29.68 kg/m².     Input and Output Summary (last 24 hours):     Intake/Output Summary (Last 24 hours) at 12/8/2024 0904  Last data filed at 12/8/2024 0301  Gross per 24 hour   Intake 3000 ml   Output 475 ml   Net 2525 ml       Physical Exam  Vitals and nursing note reviewed.    Constitutional:       General: She is not in acute distress.     Appearance: She is well-developed.   HENT:      Head: Normocephalic and atraumatic.   Eyes:      General: No scleral icterus.     Conjunctiva/sclera: Conjunctivae normal.   Cardiovascular:      Rate and Rhythm: Normal rate and regular rhythm.      Pulses: Normal pulses.      Heart sounds: No murmur heard.  Pulmonary:      Effort: Pulmonary effort is normal. No respiratory distress.      Breath sounds: Normal breath sounds.   Abdominal:      General: Bowel sounds are normal. There is no distension.      Palpations: Abdomen is soft.      Tenderness: There is no abdominal tenderness.   Musculoskeletal:         General: No swelling. Normal range of motion.      Cervical back: Neck supple.   Skin:     General: Skin is warm and dry.      Capillary Refill: Capillary refill takes less than 2 seconds.      Coloration: Skin is not jaundiced.   Neurological:      General: No focal deficit present.      Mental Status: She is alert and oriented to person, place, and time. Mental status is at baseline.   Psychiatric:         Mood and Affect: Mood normal.         Behavior: Behavior normal.         Lines/Drains:              Lab Results: I have reviewed the following results:   Results from last 7 days   Lab Units 12/08/24  0534   WBC Thousand/uL 11.15*   HEMOGLOBIN g/dL 9.6*   HEMATOCRIT % 29.5*   PLATELETS Thousands/uL 398*   SEGS PCT % 59   LYMPHO PCT % 27   MONO PCT % 13*   EOS PCT % 1     Results from last 7 days   Lab Units 12/08/24  0534   SODIUM mmol/L 138   POTASSIUM mmol/L 3.8   CHLORIDE mmol/L 104   CO2 mmol/L 24   BUN mg/dL 23   CREATININE mg/dL 0.71   ANION GAP mmol/L 10   CALCIUM mg/dL 8.5   ALBUMIN g/dL 3.5   TOTAL BILIRUBIN mg/dL 0.78   ALK PHOS U/L 86   ALT U/L 25   AST U/L 29   GLUCOSE RANDOM mg/dL 83     Results from last 7 days   Lab Units 12/07/24  1229   INR  1.02                   Recent Cultures (last 7 days):   Results from last 7 days   Lab  Units 12/03/24  2144   URINE CULTURE  >100,000 cfu/ml - Presumptive Gardnerella vaginalis*  <10,000 cfu/ml       Last 24 Hours Medication List:     Current Facility-Administered Medications:     acetaminophen (TYLENOL) tablet 650 mg, Q6H PRN    albuterol inhalation solution 2.5 mg, Q6H PRN    ALPRAZolam (XANAX) tablet 0.25 mg, Daily PRN    aspirin chewable tablet 81 mg, Daily    atorvastatin (LIPITOR) tablet 40 mg, Daily    busPIRone (BUSPAR) tablet 15 mg, TID    desvenlafaxine succinate (PRISTIQ) 24 hr tablet 50 mg, Daily    docusate sodium (COLACE) capsule 100 mg, BID    fluticasone (FLONASE) 50 mcg/act nasal spray 1 spray, Daily    heparin (porcine) subcutaneous injection 5,000 Units, Q12H BEAR    hydrOXYzine HCL (ATARAX) tablet 50 mg, Daily    Ketotifen Fumarate (ZADITOR) 0.035 % ophthalmic solution 1 drop, BID    lactated ringers infusion, Continuous, Last Rate: 125 mL/hr (12/08/24 0442)    lamoTRIgine (LaMICtal) tablet 200 mg, Daily    loratadine (CLARITIN) tablet 10 mg, Daily    multivitamin stress formula tablet 1 tablet, Daily    nicotine (NICODERM CQ) 14 mg/24hr TD 24 hr patch 1 patch, Daily    ondansetron (ZOFRAN) injection 4 mg, Q6H PRN    oxybutynin (DITROPAN-XL) 24 hr tablet 10 mg, Daily    pantoprazole (PROTONIX) EC tablet 40 mg, Daily    polyethylene glycol (MIRALAX) packet 17 g, Daily PRN    propranolol (INDERAL) tablet 10 mg, Daily    umeclidinium 62.5 mcg/actuation inhaler AEPB 1 puff, Daily    ziprasidone (GEODON) capsule 80 mg, QPM    Administrative Statements   Today, Patient Was Seen By: Odell Cunningham DO      **Please Note: This note may have been constructed using a voice recognition system.**

## 2024-12-08 NOTE — ASSESSMENT & PLAN NOTE
Patient was seen and examined in the emergency department and will be admitted to the hospital for further evaluation and management.  Patient presenting with increased confusion and falls at home.  Patient is exhibiting bizarre behavior in the emergency room.  Question as to whether this is from an underlying medical issue such as infection, acute kidney injury or rhabdomyolysis or if this is from underlying psychiatric illness.    SHERON has resolved, continue to Isolyte 100 cc/hour  UTI treatment with ceftriaxone  Follow-up psychiatry evaluation recommended

## 2024-12-08 NOTE — ASSESSMENT & PLAN NOTE
Patient with evidence of acute rhabdomyolysis with an elevated CK level of 701.  IV fluid boluses were ordered in the emergency room.  Continue IV fluids.    CK level improvement, continue IV fluids

## 2024-12-09 LAB
ALBUMIN SERPL BCG-MCNC: 2.5 G/DL (ref 3.5–5)
ALP SERPL-CCNC: 61 U/L (ref 34–104)
ALT SERPL W P-5'-P-CCNC: 17 U/L (ref 7–52)
ANION GAP SERPL CALCULATED.3IONS-SCNC: 15 MMOL/L (ref 4–13)
AST SERPL W P-5'-P-CCNC: 18 U/L (ref 13–39)
BASOPHILS # BLD AUTO: 0.03 THOUSANDS/ÂΜL (ref 0–0.1)
BASOPHILS NFR BLD AUTO: 0 % (ref 0–1)
BILIRUB SERPL-MCNC: 0.48 MG/DL (ref 0.2–1)
BUN SERPL-MCNC: 13 MG/DL (ref 5–25)
CALCIUM ALBUM COR SERPL-MCNC: 7.6 MG/DL (ref 8.3–10.1)
CALCIUM SERPL-MCNC: 6.4 MG/DL (ref 8.4–10.2)
CHLORIDE SERPL-SCNC: 103 MMOL/L (ref 96–108)
CK SERPL-CCNC: 151 U/L (ref 26–192)
CO2 SERPL-SCNC: 23 MMOL/L (ref 21–32)
CREAT SERPL-MCNC: 0.49 MG/DL (ref 0.6–1.3)
EOSINOPHIL # BLD AUTO: 0.22 THOUSAND/ÂΜL (ref 0–0.61)
EOSINOPHIL NFR BLD AUTO: 3 % (ref 0–6)
ERYTHROCYTE [DISTWIDTH] IN BLOOD BY AUTOMATED COUNT: 14.6 % (ref 11.6–15.1)
GFR SERPL CREATININE-BSD FRML MDRD: 104 ML/MIN/1.73SQ M
GLUCOSE SERPL-MCNC: 70 MG/DL (ref 65–140)
HCT VFR BLD AUTO: 29 % (ref 34.8–46.1)
HGB BLD-MCNC: 9.3 G/DL (ref 11.5–15.4)
IMM GRANULOCYTES # BLD AUTO: 0.04 THOUSAND/UL (ref 0–0.2)
IMM GRANULOCYTES NFR BLD AUTO: 1 % (ref 0–2)
LYMPHOCYTES # BLD AUTO: 3.15 THOUSANDS/ÂΜL (ref 0.6–4.47)
LYMPHOCYTES NFR BLD AUTO: 36 % (ref 14–44)
MCH RBC QN AUTO: 29.3 PG (ref 26.8–34.3)
MCHC RBC AUTO-ENTMCNC: 32.1 G/DL (ref 31.4–37.4)
MCV RBC AUTO: 92 FL (ref 82–98)
MONOCYTES # BLD AUTO: 0.87 THOUSAND/ÂΜL (ref 0.17–1.22)
MONOCYTES NFR BLD AUTO: 10 % (ref 4–12)
NEUTROPHILS # BLD AUTO: 4.4 THOUSANDS/ÂΜL (ref 1.85–7.62)
NEUTS SEG NFR BLD AUTO: 50 % (ref 43–75)
NRBC BLD AUTO-RTO: 0 /100 WBCS
PLATELET # BLD AUTO: 371 THOUSANDS/UL (ref 149–390)
PMV BLD AUTO: 9.9 FL (ref 8.9–12.7)
POTASSIUM SERPL-SCNC: 3.9 MMOL/L (ref 3.5–5.3)
PROT SERPL-MCNC: 4.2 G/DL (ref 6.4–8.4)
RBC # BLD AUTO: 3.17 MILLION/UL (ref 3.81–5.12)
SODIUM SERPL-SCNC: 141 MMOL/L (ref 135–147)
WBC # BLD AUTO: 8.71 THOUSAND/UL (ref 4.31–10.16)

## 2024-12-09 PROCEDURE — 97163 PT EVAL HIGH COMPLEX 45 MIN: CPT

## 2024-12-09 PROCEDURE — 80053 COMPREHEN METABOLIC PANEL: CPT | Performed by: STUDENT IN AN ORGANIZED HEALTH CARE EDUCATION/TRAINING PROGRAM

## 2024-12-09 PROCEDURE — 85025 COMPLETE CBC W/AUTO DIFF WBC: CPT | Performed by: STUDENT IN AN ORGANIZED HEALTH CARE EDUCATION/TRAINING PROGRAM

## 2024-12-09 PROCEDURE — 99232 SBSQ HOSP IP/OBS MODERATE 35: CPT | Performed by: NURSE PRACTITIONER

## 2024-12-09 PROCEDURE — 82550 ASSAY OF CK (CPK): CPT | Performed by: STUDENT IN AN ORGANIZED HEALTH CARE EDUCATION/TRAINING PROGRAM

## 2024-12-09 RX ORDER — HALOPERIDOL 5 MG/ML
1 INJECTION INTRAMUSCULAR ONCE
Status: COMPLETED | OUTPATIENT
Start: 2024-12-09 | End: 2024-12-09

## 2024-12-09 RX ADMIN — PANTOPRAZOLE SODIUM 40 MG: 40 TABLET, DELAYED RELEASE ORAL at 10:57

## 2024-12-09 RX ADMIN — DOCUSATE SODIUM 100 MG: 100 CAPSULE, LIQUID FILLED ORAL at 18:59

## 2024-12-09 RX ADMIN — ASPIRIN 81 MG CHEWABLE TABLET 81 MG: 81 TABLET CHEWABLE at 10:54

## 2024-12-09 RX ADMIN — LAMOTRIGINE 200 MG: 100 TABLET ORAL at 10:57

## 2024-12-09 RX ADMIN — PROPRANOLOL HYDROCHLORIDE 10 MG: 20 TABLET ORAL at 10:55

## 2024-12-09 RX ADMIN — KETOTIFEN FUMARATE 1 DROP: 0.25 SOLUTION/ DROPS OPHTHALMIC at 09:00

## 2024-12-09 RX ADMIN — HEPARIN SODIUM 5000 UNITS: 5000 INJECTION, SOLUTION INTRAVENOUS; SUBCUTANEOUS at 20:10

## 2024-12-09 RX ADMIN — ZIPRASIDONE HYDROCHLORIDE 80 MG: 20 CAPSULE ORAL at 18:59

## 2024-12-09 RX ADMIN — LORATADINE 10 MG: 10 TABLET ORAL at 10:56

## 2024-12-09 RX ADMIN — BUSPIRONE HYDROCHLORIDE 15 MG: 5 TABLET ORAL at 16:29

## 2024-12-09 RX ADMIN — HALOPERIDOL LACTATE 1 MG: 5 INJECTION, SOLUTION INTRAMUSCULAR at 21:47

## 2024-12-09 RX ADMIN — BUSPIRONE HYDROCHLORIDE 15 MG: 5 TABLET ORAL at 10:54

## 2024-12-09 RX ADMIN — B-COMPLEX W/ C & FOLIC ACID TAB 1 TABLET: TAB at 10:52

## 2024-12-09 RX ADMIN — HEPARIN SODIUM 5000 UNITS: 5000 INJECTION, SOLUTION INTRAVENOUS; SUBCUTANEOUS at 10:52

## 2024-12-09 RX ADMIN — NICOTINE 1 PATCH: 14 PATCH, EXTENDED RELEASE TRANSDERMAL at 10:00

## 2024-12-09 RX ADMIN — HYDROXYZINE HYDROCHLORIDE 50 MG: 25 TABLET ORAL at 10:54

## 2024-12-09 RX ADMIN — FLUTICASONE PROPIONATE 1 SPRAY: 50 SPRAY, METERED NASAL at 13:02

## 2024-12-09 RX ADMIN — CEFTRIAXONE 1000 MG: 1 INJECTION, SOLUTION INTRAVENOUS at 10:56

## 2024-12-09 RX ADMIN — BUSPIRONE HYDROCHLORIDE 15 MG: 5 TABLET ORAL at 20:10

## 2024-12-09 RX ADMIN — BUSPIRONE HYDROCHLORIDE 15 MG: 5 TABLET ORAL at 02:38

## 2024-12-09 RX ADMIN — DESVENLAFAXINE 50 MG: 50 TABLET, FILM COATED, EXTENDED RELEASE ORAL at 13:02

## 2024-12-09 RX ADMIN — ATORVASTATIN CALCIUM 40 MG: 40 TABLET, FILM COATED ORAL at 10:56

## 2024-12-09 RX ADMIN — LORAZEPAM 0.5 MG: 2 INJECTION INTRAMUSCULAR; INTRAVENOUS at 22:58

## 2024-12-09 RX ADMIN — OXYBUTYNIN 10 MG: 5 TABLET, FILM COATED, EXTENDED RELEASE ORAL at 10:52

## 2024-12-09 RX ADMIN — KETOTIFEN FUMARATE 1 DROP: 0.25 SOLUTION/ DROPS OPHTHALMIC at 20:14

## 2024-12-09 RX ADMIN — UMECLIDINIUM 1 PUFF: 62.5 AEROSOL, POWDER ORAL at 13:03

## 2024-12-09 RX ADMIN — DOCUSATE SODIUM 100 MG: 100 CAPSULE, LIQUID FILLED ORAL at 09:02

## 2024-12-09 NOTE — PLAN OF CARE
Problem: PHYSICAL THERAPY ADULT  Goal: Performs mobility at highest level of function for planned discharge setting.  See evaluation for individualized goals.  Description: Treatment/Interventions: Elevations, Functional transfer training, Gait training, Cognitive reorientation          See flowsheet documentation for full assessment, interventions and recommendations.  Note: Prognosis: Good  Problem List: Decreased strength, Decreased endurance, Impaired balance, Decreased mobility, Decreased cognition, Impaired judgement  Assessment: Pt is 64 y.o. female seen for PT evaluation s/p admit to [facility] on 12/7/2024 w/ Toxic metabolic encephalopathy. PT consulted to assess pt's functional mobility and d/c needs. Order placed for PT eval and tx.  Co-morbidities affecting patient's physical performance include: falls, rhabdomyolysis, bpolar disorder, chronic back pain , anxiety, depression.  Personal factors affecting patient at time of initial evaluation include: inability to ambulate household distances, inability to navigate level surfaces without external assistance, decreased cognition, positive fall history, limited insight into impairments, and inability to live alone.     Prior to admission, patient was independent with functional mobility without assistive device and independent with ADLS.  Upon evaluation: Pt ambulated 25 feet with hand held assist in her room.  Pt declined additional activity.      Please find objective findings from Physical Therapy assessment regarding body systems outlined above with impairments and limitations including weakness, impaired balance, pain, decreased activity tolerance, decreased functional mobility tolerance, decreased safety awareness, impaired judgement, fall risk, and decreased cognition.The Barthel Index was used as a functional outcome tool presenting with a score of Barthel Index Score: 50 today indicating marked limitations of functional mobility and ADLS.   Patient's clinical presentation is currently unstable/unpredictable as seen in patient's presentation of varying levels of cognitive performance, increased fall risk, new onset of impairment of functional mobility, and new onset of weakness. Pt would benefit from continued Physical Therapy treatment to address deficits as defined above and maximize level of functional mobility.     As demonstrated by objective findings, the assigned level of complexity for this evaluation is high.The patient's AM-PAC Basic Mobility Inpatient Short Form Raw Score is 19. A Raw score of greater than 16 suggests the patient may benefit from discharge to home. Please also refer to the recommendation of the Physical Therapist for safe discharge planning.        Rehab Resource Intensity Level, PT: III (Minimum Resource Intensity)    See flowsheet documentation for full assessment.

## 2024-12-09 NOTE — ASSESSMENT & PLAN NOTE
Patient with multiple falls over the last day.    Imaging does not reveal any acute fractures.    Physical therapy and Occupational Therapy evaluations have been ordered, recommending minimum resources on discharge

## 2024-12-09 NOTE — UTILIZATION REVIEW
Initial Clinical Review    Admission: Date/Time/Statement:   Admission Orders (From admission, onward)       Ordered        12/07/24 1613  INPATIENT ADMISSION  Once                          Orders Placed This Encounter   Procedures    INPATIENT ADMISSION     Standing Status:   Standing     Number of Occurrences:   1     Level of Care:   Med Surg [16]     Estimated length of stay:   More than 2 Midnights     Certification:   I certify that inpatient services are medically necessary for this patient for a duration of greater than two midnights. See H&P and MD Progress Notes for additional information about the patient's course of treatment.     ED Arrival Information       Expected   -    Arrival   12/7/2024 11:52    Acuity   Urgent              Means of arrival   Ambulance    Escorted by   [facility]    Admission type   Emergency              Arrival complaint   Fall             Chief Complaint   Patient presents with    Fall     Pt presents with EMS for fall, unwitnessed.         Initial Presentation:   64 yof to ER from home via EMS. Pt with multiple visits or ER in the last week and a half presenting to the ED today with bilateral hand pain. She has cracked skin of the bilateral hands. Says that she is unable to moisturize them on her own but she cannot give a good reason why. Patient presenting with increased confusion and falls at home.  Patient is exhibiting bizarre behavior in the emergency room.  Question as to whether this is from an underlying medical issue such as infection, acute kidney injury or rhabdomyolysis or if this is from underlying psychiatric illness. Hx asthma, COPD, GERD, HLD, bipolar disorder. Presents confused with erratic behavior. Admission CT head neg. Labs: WBC 19.31, , BUN 34, Cr 1.71, ast 42, alk phos 105, , u/a+ketones, prot, leuk, UDS+benzo.  Admitted to inpatient status for toxic metabolic encephalopathy. Started on IVABT for possible UTI,  "IVF.      Date: 12/8/24   Day 2:   Pt answering questions with tangential response. One-to-one continuous observation maintained. Psyche consulted.  Per psyche: Patient reports that she is in the hospital because she fell and that this all started several days ago. Patient is AxOx3 but struggles with sustained concentration and attention. Patient reports that she feels confused currently and very tangential on exam. Patient endorsed VH on the exam as \"some old matt\". Patient denied any current SI/HI or other acute psychiatric complaints.       ED Treatment-Medication Administration from 12/07/2024 1152 to 12/07/2024 1729         Date/Time Order Dose Route Action     12/07/2024 1233 sodium chloride 0.9 % bolus 1,000 mL 1,000 mL Intravenous New Bag     12/07/2024 1604 sodium chloride 0.9 % bolus 1,000 mL 1,000 mL Intravenous New Bag            Scheduled Medications:  aspirin, 81 mg, Oral, Daily  atorvastatin, 40 mg, Oral, Daily  busPIRone, 15 mg, Oral, TID  cefTRIAXone, 1,000 mg, Intravenous, Q24H  desvenlafaxine succinate, 50 mg, Oral, Daily  docusate sodium, 100 mg, Oral, BID  fluticasone, 1 spray, Nasal, Daily  heparin (porcine), 5,000 Units, Subcutaneous, Q12H BEAR  hydrOXYzine HCL, 50 mg, Oral, Daily  Ketotifen Fumarate, 1 drop, Both Eyes, BID  lamoTRIgine, 200 mg, Oral, Daily  loratadine, 10 mg, Oral, Daily  multivitamin stress formula, 1 tablet, Oral, Daily  nicotine, 1 patch, Transdermal, Daily  oxybutynin, 10 mg, Oral, Daily  pantoprazole, 40 mg, Oral, Daily  propranolol, 10 mg, Oral, Daily  umeclidinium, 1 puff, Inhalation, Daily  ziprasidone, 80 mg, Oral, QPM      Continuous IV Infusions:  multi-electrolyte, 100 mL/hr, Intravenous, Continuous      PRN Meds:  acetaminophen, 650 mg, Oral, Q6H PRN  albuterol, 2.5 mg, Nebulization, Q6H PRN  LORazepam, 0.5 mg, Intravenous, Q6H PRN  ondansetron, 4 mg, Intravenous, Q6H PRN  polyethylene glycol, 17 g, Oral, Daily PRN      ED Triage Vitals [12/07/24 1156] "   Temperature Pulse Respirations Blood Pressure SpO2 Pain Score   98.7 °F (37.1 °C) 88 18 125/88 98 % No Pain     Weight (last 2 days)       Date/Time Weight    12/09/24 0540 90 (198.4)    12/08/24 0535 91.2 (201)            Vital Signs (last 3 days)       Date/Time Temp Pulse Resp BP MAP (mmHg) SpO2 O2 Device Patient Position - Orthostatic VS Goldy Coma Scale Score Pain    12/09/24 14:57:55 -- -- -- 140/115 123 -- -- -- -- --    12/09/24 11:05:44 -- -- 18 137/86 103 -- -- -- -- --    12/09/24 02:40:56 98.1 °F (36.7 °C) -- 17 149/77 101 -- -- -- -- --    12/08/24 21:28:33 -- -- -- 113/60 78 -- -- -- -- --    12/08/24 19:49:07 98.9 °F (37.2 °C) -- 24 189/92 124 -- -- -- 14 No Pain    12/08/24 0900 -- -- -- -- -- -- -- -- 14 No Pain    12/08/24 07:41:19 98.4 °F (36.9 °C) -- -- 148/74 99 -- -- -- -- --    12/08/24 03:08:02 98 °F (36.7 °C) -- -- 139/71 94 -- -- -- -- --    12/07/24 23:45:45 97.6 °F (36.4 °C) -- -- 113/74 87 -- -- -- -- --    12/07/24 2000 -- -- -- -- -- -- -- -- 14 No Pain    12/07/24 1900 98.3 °F (36.8 °C) 89 22 108/74 85 -- -- Lying -- No Pain    12/07/24 1801 98.3 °F (36.8 °C) 89 22 108/74 85 95 % -- Lying -- No Pain    12/07/24 17:38:45 98.3 °F (36.8 °C) -- -- 108/74 85 -- -- -- -- --    12/07/24 1700 -- 85 20 129/76 98 96 % None (Room air) Lying -- --    12/07/24 1630 -- 99 20 124/60 87 94 % None (Room air) Lying -- --    12/07/24 1600 98.4 °F (36.9 °C) 86 20 109/53 77 93 % None (Room air) Lying 12 No Pain    12/07/24 1530 -- 97 20 143/81 106 96 % None (Room air) Lying -- --    12/07/24 1500 -- 84 21 143/72 101 95 % None (Room air) Lying -- --    12/07/24 1449 -- 83 20 147/77 106 96 % None (Room air) Lying -- --    12/07/24 1400 -- 86 20 138/79 101 96 % None (Room air) Lying 12 --    12/07/24 1300 -- 95 17 148/97 117 95 % None (Room air) Lying -- --    12/07/24 1230 -- -- -- -- -- -- -- -- 12 --    12/07/24 1215 -- 83 18 105/73 85 97 % None (Room air) Lying -- --    12/07/24 1210 -- -- -- -- --  -- -- -- 12 --    12/07/24 1208 -- -- -- -- -- -- -- -- 12 --    12/07/24 1156 98.7 °F (37.1 °C) 88 18 125/88 -- 98 % None (Room air) -- -- No Pain              Pertinent Labs/Diagnostic Test Results:   Radiology:  CT chest abdomen pelvis wo contrast   Final Interpretation by Ger Walker MD (12/07 8925)      CT chest:      No evidence of acute intrathoracic process.      No definitive acute fracture allowing for motion artifact. If there is a high degree of clinical concern for fracture, repeat CT when patient is able to remain still may be considered.      Stable ascending aortic ectasia with maximum diameter of 4 cm. Noncontrast chest CT follow-up in 12 months is the recommendation.      Additional chronic findings and negatives as above.      CT abdomen and pelvis:      No evidence of acute abdominopelvic process.      No acute fracture.      Colonic diverticulosis.      Additional chronic findings and negatives as above.      This study demonstrates a finding requiring imaging follow-up and was documented as such in Epic.            Workstation performed: [id number]         CT head without contrast   Final Interpretation by Darrell Jovel DO (12/07 1309)      No acute intracranial abnormality.                  Workstation performed: [id number]           Cardiology:  ECG 12 lead   Final Result by Kashif Medina MD (12/08 1709)   Sinus rhythm with frequent Premature ventricular complexes   Incomplete right bundle branch block   Septal infarct , age undetermined   Abnormal ECG   When compared with ECG of 03-Dec-2024 21:11,   ST no longer depressed in Inferior leads   Confirmed by Kashif Medina ([id number]) on 12/8/2024 5:09:06 PM        GI:  No orders to display           Results from last 7 days   Lab Units 12/09/24  0523 12/08/24  0534 12/07/24  1229   WBC Thousand/uL 8.71 11.15* 19.31*   HEMOGLOBIN g/dL 9.3* 9.6* 11.9   HEMATOCRIT % 29.0* 29.5* 35.7   PLATELETS Thousands/uL 371 398* 465*   TOTAL  "NEUT ABS Thousands/µL 4.40 6.53 13.93*         Results from last 7 days   Lab Units 12/09/24  0523 12/08/24  0534 12/07/24  1229   SODIUM mmol/L 141 138 135   POTASSIUM mmol/L 3.9 3.8 3.7   CHLORIDE mmol/L 103 104 98   CO2 mmol/L 23 24 24   ANION GAP mmol/L 15* 10 13   BUN mg/dL 13 23 34*   CREATININE mg/dL 0.49* 0.71 1.71*   EGFR ml/min/1.73sq m 104 90 31   CALCIUM mg/dL 6.4* 8.5 9.6   MAGNESIUM mg/dL  --  1.8* 2.2   PHOSPHORUS mg/dL  --  2.4  --      Results from last 7 days   Lab Units 12/09/24  0523 12/08/24  0534 12/07/24  1229   AST U/L 18 29 42*   ALT U/L 17 25 32   ALK PHOS U/L 61 86 105*   TOTAL PROTEIN g/dL 4.2* 5.6* 7.1   ALBUMIN g/dL 2.5* 3.5 4.2   TOTAL BILIRUBIN mg/dL 0.48 0.78 0.82         Results from last 7 days   Lab Units 12/09/24  0523 12/08/24  0534 12/07/24  1229   GLUCOSE RANDOM mg/dL 70 83 100             No results found for: \"BETA-HYDROXYBUTYRATE\"               Results from last 7 days   Lab Units 12/09/24  0523 12/08/24  0534 12/07/24  1229   CK TOTAL U/L 151 424* 701*     Results from last 7 days   Lab Units 12/07/24  1620 12/07/24  1423 12/07/24  1229   HS TNI 0HR ng/L  --   --  12   HS TNI 2HR ng/L  --  11  --    HSTNI D2 ng/L  --  -1  --    HS TNI 4HR ng/L 10  --   --    HSTNI D4 ng/L -2  --   --          Results from last 7 days   Lab Units 12/07/24  1229   PROTIME seconds 13.9   INR  1.02   PTT seconds 26                                                             Results from last 7 days   Lab Units 12/07/24  1704 12/03/24  2143   CLARITY UA  Clear Clear   COLOR UA  Yellow Yellow   SPEC GRAV UA  >=1.030 >=1.030   PH UA  5.5 5.5   GLUCOSE UA mg/dl Negative Negative   KETONES UA mg/dl 40 (2+)* Trace*   BLOOD UA  Negative Negative   PROTEIN UA mg/dl 30 (1+)* 30 (1+)*   NITRITE UA  Negative Negative   BILIRUBIN UA  Negative Negative   UROBILINOGEN UA (BE) mg/dl <2.0 2.0*   LEUKOCYTES UA  Trace* Large*   WBC UA /hpf 2-4 30-50*   RBC UA /hpf 0-1 None Seen   BACTERIA UA /hpf Occasional " Moderate*   EPITHELIAL CELLS WET PREP /hpf Occasional Moderate*             Results from last 7 days   Lab Units 12/07/24  1704   AMPH/METH  Negative   BARBITURATE UR  Negative   BENZODIAZEPINE UR  Positive*   COCAINE UR  Negative   METHADONE URINE  Negative   OPIATE UR  Negative   PCP UR  Negative   THC UR  Negative     Results from last 7 days   Lab Units 12/07/24  1229   ETHANOL LVL mg/dL <10   ACETAMINOPHEN LVL ug/mL 3*   SALICYLATE LVL mg/dL <5                 Results from last 7 days   Lab Units 12/03/24  2144   URINE CULTURE  >100,000 cfu/ml - Presumptive Gardnerella vaginalis*  <10,000 cfu/ml                   Past Medical History:   Diagnosis Date    Asthma     Bladder incontinence     Chronic pain     back    COPD (chronic obstructive pulmonary disease) ([facility])     GERD (gastroesophageal reflux disease)     Hyperlipidemia     Psychiatric disorder     bipolar     Present on Admission:   Bipolar I disorder ([facility])   Hyperlipidemia      Admitting Diagnosis: Rhabdomyolysis [M62.82]  Leukocytosis [D72.829]  Pain [R52]  Bacterial vaginosis [N76.0, B96.89]  SHERON (acute kidney injury) ([facility]) [N17.9]  Multiple falls [R29.6]  Age/Sex: 64 y.o. female    Network Utilization Review Department  ATTENTION: Please call with any questions or concerns to [phone] and carefully listen to the prompts so that you are directed to the right person. All voicemails are confidential.   For Discharge needs, contact Care Management DC Support Team at [phone] opt. [phone]  Send all requests for admission clinical reviews, approved or denied determinations and any other requests to dedicated fax number below belonging to the campus where the patient is receiving treatment. List of dedicated fax numbers for the Facilities:  FACILITY NAME UR FAX NUMBER   ADMISSION DENIALS (Administrative/Medical Necessity) [phone]   DISCHARGE SUPPORT TEAM (NETWORK) [phone]   PARENT CHILD HEALTH (Maternity/NICU/Pediatrics) [phone]   [facility]  [facility] [phone]   [facility] [phone]   [facility] [phone]   [facility] [phone]   [facility] [phone]   [facility] [phone]   [facility] [phone]   [facility] [phone]   [facility] [phone]   [facility] [phone]   [facility] [phone]   [facility] [phone]

## 2024-12-09 NOTE — ASSESSMENT & PLAN NOTE
Patient was seen and examined in the emergency department and will be admitted to the hospital for further evaluation and management.  Patient presenting with increased confusion and falls at home.  Patient is exhibiting bizarre behavior in the emergency room.  Question as to whether this is from an underlying medical issue such as infection, acute kidney injury or rhabdomyolysis or if this is from underlying psychiatric illness.    SHERON has resolved, continue to Isolyte 100 cc/hour  UTI treatment with ceftriaxone  Follow-up psychiatry evaluation recommended, re-consulted psych as metabolically patient is improving

## 2024-12-09 NOTE — ASSESSMENT & PLAN NOTE
Patient with evidence of acute rhabdomyolysis with an elevated CK level of 701.  IV fluid boluses were ordered in the emergency room.  Continue IV fluids.    , decrease IV fluids to 50 cc an hour  Repeat CK in a.m.

## 2024-12-09 NOTE — ASSESSMENT & PLAN NOTE
Patient with evidence of an acute kidney injury with a creatinine level of 1.71.  Likely secondary to dehydration and poor oral intake.      Creatinine improved to 0.49  Decrease IV fluids to 50 cc an hour

## 2024-12-09 NOTE — ASSESSMENT & PLAN NOTE
Continue home medications.  As noted above, patient with bizarre behavior on admission.  Question as to whether this is related to underlying infection such as a urinary tract infection.  Continue to closely monitor mentation.  If patient continues to exhibit bizarre behavior and confusion will need to consult psychiatry to evaluate for potential underlying psychiatric cause of her symptoms.    Leukocytosis improved, CK improved also.  Patient continues with bizarre behavior, tangential conversation, occasionally suspicious, whispering as to not be overheard.  Reconsulted psychiatry

## 2024-12-09 NOTE — PHYSICAL THERAPY NOTE
PT EVALUATION     12/09/24 1115   PT Last Visit   PT Visit Date 12/09/24   Note Type   Note type Evaluation   Pain Assessment   Pain Assessment Tool Hahn-Baker FACES   Hahn-Baker FACES Pain Rating 2   Pain Location/Orientation Location: Back   Restrictions/Precautions   Braces or Orthoses Wrist lacer  (Pt unable to state why she is wearing the brace.   Recent x-rays of the R wrist are negative for fx.)   Other Precautions 1:1;Cognitive;Pain;Fall Risk;O2   Home Living   Type of Home Apartment   Prior Function   Level of [place] Independent with ADLs;Independent with functional mobility   Falls in the last 6 months >10   Comments Pt unable to give any social history due to confusion.   General   Additional Pertinent History Pt admitted with diagnosis of TME. Patient presenting with increased confusion and falls at home.  Patient is exhibiting bizarre behavior in the emergency room so admitted for medical work up.  Pt has had multiple recent visits to the ED for falls, pain, and paresthesia.   Cognition   Overall Cognitive Status Impaired   Attention Difficulty attending to directions   Orientation Level Oriented to person;Oriented to place;Oriented to time;Disoriented to situation   Following Commands Follows one step commands with increased time or repetition   Subjective   Subjective Pt speaking but making little sense.   RLE Assessment   RLE Assessment   (ROM WFL, MMT 4/5)   LLE Assessment   LLE Assessment   (ROM WFL, MMT 4/5)   Transfers   Sit to Stand 5  Supervision   Stand to Sit 5  Supervision   Stand pivot 4  Minimal assistance   Toilet transfer 4  Minimal assistance   Additional items Commode   Ambulation/Elevation   Gait pattern   (slow ida)   Gait Assistance 4  Minimal assist   Additional items   (hand held)   Assistive Device   (none)   Distance 25 feet in room, pt asked that she not leave her room.   Balance   Static Sitting Fair +   Static Standing Fair +   Ambulatory Fair   Assessment    Prognosis Good   Problem List Decreased strength;Decreased endurance;Impaired balance;Decreased mobility;Decreased cognition;Impaired judgement   Assessment Pt is 64 y.o. female seen for PT evaluation s/p admit to [facility] on 12/7/2024 w/ Toxic metabolic encephalopathy. PT consulted to assess pt's functional mobility and d/c needs. Order placed for PT eval and tx.  Co-morbidities affecting patient's physical performance include: falls, rhabdomyolysis, bpolar disorder, chronic back pain , anxiety, depression.  Personal factors affecting patient at time of initial evaluation include: inability to ambulate household distances, inability to navigate level surfaces without external assistance, decreased cognition, positive fall history, limited insight into impairments, and inability to live alone.         Prior to admission, patient was independent with functional mobility without assistive device and independent with ADLS.  Upon evaluation: Pt ambulated 25 feet with hand held assist in her room.  Pt declined additional activity.          Please find objective findings from Physical Therapy assessment regarding body systems outlined above with impairments and limitations including weakness, impaired balance, pain, decreased activity tolerance, decreased functional mobility tolerance, decreased safety awareness, impaired judgement, fall risk, and decreased cognition.The Barthel Index was used as a functional outcome tool presenting with a score of Barthel Index Score: 50 today indicating marked limitations of functional mobility and ADLS.  Patient's clinical presentation is currently unstable/unpredictable as seen in patient's presentation of varying levels of cognitive performance, increased fall risk, new onset of impairment of functional mobility, and new onset of weakness. Pt would benefit from continued Physical Therapy treatment to address deficits as defined above and maximize level of functional  mobility.     As demonstrated by objective findings, the assigned level of complexity for this evaluation is high.    The patient's AM-PAC Basic Mobility Inpatient Short Form Raw Score is 19. A Raw score of greater than 16 suggests the patient may benefit from discharge to home, however due to pt's confusion and multiple ED admissions, pt may need alternative placement.    Goals   Patient Goals pt unable to state due to TME.   STG Expiration Date 12/16/24   Short Term Goal #1 1. independent bed mobility,   2. independent transfers, 3. independent ambulation indoor level surfaces 100 feet with a steady gait and gait speed of at least 1 m/s   LTG Expiration Date 12/23/24   Long Term Goal #1 1. independent ambulation outdoor level surfaces 500 feet with a steady gait,   2. independent up and down 1 flight of stairs  3. Pt will follow simple 1 step  commands 100% of the time to demonstrate improved cognition   Plan   Treatment/Interventions Elevations;Functional transfer training;Gait training;Cognitive reorientation   Discharge Recommendation   Rehab Resource Intensity Level, PT III (Minimum Resource Intensity)   AM-PAC Basic Mobility Inpatient   Turning in Flat Bed Without Bedrails 4   Lying on Back to Sitting on Edge of Flat Bed Without Bedrails 4   Moving Bed to Chair 3   Standing Up From Chair Using Arms 3   Walk in Room 3   Climb 3-5 Stairs With Railing 2   Basic Mobility Inpatient Raw Score 19   Basic Mobility Standardized Score 42.48   [facility] Highest Level Of Mobility   -HLM Goal 6: Walk 10 steps or more   -HLM Achieved 7: Walk 25 feet or more   Barthel Index   Feeding 10   Bathing 0   Grooming Score 0   Dressing Score 5   Bladder Score 10   Bowels Score 10   Toilet Use Score 5   Transfers (Bed/Chair) Score 10   Mobility (Level Surface) Score 0   Stairs Score 0   Barthel Index Score 50   End of Consult   Patient Position at End of Consult All needs within reach  (1:1)   Licensure   [place] License Number   Yaquelin Pennington PT [id number]

## 2024-12-09 NOTE — PROGRESS NOTES
Progress Note - Hospitalist   Name: Rosmery Vargas 64 y.o. female I MRN: [id number]  Unit/Bed#: 2 [address] Date of Admission: 12/7/2024   Date of Service: 12/9/2024 I Hospital Day: 2    Assessment & Plan  Toxic metabolic encephalopathy  Patient was seen and examined in the emergency department and will be admitted to the hospital for further evaluation and management.  Patient presenting with increased confusion and falls at home.  Patient is exhibiting bizarre behavior in the emergency room.  Question as to whether this is from an underlying medical issue such as infection, acute kidney injury or rhabdomyolysis or if this is from underlying psychiatric illness.    SHERON has resolved, continue to Isolyte 100 cc/hour  UTI treatment with ceftriaxone  Follow-up psychiatry evaluation recommended, re-consulted psych as metabolically patient is improving  Acute kidney injury (HCC)  Patient with evidence of an acute kidney injury with a creatinine level of 1.71.  Likely secondary to dehydration and poor oral intake.      Creatinine improved to 0.49  Decrease IV fluids to 50 cc an hour    Rhabdomyolysis  Patient with evidence of acute rhabdomyolysis with an elevated CK level of 701.  IV fluid boluses were ordered in the emergency room.  Continue IV fluids.    , decrease IV fluids to 50 cc an hour  Repeat CK in a.m.  Leukocytosis  Down trended  Afebrile  Repeat CBC in a.m.  Falls  Patient with multiple falls over the last day.    Imaging does not reveal any acute fractures.    Physical therapy and Occupational Therapy evaluations have been ordered, recommending minimum resources on discharge  Thrombocytosis  Patient with a platelet count of 465.  Likely reactive.   Downtrending  Aortic ectasia (HCC)  Patient found on CT imaging to have a stable ascending aortic ectasia with maximum diameter of 4 cm.  Radiology recommending noncontrast CT follow-up in 12 months.  Bipolar I disorder (HCC)  Continue home medications.  As  noted above, patient with bizarre behavior on admission.  Question as to whether this is related to underlying infection such as a urinary tract infection.  Continue to closely monitor mentation.  If patient continues to exhibit bizarre behavior and confusion will need to consult psychiatry to evaluate for potential underlying psychiatric cause of her symptoms.    Leukocytosis improved, CK improved also.  Patient continues with bizarre behavior, tangential conversation, occasionally suspicious, whispering as to not be overheard.  Reconsulted psychiatry  Hyperlipidemia  Continue statin therapy  Heart healthy diet  Tobacco abuse  Nicotine patch ordered.  Recommend smoking cessation    VTE Pharmacologic Prophylaxis:   Moderate Risk (Score 3-4) - Pharmacological DVT Prophylaxis Ordered: heparin.    Mobility:   Basic Mobility Inpatient Raw Score: 19  JH-HLM Goal: 6: Walk 10 steps or more  JH-HLM Achieved: 7: Walk 25 feet or more  JH-HLM Goal achieved. Continue to encourage appropriate mobility.    Patient Centered Rounds: I performed bedside rounds with nursing staff today.   Discussions with Specialists or Other Care Team Provider: Multidisciplinary team    Education and Discussions with Family / Patient:  Patient declined, no contact information.     Current Length of Stay: 2 day(s)  Current Patient Status: Inpatient   Certification Statement: The patient will continue to require additional inpatient hospital stay due to IV abx, psychiatry consult  Discharge Plan: Anticipate discharge in 24-48 hrs to after evaluated by psychiatry with recommendations    Code Status: Level 1 - Full Code    Subjective   Patient seen sitting up in bed, had just had breakfast has a one-to-one observation sitter with her.  She is making bizarre motions with her hands as she is speaking.  She knows that she is in the hospital, knew the year however her conversation is extremely tangential jumping from topic to topic and making very little  sense.  At times she is whispering because she does not want to be overheard and is suspicious.  Other times she is smiling and laughing.    Objective :  Temp:  [98.1 °F (36.7 °C)-98.9 °F (37.2 °C)] 98.1 °F (36.7 °C)  BP: (113-189)/(60-92) 137/86  Resp:  [17-24] 18    Body mass index is 29.3 kg/m².     Input and Output Summary (last 24 hours):   No intake or output data in the 24 hours ending 12/09/24 1445    Physical Exam  Vitals and nursing note reviewed.   Constitutional:       Comments: Disheveled appearing   HENT:      Head: Normocephalic.      Nose: Nose normal.      Mouth/Throat:      Mouth: Mucous membranes are moist.   Eyes:      Extraocular Movements: Extraocular movements intact.      Conjunctiva/sclera: Conjunctivae normal.   Cardiovascular:      Rate and Rhythm: Normal rate and regular rhythm.      Pulses: Normal pulses.      Heart sounds: Normal heart sounds.   Pulmonary:      Effort: Pulmonary effort is normal.      Breath sounds: Normal breath sounds.   Abdominal:      General: Bowel sounds are normal. There is no distension.      Palpations: Abdomen is soft.      Tenderness: There is no abdominal tenderness.   Genitourinary:     Comments: Voiding spontaneously  Musculoskeletal:         General: Normal range of motion.      Cervical back: Normal range of motion.      Right lower leg: No edema.      Left lower leg: No edema.   Skin:     General: Skin is warm and dry.      Capillary Refill: Capillary refill takes less than 2 seconds.   Neurological:      General: No focal deficit present.      Mental Status: She is oriented to person, place, and time.   Psychiatric:         Mood and Affect: Affect is flat.         Speech: Speech is rapid and pressured.         Behavior: Behavior is hyperactive.         Thought Content: Thought content does not include suicidal ideation.         Cognition and Memory: Cognition normal.         Judgment: Judgment is impulsive.           Lines/Drains:              Lab  Results: I have reviewed the following results:   Results from last 7 days   Lab Units 12/09/24  0523   WBC Thousand/uL 8.71   HEMOGLOBIN g/dL 9.3*   HEMATOCRIT % 29.0*   PLATELETS Thousands/uL 371   SEGS PCT % 50   LYMPHO PCT % 36   MONO PCT % 10   EOS PCT % 3     Results from last 7 days   Lab Units 12/09/24  0523   SODIUM mmol/L 141   POTASSIUM mmol/L 3.9   CHLORIDE mmol/L 103   CO2 mmol/L 23   BUN mg/dL 13   CREATININE mg/dL 0.49*   ANION GAP mmol/L 15*   CALCIUM mg/dL 6.4*   ALBUMIN g/dL 2.5*   TOTAL BILIRUBIN mg/dL 0.48   ALK PHOS U/L 61   ALT U/L 17   AST U/L 18   GLUCOSE RANDOM mg/dL 70     Results from last 7 days   Lab Units 12/07/24  1229   INR  1.02                   Recent Cultures (last 7 days):   Results from last 7 days   Lab Units 12/03/24  2144   URINE CULTURE  >100,000 cfu/ml - Presumptive Gardnerella vaginalis*  <10,000 cfu/ml       Imaging Results Review: I reviewed radiology reports from this admission including: CT abdomen/pelvis.  Other Study Results Review: No additional pertinent studies reviewed.    Last 24 Hours Medication List:     Current Facility-Administered Medications:     acetaminophen (TYLENOL) tablet 650 mg, Q6H PRN    albuterol inhalation solution 2.5 mg, Q6H PRN    aspirin chewable tablet 81 mg, Daily    atorvastatin (LIPITOR) tablet 40 mg, Daily    busPIRone (BUSPAR) tablet 15 mg, TID    cefTRIAXone (ROCEPHIN) IVPB (premix in dextrose) 1,000 mg 50 mL, Q24H, Last Rate: 1,000 mg (12/09/24 1056)    desvenlafaxine succinate (PRISTIQ) 24 hr tablet 50 mg, Daily    docusate sodium (COLACE) capsule 100 mg, BID    fluticasone (FLONASE) 50 mcg/act nasal spray 1 spray, Daily    heparin (porcine) subcutaneous injection 5,000 Units, Q12H BEAR    hydrOXYzine HCL (ATARAX) tablet 50 mg, Daily    Ketotifen Fumarate (ZADITOR) 0.035 % ophthalmic solution 1 drop, BID    lamoTRIgine (LaMICtal) tablet 200 mg, Daily    loratadine (CLARITIN) tablet 10 mg, Daily    LORazepam (ATIVAN) injection 0.5 mg,  Q6H PRN    multi-electrolyte (PLASMALYTE-A/ISOLYTE-S PH 7.4) IV solution, Continuous, Last Rate: 100 mL/hr (12/08/24 2129)    multivitamin stress formula tablet 1 tablet, Daily    nicotine (NICODERM CQ) 14 mg/24hr TD 24 hr patch 1 patch, Daily    ondansetron (ZOFRAN) injection 4 mg, Q6H PRN    oxybutynin (DITROPAN-XL) 24 hr tablet 10 mg, Daily    pantoprazole (PROTONIX) EC tablet 40 mg, Daily    polyethylene glycol (MIRALAX) packet 17 g, Daily PRN    propranolol (INDERAL) tablet 10 mg, Daily    umeclidinium 62.5 mcg/actuation inhaler AEPB 1 puff, Daily    ziprasidone (GEODON) capsule 80 mg, QPM    Administrative Statements   Today, Patient Was Seen By: NALDO Crews  I have spent a total time of greater than 45 minutes in caring for this patient on the day of the visit/encounter including Diagnostic results, Counseling / Coordination of care, Documenting in the medical record, Reviewing / ordering tests, medicine, procedures  , Obtaining or reviewing history  , and Communicating with other healthcare professionals .    **Please Note: This note may have been constructed using a voice recognition system.**

## 2024-12-09 NOTE — PLAN OF CARE
Problem: Potential for Falls  Goal: Patient will remain free of falls  Description: INTERVENTIONS:  - Educate patient/family on patient safety including physical limitations  - Instruct patient to call for assistance with activity   - Consult OT/PT to assist with strengthening/mobility   - Keep Call bell within reach  - Keep bed low and locked with side rails adjusted as appropriate  - Keep care items and personal belongings within reach  - Initiate and maintain comfort rounds  - Make Fall Risk Sign visible to staff  - Offer Toileting every  Hours, in advance of need  - Initiate/Maintain BED alarm  - Obtain necessary fall risk management equipment: YELLOW SOCKS  - Apply yellow socks and bracelet for high fall risk patients  - Consider moving patient to room near nurses station  Outcome: Progressing     Problem: RESPIRATORY - ADULT  Goal: Achieves optimal ventilation and oxygenation  Description: INTERVENTIONS:  - Assess for changes in respiratory status  - Assess for changes in mentation and behavior  - Position to facilitate oxygenation and minimize respiratory effort  - Oxygen administered by appropriate delivery if ordered  - Initiate smoking cessation education as indicated  - Encourage broncho-pulmonary hygiene including cough, deep breathe, Incentive Spirometry  - Assess the need for suctioning and aspirate as needed  - Assess and instruct to report SOB or any respiratory difficulty  - Respiratory Therapy support as indicated  Outcome: Progressing

## 2024-12-10 LAB
25(OH)D3 SERPL-MCNC: 30.4 NG/ML (ref 30–100)
AMMONIA PLAS-SCNC: 39 UMOL/L (ref 18–72)
ANION GAP SERPL CALCULATED.3IONS-SCNC: 4 MMOL/L (ref 4–13)
BUN SERPL-MCNC: 19 MG/DL (ref 5–25)
CALCIUM SERPL-MCNC: 8.3 MG/DL (ref 8.4–10.2)
CHLORIDE SERPL-SCNC: 103 MMOL/L (ref 96–108)
CO2 SERPL-SCNC: 31 MMOL/L (ref 21–32)
CREAT SERPL-MCNC: 0.67 MG/DL (ref 0.6–1.3)
ERYTHROCYTE [DISTWIDTH] IN BLOOD BY AUTOMATED COUNT: 14.4 % (ref 11.6–15.1)
GFR SERPL CREATININE-BSD FRML MDRD: 93 ML/MIN/1.73SQ M
GLUCOSE SERPL-MCNC: 92 MG/DL (ref 65–140)
HCT VFR BLD AUTO: 29.7 % (ref 34.8–46.1)
HGB BLD-MCNC: 9.4 G/DL (ref 11.5–15.4)
MCH RBC QN AUTO: 28.8 PG (ref 26.8–34.3)
MCHC RBC AUTO-ENTMCNC: 31.6 G/DL (ref 31.4–37.4)
MCV RBC AUTO: 91 FL (ref 82–98)
PLATELET # BLD AUTO: 364 THOUSANDS/UL (ref 149–390)
PMV BLD AUTO: 10 FL (ref 8.9–12.7)
POTASSIUM SERPL-SCNC: 3.3 MMOL/L (ref 3.5–5.3)
PROCALCITONIN SERPL-MCNC: <0.05 NG/ML
RBC # BLD AUTO: 3.26 MILLION/UL (ref 3.81–5.12)
SODIUM SERPL-SCNC: 138 MMOL/L (ref 135–147)
TREPONEMA PALLIDUM IGG+IGM AB [PRESENCE] IN SERUM OR PLASMA BY IMMUNOASSAY: NORMAL
VIT B12 SERPL-MCNC: 366 PG/ML (ref 180–914)
WBC # BLD AUTO: 8.54 THOUSAND/UL (ref 4.31–10.16)

## 2024-12-10 PROCEDURE — 84145 PROCALCITONIN (PCT): CPT | Performed by: NURSE PRACTITIONER

## 2024-12-10 PROCEDURE — 86780 TREPONEMA PALLIDUM: CPT | Performed by: NURSE PRACTITIONER

## 2024-12-10 PROCEDURE — NC001 PR NO CHARGE: Performed by: PSYCHIATRY & NEUROLOGY

## 2024-12-10 PROCEDURE — 80048 BASIC METABOLIC PNL TOTAL CA: CPT | Performed by: NURSE PRACTITIONER

## 2024-12-10 PROCEDURE — 85027 COMPLETE CBC AUTOMATED: CPT | Performed by: NURSE PRACTITIONER

## 2024-12-10 PROCEDURE — 82607 VITAMIN B-12: CPT | Performed by: NURSE PRACTITIONER

## 2024-12-10 PROCEDURE — 99232 SBSQ HOSP IP/OBS MODERATE 35: CPT | Performed by: NURSE PRACTITIONER

## 2024-12-10 PROCEDURE — 82306 VITAMIN D 25 HYDROXY: CPT | Performed by: NURSE PRACTITIONER

## 2024-12-10 PROCEDURE — 99255 IP/OBS CONSLTJ NEW/EST HI 80: CPT | Performed by: PSYCHIATRY & NEUROLOGY

## 2024-12-10 PROCEDURE — 99233 SBSQ HOSP IP/OBS HIGH 50: CPT | Performed by: PSYCHIATRY & NEUROLOGY

## 2024-12-10 PROCEDURE — 84446 ASSAY OF VITAMIN E: CPT | Performed by: NURSE PRACTITIONER

## 2024-12-10 PROCEDURE — 82140 ASSAY OF AMMONIA: CPT | Performed by: NURSE PRACTITIONER

## 2024-12-10 RX ORDER — LORAZEPAM 0.5 MG/1
0.5 TABLET ORAL EVERY 8 HOURS PRN
Status: DISCONTINUED | OUTPATIENT
Start: 2024-12-10 | End: 2024-12-10

## 2024-12-10 RX ORDER — LORAZEPAM 0.5 MG/1
0.5 TABLET ORAL EVERY 6 HOURS PRN
Status: DISCONTINUED | OUTPATIENT
Start: 2024-12-10 | End: 2024-12-11

## 2024-12-10 RX ORDER — LORAZEPAM 2 MG/ML
1 INJECTION INTRAMUSCULAR ONCE
Status: COMPLETED | OUTPATIENT
Start: 2024-12-10 | End: 2024-12-10

## 2024-12-10 RX ORDER — POTASSIUM CHLORIDE 1500 MG/1
40 TABLET, EXTENDED RELEASE ORAL ONCE
Status: COMPLETED | OUTPATIENT
Start: 2024-12-10 | End: 2024-12-10

## 2024-12-10 RX ORDER — LORAZEPAM 2 MG/ML
0.5 INJECTION INTRAMUSCULAR ONCE
Status: COMPLETED | OUTPATIENT
Start: 2024-12-10 | End: 2024-12-10

## 2024-12-10 RX ORDER — METRONIDAZOLE 500 MG/1
500 TABLET ORAL EVERY 12 HOURS SCHEDULED
Status: DISCONTINUED | OUTPATIENT
Start: 2024-12-10 | End: 2024-12-14 | Stop reason: HOSPADM

## 2024-12-10 RX ORDER — OLANZAPINE 2.5 MG/1
2.5 TABLET, FILM COATED ORAL 2 TIMES DAILY
Status: DISCONTINUED | OUTPATIENT
Start: 2024-12-10 | End: 2024-12-12

## 2024-12-10 RX ADMIN — PROPRANOLOL HYDROCHLORIDE 10 MG: 20 TABLET ORAL at 11:28

## 2024-12-10 RX ADMIN — LORAZEPAM 0.5 MG: 2 INJECTION INTRAMUSCULAR; INTRAVENOUS at 10:57

## 2024-12-10 RX ADMIN — KETOTIFEN FUMARATE 1 DROP: 0.25 SOLUTION/ DROPS OPHTHALMIC at 11:09

## 2024-12-10 RX ADMIN — LORAZEPAM 1 MG: 2 INJECTION INTRAMUSCULAR; INTRAVENOUS at 20:24

## 2024-12-10 RX ADMIN — BUSPIRONE HYDROCHLORIDE 15 MG: 5 TABLET ORAL at 21:00

## 2024-12-10 RX ADMIN — ATORVASTATIN CALCIUM 40 MG: 40 TABLET, FILM COATED ORAL at 11:28

## 2024-12-10 RX ADMIN — LORAZEPAM 0.5 MG: 0.5 TABLET ORAL at 15:43

## 2024-12-10 RX ADMIN — OLANZAPINE 2.5 MG: 2.5 TABLET, FILM COATED ORAL at 20:24

## 2024-12-10 RX ADMIN — DESVENLAFAXINE 50 MG: 50 TABLET, FILM COATED, EXTENDED RELEASE ORAL at 11:08

## 2024-12-10 RX ADMIN — METRONIDAZOLE 500 MG: 500 TABLET ORAL at 15:46

## 2024-12-10 RX ADMIN — HYDROXYZINE HYDROCHLORIDE 50 MG: 25 TABLET ORAL at 11:00

## 2024-12-10 RX ADMIN — PANTOPRAZOLE SODIUM 40 MG: 40 TABLET, DELAYED RELEASE ORAL at 11:28

## 2024-12-10 RX ADMIN — LAMOTRIGINE 200 MG: 100 TABLET ORAL at 10:59

## 2024-12-10 RX ADMIN — ASPIRIN 81 MG CHEWABLE TABLET 81 MG: 81 TABLET CHEWABLE at 11:00

## 2024-12-10 RX ADMIN — HEPARIN SODIUM 5000 UNITS: 5000 INJECTION, SOLUTION INTRAVENOUS; SUBCUTANEOUS at 21:00

## 2024-12-10 RX ADMIN — BUSPIRONE HYDROCHLORIDE 15 MG: 5 TABLET ORAL at 11:01

## 2024-12-10 RX ADMIN — B-COMPLEX W/ C & FOLIC ACID TAB 1 TABLET: TAB at 11:01

## 2024-12-10 RX ADMIN — DOCUSATE SODIUM 100 MG: 100 CAPSULE, LIQUID FILLED ORAL at 20:24

## 2024-12-10 RX ADMIN — OXYBUTYNIN 10 MG: 5 TABLET, FILM COATED, EXTENDED RELEASE ORAL at 11:28

## 2024-12-10 RX ADMIN — HEPARIN SODIUM 5000 UNITS: 5000 INJECTION, SOLUTION INTRAVENOUS; SUBCUTANEOUS at 10:57

## 2024-12-10 RX ADMIN — UMECLIDINIUM 1 PUFF: 62.5 AEROSOL, POWDER ORAL at 11:10

## 2024-12-10 RX ADMIN — DOCUSATE SODIUM 100 MG: 100 CAPSULE, LIQUID FILLED ORAL at 11:00

## 2024-12-10 RX ADMIN — LORATADINE 10 MG: 10 TABLET ORAL at 11:00

## 2024-12-10 RX ADMIN — NICOTINE 1 PATCH: 14 PATCH, EXTENDED RELEASE TRANSDERMAL at 11:20

## 2024-12-10 RX ADMIN — FLUTICASONE PROPIONATE 1 SPRAY: 50 SPRAY, METERED NASAL at 11:09

## 2024-12-10 RX ADMIN — OLANZAPINE 2.5 MG: 2.5 TABLET, FILM COATED ORAL at 11:28

## 2024-12-10 RX ADMIN — POTASSIUM CHLORIDE 40 MEQ: 1500 TABLET, EXTENDED RELEASE ORAL at 10:57

## 2024-12-10 RX ADMIN — LORAZEPAM 0.5 MG: 2 INJECTION INTRAMUSCULAR; INTRAVENOUS at 17:00

## 2024-12-10 RX ADMIN — BUSPIRONE HYDROCHLORIDE 15 MG: 5 TABLET ORAL at 15:43

## 2024-12-10 NOTE — PROGRESS NOTES
Progress Note - Hospitalist   Name: Rosmery Vargas 64 y.o. female I MRN: [id number]  Unit/Bed#: 2 [address] Date of Admission: 12/7/2024   Date of Service: 12/10/2024 I Hospital Day: 3    Assessment & Plan  Toxic metabolic encephalopathy  Patient was seen and examined in the emergency department and will be admitted to the hospital for further evaluation and management.  Patient presenting with increased confusion and falls at home.  Patient is exhibiting bizarre behavior in the emergency room.  Question as to whether this is from an underlying medical issue such as infection, acute kidney injury or rhabdomyolysis or if this is from underlying psychiatric illness.    SHERON has resolved, continue to Isolyte 100 cc/hour  UTI treatment with ceftriaxone; urine culture demonstrating Gardnerella vaginalis, starting on Flagyl 500 mg p.o. twice daily for 1 week  Discussed with on-call ID, recommend Pro-Raoul if negative de-escalate ceftriaxone  Follow-up psychiatry evaluation recommended, re-consulted psych as metabolically patient is improving  Appreciate psychiatry recommendations, will have neurology also evaluate patient for completeness  MRI brain ordered  Will also follow-up with RPR, HIV, vitamin levels, VBG and ammonia level for completeness  Acute kidney injury (HCC)  Patient with evidence of an acute kidney injury with a creatinine level of 1.71.  Likely secondary to dehydration and poor oral intake.      Creatinine improved to 0.49  Discontinued IV fluids at this time as oral intake is adequate  Repeat BMP in a.m.    Rhabdomyolysis  Patient with evidence of acute rhabdomyolysis with an elevated CK level of 701.  IV fluid boluses were ordered in the emergency room.  Continue IV fluids.      Adequate oral intake noted, discontinue IV fluids  Repeat CK in a.m.  Leukocytosis  Down trended  Afebrile  Repeat CBC in a.m.  Falls  Patient with multiple falls over the last day.    Imaging does not reveal any acute  fractures.    Physical therapy and Occupational Therapy evaluations have been ordered, recommending minimum resources on discharge  Thrombocytosis  Patient with a platelet count of 465.  Likely reactive.   Downtrending currently 364  Aortic ectasia (HCC)  Patient found on CT imaging to have a stable ascending aortic ectasia with maximum diameter of 4 cm.  Radiology recommending noncontrast CT follow-up in 12 months.  Bipolar I disorder (HCC)  Continue home medications.  As noted above, patient with bizarre behavior on admission.  Question as to whether this is related to underlying infection such as a urinary tract infection.  Continue to closely monitor mentation.  If patient continues to exhibit bizarre behavior and confusion will need to consult psychiatry to evaluate for potential underlying psychiatric cause of her symptoms.    Leukocytosis improved, CK improved also.  Patient continues with bizarre behavior, tangential conversation, occasionally suspicious, whispering as to not be overheard.  Reconsulted psychiatry, discussed with them, will have neurology evaluate patient, checking RPR, HIV, vitamin levels, VBG and ammonia level to ensure completeness for metabolic workup.  Appreciate psychiatry recommendations with changes to medications, indicated if medical workup is negative would recommend inpatient psychiatric hospitalization    Hyperlipidemia  Continue statin therapy  Heart healthy diet  Tobacco abuse  Nicotine patch ordered.  Recommend smoking cessation    VTE Pharmacologic Prophylaxis:   Moderate Risk (Score 3-4) - Pharmacological DVT Prophylaxis Ordered: heparin.    Mobility:   Basic Mobility Inpatient Raw Score: 19  JH-HLM Goal: 6: Walk 10 steps or more  JH-HLM Achieved: 6: Walk 10 steps or more  JH-HLM Goal achieved. Continue to encourage appropriate mobility.    Patient Centered Rounds: I performed bedside rounds with nursing staff today.   Discussions with Specialists or Other Care Team  Provider: Multidisciplinary team    Education and Discussions with Family / Patient:  No contact listed.     Current Length of Stay: 3 day(s)  Current Patient Status: Inpatient   Certification Statement: The patient will continue to require additional inpatient hospital stay due to continue metabolic workup, psychiatry consulted, neurology consulted, MRI brain  Discharge Plan: Anticipate discharge in 24-48 hrs to current recommendation is when medically cleared inpatient psych    Code Status: Level 1 - Full Code    Subjective   Patient seen laying in bed, has one-to-one observation sitter in the room with her.  She was able to tell me that she was at [facility] and that it was 2024.  She did show some insight to why she came to the hospital however she then became very tangential, repeating herself, making frequent sexual comments.  She once again began whispering and then just mouthing words.    Objective :  Temp:  [97.9 °F (36.6 °C)-98.7 °F (37.1 °C)] 97.9 °F (36.6 °C)  BP: (105-156)/() 156/95    Body mass index is 29.3 kg/m².     Input and Output Summary (last 24 hours):   No intake or output data in the 24 hours ending 12/10/24 1434    Physical Exam  Vitals reviewed.   Constitutional:       Comments: Disheveled appearance   HENT:      Head: Normocephalic.      Nose: Nose normal.      Mouth/Throat:      Mouth: Mucous membranes are moist.   Eyes:      Extraocular Movements: Extraocular movements intact.      Conjunctiva/sclera: Conjunctivae normal.      Pupils: Pupils are equal, round, and reactive to light.   Cardiovascular:      Rate and Rhythm: Normal rate and regular rhythm.      Pulses: Normal pulses.      Heart sounds: Normal heart sounds.   Pulmonary:      Effort: Pulmonary effort is normal. No respiratory distress.      Breath sounds: Normal breath sounds.   Abdominal:      General: Bowel sounds are normal. There is no distension.      Palpations: Abdomen is soft.      Tenderness: There  is no abdominal tenderness.   Genitourinary:     Comments: Voiding spontaneously  Musculoskeletal:         General: Normal range of motion.      Cervical back: Normal range of motion.      Right lower leg: No edema.      Left lower leg: No edema.   Skin:     General: Skin is warm and dry.      Capillary Refill: Capillary refill takes less than 2 seconds.   Neurological:      General: No focal deficit present.      Mental Status: She is oriented to person, place, and time.   Psychiatric:         Mood and Affect: Mood normal.         Behavior: Behavior normal.         Thought Content: Thought content normal.         Judgment: Judgment normal.           Lines/Drains:              Lab Results: I have reviewed the following results:   Results from last 7 days   Lab Units 12/10/24  0436 12/09/24  0523   WBC Thousand/uL 8.54 8.71   HEMOGLOBIN g/dL 9.4* 9.3*   HEMATOCRIT % 29.7* 29.0*   PLATELETS Thousands/uL 364 371   SEGS PCT %  --  50   LYMPHO PCT %  --  36   MONO PCT %  --  10   EOS PCT %  --  3     Results from last 7 days   Lab Units 12/10/24  0436 12/09/24  0523   SODIUM mmol/L 138 141   POTASSIUM mmol/L 3.3* 3.9   CHLORIDE mmol/L 103 103   CO2 mmol/L 31 23   BUN mg/dL 19 13   CREATININE mg/dL 0.67 0.49*   ANION GAP mmol/L 4 15*   CALCIUM mg/dL 8.3* 6.4*   ALBUMIN g/dL  --  2.5*   TOTAL BILIRUBIN mg/dL  --  0.48   ALK PHOS U/L  --  61   ALT U/L  --  17   AST U/L  --  18   GLUCOSE RANDOM mg/dL 92 70     Results from last 7 days   Lab Units 12/07/24  1229   INR  1.02                   Recent Cultures (last 7 days):   Results from last 7 days   Lab Units 12/03/24  2144   URINE CULTURE  >100,000 cfu/ml - Presumptive Gardnerella vaginalis*  <10,000 cfu/ml       Imaging Results Review: I reviewed radiology reports from this admission including: CT chest and CT abdomen/pelvis.  Other Study Results Review: No additional pertinent studies reviewed.    Last 24 Hours Medication List:     Current Facility-Administered  Medications:     acetaminophen (TYLENOL) tablet 650 mg, Q6H PRN    albuterol inhalation solution 2.5 mg, Q6H PRN    aspirin chewable tablet 81 mg, Daily    atorvastatin (LIPITOR) tablet 40 mg, Daily    busPIRone (BUSPAR) tablet 15 mg, TID    cefTRIAXone (ROCEPHIN) IVPB (premix in dextrose) 1,000 mg 50 mL, Q24H, Last Rate: 1,000 mg (12/09/24 1056)    [Held by provider] desvenlafaxine succinate (PRISTIQ) 24 hr tablet 50 mg, Daily    docusate sodium (COLACE) capsule 100 mg, BID    fluticasone (FLONASE) 50 mcg/act nasal spray 1 spray, Daily    heparin (porcine) subcutaneous injection 5,000 Units, Q12H BEAR    Ketotifen Fumarate (ZADITOR) 0.035 % ophthalmic solution 1 drop, BID    lamoTRIgine (LaMICtal) tablet 200 mg, Daily    loratadine (CLARITIN) tablet 10 mg, Daily    LORazepam (ATIVAN) tablet 0.5 mg, Q8H PRN    metroNIDAZOLE (FLAGYL) tablet 500 mg, Q12H BEAR    multivitamin stress formula tablet 1 tablet, Daily    nicotine (NICODERM CQ) 14 mg/24hr TD 24 hr patch 1 patch, Daily    OLANZapine (ZyPREXA) tablet 2.5 mg, BID    ondansetron (ZOFRAN) injection 4 mg, Q6H PRN    oxybutynin (DITROPAN-XL) 24 hr tablet 10 mg, Daily    pantoprazole (PROTONIX) EC tablet 40 mg, Daily    polyethylene glycol (MIRALAX) packet 17 g, Daily PRN    propranolol (INDERAL) tablet 10 mg, Daily    umeclidinium 62.5 mcg/actuation inhaler AEPB 1 puff, Daily    Administrative Statements   Today, Patient Was Seen By: NALDO Crews  I have spent a total time of greater than 45 minutes in caring for this patient on the day of the visit/encounter including Diagnostic results, Counseling / Coordination of care, Documenting in the medical record, Reviewing / ordering tests, medicine, procedures  , and Communicating with other healthcare professionals .    **Please Note: This note may have been constructed using a voice recognition system.**

## 2024-12-10 NOTE — ASSESSMENT & PLAN NOTE
Patient was seen and examined in the emergency department and will be admitted to the hospital for further evaluation and management.  Patient presenting with increased confusion and falls at home.  Patient is exhibiting bizarre behavior in the emergency room.  Question as to whether this is from an underlying medical issue such as infection, acute kidney injury or rhabdomyolysis or if this is from underlying psychiatric illness.    HSERON has resolved, continue to Isolyte 100 cc/hour  UTI treatment with ceftriaxone; urine culture demonstrating Gardnerella vaginalis, starting on Flagyl 500 mg p.o. twice daily for 1 week  Discussed with on-call ID, recommend Pro-Raoul if negative de-escalate ceftriaxone  Follow-up psychiatry evaluation recommended, re-consulted psych as metabolically patient is improving  Appreciate psychiatry recommendations, will have neurology also evaluate patient for completeness  MRI brain ordered  Will also follow-up with RPR, HIV, vitamin levels, VBG and ammonia level for completeness
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
## 2024-12-10 NOTE — ASSESSMENT & PLAN NOTE
Patient with evidence of acute rhabdomyolysis with an elevated CK level of 701.  IV fluid boluses were ordered in the emergency room.  Continue IV fluids.      Adequate oral intake noted, discontinue IV fluids  Repeat CK in a.m.

## 2024-12-10 NOTE — CONSULTS
[facility]   Neurology Initial Consult    Rosmery Vargas is a 64 y.o. female  [address]          Information obtained from:   Chief Complaint   Patient presents with    Fall     Pt presents with EMS for fall, unwitnessed.       Assessment & Plan  Toxic metabolic encephalopathy  Patient is a 64-year-old female with PMH of COPD, bipolar disorder, HLD and recent treatment for UTIs who has had multiple ER visits for falls, weakness, confusion and UTI over the course of the past 2 months.  Patient reported back to the ER on 12/7/2024 after sustaining multiple falls in the home.  She presented with behaviors, waving her hands and yelling.  She had reported difficulties with bilateral hand pain related to dry chapped hands.  Indicated she was unable to provide herself lotion to her hands.  Over the course of time patient's behaviors appear to becoming more erratic, question underlying metabolic etiology for toxic metabolic encephalopathy versus psychiatric etiology.  Patient was admitted, psychiatry evaluated patient and feels patient does need inpatient psych care however may have underlying neurological or metabolic causes to her behaviors requesting consultation.  Patient has positive UTI for which medical team has had discussion with infectious disease.  Patient is alert and oriented x 3 however has speech that is tangential.  She gets confused and needs redirecting for simple commands.  She has slightly ataxic gait with freezing type gait pattern to the left lower extremity.  Will get an MRI of the brain for further evaluation of centralized cause for her changes.  Patient is also sexually active without use of protection.  Will assess for RPR and HIV.  Patient also may have some vitamin deficiencies leading to ataxia motor changes will get vitamin D, B12 and  E.    -Fall risk  -MRI of the brain with without contrast  -Labs with RPR, HIV, vitamin D, vitamin B12, vitamin D  - continue with  PT/OT  -Avoid medications that can alter patient mentation including benzodiazepines, sedatives and certain antibiotics such as cefepime  - IV fluids per medical team  - Antibiotics per medical team      Bipolar I disorder ([facility])  Psychiatry consulted-recommendations appreciated  Acute kidney injury (HCC)  IV fluid hydration per the medical team  Rhabdomyolysis  The fluid hydration per the medical team  Leukocytosis  IV antibiotics per the medical team  Falls  PT/OT  MRI of the Brain w/wo contrast        HPI:  Rosmery Vargas is a 65yo female with PMH of COPD, HLD, bipolar disorder who had presented to the ER on 12/7 with increased hand pain, dry skin, tremors and abnormal mental state.  Patient's creatinine at that time was 1.71, she was hypocalcemic.  CK was 701 with reported frequent falls at home.  Her WBC count is 19.31.  UDS positive for benzodiazepine.  Patient was admitted for psychiatric evaluation and other metabolic/medical needs.  Psychiatry was consulted and evaluated patient today, notes bipolar disorder requiring one-on-one management.  Multiple medication changes have been previously made and continued to be made at this point in time.  Would start Zyprexa discontinue her Geodon.  Pristiq is on hold and continued BuSpar and Lamictal.  Continue Ativan IV every 6 hours as needed for her agitation.  Per psychiatry recommending inpatient psychiatric hospitalization however has requested neurology consultation for neurological assessments regarding other underlying etiology.  Reviewed patient's medical that she has had frequent ER visits since October 5, 2024.  She has had multiple visits regarding possible UTIs, falls, hand and/or wrist pain, weakness.  She has had multiple opportunities to treat for UTI however comes back indicating she feels antibiotics are interfering with her medications has not fully completed her antibiotic therapies.  She initially came back on 12 7 due to bilateral hand pain,  noted dry chapped hands unable to provide her own lotion due to her tremors.  She had been discharged, returned again after having multiple falls.  She displayed odd behaviors with waving her hands and yelling.  Patient having difficulties maintaining the thought process.  Met with patient at bedside, she is alert and oriented x 3 however during conversation her subject matter becomes tangential.  She becomes repetitive with perseveri speech.  Her movements are somewhat spastic, her gait can be ataxic.  Utilizing the rolling walker.  She has some freezing type gait pattern with the left lower extremity and is unsteady on her feet.  She does have tremor with action, some mild resting tremor of the hands bilaterally.  She denies having any tremors to the lower extremities however does report having weakness and falls.  Patient denies headache, notes that she has been having lightheadedness causing her falls at home.  She is able to follow some simple commands however requiring frequent refocusing and questioning with regards to specifics such as sensation other subjective assessment needs.  Patient is unclear as to when her mental status started to change, she is unaware of medical changes although notes that she is not getting the medicine that she expects.  She is on likely related good medical history on her.  She was treated with family guidance, unclear how recent that treatment has been and/or if they have a bed for most recent medications.  Patient is also sexually active, she denies use of protection.  She states that she was very sexually oriented person as well as her mother.  Will get RPR and HIV, discussed with attending medical team.  They are also consulting for abnormal urine culture.  To note also getting vitamin levels to evaluate for  deficiencies leading to weakness, ataxia and spasticity.    Past Medical History:   Diagnosis Date    Asthma     Bladder incontinence     Chronic pain     back    COPD  (chronic obstructive pulmonary disease) (HCC)     GERD (gastroesophageal reflux disease)     Hyperlipidemia     Psychiatric disorder     bipolar       Past Surgical History:   Procedure Laterality Date    TUBAL LIGATION         No Known Allergies      Current Facility-Administered Medications:     acetaminophen (TYLENOL) tablet 650 mg, 650 mg, Oral, Q6H PRN, Cesar Lara MD    albuterol inhalation solution 2.5 mg, 2.5 mg, Nebulization, Q6H PRN, Cesar Lara MD    aspirin chewable tablet 81 mg, 81 mg, Oral, Daily, Cesar Lara MD, 81 mg at 12/10/24 1100    atorvastatin (LIPITOR) tablet 40 mg, 40 mg, Oral, Daily, Cesar Lara MD, 40 mg at 12/10/24 1128    busPIRone (BUSPAR) tablet 15 mg, 15 mg, Oral, TID, Cesar Lara MD, 15 mg at 12/10/24 1101    cefTRIAXone (ROCEPHIN) IVPB (premix in dextrose) 1,000 mg 50 mL, 1,000 mg, Intravenous, Q24H, Odell Cunningham DO, Last Rate: 100 mL/hr at 12/09/24 1056, 1,000 mg at 12/09/24 1056    [Held by provider] desvenlafaxine succinate (PRISTIQ) 24 hr tablet 50 mg, 50 mg, Oral, Daily, Cesar Lara MD, 50 mg at 12/10/24 1108    docusate sodium (COLACE) capsule 100 mg, 100 mg, Oral, BID, Cesar aLra MD, 100 mg at 12/10/24 1100    fluticasone (FLONASE) 50 mcg/act nasal spray 1 spray, 1 spray, Nasal, Daily, Cesar Lara MD, 1 spray at 12/10/24 1109    heparin (porcine) subcutaneous injection 5,000 Units, 5,000 Units, Subcutaneous, Q12H BEAR, Cesar Lara MD, 5,000 Units at 12/10/24 1057    Ketotifen Fumarate (ZADITOR) 0.035 % ophthalmic solution 1 drop, 1 drop, Both Eyes, BID, Cesar Lara MD, 1 drop at 12/10/24 1109    lamoTRIgine (LaMICtal) tablet 200 mg, 200 mg, Oral, Daily, Cesar Lara MD, 200 mg at 12/10/24 1059    loratadine (CLARITIN) tablet 10 mg, 10 mg, Oral, Daily, Cesar Lara MD, 10 mg at 12/10/24 1100    LORazepam (ATIVAN) injection 0.5 mg, 0.5 mg,  Intravenous, Q6H PRN, Odell Cunningham DO, 0.5 mg at 12/10/24 1057    multivitamin stress formula tablet 1 tablet, 1 tablet, Oral, Daily, Cesar Lara MD, 1 tablet at 12/10/24 1101    nicotine (NICODERM CQ) 14 mg/24hr TD 24 hr patch 1 patch, 1 patch, Transdermal, Daily, Cesar Lara MD, 1 patch at 12/10/24 1120    OLANZapine (ZyPREXA) tablet 2.5 mg, 2.5 mg, Oral, BID, Christian Copeland DO, 2.5 mg at 12/10/24 1128    ondansetron (ZOFRAN) injection 4 mg, 4 mg, Intravenous, Q6H PRN, Cesar Lara MD    oxybutynin (DITROPAN-XL) 24 hr tablet 10 mg, 10 mg, Oral, Daily, Cesar Lara MD, 10 mg at 12/10/24 1128    pantoprazole (PROTONIX) EC tablet 40 mg, 40 mg, Oral, Daily, Cesar Lara MD, 40 mg at 12/10/24 1128    polyethylene glycol (MIRALAX) packet 17 g, 17 g, Oral, Daily PRN, Cesar Lara MD    propranolol (INDERAL) tablet 10 mg, 10 mg, Oral, Daily, Cesar Lara MD, 10 mg at 12/10/24 1128    umeclidinium 62.5 mcg/actuation inhaler AEPB 1 puff, 1 puff, Inhalation, Daily, Cesar Lara MD, 1 puff at 12/10/24 1110    Social History     Socioeconomic History    Marital status:      Spouse name: Not on file    Number of children: Not on file    Years of education: Not on file    Highest education level: Not on file   Occupational History    Not on file   Tobacco Use    Smoking status: Every Day     Current packs/day: 0.50     Average packs/day: 0.5 packs/day for 0.6 years (0.3 ttl pk-yrs)     Types: Cigarettes     Start date: 05/2024    Smokeless tobacco: Never   Vaping Use    Vaping status: Never Used   Substance and Sexual Activity    Alcohol use: No    Drug use: No    Sexual activity: Yes   Other Topics Concern    Not on file   Social History Narrative    Not on file     Social Drivers of Health     Financial Resource Strain: Not on file   Food Insecurity: Not on file   Transportation Needs: Not on file   Physical Activity: Not on  "file   Stress: Not on file   Social Connections: Not on file   Intimate Partner Violence: Not on file   Housing Stability: Not on file       Family History   Problem Relation Age of Onset    No Known Problems Mother     No Known Problems Sister     No Known Problems Daughter     No Known Problems Maternal Aunt          Review of systems:  Pt is unable to adequately provide subjective feedback  Pt reports that \"I make honest mistakes\"  Denies headache, reports + lightheadedness   Pt reports she \"just wants to do the right thing and go home\"    Physical examination:  /95   Pulse 89   Temp 97.9 °F (36.6 °C)   Resp 18   Wt 90 kg (198 lb 6.4 oz)   SpO2 98%   BMI 29.30 kg/m²     GENERAL APPEARANCE:  The patient is alert, oriented.    HEENT:  Head is normocephalic.  Pupils are equal and reactive.    NECK:  Supple without lymphadenopathy.   HEART:  Regular rate and rhythm.  LUNGS:  No audible wheezing or stridor heard.  ABDOMEN:  Soft, nontender, nondistended EXTREMITIES:  Without cyanosis, clubbing or edema.     Mental status:  The patient is alert, attentive, and oriented.   Speech is clear, subject is tangential and hyperreactive. She can follow some simple command, requires refocus and time for appropriate feedback.   Cranial nerves:  CN II: Visual fields are full to confrontation.   Fundoscopic exam is limited for assessment  Pupils are 3 mm and briskly reactive to light.   CN III, IV, VI: At primary gaze, there is no eye deviation.   CN V: Facial sensation is intact in all 3 divisions bilaterally.   Corneal responses are intact.  CN VII: Face is symmetric with normal eye closure and smile.  CN VIII: Hearing is normal to rubbing fingers  CN IX, X: Palate elevates symmetrically. Phonation is normal.  CN XI: Head turning and shoulder shrug are intact  CN XII: Tongue is midline with normal movements and no atrophy.  Motor:  There is no pronator drift of out-stretched arms.    Muscle bulk and tone are normal. " "  Muscle exam  Arm Right Left Leg Right Left   Deltoid 5/5 5/5 Iliopsoas 5/5 5/5   Biceps 5/5 5/5 Quads 5/5 5/5   Triceps 5/5 5/5 Hamstrings 5/5 5/5   Wrist Extension 5/5 5/5 Ankle Dorsi Flexion 5/5 5/5   Wrist Flexion 5/5 5/5 Ankle Plantar Flexion 5/5 5/5        Reflexes    RJ BJ TJ KJ AJ Plantars Ruffin's   Right 4+ 4+  4+ 4+ Downgoing Not present   Left 4+ 4+  4+ 4+ Downgoing Not present     Reflex exam may be skewed 2nd to pt anticipation of the testing and jumping of all extremities during each test     Sensory:  Light touch, Temperature, position sense, and vibration sense are intact in fingers and toes.  This assessment is limited to pt's  Coordination:  Rapid alternating movements and fine finger movements are unable to be assessed.   There is no dysmetria on finger-to-nose and heel-knee-shin.   There are + UE action tremor with at times myoclonic actions of the UE and the LE.  Romberg deferred during follow up assessment.  Gait/Stance:  Gait is unsteady, occasional freezing gait pattern with the LLE.      Lab Results   Component Value Date    WBC 8.54 12/10/2024    HGB 9.4 (L) 12/10/2024    HCT 29.7 (L) 12/10/2024    MCV 91 12/10/2024     12/10/2024     No results found for: \"HGBA1C\"  Lab Results   Component Value Date    ALT 17 12/09/2024    AST 18 12/09/2024    ALKPHOS 61 12/09/2024     Lab Results   Component Value Date    CALCIUM 8.3 (L) 12/10/2024    K 3.3 (L) 12/10/2024    CO2 31 12/10/2024     12/10/2024    BUN 19 12/10/2024    CREATININE 0.67 12/10/2024         Review of reports and notes reveal:  Independent Interpretation of images or specimens:  XR ribs with pa chest min 3 views RIGHT  Result Date: 12/7/2024  No evidence of a rib fracture. No acute cardiopulmonary disease. Computerized Assisted Algorithm (CAA) may have been used to analyze all applicable images. Workstation performed: [id number]     CT chest abdomen pelvis wo contrast  Result Date: 12/7/2024  CT chest: No evidence of " acute intrathoracic process. No definitive acute fracture allowing for motion artifact. If there is a high degree of clinical concern for fracture, repeat CT when patient is able to remain still may be considered. Stable ascending aortic ectasia with maximum diameter of 4 cm. Noncontrast chest CT follow-up in 12 months is the recommendation. Additional chronic findings and negatives as above. CT abdomen and pelvis: No evidence of acute abdominopelvic process. No acute fracture. Colonic diverticulosis. Additional chronic findings and negatives as above. This study demonstrates a finding requiring imaging follow-up and was documented as such in Epic. Workstation performed: [id number]     CT head without contrast  Result Date: 12/7/2024  No acute intracranial abnormality. Workstation performed: [id number]     XR wrist 3+ views RIGHT  Result Date: 12/7/2024  No acute osseous abnormality. Computerized Assisted Algorithm (CAA) may have been used to analyze all applicable images. Workstation performed: [id number]     XR shoulder 2+ views RIGHT  Result Date: 12/7/2024  No acute osseous abnormality. Degenerative changes as described. Computerized Assisted Algorithm (CAA) may have been used to analyze all applicable images. Workstation performed: [id number]     XR elbow 3+ vw RIGHT  Result Date: 12/7/2024  No acute osseous abnormality. Computerized Assisted Algorithm (CAA) may have been used to analyze all applicable images. Workstation performed: [id number]           Thank you for this consult.    Total time of encounter: 70 Minutes  More than 50% of time was spent in counseling and coordination of care of patient.  Discussed with the patient at bedside, medical team and attending neurology MD.

## 2024-12-10 NOTE — OCCUPATIONAL THERAPY NOTE
Occupational Therapy Cancellation     Patient Name: Rosmery Vargas  Today's Date: 12/10/2024  Problem List  Principal Problem:    Toxic metabolic encephalopathy  Active Problems:    Hyperlipidemia    Bipolar I disorder (HCC)    Acute kidney injury (HCC)    Rhabdomyolysis    Leukocytosis    Falls    Thrombocytosis    Aortic ectasia (HCC)    Tobacco abuse    Past Medical History  Past Medical History:   Diagnosis Date    Asthma     Bladder incontinence     Chronic pain     back    COPD (chronic obstructive pulmonary disease) (HCC)     GERD (gastroesophageal reflux disease)     Hyperlipidemia     Psychiatric disorder     bipolar     Past Surgical History  Past Surgical History:   Procedure Laterality Date    TUBAL LIGATION          12/10/24 1010   Note Type   Note type Cancelled Session  (Tuesday 12/10/24)   Cancel Reasons Other  (w/ MD)   Additional Comments Chart review completed and spoke w/ RN. Attempted to see pt for OT eval. Presently w/ MD, provider. Will cancel and continue to follow   Licensure   [place] License Number  Radha Segundo OTR/L [id number]       Radha Segundo OTR/L  [id number]  [id number]

## 2024-12-10 NOTE — ASSESSMENT & PLAN NOTE
Patient with evidence of an acute kidney injury with a creatinine level of 1.71.  Likely secondary to dehydration and poor oral intake.      Creatinine improved to 0.49  Discontinued IV fluids at this time as oral intake is adequate  Repeat BMP in a.m.

## 2024-12-10 NOTE — ASSESSMENT & PLAN NOTE
Continue home medications.  As noted above, patient with bizarre behavior on admission.  Question as to whether this is related to underlying infection such as a urinary tract infection.  Continue to closely monitor mentation.  If patient continues to exhibit bizarre behavior and confusion will need to consult psychiatry to evaluate for potential underlying psychiatric cause of her symptoms.    Leukocytosis improved, CK improved also.  Patient continues with bizarre behavior, tangential conversation, occasionally suspicious, whispering as to not be overheard.  Reconsulted psychiatry, discussed with them, will have neurology evaluate patient, checking RPR, HIV, vitamin levels, VBG and ammonia level to ensure completeness for metabolic workup.  Appreciate psychiatry recommendations with changes to medications, indicated if medical workup is negative would recommend inpatient psychiatric hospitalization

## 2024-12-10 NOTE — ASSESSMENT & PLAN NOTE
Plan:   Continue medical management  Continue 1:1 observation  Recommend further medical and neurological workup to rule out underlying etiologies/delirium  Discussed with primary team  Neurology consulted, ammonia, VBG  Repeat EKG ordered for Qtc monitoring  Discontinue Geodon  Start Zyprexa 2.5 mg BID  Discontinue hydroxyzine  Hold Pristiq  Can continue BuSpar 15 mg TID for now due to dosage uncertainty   Continue Lamictal 200 mg daily  Continue lorazepam 0.5 mg IV every 6 hours as needed agitation  Discussed with the primary team  For after hours and weekends please contact [facility] Psychiatry Consultation role

## 2024-12-10 NOTE — ASSESSMENT & PLAN NOTE
Patient is a 64-year-old female with PMH of COPD, bipolar disorder, HLD and recent treatment for UTIs who has had multiple ER visits for falls, weakness, confusion and UTI over the course of the past 2 months.  Patient reported back to the ER on 12/7/2024 after sustaining multiple falls in the home.  She presented with behaviors, waving her hands and yelling.  She had reported difficulties with bilateral hand pain related to dry chapped hands.  Indicated she was unable to provide herself lotion to her hands.  Over the course of time patient's behaviors appear to becoming more erratic, question underlying metabolic etiology for toxic metabolic encephalopathy versus psychiatric etiology.  Patient was admitted, psychiatry evaluated patient and feels patient does need inpatient psych care however may have underlying neurological or metabolic causes to her behaviors requesting consultation.  Patient has positive UTI for which medical team has had discussion with infectious disease.  Patient is alert and oriented x 3 however has speech that is tangential.  She gets confused and needs redirecting for simple commands.  She has slightly ataxic gait with freezing type gait pattern to the left lower extremity.  Will get an MRI of the brain for further evaluation of centralized cause for her changes.  Patient is also sexually active without use of protection.  Will assess for RPR and HIV.  Patient also may have some vitamin deficiencies leading to ataxia motor changes will get vitamin D, B12 and  E.    -Fall risk  -MRI of the brain with without contrast  -Labs with RPR, HIV, vitamin D, vitamin B12, vitamin D  - continue with PT/OT  -Avoid medications that can alter patient mentation including benzodiazepines, sedatives and certain antibiotics such as cefepime  - IV fluids per medical team  - Antibiotics per medical team

## 2024-12-10 NOTE — QUICK NOTE
Ordered soft limb restraints.  Patient continues to be very combative with staff even after being given Haldol.

## 2024-12-10 NOTE — NURSING NOTE
Patient attempting to push past 1:1 staff. Staff called for help. Haldol 1mg given. Primary nurse and PCA spent next half hour attempting to comfort, toilet, and hydrate patient. Patient still not listening to instructions to stay in bed and attempting to push past staff. Fall risk and has pulled multiple IV's.  order for restraints placed at this time.

## 2024-12-11 ENCOUNTER — APPOINTMENT (INPATIENT)
Dept: RADIOLOGY | Facility: HOSPITAL | Age: 64
DRG: 351 | End: 2024-12-11
Payer: COMMERCIAL

## 2024-12-11 LAB
ANION GAP SERPL CALCULATED.3IONS-SCNC: 6 MMOL/L (ref 4–13)
ATRIAL RATE: 80 BPM
BUN SERPL-MCNC: 18 MG/DL (ref 5–25)
CALCIUM SERPL-MCNC: 8 MG/DL (ref 8.4–10.2)
CHLORIDE SERPL-SCNC: 103 MMOL/L (ref 96–108)
CK SERPL-CCNC: 290 U/L (ref 26–192)
CO2 SERPL-SCNC: 27 MMOL/L (ref 21–32)
CREAT SERPL-MCNC: 0.74 MG/DL (ref 0.6–1.3)
ERYTHROCYTE [DISTWIDTH] IN BLOOD BY AUTOMATED COUNT: 14.4 % (ref 11.6–15.1)
GFR SERPL CREATININE-BSD FRML MDRD: 86 ML/MIN/1.73SQ M
GLUCOSE SERPL-MCNC: 124 MG/DL (ref 65–140)
HCT VFR BLD AUTO: 29.5 % (ref 34.8–46.1)
HGB BLD-MCNC: 9.5 G/DL (ref 11.5–15.4)
MCH RBC QN AUTO: 29.2 PG (ref 26.8–34.3)
MCHC RBC AUTO-ENTMCNC: 32.2 G/DL (ref 31.4–37.4)
MCV RBC AUTO: 91 FL (ref 82–98)
P AXIS: 46 DEGREES
PLATELET # BLD AUTO: 360 THOUSANDS/UL (ref 149–390)
PMV BLD AUTO: 9.6 FL (ref 8.9–12.7)
POTASSIUM SERPL-SCNC: 3.6 MMOL/L (ref 3.5–5.3)
PR INTERVAL: 132 MS
QRS AXIS: 7 DEGREES
QRSD INTERVAL: 98 MS
QT INTERVAL: 400 MS
QTC INTERVAL: 462 MS
RBC # BLD AUTO: 3.25 MILLION/UL (ref 3.81–5.12)
SODIUM SERPL-SCNC: 136 MMOL/L (ref 135–147)
T WAVE AXIS: 71 DEGREES
VENTRICULAR RATE: 80 BPM
WBC # BLD AUTO: 10.14 THOUSAND/UL (ref 4.31–10.16)

## 2024-12-11 PROCEDURE — 84443 ASSAY THYROID STIM HORMONE: CPT

## 2024-12-11 PROCEDURE — 80048 BASIC METABOLIC PNL TOTAL CA: CPT | Performed by: NURSE PRACTITIONER

## 2024-12-11 PROCEDURE — 70553 MRI BRAIN STEM W/O & W/DYE: CPT

## 2024-12-11 PROCEDURE — 99233 SBSQ HOSP IP/OBS HIGH 50: CPT | Performed by: PSYCHIATRY & NEUROLOGY

## 2024-12-11 PROCEDURE — 99232 SBSQ HOSP IP/OBS MODERATE 35: CPT

## 2024-12-11 PROCEDURE — A9585 GADOBUTROL INJECTION: HCPCS

## 2024-12-11 PROCEDURE — 93005 ELECTROCARDIOGRAM TRACING: CPT

## 2024-12-11 PROCEDURE — 85027 COMPLETE CBC AUTOMATED: CPT | Performed by: NURSE PRACTITIONER

## 2024-12-11 PROCEDURE — 93010 ELECTROCARDIOGRAM REPORT: CPT | Performed by: INTERNAL MEDICINE

## 2024-12-11 PROCEDURE — 82550 ASSAY OF CK (CPK): CPT | Performed by: NURSE PRACTITIONER

## 2024-12-11 RX ORDER — LORAZEPAM 2 MG/ML
0.5 INJECTION INTRAMUSCULAR ONCE
Status: COMPLETED | OUTPATIENT
Start: 2024-12-11 | End: 2024-12-11

## 2024-12-11 RX ORDER — GADOBUTROL 604.72 MG/ML
9 INJECTION INTRAVENOUS
Status: COMPLETED | OUTPATIENT
Start: 2024-12-11 | End: 2024-12-11

## 2024-12-11 RX ORDER — LORAZEPAM 2 MG/ML
0.5 INJECTION INTRAMUSCULAR EVERY 6 HOURS PRN
Status: DISCONTINUED | OUTPATIENT
Start: 2024-12-11 | End: 2024-12-12

## 2024-12-11 RX ADMIN — B-COMPLEX W/ C & FOLIC ACID TAB 1 TABLET: TAB at 09:49

## 2024-12-11 RX ADMIN — LORAZEPAM 0.5 MG: 2 INJECTION INTRAMUSCULAR; INTRAVENOUS at 09:39

## 2024-12-11 RX ADMIN — ATORVASTATIN CALCIUM 40 MG: 40 TABLET, FILM COATED ORAL at 09:49

## 2024-12-11 RX ADMIN — FLUTICASONE PROPIONATE 1 SPRAY: 50 SPRAY, METERED NASAL at 09:54

## 2024-12-11 RX ADMIN — HEPARIN SODIUM 5000 UNITS: 5000 INJECTION, SOLUTION INTRAVENOUS; SUBCUTANEOUS at 09:48

## 2024-12-11 RX ADMIN — BUSPIRONE HYDROCHLORIDE 15 MG: 5 TABLET ORAL at 09:49

## 2024-12-11 RX ADMIN — DOCUSATE SODIUM 100 MG: 100 CAPSULE, LIQUID FILLED ORAL at 18:29

## 2024-12-11 RX ADMIN — LORAZEPAM 0.5 MG: 2 INJECTION INTRAMUSCULAR; INTRAVENOUS at 01:20

## 2024-12-11 RX ADMIN — KETOTIFEN FUMARATE 1 DROP: 0.25 SOLUTION/ DROPS OPHTHALMIC at 09:55

## 2024-12-11 RX ADMIN — DOCUSATE SODIUM 100 MG: 100 CAPSULE, LIQUID FILLED ORAL at 09:49

## 2024-12-11 RX ADMIN — OLANZAPINE 2.5 MG: 2.5 TABLET, FILM COATED ORAL at 18:29

## 2024-12-11 RX ADMIN — OXYBUTYNIN 10 MG: 5 TABLET, FILM COATED, EXTENDED RELEASE ORAL at 09:49

## 2024-12-11 RX ADMIN — GADOBUTROL 9 ML: 604.72 INJECTION INTRAVENOUS at 12:31

## 2024-12-11 RX ADMIN — BUSPIRONE HYDROCHLORIDE 15 MG: 5 TABLET ORAL at 21:02

## 2024-12-11 RX ADMIN — HEPARIN SODIUM 5000 UNITS: 5000 INJECTION, SOLUTION INTRAVENOUS; SUBCUTANEOUS at 21:01

## 2024-12-11 RX ADMIN — KETOTIFEN FUMARATE 1 DROP: 0.25 SOLUTION/ DROPS OPHTHALMIC at 18:31

## 2024-12-11 RX ADMIN — NICOTINE 1 PATCH: 14 PATCH, EXTENDED RELEASE TRANSDERMAL at 09:55

## 2024-12-11 RX ADMIN — BUSPIRONE HYDROCHLORIDE 15 MG: 5 TABLET ORAL at 18:29

## 2024-12-11 RX ADMIN — LAMOTRIGINE 200 MG: 100 TABLET ORAL at 09:48

## 2024-12-11 RX ADMIN — OLANZAPINE 2.5 MG: 2.5 TABLET, FILM COATED ORAL at 09:49

## 2024-12-11 RX ADMIN — METRONIDAZOLE 500 MG: 500 TABLET ORAL at 06:32

## 2024-12-11 RX ADMIN — PROPRANOLOL HYDROCHLORIDE 10 MG: 20 TABLET ORAL at 09:49

## 2024-12-11 RX ADMIN — ACETAMINOPHEN 650 MG: 325 TABLET ORAL at 21:02

## 2024-12-11 RX ADMIN — ASPIRIN 81 MG CHEWABLE TABLET 81 MG: 81 TABLET CHEWABLE at 09:49

## 2024-12-11 RX ADMIN — LORATADINE 10 MG: 10 TABLET ORAL at 09:49

## 2024-12-11 RX ADMIN — METRONIDAZOLE 500 MG: 500 TABLET ORAL at 09:49

## 2024-12-11 RX ADMIN — METRONIDAZOLE 500 MG: 500 TABLET ORAL at 21:02

## 2024-12-11 RX ADMIN — PANTOPRAZOLE SODIUM 40 MG: 40 TABLET, DELAYED RELEASE ORAL at 09:49

## 2024-12-11 NOTE — PROGRESS NOTES
Progress Note - Hospitalist   Name: Rosmery Vargas 64 y.o. female I MRN: [id number]  Unit/Bed#: 2 [address] Date of Admission: 12/7/2024   Date of Service: 12/11/2024 I Hospital Day: 4    Assessment & Plan  Toxic metabolic encephalopathy  Patient had presented to ED several times over last few days haro to falls, noted to exhibit bizarre behavior in the emergency room. History of bipolar disorder. Noted to be intermittently yelling, whispering, avoiding questions and not follow commands. Patient admitted for further workup of acute encephalopathy possibly due to metabolic encephalopathy or underlying psychiatric illness.  On admission, Cr 1.71 higher than baseline, , WBC 19.31  SHERON and rhabdo resolved s/p IVF  Urine culture with Gardnerella vaginalis  Previous provider discussed with ID, recommended to continue flagyl 500 mg po bid x 1 week and can discontinue rocephin as procal negative  Neurology consulted  MRI brain is pending  RPR and ammonia is negative  HIV ordered, consent could not be obtained due to altered mental status  Psychiatry consulted  Discontinued Geodon and hydroxyzine, Pristiq held, started on Zyprexa 2.5 mg BID, continue home buspar and lamictal  Continue ativan 0.5 mg IV q6h PRN for agitation  Monitor qtc  If metabolic and neurologic workup is negative for cause of acute encephalopathy, psychiatry indicated would recommend inpatient psychiatric hospitalization  Currently on 1:1 observation  Bipolar I disorder (HCC)  Patient continues with bizarre behavior, tangential conversation, occasionally suspicious, whispering as to not be overheard  Psychiatry following, appreciate recommendations  Geodon and hydroxyzine discontinued  Pristiq currently on hold  Continue Buspar and Lamictal  Continue 0.5 mg IV ativan q6h for agitation  Started on Zyprexa 2.5 mg BID  Repeat EKG to monitor qtc  Acute kidney injury (HCC)  CR 1.71 on admission, baseline around 0.8  Improved s/p IVF  Monitor off  further fluids, currently stable at baseline  Daily bmp  Rhabdomyolysis   on admission  Improved s/p IVF  Oral intake improving, monitor off fluids  Leukocytosis  Now improved  Afebrile  Daily cbc  Falls  Patient reported multiple falls over past day  Imaging negative for acute fractures  PT/OT  Thrombocytosis  Patient with a platelet count of 465.  Likely reactive.   Downtrending currently 364  Aortic ectasia (HCC)  Patient found on CT imaging to have a stable ascending aortic ectasia with maximum diameter of 4 cm.  Radiology recommending noncontrast CT follow-up in 12 months.  Hyperlipidemia  Continue Lipitor  Tobacco abuse  Nicotine patch ordered.  Recommend smoking cessation    VTE Pharmacologic Prophylaxis:   Moderate Risk (Score 3-4) - Pharmacological DVT Prophylaxis Ordered: heparin.    Mobility:   Basic Mobility Inpatient Raw Score: 19  JH-HLM Goal: 6: Walk 10 steps or more  JH-HLM Achieved: 2: Bed activities/Dependent transfer  JH-HLM Goal NOT achieved. Continue with multidisciplinary rounding and encourage appropriate mobility to improve upon JH-HLM goals.    Patient Centered Rounds: I performed bedside rounds with nursing staff today.   Discussions with Specialists or Other Care Team Provider: [email or web address]    Education and Discussions with Family / Patient:  no  listed.     Current Length of Stay: 4 day(s)  Current Patient Status: Inpatient   Certification Statement: The patient will continue to require additional inpatient hospital stay due to altered mental status, MRI brain  Discharge Plan: Anticipate discharge in 24-48 hrs to pending psychiatry recommendations    Code Status: Level 1 - Full Code    Subjective   Patient sitting in chair eating breakfast.  She is oriented to self, time, and place.  RN reported she told her she was pregnant with twins.  Patient occasionally whispering and asking this provider to come closer so other people do not hear her.  Conversation is tangential.  She  denies any current symptoms or complaints.  Patient is noted to be chewing her breakfast and taking it out of her mouth and putting it back on her plate.    Objective :  Temp:  [98.5 °F (36.9 °C)] 98.5 °F (36.9 °C)  BP: (153-158)/(89-93) 153/89    Body mass index is 30.32 kg/m².     Input and Output Summary (last 24 hours):   No intake or output data in the 24 hours ending 12/11/24 1257    Physical Exam  Vitals and nursing note reviewed.   Constitutional:       General: She is not in acute distress.     Appearance: She is well-developed.   Cardiovascular:      Rate and Rhythm: Normal rate and regular rhythm.   Pulmonary:      Effort: Pulmonary effort is normal. No respiratory distress.      Breath sounds: Normal breath sounds.   Abdominal:      Tenderness: There is no abdominal tenderness.   Musculoskeletal:         General: No swelling.   Skin:     General: Skin is warm and dry.      Capillary Refill: Capillary refill takes less than 2 seconds.   Neurological:      General: No focal deficit present.      Mental Status: She is alert and oriented to person, place, and time.   Psychiatric:         Mood and Affect: Mood normal.         Lines/Drains:        Lab Results: I have reviewed the following results:   Results from last 7 days   Lab Units 12/11/24  0557 12/10/24  0436 12/09/24  0523   WBC Thousand/uL 10.14   < > 8.71   HEMOGLOBIN g/dL 9.5*   < > 9.3*   HEMATOCRIT % 29.5*   < > 29.0*   PLATELETS Thousands/uL 360   < > 371   SEGS PCT %  --   --  50   LYMPHO PCT %  --   --  36   MONO PCT %  --   --  10   EOS PCT %  --   --  3    < > = values in this interval not displayed.     Results from last 7 days   Lab Units 12/11/24  0557 12/10/24  0436 12/09/24  0523   SODIUM mmol/L 136   < > 141   POTASSIUM mmol/L 3.6   < > 3.9   CHLORIDE mmol/L 103   < > 103   CO2 mmol/L 27   < > 23   BUN mg/dL 18   < > 13   CREATININE mg/dL 0.74   < > 0.49*   ANION GAP mmol/L 6   < > 15*   CALCIUM mg/dL 8.0*   < > 6.4*   ALBUMIN g/dL  --    --  2.5*   TOTAL BILIRUBIN mg/dL  --   --  0.48   ALK PHOS U/L  --   --  61   ALT U/L  --   --  17   AST U/L  --   --  18   GLUCOSE RANDOM mg/dL 124   < > 70    < > = values in this interval not displayed.     Results from last 7 days   Lab Units 12/07/24  1229   INR  1.02             Results from last 7 days   Lab Units 12/10/24  0436   PROCALCITONIN ng/ml <0.05       Recent Cultures (last 7 days):         Imaging Results Review: I reviewed radiology reports from this admission including: CT chest, CT abdomen/pelvis, and CT head.  Other Study Results Review: No additional pertinent studies reviewed.    Last 24 Hours Medication List:     Current Facility-Administered Medications:     acetaminophen (TYLENOL) tablet 650 mg, Q6H PRN    albuterol inhalation solution 2.5 mg, Q6H PRN    aspirin chewable tablet 81 mg, Daily    atorvastatin (LIPITOR) tablet 40 mg, Daily    busPIRone (BUSPAR) tablet 15 mg, TID    [Held by provider] desvenlafaxine succinate (PRISTIQ) 24 hr tablet 50 mg, Daily    docusate sodium (COLACE) capsule 100 mg, BID    fluticasone (FLONASE) 50 mcg/act nasal spray 1 spray, Daily    heparin (porcine) subcutaneous injection 5,000 Units, Q12H [facility]    Ketotifen Fumarate (ZADITOR) 0.035 % ophthalmic solution 1 drop, BID    lamoTRIgine (LaMICtal) tablet 200 mg, Daily    loratadine (CLARITIN) tablet 10 mg, Daily    LORazepam (ATIVAN) injection 0.5 mg, Q6H PRN    metroNIDAZOLE (FLAGYL) tablet 500 mg, Q12H [facility]    multivitamin stress formula tablet 1 tablet, Daily    nicotine (NICODERM CQ) 14 mg/24hr TD 24 hr patch 1 patch, Daily    OLANZapine (ZyPREXA) tablet 2.5 mg, BID    ondansetron (ZOFRAN) injection 4 mg, Q6H PRN    oxybutynin (DITROPAN-XL) 24 hr tablet 10 mg, Daily    pantoprazole (PROTONIX) EC tablet 40 mg, Daily    polyethylene glycol (MIRALAX) packet 17 g, Daily PRN    propranolol (INDERAL) tablet 10 mg, Daily    umeclidinium 62.5 mcg/actuation inhaler AEPB 1 puff, Daily    Administrative Statements    Today, Patient Was Seen By: Sandi Garrison PA-C    **Please Note: This note may have been constructed using a voice recognition system.**

## 2024-12-11 NOTE — OCCUPATIONAL THERAPY NOTE
Occupational Therapy Cancellation     Patient Name: Rosmery Vargas  Today's Date: 12/11/2024  Problem List  Principal Problem:    Toxic metabolic encephalopathy  Active Problems:    Hyperlipidemia    Bipolar I disorder (HCC)    Acute kidney injury (HCC)    Rhabdomyolysis    Leukocytosis    Falls    Thrombocytosis    Aortic ectasia (HCC)    Tobacco abuse    Past Medical History  Past Medical History:   Diagnosis Date    Asthma     Bladder incontinence     Chronic pain     back    COPD (chronic obstructive pulmonary disease) (HCC)     GERD (gastroesophageal reflux disease)     Hyperlipidemia     Psychiatric disorder     bipolar     Past Surgical History  Past Surgical History:   Procedure Laterality Date    TUBAL LIGATION           12/11/24 7742   Note Type   Note type Cancelled Session  (Wed 12/11/24)   Cancel Reasons Other  (pt sleeping soundly. RN requested therapist not disturb / wake pt.)   Additional Comments Will continue to follow   Licensure   [place] License Number  Radha Segundo, OTR/L [id number]       Radha Segundo OTR/L  [id number]  [id number]

## 2024-12-11 NOTE — ASSESSMENT & PLAN NOTE
CR 1.71 on admission, baseline around 0.8  Improved s/p IVF  Monitor off further fluids, currently stable at baseline  Daily bmp

## 2024-12-11 NOTE — PLAN OF CARE
Problem: Potential for Falls  Goal: Patient will remain free of falls  Description: INTERVENTIONS:  - Educate patient/family on patient safety including physical limitations  - Instruct patient to call for assistance with activity   - Consult OT/PT to assist with strengthening/mobility   - Keep Call bell within reach  - Keep bed low and locked with side rails adjusted as appropriate  - Keep care items and personal belongings within reach  - Initiate and maintain comfort rounds  - Make Fall Risk Sign visible to staff  - Offer Toileting every 2 Hours, in advance of need  - Initiate/Maintain bed alarm  - Obtain necessary fall risk management equipment: call bell   - Apply yellow socks and bracelet for high fall risk patients  - Consider moving patient to room near nurses station  Outcome: Progressing     Problem: RESPIRATORY - ADULT  Goal: Achieves optimal ventilation and oxygenation  Description: INTERVENTIONS:  - Assess for changes in respiratory status  - Assess for changes in mentation and behavior  - Position to facilitate oxygenation and minimize respiratory effort  - Oxygen administered by appropriate delivery if ordered  - Initiate smoking cessation education as indicated  - Encourage broncho-pulmonary hygiene including cough, deep breathe, Incentive Spirometry  - Assess the need for suctioning and aspirate as needed  - Assess and instruct to report SOB or any respiratory difficulty  - Respiratory Therapy support as indicated  Outcome: Progressing     Problem: PAIN - ADULT  Goal: Verbalizes/displays adequate comfort level or baseline comfort level  Description: Interventions:  - Encourage patient to monitor pain and request assistance  - Assess pain using appropriate pain scale  - Administer analgesics based on type and severity of pain and evaluate response  - Implement non-pharmacological measures as appropriate and evaluate response  - Consider cultural and social influences on pain and pain management  -  Notify physician/advanced practitioner if interventions unsuccessful or patient reports new pain  Outcome: Progressing     Problem: INFECTION - ADULT  Goal: Absence or prevention of progression during hospitalization  Description: INTERVENTIONS:  - Assess and monitor for signs and symptoms of infection  - Monitor lab/diagnostic results  - Monitor all insertion sites, i.e. indwelling lines, tubes, and drains  - Monitor endotracheal if appropriate and nasal secretions for changes in amount and color  - [place] appropriate cooling/warming therapies per order  - Administer medications as ordered  - Instruct and encourage patient and family to use good hand hygiene technique  - Identify and instruct in appropriate isolation precautions for identified infection/condition  Outcome: Progressing  Goal: Absence of fever/infection during neutropenic period  Description: INTERVENTIONS:  - Monitor WBC    Outcome: Progressing     Problem: SAFETY ADULT  Goal: Patient will remain free of falls  Description: INTERVENTIONS:  - Educate patient/family on patient safety including physical limitations  - Instruct patient to call for assistance with activity   - Consult OT/PT to assist with strengthening/mobility   - Keep Call bell within reach  - Keep bed low and locked with side rails adjusted as appropriate  - Keep care items and personal belongings within reach  - Initiate and maintain comfort rounds  - Make Fall Risk Sign visible to staff  - Offer Toileting every 2 Hours, in advance of need  - Initiate/Maintain bed alarm  - Obtain necessary fall risk management equipment: call bell   - Apply yellow socks and bracelet for high fall risk patients  - Consider moving patient to room near nurses station  Outcome: Progressing  Goal: Maintain or return to baseline ADL function  Description: INTERVENTIONS:  -  Assess patient's ability to carry out ADLs; assess patient's baseline for ADL function and identify physical deficits which impact  ability to perform ADLs (bathing, care of mouth/teeth, toileting, grooming, dressing, etc.)  - Assess/evaluate cause of self-care deficits   - Assess range of motion  - Assess patient's mobility; develop plan if impaired  - Assess patient's need for assistive devices and provide as appropriate  - Encourage maximum independence but intervene and supervise when necessary  - Involve family in performance of ADLs  - Assess for home care needs following discharge   - Consider OT consult to assist with ADL evaluation and planning for discharge  - Provide patient education as appropriate  Outcome: Progressing  Goal: Maintains/Returns to pre admission functional level  Description: INTERVENTIONS:  - Perform AM-PAC 6 Click Basic Mobility/ Daily Activity assessment daily.  - Set and communicate daily mobility goal to care team and patient/family/caregiver.   - Collaborate with rehabilitation services on mobility goals if consulted  - Perform Range of Motion 3 times a day.  - Reposition patient every 3 hours.  - Dangle patient 3 times a day  - Stand patient 3 times a day  - Ambulate patient 3 times a day  - Out of bed to chair 3 times a day   - Out of bed for meals 3 times a day  - Out of bed for toileting  - Record patient progress and toleration of activity level   Outcome: Progressing     Problem: DISCHARGE PLANNING  Goal: Discharge to home or other facility with appropriate resources  Description: INTERVENTIONS:  - Identify barriers to discharge w/patient and caregiver  - Arrange for needed discharge resources and transportation as appropriate  - Identify discharge learning needs (meds, wound care, etc.)  - Arrange for interpretive services to assist at discharge as needed  - Refer to Case Management Department for coordinating discharge planning if the patient needs post-hospital services based on physician/advanced practitioner order or complex needs related to functional status, cognitive ability, or social support  system  Outcome: Progressing     Problem: Knowledge Deficit  Goal: Patient/family/caregiver demonstrates understanding of disease process, treatment plan, medications, and discharge instructions  Description: Complete learning assessment and assess knowledge base.  Interventions:  - Provide teaching at level of understanding  - Provide teaching via preferred learning methods  Outcome: Progressing     Problem: Prexisting or High Potential for Compromised Skin Integrity  Goal: Skin integrity is maintained or improved  Description: INTERVENTIONS:  - Identify patients at risk for skin breakdown  - Assess and monitor skin integrity  - Assess and monitor nutrition and hydration status  - Monitor labs   - Assess for incontinence   - Turn and reposition patient  - Assist with mobility/ambulation  - Relieve pressure over bony prominences  - Avoid friction and shearing  - Provide appropriate hygiene as needed including keeping skin clean and dry  - Evaluate need for skin moisturizer/barrier cream  - Collaborate with interdisciplinary team   - Patient/family teaching  - Consider wound care consult   Outcome: Progressing     Problem: SAFETY,RESTRAINT: NV/NON-SELF DESTRUCTIVE BEHAVIOR  Goal: Remains free of harm/injury (restraint for non violent/non self-detsructive behavior)  Description: INTERVENTIONS:  - Instruct patient/family regarding restraint use   - Assess and monitor physiologic and psychological status   - Provide interventions and comfort measures to meet assessed patient needs   - Identify and implement measures to help patient regain control  - Assess readiness for release of restraint   Outcome: Progressing  Goal: Returns to optimal restraint-free functioning  Description: INTERVENTIONS:  - Assess the patient's behavior and symptoms that indicate continued need for restraint  - Identify and implement measures to help patient regain control  - Assess readiness for release of restraint   Outcome: Progressing

## 2024-12-11 NOTE — PROGRESS NOTES
Progress Note - Neurology   Name: Rosmery Vargas 64 y.o. female I MRN: [id number]  Unit/Bed#: 2 [address] Date of Admission: 12/7/2024   Date of Service: 12/11/2024 I Hospital Day: 4     Assessment & Plan  Toxic metabolic encephalopathy  Patient is a 64-year-old female with PMH of COPD, bipolar disorder, HLD and recent treatment for UTIs who has had multiple ER visits for falls, weakness, confusion and UTI over the course of the past 2 months.  Patient reported back to the ER on 12/7/2024 after sustaining multiple falls in the home.  She presented with behaviors, waving her hands and yelling.  She had reported difficulties with bilateral hand pain related to dry chapped hands.  Indicated she was unable to provide herself lotion to her hands.  Over the course of time patient's behaviors appear to becoming more erratic, question underlying metabolic etiology for toxic metabolic encephalopathy versus psychiatric etiology.  Patient was admitted, psychiatry evaluated patient and feels patient does need inpatient psych care however may have underlying neurological or metabolic causes to her behaviors requesting consultation.  Patient has positive UTI for which medical team has had discussion with infectious disease.  Patient is alert and oriented x 3 however has speech that is tangential.  She gets confused and needs redirecting for simple commands.  She has slightly ataxic gait with freezing type gait pattern to the left lower extremity.  Will get an MRI of the brain for further evaluation of centralized cause for her changes.  Patient is also sexually active without use of protection.  Will assess for RPR and HIV.  Patient also may have some vitamin deficiencies leading to ataxia motor changes will get vitamin D, B12 and  E.    -Fall risk  -MRI of the brain with without contrast- attempted today,  -Labs with RPR, HIV, vitamin D, vitamin B12, vitamin B1   Vitamin D on low normal, would benefit from starting daily  replacement.     RPR, B12 are in Normal ranges   HIV is pending consent   - continue with PT/OT  -Avoid medications that can alter patient mentation including benzodiazepines, sedatives and certain antibiotics such as cefepime  - IV fluids per medical team  - Antibiotics per medical team        Bipolar I disorder ([facility])  Psychiatry consulted-recommendations appreciated  Acute kidney injury (HCC)  IV fluid hydration per the medical team  Rhabdomyolysis  The fluid hydration per the medical team  Leukocytosis  IV antibiotics per the medical team  Falls  PT/OT  MRI of the Brain w/wo contrast    HPI:    Patient is a 64-year-old female with PMH of COPD, bipolar disorder, HLD and UTIs who has had multiple ER visits with falls, weakness, confusion and UTI evaluations over the course of 2 months.  12/7/2024 she reported back to the ER after having multiple falls at home.  She presented with behaviors, waving her hands and yelling.  She reported difficulties with bilateral hand pain due to chapped hands, indicated she was unable to provide her own lotion to her hands.  Over the course of time patient's behaviors are becoming more erratic, conversations are tangential and confabulatory.  Question the possibility of underlying metabolic etiology for toxic metabolic encephalopathy versus psychiatric etiology.  Psychiatry consulted and evaluation of patient indicates need for inpatient psychiatric care however concerned that other metabolic issues may be contributory.  Neurology was consulted for further neurological evaluation.  Discussed with the medical team, she does have positive urine culture which will be treated with antibiotics per the medical team.  She is currently alert and oriented x 3.  Today her speech has continued to be tangential and confabulatory.  She indicates that she is pregnant, she has concerns regarding her sister who takes over her pregnancies and her children.  Although she has remained calm without any  combativeness or agitation.  From neurological standpoint, recommend MRI of the brain with without contrast to evaluate for other structural or ischemic disease.  We also recommended labs for reversible causes.  So far RPR is negative, vitamin D and B12 are within normal ranges however vitamin D is in the low end of normal therefore would recommend replacement on a daily basis.  HIV and B1 remain pending at this point in time.  Patient feels as though she is walking a little better, according to the bedside aide she did fairly well with a guarded assist x 1 today.  She feels as though her her legs are not as spastic although has limited awareness.  Patient was sent for MRI of the brain with without contrast this a.m. however was unable to complete the testing due to dysfunction of the MRI table.  Patient was lying still and cooperating.  She should be rescheduled once stable is repaired.  No additional neurological input at this time pending the outcomes of her MRI.      Subjective:   Pt has no specific complaints, feels her thinking is a bit clearer. Although has some concerns noting that she is pregnant and she is worried about her sister who is always controlling her pregnancies and babies.   No focal deficits or complaints today  Past Medical History:   Diagnosis Date    Asthma     Bladder incontinence     Chronic pain     back    COPD (chronic obstructive pulmonary disease) ([facility])     GERD (gastroesophageal reflux disease)     Hyperlipidemia     Psychiatric disorder     bipolar     Social History     Socioeconomic History    Marital status:      Spouse name: Not on file    Number of children: Not on file    Years of education: Not on file    Highest education level: Not on file   Occupational History    Not on file   Tobacco Use    Smoking status: Every Day     Current packs/day: 0.50     Average packs/day: 0.5 packs/day for 0.6 years (0.3 ttl pk-yrs)     Types: Cigarettes     Start date: 05/2024     Smokeless tobacco: Never   Vaping Use    Vaping status: Never Used   Substance and Sexual Activity    Alcohol use: No    Drug use: No    Sexual activity: Yes   Other Topics Concern    Not on file   Social History Narrative    Not on file     Social Drivers of Health     Financial Resource Strain: Not on file   Food Insecurity: Not on file   Transportation Needs: Not on file   Physical Activity: Not on file   Stress: Not on file   Social Connections: Not on file   Intimate Partner Violence: Not on file   Housing Stability: Not on file     Family History   Problem Relation Age of Onset    No Known Problems Mother     No Known Problems Sister     No Known Problems Daughter     No Known Problems Maternal Aunt        ROS:  Please see HPI for positive symptoms.   No fever, no chills, no weight change.   Ocular: No diplopia, no blurred vision, spot/zigzag lines   HEENT:  No sore throat, headache or congestion. No neck pain.   COR:  No chest pain. No palpitations.   Lungs:  no sob  GI:  no  nausea, no vomiting, no diarrhea, no constipation, no anorexia.   :  No dysuria, frequency, or urgency. No hematuria.   Musculoskeletal:  No joint pain or swelling   Skin:  No rash or itching.   Psychiatric:  no anxiety, no depression.   Endocrine:  No polyuria or polydipsia.      Objective:  /89   Pulse 89   Temp 98.5 °F (36.9 °C)   Resp 18   Wt 90.4 kg (199 lb 6.4 oz)   SpO2 98%   BMI 30.32 kg/m²     General: alert   Mental status: oriented x3  Attention: scattered, tangential   Knowledge: fair  Language and Speech: tangential and confabulatory   Cranial nerves:   CN II: Visual fields are full to confrontation.   Fundoscopic exam is normal with sharp discs and no vascular changes.  Pupils are 3 mm and briskly reactive to light.   CN III, IV, VI: At primary gaze, there is no eye deviation.   CN V: Facial sensation is intact in all 3 divisions bilaterally. Corneal responses are intact.  CN VII: Face is symmetrical, with  "normal eye closure and smile.  CN VIII: Hearing is normal to rubbing fingers  CN IX, X: Palate elevates symmetrically. Phonation is normal.  CN XI: Head turning and shoulder shrug are intact  CN XII: Tongue is midline with normal movements and no atrophy.  Motor:  There is no pronator drift of out-stretched arms.   Muscle bulk and tone are normal.      Muscle exam  Arm Right Left Leg Right Left   Deltoid 5/5 5/5 Iliopsoas 5/5 5/5   Biceps 5/5 5/5 Quads 5/5 5/5   Triceps 5/5 5/5 Hamstrings 5/5 5/5   Wrist Extension 5/5 5/5 Ankle Dorsi Flexion 5/5 5/5   Wrist Flexion 5/5 5/5 Ankle Plantar Flexion 5/5 5/5       Sensory: normal to light touch, temperature, vibration. Proprioception intact  Gait: unsteady  Coordination: finger to nose and heel to toe normal     Reflexes    RJ BJ TJ KJ AJ Plantars Ruffin's   Right 2+ 2+  2+ 2+ Downgoing Not present   Left 2+ 2+  2+ 2+ Downgoing Not present      Heart: Regular rate and rhythm  Lung: No audible wheezing or stridor heard  Abd: soft, non-tender, non-distended   Skin: dry and intact    Labs:      Lab Results   Component Value Date    WBC 10.14 12/11/2024    HGB 9.5 (L) 12/11/2024    HCT 29.5 (L) 12/11/2024    MCV 91 12/11/2024     12/11/2024     No results found for: \"HGBA1C\"  Lab Results   Component Value Date    ALT 17 12/09/2024    AST 18 12/09/2024    ALKPHOS 61 12/09/2024     Lab Results   Component Value Date    CALCIUM 8.0 (L) 12/11/2024    K 3.6 12/11/2024    CO2 27 12/11/2024     12/11/2024    BUN 18 12/11/2024    CREATININE 0.74 12/11/2024         Review of reports and notes reveal:   Independent review of films/reports:  XR ribs with pa chest min 3 views RIGHT  Result Date: 12/7/2024  No evidence of a rib fracture. No acute cardiopulmonary disease. Computerized Assisted Algorithm (CAA) may have been used to analyze all applicable images. Workstation performed: [id number]     CT chest abdomen pelvis wo contrast  Result Date: 12/7/2024  CT chest: No " evidence of acute intrathoracic process. No definitive acute fracture allowing for motion artifact. If there is a high degree of clinical concern for fracture, repeat CT when patient is able to remain still may be considered. Stable ascending aortic ectasia with maximum diameter of 4 cm. Noncontrast chest CT follow-up in 12 months is the recommendation. Additional chronic findings and negatives as above. CT abdomen and pelvis: No evidence of acute abdominopelvic process. No acute fracture. Colonic diverticulosis. Additional chronic findings and negatives as above. This study demonstrates a finding requiring imaging follow-up and was documented as such in Epic. Workstation performed: [id number]     CT head without contrast  Result Date: 12/7/2024  No acute intracranial abnormality. Workstation performed: [id number]     XR wrist 3+ views RIGHT  Result Date: 12/7/2024  No acute osseous abnormality. Computerized Assisted Algorithm (CAA) may have been used to analyze all applicable images. Workstation performed: [id number]     XR shoulder 2+ views RIGHT  Result Date: 12/7/2024  No acute osseous abnormality. Degenerative changes as described. Computerized Assisted Algorithm (CAA) may have been used to analyze all applicable images. Workstation performed: [id number]     XR elbow 3+ vw RIGHT  Result Date: 12/7/2024  No acute osseous abnormality. Computerized Assisted Algorithm (CAA) may have been used to analyze all applicable images. Workstation performed: [id number]         Thank you for this consult.    Total time of encounter:  30 min  More than 50% of the time was used in counseling and/or coordination of care  Extent of couseling and/or coordination of care d/w the pt, medical team and attending Neurology MD.

## 2024-12-11 NOTE — PLAN OF CARE
Problem: Potential for Falls  Goal: Patient will remain free of falls  Description: INTERVENTIONS:  - Educate patient/family on patient safety including physical limitations  - Instruct patient to call for assistance with activity   - Consult OT/PT to assist with strengthening/mobility   - Keep Call bell within reach  - Keep bed low and locked with side rails adjusted as appropriate  - Keep care items and personal belongings within reach  - Initiate and maintain comfort rounds  - Make Fall Risk Sign visible to staff  - Offer Toileting every 2 Hours, in advance of need  - Initiate/Maintain bedalarm  - Obtain necessary fall risk management equipment: yellow socks   - Apply yellow socks and bracelet for high fall risk patients  - Consider moving patient to room near nurses station  Outcome: Progressing     Problem: RESPIRATORY - ADULT  Goal: Achieves optimal ventilation and oxygenation  Description: INTERVENTIONS:  - Assess for changes in respiratory status  - Assess for changes in mentation and behavior  - Position to facilitate oxygenation and minimize respiratory effort  - Oxygen administered by appropriate delivery if ordered  - Initiate smoking cessation education as indicated  - Encourage broncho-pulmonary hygiene including cough, deep breathe, Incentive Spirometry  - Assess the need for suctioning and aspirate as needed  - Assess and instruct to report SOB or any respiratory difficulty  - Respiratory Therapy support as indicated  Outcome: Progressing     Problem: PAIN - ADULT  Goal: Verbalizes/displays adequate comfort level or baseline comfort level  Description: Interventions:  - Encourage patient to monitor pain and request assistance  - Assess pain using appropriate pain scale  - Administer analgesics based on type and severity of pain and evaluate response  - Implement non-pharmacological measures as appropriate and evaluate response  - Consider cultural and social influences on pain and pain  management  - Notify physician/advanced practitioner if interventions unsuccessful or patient reports new pain  Outcome: Progressing

## 2024-12-11 NOTE — ASSESSMENT & PLAN NOTE
Patient had presented to ED several times over last few days haro to falls, noted to exhibit bizarre behavior in the emergency room. History of bipolar disorder. Noted to be intermittently yelling, whispering, avoiding questions and not follow commands. Patient admitted for further workup of acute encephalopathy possibly due to metabolic encephalopathy or underlying psychiatric illness.  On admission, Cr 1.71 higher than baseline, , WBC 19.31  SHERON and rhabdo resolved s/p IVF  Urine culture with Gardnerella vaginalis  Previous provider discussed with ID, recommended to continue flagyl 500 mg po bid x 1 week and can discontinue rocephin as procal negative  Neurology consulted  MRI brain is pending  RPR and ammonia is negative  HIV ordered, consent could not be obtained due to altered mental status  Psychiatry consulted  Discontinued Geodon and hydroxyzine, Pristiq held, started on Zyprexa 2.5 mg BID, continue home buspar and lamictal  Continue ativan 0.5 mg IV q6h PRN for agitation  Monitor qtc  If metabolic and neurologic workup is negative for cause of acute encephalopathy, psychiatry indicated would recommend inpatient psychiatric hospitalization  Currently on 1:1 observation

## 2024-12-11 NOTE — ASSESSMENT & PLAN NOTE
Patient continues with bizarre behavior, tangential conversation, occasionally suspicious, whispering as to not be overheard  Psychiatry following, appreciate recommendations  Geodon and hydroxyzine discontinued  Pristiq currently on hold  Continue Buspar and Lamictal  Continue 0.5 mg IV ativan q6h for agitation  Started on Zyprexa 2.5 mg BID  Repeat EKG to monitor qtc

## 2024-12-12 PROBLEM — D75.839 THROMBOCYTOSIS: Status: RESOLVED | Noted: 2024-12-07 | Resolved: 2024-12-12

## 2024-12-12 PROBLEM — N17.9 ACUTE KIDNEY INJURY (HCC): Status: RESOLVED | Noted: 2024-12-07 | Resolved: 2024-12-12

## 2024-12-12 PROBLEM — D72.829 LEUKOCYTOSIS: Status: RESOLVED | Noted: 2024-12-07 | Resolved: 2024-12-12

## 2024-12-12 PROBLEM — F29 PSYCHOSIS (HCC): Status: ACTIVE | Noted: 2018-01-23

## 2024-12-12 PROBLEM — M62.82 RHABDOMYOLYSIS: Status: RESOLVED | Noted: 2024-12-07 | Resolved: 2024-12-12

## 2024-12-12 LAB — TSH SERPL DL<=0.05 MIU/L-ACNC: 1.16 UIU/ML (ref 0.45–4.5)

## 2024-12-12 PROCEDURE — 97167 OT EVAL HIGH COMPLEX 60 MIN: CPT

## 2024-12-12 PROCEDURE — 99232 SBSQ HOSP IP/OBS MODERATE 35: CPT

## 2024-12-12 PROCEDURE — 99233 SBSQ HOSP IP/OBS HIGH 50: CPT | Performed by: PSYCHIATRY & NEUROLOGY

## 2024-12-12 RX ORDER — OLANZAPINE 2.5 MG/1
2.5 TABLET, FILM COATED ORAL
Status: DISCONTINUED | OUTPATIENT
Start: 2024-12-12 | End: 2024-12-14 | Stop reason: HOSPADM

## 2024-12-12 RX ORDER — LORAZEPAM 0.5 MG/1
0.5 TABLET ORAL ONCE
Status: COMPLETED | OUTPATIENT
Start: 2024-12-12 | End: 2024-12-12

## 2024-12-12 RX ORDER — OLANZAPINE 2.5 MG/1
5 TABLET, FILM COATED ORAL 2 TIMES DAILY
Status: DISCONTINUED | OUTPATIENT
Start: 2024-12-12 | End: 2024-12-14 | Stop reason: HOSPADM

## 2024-12-12 RX ORDER — OLANZAPINE 2.5 MG/1
2.5 TABLET, FILM COATED ORAL ONCE
Status: COMPLETED | OUTPATIENT
Start: 2024-12-12 | End: 2024-12-12

## 2024-12-12 RX ORDER — OLANZAPINE 10 MG/2ML
2.5 INJECTION, POWDER, FOR SOLUTION INTRAMUSCULAR
Status: DISCONTINUED | OUTPATIENT
Start: 2024-12-12 | End: 2024-12-14 | Stop reason: HOSPADM

## 2024-12-12 RX ADMIN — OLANZAPINE 2.5 MG: 2.5 TABLET, FILM COATED ORAL at 11:11

## 2024-12-12 RX ADMIN — LORAZEPAM 0.5 MG: 0.5 TABLET ORAL at 00:57

## 2024-12-12 RX ADMIN — BUSPIRONE HYDROCHLORIDE 15 MG: 5 TABLET ORAL at 21:53

## 2024-12-12 RX ADMIN — HEPARIN SODIUM 5000 UNITS: 5000 INJECTION, SOLUTION INTRAVENOUS; SUBCUTANEOUS at 21:53

## 2024-12-12 RX ADMIN — NICOTINE 1 PATCH: 14 PATCH, EXTENDED RELEASE TRANSDERMAL at 09:31

## 2024-12-12 RX ADMIN — LAMOTRIGINE 200 MG: 100 TABLET ORAL at 09:29

## 2024-12-12 RX ADMIN — BUSPIRONE HYDROCHLORIDE 15 MG: 5 TABLET ORAL at 17:27

## 2024-12-12 RX ADMIN — OXYBUTYNIN 10 MG: 5 TABLET, FILM COATED, EXTENDED RELEASE ORAL at 09:29

## 2024-12-12 RX ADMIN — HEPARIN SODIUM 5000 UNITS: 5000 INJECTION, SOLUTION INTRAVENOUS; SUBCUTANEOUS at 09:30

## 2024-12-12 RX ADMIN — METRONIDAZOLE 500 MG: 500 TABLET ORAL at 21:53

## 2024-12-12 RX ADMIN — ASPIRIN 81 MG CHEWABLE TABLET 81 MG: 81 TABLET CHEWABLE at 09:29

## 2024-12-12 RX ADMIN — OLANZAPINE 5 MG: 2.5 TABLET, FILM COATED ORAL at 17:27

## 2024-12-12 RX ADMIN — OLANZAPINE 2.5 MG: 2.5 TABLET, FILM COATED ORAL at 09:29

## 2024-12-12 RX ADMIN — UMECLIDINIUM 1 PUFF: 62.5 AEROSOL, POWDER ORAL at 09:31

## 2024-12-12 RX ADMIN — ATORVASTATIN CALCIUM 40 MG: 40 TABLET, FILM COATED ORAL at 09:30

## 2024-12-12 RX ADMIN — DOCUSATE SODIUM 100 MG: 100 CAPSULE, LIQUID FILLED ORAL at 09:29

## 2024-12-12 RX ADMIN — B-COMPLEX W/ C & FOLIC ACID TAB 1 TABLET: TAB at 09:30

## 2024-12-12 RX ADMIN — METRONIDAZOLE 500 MG: 500 TABLET ORAL at 09:29

## 2024-12-12 RX ADMIN — DOCUSATE SODIUM 100 MG: 100 CAPSULE, LIQUID FILLED ORAL at 17:27

## 2024-12-12 RX ADMIN — LORATADINE 10 MG: 10 TABLET ORAL at 09:29

## 2024-12-12 RX ADMIN — BUSPIRONE HYDROCHLORIDE 15 MG: 5 TABLET ORAL at 09:29

## 2024-12-12 RX ADMIN — PANTOPRAZOLE SODIUM 40 MG: 40 TABLET, DELAYED RELEASE ORAL at 09:29

## 2024-12-12 RX ADMIN — PROPRANOLOL HYDROCHLORIDE 10 MG: 20 TABLET ORAL at 09:29

## 2024-12-12 NOTE — ED NOTES
12/12/24 @ 1355:  PES was notified by Sandi Garrison PA-C that patient was medically cleared.  PES reviewed chart and noted that Dr. Copeland, Psychiatrist, reports that patient is grossly psychotic, reporting that she is Mrs. Suero and is pregnant with twins.  Of note, Dr. Copeland also reports that patient isn't voluntary, therefore, PES contacted [facility], spoke to Reg and requested screening.  Joanne MS

## 2024-12-12 NOTE — PLAN OF CARE
Problem: PAIN - ADULT  Goal: Verbalizes/displays adequate comfort level or baseline comfort level  Description: Interventions:  - Encourage patient to monitor pain and request assistance  - Assess pain using appropriate pain scale  - Administer analgesics based on type and severity of pain and evaluate response  - Implement non-pharmacological measures as appropriate and evaluate response  - Consider cultural and social influences on pain and pain management  - Notify physician/advanced practitioner if interventions unsuccessful or patient reports new pain  Outcome: Progressing     Problem: INFECTION - ADULT  Goal: Absence or prevention of progression during hospitalization  Description: INTERVENTIONS:  - Assess and monitor for signs and symptoms of infection  - Monitor lab/diagnostic results  - Monitor all insertion sites, i.e. indwelling lines, tubes, and drains  - Monitor endotracheal if appropriate and nasal secretions for changes in amount and color  - Lamont appropriate cooling/warming therapies per order  - Administer medications as ordered  - Instruct and encourage patient and family to use good hand hygiene technique  - Identify and instruct in appropriate isolation precautions for identified infection/condition  Outcome: Progressing  Goal: Absence of fever/infection during neutropenic period  Description: INTERVENTIONS:  - Monitor WBC    Outcome: Progressing        Problem: Prexisting or High Potential for Compromised Skin Integrity  Goal: Skin integrity is maintained or improved  Description: INTERVENTIONS:  - Identify patients at risk for skin breakdown  - Assess and monitor skin integrity  - Assess and monitor nutrition and hydration status  - Monitor labs   - Assess for incontinence   - Turn and reposition patient  - Assist with mobility/ambulation  - Relieve pressure over bony prominences  - Avoid friction and shearing  - Provide appropriate hygiene as needed including keeping skin clean and dry  -  Evaluate need for skin moisturizer/barrier cream  - Collaborate with interdisciplinary team   - Patient/family teaching  - Consider wound care consult   Outcome: Progressing     Problem: SAFETY,RESTRAINT: NV/NON-SELF DESTRUCTIVE BEHAVIOR  Goal: Remains free of harm/injury (restraint for non violent/non self-detsructive behavior)  Description: INTERVENTIONS:  - Instruct patient/family regarding restraint use   - Assess and monitor physiologic and psychological status   - Provide interventions and comfort measures to meet assessed patient needs   - Identify and implement measures to help patient regain control  - Assess readiness for release of restraint   Outcome: Progressing  Goal: Returns to optimal restraint-free functioning  Description: INTERVENTIONS:  - Assess the patient's behavior and symptoms that indicate continued need for restraint  - Identify and implement measures to help patient regain control  - Assess readiness for release of restraint   Outcome: Progressing

## 2024-12-12 NOTE — OCCUPATIONAL THERAPY NOTE
Occupational Therapy Evaluation     Patient Name: Rosmery Vargas  Today's Date: 12/12/2024  Problem List  Principal Problem:    Toxic metabolic encephalopathy  Active Problems:    Hyperlipidemia    Bipolar I disorder (HCC)    Acute kidney injury (HCC)    Rhabdomyolysis    Leukocytosis    Falls    Thrombocytosis    Aortic ectasia (HCC)    Tobacco abuse    Past Medical History  Past Medical History:   Diagnosis Date    Asthma     Bladder incontinence     Chronic pain     back    COPD (chronic obstructive pulmonary disease) (HCC)     GERD (gastroesophageal reflux disease)     Hyperlipidemia     Psychiatric disorder     bipolar     Past Surgical History  Past Surgical History:   Procedure Laterality Date    TUBAL LIGATION          12/12/24 0845   OT Last Visit   OT Visit Date 12/12/24  (Thursday)   Note Type   Note type Evaluation  (Eval [id number])   Additional Comments Identified pt by full name and birthdate (excluding year). Pt stated 08/14/2024   Pain Assessment   Pain Assessment Tool 0-10   Pain Score No Pain   Restrictions/Precautions   Weight Bearing Precautions Per Order No   Braces or Orthoses   (Per PT eval pt wearing  wrist brace. Pt not wearing brace this AM. X-ray 07/30/24 impression: Stable alignment of a suspected nondisplaced scaphoid bone fracture (left))   Other Precautions 1:1;Cognitive;Fall Risk   Home Living   Type of Home Apartment   Home Layout Performs ADLs on one level;Able to live on main level with bedroom/bathroom   Home Equipment   (no AD)   Additional Comments Pt reports living in downstairs apt (ground floor??). Pt reports there are apartments upstairs/ above her   Prior Function   Level of [place] Independent with ADLs;Independent with functional mobility   Vocational On disability   Comments Pt unable to consistently follow directiions. Pt reports I w/ ADLs   Lifestyle   Autonomy Pt reports I w/ ADLs but is not an accurate historian upon eval   Reciprocal Relationships Pt lives  "alone although reports she has been cooking for [place]   Service to Others Pt reports she is on disability. Pt became tearful when mentioning that she is on disability   Intrinsic Gratification Pt unable to report. Will continue to assess   General   Additional Pertinent History Pt admitted to [facility] on 12/7/24. Diagnosed w/ toxic metabolic encephalopathy.   Family/Caregiver Present No   Subjective   Subjective \"You can do anything if you put your mind to it\"   ADL   Where Assessed Chair  (vs in stance at sink)   Eating Assistance 6  Modified independent   Eating Deficit Setup;Increased time to complete   Grooming Assistance 5  Supervision/Setup   Grooming Deficit Setup  (in stance at sink in bathroom to comb hair w/ + time)   UB Dressing Assistance 4  Minimal Assistance   UB Dressing Deficit Setup;Supervision/safety;Verbal cueing   LB Dressing Assistance 4  Minimal Assistance   LB Dressing Deficit Setup;Increased time to complete;Supervision/safety;Verbal cueing  (+ time; don / doff pants. \"they are too tight\")   Toileting Assistance  Unable to assess  (anticipate S based on fxal obs skills, clinical judgement)   Additional Comments Pt reported needing to use the bathroom upon seeing the toilet. Initiated toilet transfer and then began walking out of the bathroom   Bed Mobility   Supine to Sit Unable to assess   Sit to Supine Unable to assess   Additional Comments Pt seated OOB In bedside recliner chair upon arrival and post eval w/ needs met, 1:1 present   Transfers   Sit to Stand 5  Supervision   Stand to Sit 5  Supervision   Additional Comments Pt performed sit <> stand 2X w/ S   Functional Mobility   Functional Mobility 5  Supervision   Additional Comments engaged in functional mobility to / from bathroom w/ S w/ out use of AD or LOB   Additional items   (no AD)   Balance   Static Sitting Fair +   Dynamic Sitting Fair +   Static Standing Fair +   Ambulatory Fair   Activity Tolerance   Activity Tolerance Other " (Comment)  (impaired cognition, insight into deficits, attention, recall / memory)   Medical Staff Made Aware spoke w/ PTNohemi   Nurse Made Aware spoke w/ RNAbby / 1:1 PCA   RUE Assessment   RUE Assessment WFL   RUE Strength   RUE Overall Strength Within Functional Limits - able to perform ADL tasks with strength   LUE Assessment   LUE Assessment WFL   LUE Strength   LUE Overall Strength Within Functional Limits - able to perform ADL tasks with strength   Hand Function   Gross Motor Coordination Functional   Fine Motor Coordination Functional   Psychosocial   Patient Behaviors/Mood Cooperative  (difficulty following directions, sustaining attention; confused)   Cognition   Overall Cognitive Status (S)  Impaired   Arousal/Participation Alert   Attention Difficulty attending to directions   Orientation Level Oriented to person;Disoriented to time;Disoriented to situation   Memory Decreased long term memory;Decreased recall of precautions;Decreased recall of recent events;Decreased short term memory  (Will continue to assess as appropriate)   Following Commands Follows one step commands inconsistently   Comments Alert, pleasantly confused and tangential. Difficulty following directions. Increased time to intiate tasks, difficulty following thru to completion. Recommend ongoing eval of functional cognition as appropriate pend improved activity tolerance, attention, awareness   Assessment   Limitation Decreased ADL status;Decreased cognition;Decreased Safe judgement during ADL;Decreased self-care trans;Decreased high-level ADLs  (impaired activity tolerance, balance, recall, attention, problem solving)   Assessment Pt is a 63yo female admitted to [facility] on 12/7/24. Diagnosed w/ toxic metabolic encephalopathy. Significant PMH impacting her occupational performance includes nicotine dependence, chronic back pain, anxiety/depression, Bipolar I disorder, L wrist fracture (07/2024). Pt exhibiting bizarre behavior and  presented to ED several times past few days due to falls. Personal and environmental factors supporting performance includes first floor environment, independent at baseline; barriers include lives alone, inability to complete IADLs, difficulty completing ADLs, fall history, insight into deficits. Pt reports living in apt and I w/ ADLs w/ out use of AD. Upon eval, pt alert and oriented to person. Difficulty following directions, sustaining attention. Pt demonstrated poor awareness and impaired recall. Pt required S to complete grooming in stance at sink w/ + time, cues; min A UBD/LBD after set- up, S sit <> stand and S w/ out use of AD for functional mobility to / from bathroom. Recommend ongoing eval of functional cognition to assist in DC planning pend improved activity tolerance, attention, awareness. Pt is completing ADLs below baseline level of I and would benefit from OT in acute care to max I w/ ADLs, achieve highest level of function. Recommend level III rehab resource intensity when medically stable for discharge. Will continue to follow   Goals   Patient Goals Pt unable to state. Will continue to assess.   LTG Time Frame 10-14   Long Term Goal #1 see below   Plan   Treatment Interventions ADL retraining;Cognitive reorientation;Patient/family training;Equipment evaluation/education;Compensatory technique education;Continued evaluation;Activityengagement   Goal Expiration Date 12/26/24   OT Treatment Day 0  (Thursday 12/12/24)   OT Frequency 1-2x/wk   Discharge Recommendation   Rehab Resource Intensity Level, OT III (Minimum Resource Intensity)   AM-PAC Daily Activity Inpatient   Lower Body Dressing 3   Bathing 3   Toileting 3   Upper Body Dressing 3   Grooming 3   Eating 3   Daily Activity Raw Score 18   Daily Activity Standardized Score (Calc for Raw Score >=11) 38.66   AM-PAC Applied Cognition Inpatient   Following a Speech/Presentation 1   Understanding Ordinary Conversation 2   Taking Medications 1    Remembering Where Things Are Placed or Put Away 2   Remembering List of 4-5 Errands 1   Taking Care of Complicated Tasks 1   Applied Cognition Raw Score 8   Applied Cognition Standardized Score 19.32   Barthel Index   Feeding 5   Bathing 0   Grooming Score 0   Dressing Score 5   Bladder Score 10   Bowels Score 10   Toilet Use Score 5   Transfers (Bed/Chair) Score 10   Mobility (Level Surface) Score 0   Stairs Score 0   Barthel Index Score 45   End of Consult   Education Provided Yes   Patient Position at End of Consult Bedside chair;All needs within reach  (1:1 present)   Nurse Communication Nurse aware of consult   Licensure   [place] License Number  Radha Segundo, OTR/L [id number]          The patient's raw score on the AM-PAC Daily Activity Inpatient Short Form is 18. A raw score of less than 19 suggests the patient may benefit from discharge to post-acute rehabilitation services. Please refer to the recommendation of the Occupational Therapist for safe discharge planning. OT DC recommendation does not coincide w/ AMPAC score. Please see above for details.       Pt goals to be met by 12/26/24 to max I w/ ADLs to return home to baseline level of I includes:    -Pt will consistently engage in functional mobility community distances independently    -Pt will complete functional transfers to all surfaces independently    -Pt will complete UBD/LBD independently    -Pt will consistently follow 2 step directions during ADLs w/ no more than 1 cue / prompt      STG to be met in 3-5 days to max I w/ ADLs, assist in DC planning includes:    -Pt will demonstrate good attention and participation in ongoing eval of functional cognition / health literacy to assist in DC planning    -Pt will consistently follow 1 step directions during ADLs to improve engagement    -Pt will complete bed mobility supine <> sit independently    -Pt will complete functional transfers to bed, chair, and toilet using LRAD, DME as needed w/ mod  I    -Pt will consistently engage in functional mobility household distances using LRAD w/ mod I    -Pt will demonstrate improved activity tolerance to participate in ADLs vs preferred leisure task for at least 15 minutes w/ out rest break or sign /symptoms of fatigue    -Pt will demonstrate improved AMPAC score to at least 23 to max I w/ ADLs, assist in DC planning.       Radha Segundo, OTR/L  [id number]  [id number]

## 2024-12-12 NOTE — PROGRESS NOTES
Progress Note - Behavioral Health   Rosmery Vargas 64 y.o. female MRN: [id number]  Unit/Bed#: 2 [place] [address] Encounter: [id number]      Assessment:  Rosmery Vargas is a 64 y.o. female with past medical history of bipolar 1 disorder, hyperlipidemia, NSTEMI who presented to the emergency department with increased confusion and is admitted for toxic metabolic encephalopathy.  Please see previous psychiatric consultation and follow-up for more information.  When last seen recommended further ruling out medical/neurological etiologies and if negative to recommend inpatient psychiatric hospitalization.  Since then patient has been evaluated by neurology-MRI and labs have been unremarkable.  Per nursing patient has reported that she is Mrs. Suero and that she is pregnant with twins.  She was initially agitated and refusing to speak to me and I left the room though after representing shortly after patient was pleasant, calm, cooperative.  Patient this morning is exhibiting psychosis.  She is delusional discussing being Mrs. Mrs. Suero that I am Mahsa Suero. She is disorganized in behavior and thought process and is tangential.  She is repeating things stated on the television.  She is oriented to being at [facility] and that it is December though states that it is Roxi Day 2025.  Denies suicidal or homicidal ideation.  Reports having issues with her thoughts.  I recommend inpatient psychiatric hospitalization for treatment of psychosis and patient is not in agreement therefore request patient be screened for involuntary inpatient psychiatric commitment.  Will increase Zyprexa to 5 mg BID for psychosis and mood stabilization.  QTc yesterday was 462.  Repeat EKG for QTc monitoring.      Assessment & Plan  Psychosis (HCC) (rule out bipolar disorder with psychotic features versus schizoaffective disorder bipolar type)  Plan:   Continue medical management  Continue observation  Recommend patient psychiatric  "hospitalization  Patient is not in agreement therefore request patient be screened for involuntary inpatient psychiatric commitment  Neurology input appreciated  Increase Zyprexa 5 mg BID  Discontinue Pristiq  Can continue BuSpar 15 mg TID for now due to dosage uncertainty   Continue Lamictal 200 mg daily  Discontinue lorazepam prn  Start Zyprexa 2.5 mg p.o. every 6 hours as needed agitation max dose 3 in 24 hours with Zyprexa 2.5 mg IM alternative every 6 hours as needed agitation max dose 3 in 24 hours if patient refuses oral route  Do not administer IM/IV benzodiazapines if possible within 1 hour of administration of IM Zyprexa as this is associated with excessive sedation and cardiorespiratory suppression  Repeat EKG for QTc monitoring  Discussed with the primary team  Discussed with nursing  For after hours and weekends please contact [facility] Psychiatry Consultation role       Subjective:    I came to see the patient for continuity of care. One-to-one present this morning during evaluation.  Patient initially was agitated and loudly repeatedly telling me to leave refusing to speak with me stating \"you already know everything\".  Per nursing patient has reported that she is Mrs. Suero and that she is pregnant with twins.  Per nursing patient made a Roxi song out of a nurse's name. Shortly after returned in the room again and patient was cooperative, pleasant, sitting upright in chair.  Reports that her mood is \"really good\".  States that she is in the hospital because things are \"mixed up, everything is wrong\".  States that she lives in the [place].  States that I am Mahsa Suero.  Patient exhibited odd and bizarre hand and arm movements near constantly during the evaluation of moving her hands and arms and circles around her head.  States that she keeps up her work she \"will never die\".  She was discussing magic.  States that she is pregnant though did not describe this further when asked.  States that she " "is at [facility] and that it is Roxi day December 2025.  She was repeating what was stated on the television.  Denies suicidal or homicidal ideation stating \"never\".  States that she is looking forward to Roxi.  Reports having issues with her thoughts.      Mental Status Evaluation:  Appearance:  appears stated age and overweight    Behavior:  Initially uncooperative, later pleasant and cooperative.  Bizarre, disorganized   Speech:  Initially loud, later normal rate and volume   Mood:  \"Really good\"   Affect:  Irritable, then bright   Thought Process:  tangential   Thought Content:  Delusions   Perceptual Disturbances: No hallucinations verbalized   Risk Potential: Denies suicidal or homicidal ideation   Sensorium:  person, place, and month of year   Memory:  Unable to assess   Cognition:  Unable to assess   Consciousness:  alert and awake    Attention: attention span appeared shorter than expected for age   Insight:  poor   Judgment: limited   Muscle Strength and Tone: Not assessed   Motor Activity: no abnormal movements         Medications:  Current Facility-Administered Medications   Medication Dose Route Frequency Provider Last Rate    acetaminophen  650 mg Oral Q6H PRN Cesar Lara MD      albuterol  2.5 mg Nebulization Q6H PRN Cesar Lara MD      aspirin  81 mg Oral Daily Cesar Lara MD      atorvastatin  40 mg Oral Daily Cesar Lara MD      busPIRone  15 mg Oral TID Cesar Lara MD      [Held by provider] desvenlafaxine succinate  50 mg Oral Daily Cesar Lara MD      docusate sodium  100 mg Oral BID Cesar Lara MD      fluticasone  1 spray Nasal Daily Cesar Lara MD      heparin (porcine)  5,000 Units Subcutaneous Q12H Count includes the [facility] Cesar Lara MD      Ketotifen Fumarate  1 drop Both Eyes BID Cesar Lara MD      lamoTRIgine  200 mg Oral Daily Cesar Lara MD      loratadine  10 mg Oral " "Daily Cesar Lara MD      LORazepam  0.5 mg Intravenous Q6H PRN Sandi Grarison PA-C      metroNIDAZOLE  500 mg Oral Q12H [facility] NALDO [facility]s      multivitamin stress formula  1 tablet Oral Daily Cesar Lara MD      nicotine  1 patch Transdermal Daily Cesar Lara MD      OLANZapine  2.5 mg Oral BID Christian Copeland DO      ondansetron  4 mg Intravenous Q6H PRN Cesar Lara MD      oxybutynin  10 mg Oral Daily Cesar Lara MD      pantoprazole  40 mg Oral Daily Cesar Lara MD      polyethylene glycol  17 g Oral Daily PRN Cesar Lara MD      propranolol  10 mg Oral Daily Cesar Lara MD      umeclidinium  1 puff Inhalation Daily Cesar Lara MD           Risks, benefits and possible side effects of Medications:   Unable to have discussion due to patient factors      Labs:    I have personally reviewed all pertinent laboratory/tests results.  Labs in last 72 hours:   Recent Labs     12/10/24  1611 12/11/24  0557   WBC  --  10.14   RBC  --  3.25*   HGB  --  9.5*   HCT  --  29.5*   PLT  --  360   RDW  --  14.4   SODIUM  --  136   K  --  3.6   CL  --  103   CO2  --  27   BUN  --  18   CREATININE  --  0.74   GLUC  --  124   CALCIUM  --  8.0*   AMMONIA 39  --    [id number]  --  1.162     Thyroid Studies:   Lab Results   Component Value Date    WQQ8GBROKYNQ 1.162 12/11/2024     RPR: No results found for: \"RPR\"  Ammonia:   Lab Results   Component Value Date    AMMONIA 39 12/10/2024       Drug Screen:   Lab Results   Component Value Date    AMPMETHUR Negative 12/07/2024    BARBTUR Negative 12/07/2024    BDZUR Positive (A) 12/07/2024    THCUR Negative 12/07/2024    COCAINEUR Negative 12/07/2024    METHADONEUR Negative 12/07/2024    OPIATEUR Negative 12/07/2024    PCPUR Negative 12/07/2024     Medical alcohol level   Lab Results   Component Value Date    ETOH <10 12/07/2024     Vitamin D Level   Lab Results   Component " Value Date    [id number] 30.4 12/10/2024     Vitamin B12   Lab Results   Component Value Date    FWKWUBMR45 366 12/10/2024       EKG   Lab Results   Component Value Date    VENTRATE 80 12/11/2024    ATRIALRATE 80 12/11/2024    PRINT 132 12/11/2024    QRSDINT 98 12/11/2024    QTINT 400 12/11/2024    QTCINT 462 12/11/2024    PAXIS 46 12/11/2024    QRSAXIS 7 12/11/2024    TWAVEAXIS 71 12/11/2024     Imaging Studies: MRI brain w wo contrast  Result Date: 12/11/2024  Narrative: MRI BRAIN WITH AND WITHOUT CONTRAST INDICATION: Pt with changes in mental state, question ischemia or other etiology for abrupt changes.. COMPARISON: 12/7/2024 TECHNIQUE: Multiplanar, multisequence imaging of the brain was performed before and after gadolinium administration. IV Contrast:  9 mL of Gadobutrol injection (SINGLE-DOSE) IMAGE QUALITY:   Diagnostic. FINDINGS: BRAIN PARENCHYMA:  There is no discrete mass, mass effect or midline shift. There is no intracranial hemorrhage.  Normal posterior fossa.  Diffusion imaging is unremarkable. There are no white matter changes in the cerebral hemispheres. Postcontrast imaging of the brain demonstrates no abnormal enhancement. VENTRICLES:  Normal for the patient's age. SELLA AND PITUITARY GLAND:  Normal. ORBITS:  Normal. PARANASAL SINUSES:  Normal. VASCULATURE:  Evaluation of the major intracranial vasculature demonstrates appropriate flow voids. CALVARIUM AND SKULL BASE:  Normal. EXTRACRANIAL SOFT TISSUES:  Normal.     Impression: Normal examination. No acute process or enhancing lesion seen. Workstation performed: [id number]         Christian Copeland DO  12/12/24      This note has been constructed using a voice recognition system.    There may be translation, syntax,  or grammatical errors. If you have any questions, please contact the dictating provider.

## 2024-12-12 NOTE — ASSESSMENT & PLAN NOTE
Patient had presented to ED several times over last few days due to falls, noted to exhibit bizarre behavior in the emergency room. History of bipolar disorder. Noted to be intermittently yelling, whispering, avoiding questions and not follow commands. Patient admitted for further workup of acute encephalopathy possibly due to metabolic encephalopathy or underlying psychiatric illness.  On admission, Cr 1.71 higher than baseline, , WBC 19.31  SHERON and rhabdo resolved s/p IVF  Urine culture with Gardnerella vaginalis  Previous provider discussed with ID, recommended to continue flagyl 500 mg po bid x 1 week and can discontinue rocephin as procal negative  Neurology consulted  MRI brain was negative  TSH, B12, vitamin D, ammonia levels, RPR within normal limits  HIV ordered, consent could not be obtained due to altered mental status  Psychiatry consulted  Discontinued Geodon and hydroxyzine, Pristiq held, started on Zyprexa 5 mg BID, continue home buspar and lamictal  Continue ativan 0.5 mg IV q6h PRN for agitation  Currently on 1:1 observation  Monitor qtc - was 462 on 12/11  Encephalopathy likely due to underlying psychiatric illness  Psychiatry recommending inpatient psychiatric hospitalization  Patient is medically cleared for inpatient psychiatric treatment

## 2024-12-12 NOTE — NURSING NOTE
During the night, patient pulled out IV and refused a new IV resulting in patient getting agitated. Patient given PO ativan 0.5mg which did calm patient down. Patient refused morning labs multiple times and started to escalate. This nurse was able to de-escalate the situation and calm patient.

## 2024-12-12 NOTE — ASSESSMENT & PLAN NOTE
Patient found on CT imaging to have a stable ascending aortic ectasia with maximum diameter of 4 cm.  Radiology recommending noncontrast CT follow-up in 12 months
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
## 2024-12-12 NOTE — PHYSICAL THERAPY NOTE
PT cancellation       12/12/24 1135   PT Last Visit   PT Visit Date 12/12/24   Note Type   Note Type Cancelled Session   Cancel Reasons Other  (PT treatment cancelled today as pt was agitated and needed to be brought back to her room by the control team. Will follow as medically appropriate)     Yaquelin Pennington PT  [id number]

## 2024-12-12 NOTE — ASSESSMENT & PLAN NOTE
Plan:   Continue medical management  Continue observation  Recommend patient psychiatric hospitalization  Patient is not in agreement therefore request patient be screened for involuntary inpatient psychiatric commitment  Neurology input appreciated  Increase Zyprexa 5 mg BID  Discontinue Pristiq  Can continue BuSpar 15 mg TID for now due to dosage uncertainty   Continue Lamictal 200 mg daily  Discontinue lorazepam prn  Start Zyprexa 2.5 mg p.o. every 6 hours as needed agitation max dose 3 in 24 hours with Zyprexa 2.5 mg IM alternative every 6 hours as needed agitation max dose 3 in 24 hours if patient refuses oral route  Do not administer IM/IV benzodiazapines if possible within 1 hour of administration of IM Zyprexa as this is associated with excessive sedation and cardiorespiratory suppression  Repeat EKG for QTc monitoring  Discussed with the primary team  Discussed with nursing  For after hours and weekends please contact [facility] Psychiatry Consultation role

## 2024-12-12 NOTE — PLAN OF CARE
Problem: OCCUPATIONAL THERAPY ADULT  Goal: Performs self-care activities at highest level of function for planned discharge setting.  See evaluation for individualized goals.  Description: Treatment Interventions: ADL retraining, Cognitive reorientation, Patient/family training, Equipment evaluation/education, Compensatory technique education, Continued evaluation, Activityengagement          See flowsheet documentation for full assessment, interventions and recommendations.   Note: Limitation: Decreased ADL status, Decreased cognition, Decreased Safe judgement during ADL, Decreased self-care trans, Decreased high-level ADLs (impaired activity tolerance, balance, recall, attention, problem solving)     Assessment: Pt is a 63yo female admitted to \[facility]\"" on 12/7/24. Diagnosed w/ toxic metabolic encephalopathy. Significant PMH impacting her occupational performance includes nicotine dependence, chronic back pain, anxiety/depression, Bipolar I disorder, L wrist fracture (07/2024). Pt exhibiting bizarre behavior and presented to ED several times past few days due to falls. Personal and environmental factors supporting performance includes first floor environment, independent at baseline; barriers include lives alone, inability to complete IADLs, difficulty completing ADLs, fall history, insight into deficits. Pt reports living in apt and I w/ ADLs w/ out use of AD. Upon eval, pt alert and oriented to person. Difficulty following directions, sustaining attention. Pt demonstrated poor awareness and impaired recall. Pt required S to complete grooming in stance at sink w/ + time, cues; min A UBD/LBD after set- up, S sit <> stand and S w/ out use of AD for functional mobility to / from bathroom. Recommend ongoing eval of functional cognition to assist in DC planning pend improved activity tolerance, attention, awareness. Pt is completing ADLs below baseline level of I and would benefit from OT in acute care to max I w/ ADLs,  achieve highest level of function. Recommend level III rehab resource intensity when medically stable for discharge. Will continue to follow     Rehab Resource Intensity Level, OT: III (Minimum Resource Intensity)

## 2024-12-12 NOTE — ED NOTES
12/12/24 @ 1515:  Tati from [facility] screened patient and will schedule patient for telepsych.  Joanne MS

## 2024-12-12 NOTE — ASSESSMENT & PLAN NOTE
Patient continues with bizarre behavior, tangential conversation, occasionally suspicious, whispering as to not be overheard  Psychiatry following, appreciate recommendations  Geodon and hydroxyzine discontinued  Pristiq currently on hold  Continue Buspar and Lamictal  Continue 0.5 mg IV ativan q6h for agitation  Started on Zyprexa 5 mg BID  Qtc on 12/11 was 462

## 2024-12-13 VITALS
HEIGHT: 68 IN | OXYGEN SATURATION: 95 % | TEMPERATURE: 98.8 F | WEIGHT: 198.7 LBS | HEART RATE: 92 BPM | RESPIRATION RATE: 20 BRPM | DIASTOLIC BLOOD PRESSURE: 90 MMHG | SYSTOLIC BLOOD PRESSURE: 170 MMHG | BODY MASS INDEX: 30.11 KG/M2

## 2024-12-13 LAB
ANION GAP SERPL CALCULATED.3IONS-SCNC: 6 MMOL/L (ref 4–13)
BUN SERPL-MCNC: 12 MG/DL (ref 5–25)
CALCIUM SERPL-MCNC: 9.6 MG/DL (ref 8.4–10.2)
CHLORIDE SERPL-SCNC: 102 MMOL/L (ref 96–108)
CK SERPL-CCNC: 128 U/L (ref 26–192)
CO2 SERPL-SCNC: 29 MMOL/L (ref 21–32)
CREAT SERPL-MCNC: 0.64 MG/DL (ref 0.6–1.3)
ERYTHROCYTE [DISTWIDTH] IN BLOOD BY AUTOMATED COUNT: 14.6 % (ref 11.6–15.1)
GFR SERPL CREATININE-BSD FRML MDRD: 95 ML/MIN/1.73SQ M
GLUCOSE SERPL-MCNC: 94 MG/DL (ref 65–140)
HCT VFR BLD AUTO: 34.1 % (ref 34.8–46.1)
HGB BLD-MCNC: 11.3 G/DL (ref 11.5–15.4)
MCH RBC QN AUTO: 29.6 PG (ref 26.8–34.3)
MCHC RBC AUTO-ENTMCNC: 33.1 G/DL (ref 31.4–37.4)
MCV RBC AUTO: 89 FL (ref 82–98)
PLATELET # BLD AUTO: 435 THOUSANDS/UL (ref 149–390)
PMV BLD AUTO: 9.7 FL (ref 8.9–12.7)
POTASSIUM SERPL-SCNC: 3.6 MMOL/L (ref 3.5–5.3)
RBC # BLD AUTO: 3.82 MILLION/UL (ref 3.81–5.12)
SARS-COV-2 RNA RESP QL NAA+PROBE: NEGATIVE
SODIUM SERPL-SCNC: 137 MMOL/L (ref 135–147)
WBC # BLD AUTO: 9.9 THOUSAND/UL (ref 4.31–10.16)

## 2024-12-13 PROCEDURE — 87635 SARS-COV-2 COVID-19 AMP PRB: CPT | Performed by: NURSE PRACTITIONER

## 2024-12-13 PROCEDURE — 82550 ASSAY OF CK (CPK): CPT | Performed by: NURSE PRACTITIONER

## 2024-12-13 PROCEDURE — 80048 BASIC METABOLIC PNL TOTAL CA: CPT

## 2024-12-13 PROCEDURE — 99239 HOSP IP/OBS DSCHRG MGMT >30: CPT | Performed by: NURSE PRACTITIONER

## 2024-12-13 PROCEDURE — 85027 COMPLETE CBC AUTOMATED: CPT

## 2024-12-13 RX ORDER — OLANZAPINE 5 MG/1
5 TABLET ORAL 2 TIMES DAILY
Start: 2024-12-13

## 2024-12-13 RX ORDER — NICOTINE 21 MG/24HR
1 PATCH, TRANSDERMAL 24 HOURS TRANSDERMAL DAILY
Start: 2024-12-14

## 2024-12-13 RX ORDER — METRONIDAZOLE 500 MG/1
500 TABLET ORAL EVERY 12 HOURS SCHEDULED
Start: 2024-12-13 | End: 2024-12-15

## 2024-12-13 RX ORDER — LISINOPRIL 10 MG/1
10 TABLET ORAL DAILY
Status: DISCONTINUED | OUTPATIENT
Start: 2024-12-13 | End: 2024-12-14 | Stop reason: HOSPADM

## 2024-12-13 RX ADMIN — FLUTICASONE PROPIONATE 1 SPRAY: 50 SPRAY, METERED NASAL at 10:24

## 2024-12-13 RX ADMIN — BUSPIRONE HYDROCHLORIDE 15 MG: 5 TABLET ORAL at 10:13

## 2024-12-13 RX ADMIN — METRONIDAZOLE 500 MG: 500 TABLET ORAL at 21:36

## 2024-12-13 RX ADMIN — OLANZAPINE 5 MG: 2.5 TABLET, FILM COATED ORAL at 17:04

## 2024-12-13 RX ADMIN — KETOTIFEN FUMARATE 1 DROP: 0.25 SOLUTION/ DROPS OPHTHALMIC at 10:27

## 2024-12-13 RX ADMIN — KETOTIFEN FUMARATE 1 DROP: 0.25 SOLUTION/ DROPS OPHTHALMIC at 17:05

## 2024-12-13 RX ADMIN — UMECLIDINIUM 1 PUFF: 62.5 AEROSOL, POWDER ORAL at 10:27

## 2024-12-13 RX ADMIN — ASPIRIN 81 MG CHEWABLE TABLET 81 MG: 81 TABLET CHEWABLE at 10:08

## 2024-12-13 RX ADMIN — DOCUSATE SODIUM 100 MG: 100 CAPSULE, LIQUID FILLED ORAL at 17:04

## 2024-12-13 RX ADMIN — ACETAMINOPHEN 650 MG: 325 TABLET ORAL at 03:55

## 2024-12-13 RX ADMIN — LAMOTRIGINE 200 MG: 100 TABLET ORAL at 10:07

## 2024-12-13 RX ADMIN — LORATADINE 10 MG: 10 TABLET ORAL at 10:07

## 2024-12-13 RX ADMIN — METRONIDAZOLE 500 MG: 500 TABLET ORAL at 10:08

## 2024-12-13 RX ADMIN — LISINOPRIL 10 MG: 10 TABLET ORAL at 19:38

## 2024-12-13 RX ADMIN — OLANZAPINE 5 MG: 2.5 TABLET, FILM COATED ORAL at 10:05

## 2024-12-13 RX ADMIN — HEPARIN SODIUM 5000 UNITS: 5000 INJECTION, SOLUTION INTRAVENOUS; SUBCUTANEOUS at 10:09

## 2024-12-13 RX ADMIN — BUSPIRONE HYDROCHLORIDE 15 MG: 5 TABLET ORAL at 21:36

## 2024-12-13 RX ADMIN — BUSPIRONE HYDROCHLORIDE 15 MG: 5 TABLET ORAL at 16:12

## 2024-12-13 RX ADMIN — DOCUSATE SODIUM 100 MG: 100 CAPSULE, LIQUID FILLED ORAL at 10:08

## 2024-12-13 RX ADMIN — PROPRANOLOL HYDROCHLORIDE 10 MG: 20 TABLET ORAL at 10:00

## 2024-12-13 RX ADMIN — NICOTINE 1 PATCH: 14 PATCH, EXTENDED RELEASE TRANSDERMAL at 10:02

## 2024-12-13 RX ADMIN — ATORVASTATIN CALCIUM 40 MG: 40 TABLET, FILM COATED ORAL at 10:10

## 2024-12-13 RX ADMIN — PANTOPRAZOLE SODIUM 40 MG: 40 TABLET, DELAYED RELEASE ORAL at 10:06

## 2024-12-13 RX ADMIN — B-COMPLEX W/ C & FOLIC ACID TAB 1 TABLET: TAB at 10:08

## 2024-12-13 RX ADMIN — OXYBUTYNIN 10 MG: 5 TABLET, FILM COATED, EXTENDED RELEASE ORAL at 10:09

## 2024-12-13 NOTE — PLAN OF CARE
Problem: Potential for Falls  Goal: Patient will remain free of falls  Description: INTERVENTIONS:  - Educate patient/family on patient safety including physical limitations  - Instruct patient to call for assistance with activity   - Consult OT/PT to assist with strengthening/mobility   - Keep Call bell within reach  - Keep bed low and locked with side rails adjusted as appropriate  - Keep care items and personal belongings within reach  - Initiate and maintain comfort rounds  - Make Fall Risk Sign visible to staff  - Offer Toileting every 2 Hours, in advance of need  - Apply yellow socks and bracelet for high fall risk patients  - Consider moving patient to room near nurses station  Outcome: Progressing     Problem: RESPIRATORY - ADULT  Goal: Achieves optimal ventilation and oxygenation  Description: INTERVENTIONS:  - Assess for changes in respiratory status  - Assess for changes in mentation and behavior  - Position to facilitate oxygenation and minimize respiratory effort  - Oxygen administered by appropriate delivery if ordered  - Initiate smoking cessation education as indicated  - Encourage broncho-pulmonary hygiene including cough, deep breathe, Incentive Spirometry  - Assess the need for suctioning and aspirate as needed  - Assess and instruct to report SOB or any respiratory difficulty  - Respiratory Therapy support as indicated  Outcome: Progressing     Problem: PAIN - ADULT  Goal: Verbalizes/displays adequate comfort level or baseline comfort level  Description: Interventions:  - Encourage patient to monitor pain and request assistance  - Assess pain using appropriate pain scale  - Administer analgesics based on type and severity of pain and evaluate response  - Implement non-pharmacological measures as appropriate and evaluate response  - Consider cultural and social influences on pain and pain management  - Notify physician/advanced practitioner if interventions unsuccessful or patient reports new  pain  Outcome: Progressing     Problem: INFECTION - ADULT  Goal: Absence or prevention of progression during hospitalization  Description: INTERVENTIONS:  - Assess and monitor for signs and symptoms of infection  - Monitor lab/diagnostic results  - Monitor all insertion sites, i.e. indwelling lines, tubes, and drains  - Monitor endotracheal if appropriate and nasal secretions for changes in amount and color  - [place] appropriate cooling/warming therapies per order  - Administer medications as ordered  - Instruct and encourage patient and family to use good hand hygiene technique  - Identify and instruct in appropriate isolation precautions for identified infection/condition  Outcome: Progressing  Goal: Absence of fever/infection during neutropenic period  Description: INTERVENTIONS:  - Monitor WBC    Outcome: Progressing     Goal: Maintain or return to baseline ADL function  Description: INTERVENTIONS:  -  Assess patient's ability to carry out ADLs; assess patient's baseline for ADL function and identify physical deficits which impact ability to perform ADLs (bathing, care of mouth/teeth, toileting, grooming, dressing, etc.)  - Assess/evaluate cause of self-care deficits   - Assess range of motion  - Assess patient's mobility; develop plan if impaired  - Assess patient's need for assistive devices and provide as appropriate  - Encourage maximum independence but intervene and supervise when necessary  - Involve family in performance of ADLs  - Assess for home care needs following discharge   - Consider OT consult to assist with ADL evaluation and planning for discharge  - Provide patient education as appropriate  Outcome: Progressing  Goal: Maintains/Returns to pre admission functional level  Description: INTERVENTIONS:  - Perform AM-PAC 6 Click Basic Mobility/ Daily Activity assessment daily.  - Set and communicate daily mobility goal to care team and patient/family/caregiver.   - Collaborate with rehabilitation  services on mobility goals if consulted  - Out of bed for toileting  - Record patient progress and toleration of activity level   Outcome: Progressing     Problem: DISCHARGE PLANNING  Goal: Discharge to home or other facility with appropriate resources  Description: INTERVENTIONS:  - Identify barriers to discharge w/patient and caregiver  - Arrange for needed discharge resources and transportation as appropriate  - Identify discharge learning needs (meds, wound care, etc.)  - Arrange for interpretive services to assist at discharge as needed  - Refer to Case Management Department for coordinating discharge planning if the patient needs post-hospital services based on physician/advanced practitioner order or complex needs related to functional status, cognitive ability, or social support system  Outcome: Progressing     Problem: Knowledge Deficit  Goal: Patient/family/caregiver demonstrates understanding of disease process, treatment plan, medications, and discharge instructions  Description: Complete learning assessment and assess knowledge base.  Interventions:  - Provide teaching at level of understanding  - Provide teaching via preferred learning methods  Outcome: Progressing     Problem: Prexisting or High Potential for Compromised Skin Integrity  Goal: Skin integrity is maintained or improved  Description: INTERVENTIONS:  - Identify patients at risk for skin breakdown  - Assess and monitor skin integrity  - Assess and monitor nutrition and hydration status  - Monitor labs   - Assess for incontinence   - Turn and reposition patient  - Assist with mobility/ambulation  - Relieve pressure over bony prominences  - Avoid friction and shearing  - Provide appropriate hygiene as needed including keeping skin clean and dry  - Evaluate need for skin moisturizer/barrier cream  - Collaborate with interdisciplinary team   - Patient/family teaching  - Consider wound care consult   Outcome: Progressing     Problem:  SAFETY,RESTRAINT: NV/NON-SELF DESTRUCTIVE BEHAVIOR  Goal: Remains free of harm/injury (restraint for non violent/non self-detsructive behavior)  Description: INTERVENTIONS:  - Instruct patient/family regarding restraint use   - Assess and monitor physiologic and psychological status   - Provide interventions and comfort measures to meet assessed patient needs   - Identify and implement measures to help patient regain control  - Assess readiness for release of restraint   Outcome: Progressing  Goal: Returns to optimal restraint-free functioning  Description: INTERVENTIONS:  - Assess the patient's behavior and symptoms that indicate continued need for restraint  - Identify and implement measures to help patient regain control  - Assess readiness for release of restraint   Outcome: Progressing

## 2024-12-13 NOTE — PLAN OF CARE
Problem: Potential for Falls  Goal: Patient will remain free of falls  Description: INTERVENTIONS:  - Educate patient/family on patient safety including physical limitations  - Instruct patient to call for assistance with activity   - Consult OT/PT to assist with strengthening/mobility   - Keep Call bell within reach  - Keep bed low and locked with side rails adjusted as appropriate  - Keep care items and personal belongings within reach  - Initiate and maintain comfort rounds  - Make Fall Risk Sign visible to staff  - Offer Toileting every 2 Hours, in advance of need  - Initiate/Maintain bed alarm  Problem: PAIN - ADULT  Goal: Verbalizes/displays adequate comfort level or baseline comfort level  Description: Interventions:  - Encourage patient to monitor pain and request assistance  - Assess pain using appropriate pain scale  - Administer analgesics based on type and severity of pain and evaluate response  - Implement non-pharmacological measures as appropriate and evaluate response  - Consider cultural and social influences on pain and pain management  - Notify physician/advanced practitioner if interventions unsuccessful or patient reports new pain  Outcome: Progressing     Problem: INFECTION - ADULT  Goal: Absence or prevention of progression during hospitalization  Description: INTERVENTIONS:  - Assess and monitor for signs and symptoms of infection  - Monitor lab/diagnostic results  - Monitor all insertion sites, i.e. indwelling lines, tubes, and drains  - Monitor endotracheal if appropriate and nasal secretions for changes in amount and color  - [place] appropriate cooling/warming therapies per order  - Administer medications as ordered  - Instruct and encourage patient and family to use good hand hygiene technique  - Identify and instruct in appropriate isolation precautions for identified infection/condition  Outcome: Progressing  Goal: Absence of fever/infection during neutropenic period  Description:  INTERVENTIONS:  - Monitor WBC    Outcome: Progressing     Problem: SAFETY ADULT  Goal: Patient will remain free of falls  Description: INTERVENTIONS:  - Educate patient/family on patient safety including physical limitations  - Instruct patient to call for assistance with activity   - Consult OT/PT to assist with strengthening/mobility   - Keep Call bell within reach  - Keep bed low and locked with side rails adjusted as appropriate  - Keep care items and personal belongings within reach  - Initiate and maintain comfort rounds  - Make Fall Risk Sign visible to staff  - Offer Toileting every 2 Hours, in advance of need  - Initiate/Maintain bed alarm  Problem: DISCHARGE PLANNING  Goal: Discharge to home or other facility with appropriate resources  Description: INTERVENTIONS:  - Identify barriers to discharge w/patient and caregiver  - Arrange for needed discharge resources and transportation as appropriate  - Identify discharge learning needs (meds, wound care, etc.)  - Arrange for interpretive services to assist at discharge as needed  - Refer to Case Management Department for coordinating discharge planning if the patient needs post-hospital services based on physician/advanced practitioner order or complex needs related to functional status, cognitive ability, or social support system  Outcome: Progressing     Problem: Knowledge Deficit  Goal: Patient/family/caregiver demonstrates understanding of disease process, treatment plan, medications, and discharge instructions  Description: Complete learning assessment and assess knowledge base.  Interventions:  - Provide teaching at level of understanding  - Provide teaching via preferred learning methods  Outcome: Progressing     Problem: Prexisting or High Potential for Compromised Skin Integrity  Goal: Skin integrity is maintained or improved  Description: INTERVENTIONS:  - Identify patients at risk for skin breakdown  - Assess and monitor skin integrity  - Assess and  monitor nutrition and hydration status  - Monitor labs   - Assess for incontinence   - Turn and reposition patient  - Assist with mobility/ambulation  - Relieve pressure over bony prominences  - Avoid friction and shearing  - Provide appropriate hygiene as needed including keeping skin clean and dry  - Evaluate need for skin moisturizer/barrier cream  - Collaborate with interdisciplinary team   - Patient/family teaching  - Consider wound care consult   Outcome: Progressing     Problem: SAFETY,RESTRAINT: NV/NON-SELF DESTRUCTIVE BEHAVIOR  Goal: Remains free of harm/injury (restraint for non violent/non self-detsructive behavior)  Description: INTERVENTIONS:  - Instruct patient/family regarding restraint use   - Assess and monitor physiologic and psychological status   - Provide interventions and comfort measures to meet assessed patient needs   - Identify and implement measures to help patient regain control  - Assess readiness for release of restraint   Outcome: Progressing  Goal: Returns to optimal restraint-free functioning  Description: INTERVENTIONS:  - Assess the patient's behavior and symptoms that indicate continued need for restraint  - Identify and implement measures to help patient regain control  - Assess readiness for release of restraint   Outcome: Progressing     - Apply yellow socks and bracelet for high fall risk patients  - Consider moving patient to room near nurses station  Outcome: Progressing  Goal: Maintain or return to baseline ADL function  Description: INTERVENTIONS:  -  Assess patient's ability to carry out ADLs; assess patient's baseline for ADL function and identify physical deficits which impact ability to perform ADLs (bathing, care of mouth/teeth, toileting, grooming, dressing, etc.)  - Assess/evaluate cause of self-care deficits   - Assess range of motion  - Assess patient's mobility; develop plan if impaired  - Assess patient's need for assistive devices and provide as appropriate  -  Encourage maximum independence but intervene and supervise when necessary  - Involve family in performance of ADLs  - Assess for home care needs following discharge   - Consider OT consult to assist with ADL evaluation and planning for discharge  - Provide patient education as appropriate  Outcome: Progressing  Goal: Maintains/Returns to pre admission functional level  Description: INTERVENTIONS:  - Perform AM-PAC 6 Click Basic Mobility/ Daily Activity assessment daily.  - Set and communicate daily mobility goal to care team and patient/family/caregiver.   - Collaborate with rehabilitation services on mobility goals if consulted  - Perform Range of Motion 2 times a day.  - Reposition patient every 2 hours.  - Dangle patient 2 times a day  - Stand patient 2 times a day  - Ambulate patient 2 times a day  - Out of bed to chair 2 times a day   - Out of bed for meals 2 times a day  - Out of bed for toileting  - Record patient progress and toleration of activity level   Outcome: Progressing     - Apply yellow socks and bracelet for high fall risk patients  - Consider moving patient to room near nurses station  Outcome: Progressing     Problem: RESPIRATORY - ADULT  Goal: Achieves optimal ventilation and oxygenation  Description: INTERVENTIONS:  - Assess for changes in respiratory status  - Assess for changes in mentation and behavior  - Position to facilitate oxygenation and minimize respiratory effort  - Oxygen administered by appropriate delivery if ordered  - Initiate smoking cessation education as indicated  - Encourage broncho-pulmonary hygiene including cough, deep breathe, Incentive Spirometry  - Assess the need for suctioning and aspirate as needed  - Assess and instruct to report SOB or any respiratory difficulty  - Respiratory Therapy support as indicated  Outcome: Progressing

## 2024-12-13 NOTE — [place] UNIVERSAL TRANSFER FORM
"[place] UNIVERSAL TRANSFER FORM  (ALL ITEMS MUST BE COMPLETED)    1. TRANSFER FROM: [facility]      TRANSFER TO: [facility]    2. DATE OF TRANSFER: 12/13/2024                        TIME OF TRANSFER: 1500    3. PATIENT NAME: Rosmery Vargas E      YOB: 1960                             GENDER: female    4. LANGUAGE:   English    5. PHYSICIAN NAME:  Keshawn Baldwin MD                   PHONE: [phone]    6. CODE STATUS: Level 1 - Full Code        Out of Hospital DNR Attached: No    7. :                                      :  No emergency contact information on file.           Health Care Representative/Proxy:  Yes           Legal Guardian:  Yes             NAME OF:           HEALTH CARE REPRESENTATIVE/PROXY:                                         OR           LEGAL GUARDIAN, IF NOT :                                               PHONE:  (Day)           (Night)                        (Cell)    8. REASON FOR TRANSFER: (Must include brief medical history and recent changes in physical function or cognition.) Metabolic Encephalopathy            V/S: /86   Pulse 76   Temp 98 °F (36.7 °C)   Resp 18   Ht 5' 8\" (1.727 m)   Wt 90.1 kg (198 lb 11.2 oz)   SpO2 98%   BMI 30.21 kg/m²           PAIN: None    9. PRIMARY DIAGNOSIS: Toxic metabolic encephalopathy      Secondary Diagnosis:         Pacemaker: No      Internal Defib: No          Mental Health Diagnosis (if Applicable):    10. RESTRAINTS: No     11. RESPIRATORY NEEDS: None    12. ISOLATION/PRECAUTION: None    13. ALLERGY: Patient has no known allergies.    14. SENSORY:       Vision Good, Hearing Good , and Speech Clear    15. SKIN CONDITION: No Wounds    16. DIET: Regular    17. IV ACCESS: None    18. PERSONAL ITEMS SENT WITH PATIENT: Glasses    19. ATTACHED DOCUMENTS: MUST ATTACH CURRENT MEDICATION INFORMATION Face Sheet, MAR, Labs, Code " Status, and Discharge Summary    20. AT RISK ALERTS:None        HARM TO: N/A    21. WEIGHT BEARING STATUS:         Left Leg: None        Right Leg: None    22. MENTAL STATUS:Forgetful and Disoriented    23. FUNCTION:        Walk: With Help        Transfer: With Help        Toilet: With Help        Feed: Self    24. IMMUNIZATIONS/SCREENING:     Immunization History   Administered Date(s) Administered    Tdap 11/14/2018, 08/12/2019, 04/02/2024       25. BOWEL: Continent    26. BLADDER: Continent    27. SENDING FACILITY CONTACT: [facility]                  Title: RN        Unit: 2S        Phone: [phone]          REC'G FACILITY CONTACT (if known):        Title:        Unit:         Phone:         FORM PREFILLED BY (if applicable)       Title:       Unit:        Phone:         FORM COMPLETED BY Peg Colby RN      Title: [facility]      Phone: [phone]

## 2024-12-13 NOTE — DISCHARGE SUMMARY
Discharge Summary - Hospitalist   Name: Rosmery Vargas 64 y.o. female I MRN: [id number]  Unit/Bed#: 2 [address] Date of Admission: 12/7/2024   Date of Service: 12/13/2024 I Hospital Day: 6     Assessment & Plan  Toxic metabolic encephalopathy  Patient had presented to ED several times over last few days due to falls, noted to exhibit bizarre behavior in the emergency room. History of bipolar disorder. Noted to be intermittently yelling, whispering, avoiding questions and not follow commands. Patient admitted for further workup of acute encephalopathy possibly due to metabolic encephalopathy or underlying psychiatric illness.  On admission, Cr 1.71 higher than baseline, , WBC 19.31  SHERON and rhabdo resolved s/p IVF  Urine culture with Gardnerella vaginalis  Previous provider discussed with ID, recommended to continue flagyl 500 mg po bid x 1 week and can discontinue rocephin as procal negative, continue through 12/15  Neurology consulted  MRI brain was negative  TSH, B12, vitamin D, ammonia levels, RPR within normal limits  HIV ordered, consent could not be obtained due to altered mental status  Psychiatry consulted  Discontinued Geodon and hydroxyzine, Pristiq held, started on Zyprexa 5 mg BID, continue home buspar and lamictal  Currently on 1:1 observation  Monitor qtc - was 462 on 12/11  Encephalopathy likely due to underlying psychiatric illness  Psychiatry recommending inpatient psychiatric hospitalization  Patient is medically cleared for inpatient psychiatric treatment  Patient was seen evening of 12/12 by telepsych and patient was committed for involuntary psychiatric hospitalization.  Discussed with on-call family guidance, indicated that [facility] in patient psychiatry has accepted patient and she will be transported there for further treatment   Psychosis (HCC) (rule out bipolar disorder with psychotic features versus schizoaffective disorder bipolar type)  Patient continues with  bizarre behavior, tangential conversation, occasionally suspicious, whispering as to not be overheard  Psychiatry following, appreciate recommendations  Geodon and hydroxyzine discontinued  Pristiq currently on hold  Continue Buspar and Lamictal  Started on Zyprexa 5 mg BID  Qtc on 12/11 was 462  As noted above patient is excepted to inpatient psychiatric hospital, will be transferred there for further evaluation and treatment  Aortic ectasia (HCC)  Patient found on CT imaging to have a stable ascending aortic ectasia with maximum diameter of 4 cm.  Radiology recommending noncontrast CT follow-up in 12 months  Hyperlipidemia  Continue Lipitor  Heart healthy diet  Falls  Patient reported multiple falls over past day  Imaging negative for acute fractures  PT/OT evaluation treatment, no needs identified  Tobacco abuse  Nicotine patch ordered  Recommend smoking cessation     Medical Problems       Resolved Problems  Date Reviewed: 12/13/2024          Resolved    Acute kidney injury (HCC) 12/12/2024     Resolved by  Sandi Garrison PA-C    Rhabdomyolysis 12/12/2024     Resolved by  Sandi Garrison PA-C    Leukocytosis 12/12/2024     Resolved by  Sandi Garrison PA-C    Thrombocytosis 12/12/2024     Resolved by  Sandi Garrison PA-C        Discharging Physician / Practitioner: NALDO Crews  PCP: Uriel Verma MD  Admission Date:   Admission Orders (From admission, onward)       Ordered        12/07/24 1613  INPATIENT ADMISSION  Once                          Discharge Date: 12/13/24    Consultations During Hospital Stay:  Psychiatry  Neurology  PT/OT    Procedures Performed:   None     Significant Findings / Test Results:   X-ray ribs performed 12/7/2024 shows no evidence of rib fracture, no acute cardiopulmonary disease as per radiology report.  Right elbow x-ray performed 12/7/2024 shows no acute osseous abnormality as per radiology report.  Right wrist x-ray performed 12/7/2024 shows no acute osseous  abnormality as per radiology report.  X-ray of right shoulder performed 12/7/2024 shows no acute osseous abnormality, degenerative changes as per radiology report.  CT of the head performed 12/7/2024 shows no acute intracranial abnormality.  CTA chest abdomen pelvis performed 12/7/2024 shows CT of chest no evidence of acute intrathoracic process, no definitive acute fracture allowing for motion artifact.  Stable ascending aortic ectasia with maximum diameter of 4 cm.  Noncontrast chest CT follow-up in 12 months is the recommendation.  CT of abdomen pelvis shows no evidence of acute abdominal pelvic process, no acute fracture, colonic diverticulosis as per radiology report.  MRI of the brain performed 12/11/2024 shows normal examination, no acute process or enhancing lesion seen as per radiology report.    Incidental Findings:   CT chest showed a stable ascending aortic ectasia with maximum diameter of 4 cm.  Noncontrast chest CT follow-up in 12 months is recommendation  Patient at bedside    Test Results Pending at Discharge (will require follow up):   None      Outpatient Tests Requested:  None     Complications:  None     Reason for Admission: Increased confusion and falls at home    Hospital Course:   Rosmery Vargas is a 64 y.o. female patient past medical history bipolar disorder, asthma, bladder incontinence, chronic back pain, COPD, GERD, hyperlipidemia who originally presented to the hospital on 12/7/2024 due to increased confusion and falls at home.  Patient had presented to the emergency department multiple times over the last day secondary to falls.  She presented again after a fall noted to be acting more confused and bizarre in the emergency department.  All trauma scans negative for osseous abnormality.  She was noted to intermittently be yelling and then whispering unable to follow all commands or answer questions appropriately.  Labs revealed an SHERON with a creatinine of 1.71 and also a CK of 701.   "WBCs increased to 19.31.  Patient was admitted for further evaluation and treatment, did receive aggressive IV hydration with CK dropping to 128 prior to discharge.  Her creatinine normalized, back at baseline 0.64 on discharge.  UA was positive for Gardnerella vaginalis, patient initially was on ceftriaxone and Flagyl, discussed with infectious disease and as procalcitonin was negative de-escalated to Flagyl only for total of 5 days treatment which will end on 12/16.  Patient continued exhibiting bizarre behaviors; psychiatry was consulted, recommended having neurology evaluate patient to ensure no neurological cause to the patient's encephalopathy.  MRI of the brain was performed which was negative.  Once again psychiatry was consulted, recommended involuntary commitment of patient.  Family guidance assisted with bed search, bed is available at [facility] inpatient psychiatry unit.  Patient will be transferred to inpatient psychiatry today.          Please see above list of diagnoses and related plan for additional information.     Condition at Discharge: good    Discharge Day Visit / Exam:   Subjective:    \"I am well thank you for asking\".  When asked patient how she slept last night she said she could not remember.  When questioned if she had breakfast and how it was she also said I do not remember.  She knew the year and the president and stated that she was Mrs. Suero.  When listening to her abdomen she stated \"the baby is in there\".  Does not seem as pressured as noted earlier in the week by this provider    Vitals: Blood Pressure: 158/86 (12/13/24 0938)  Pulse: 76 (12/13/24 1000)  Temperature: 98 °F (36.7 °C) (12/13/24 0938)  Temp Source: Oral (12/11/24 2012)  Respirations: 18 (12/13/24 0938)  Height: 5' 8\" (172.7 cm) (12/12/24 0152)  Weight - Scale: 90.1 kg (198 lb 11.2 oz) (12/12/24 0600)  SpO2: 98 % (12/13/24 0900)    Physical Exam  Vitals reviewed.   Constitutional:       General: She is " "not in acute distress.     Appearance: She is not ill-appearing.   HENT:      Head: Normocephalic.      Nose: Nose normal.      Mouth/Throat:      Mouth: Mucous membranes are moist.   Eyes:      Extraocular Movements: Extraocular movements intact.      Conjunctiva/sclera: Conjunctivae normal.   Cardiovascular:      Rate and Rhythm: Normal rate and regular rhythm.      Pulses: Normal pulses.      Heart sounds: Normal heart sounds.   Pulmonary:      Effort: Pulmonary effort is normal.      Breath sounds: Normal breath sounds.   Abdominal:      General: Bowel sounds are normal. There is no distension.      Palpations: Abdomen is soft.      Tenderness: There is no abdominal tenderness.   Genitourinary:     Comments: Voiding spontaneously   Musculoskeletal:         General: Normal range of motion.      Cervical back: Normal range of motion.      Right lower leg: No edema.      Left lower leg: No edema.   Skin:     General: Skin is warm and dry.      Capillary Refill: Capillary refill takes less than 2 seconds.   Neurological:      General: No focal deficit present.      Mental Status: She is alert and oriented to person, place, and time.   Psychiatric:         Mood and Affect: Affect is labile.         Behavior: Behavior is cooperative.      Comments: Patient smiling, cooperative with exam.  She states multiple times during course of her conversation, \"we will make mistakes but we learn from them\".  Stated that she is Mrs. Suero, and when listening with my stethoscope to her abdomen she stated the baby was in there.  Later in the day the patient was noted to be dancing in circles in the room          Discussion with Family:  No contact listed, patient has not had any visitors.     Discharge instructions/Information to patient and family:   See after visit summary for information provided to patient and family.      Provisions for Follow-Up Care:  See after visit summary for information related to follow-up care and any " pertinent home health orders.      Mobility at time of Discharge:   Basic Mobility Inpatient Raw Score: 19  JH-HLM Goal: 6: Walk 10 steps or more  JH-HLM Achieved: 7: Walk 25 feet or more  HLM Goal achieved. Continue to encourage appropriate mobility.     Disposition:   Inpatient Psychiatry at [facility]     Planned Readmission: No     Discharge Medications:  See after visit summary for reconciled discharge medications provided to patient and/or family.      Administrative Statements   Discharge Statement:  I have spent a total time of greater than 45 minutes in caring for this patient on the day of the visit/encounter. >30 minutes of time was spent on: Diagnostic results, Counseling / Coordination of care, Documenting in the medical record, Reviewing / ordering tests, medicine, procedures  , and Communicating with other healthcare professionals .    **Please Note: This note may have been constructed using a voice recognition system**

## 2024-12-13 NOTE — ED NOTES
Dante from [facility] came to tele psych the patient. The patient was not very cooperative. The patient closed the laptop on the psychiatrist. Dante stated the psychiatrist will complete the paperwork for the commitment        Nely LONDON

## 2024-12-13 NOTE — ASSESSMENT & PLAN NOTE
Patient had presented to ED several times over last few days due to falls, noted to exhibit bizarre behavior in the emergency room. History of bipolar disorder. Noted to be intermittently yelling, whispering, avoiding questions and not follow commands. Patient admitted for further workup of acute encephalopathy possibly due to metabolic encephalopathy or underlying psychiatric illness.  On admission, Cr 1.71 higher than baseline, , WBC 19.31  SHERON and rhabdo resolved s/p IVF  Urine culture with Gardnerella vaginalis  Previous provider discussed with ID, recommended to continue flagyl 500 mg po bid x 1 week and can discontinue rocephin as procal negative, continue through 12/15  Neurology consulted  MRI brain was negative  TSH, B12, vitamin D, ammonia levels, RPR within normal limits  HIV ordered, consent could not be obtained due to altered mental status  Psychiatry consulted  Discontinued Geodon and hydroxyzine, Pristiq held, started on Zyprexa 5 mg BID, continue home buspar and lamictal  Currently on 1:1 observation  Monitor qtc - was 462 on 12/11  Encephalopathy likely due to underlying psychiatric illness  Psychiatry recommending inpatient psychiatric hospitalization  Patient is medically cleared for inpatient psychiatric treatment  Patient was seen evening of 12/12 by telepsych and patient was committed for involuntary psychiatric hospitalization.  Discussed with on-call family guidance, indicated that [facility] in patient psychiatry has accepted patient and she will be transported there for further treatment

## 2024-12-13 NOTE — ASSESSMENT & PLAN NOTE
Patient continues with bizarre behavior, tangential conversation, occasionally suspicious, whispering as to not be overheard  Psychiatry following, appreciate recommendations  Geodon and hydroxyzine discontinued  Pristiq currently on hold  Continue Buspar and Lamictal  Started on Zyprexa 5 mg BID  Qtc on 12/11 was 462  As noted above patient is excepted to inpatient psychiatric hospital, will be transferred there for further evaluation and treatment

## 2024-12-13 NOTE — ED NOTES
Patient is accepted at [facility]   Patient is accepted by Dr. Marie Shaw    Transportation is arranged with [employer].     Transportation is scheduled for **.   Patient may go to the floor after 1500         Nurse report is to be called to [phone] prior to patient transfer.

## 2024-12-13 NOTE — EMTALA/ACUTE CARE TRANSFER
[address] [address]  Dept: [phone]      ACUTE CARE TRANSFER CONSENT    NAME Rosmery Vargas                                         1960                              MRN [id number]    I have been informed of my rights regarding examination, treatment, and transfer   by Dr. Keshawn Baldwin MD    Benefits:      Risks:        {SL IP Acute Care Transfer Choices:524005395}    I authorize the performance of emergency medical procedures and treatments upon me in both transit and upon arrival at the receiving facility.  Additionally, I authorize the release of any and all medical records to the receiving facility and request they be transported with me, if possible.  I understand that the safest mode of transportation during a medical emergency is an ambulance and that the Hospital advocates the use of this mode of transport. Risks of traveling to the receiving facility by car, including absence of medical control, life sustaining equipment, such as oxygen, and medical personnel has been explained to me and I fully understand them.    (ARMINDA CORRECT BOX BELOW)  [  ]  I consent to the stated transfer and to be transported by ambulance/helicopter.  [  ]  I consent to the stated transfer, but refuse transportation by ambulance and accept full responsibility for my transportation by car.  I understand the risks of non-ambulance transfers and I exonerate the Hospital and its staff from any deterioration in my condition that results from this refusal.    X___________________________________________    DATE  24  TIME________  Signature of patient or legally responsible individual signing on patient behalf           RELATIONSHIP TO PATIENT_________________________          Provider Certification    NAME Rosmery Vargas                                         1960                              MRN [id number]    A medical screening exam was performed on  the above named patient.  Based on the examination:    Condition Necessitating Transfer ***    Patient Condition:      Reason for Transfer:      Transfer Requirements: Facility [facility]   Space available and qualified personnel available for treatment as acknowledged by JYOTI [phone]  Agreed to accept transfer and to provide appropriate medical treatment as acknowledged by       Dr Salazar  Appropriate medical records of the examination and treatment of the patient are provided at the time of transfer   STAFF INITIAL WHEN COMPLETED _______  Transfer will be performed by qualified personnel from    and appropriate transfer equipment as required, including the use of necessary and appropriate life support measures.    Provider Certification: I have examined the patient and explained the following risks and benefits of being transferred/refusing transfer to the patient/family:         Based on these reasonable risks and benefits to the patient and/or the unborn child(kayleigh), and based upon the information available at the time of the patient’s examination, I certify that the medical benefits reasonably to be expected from the provision of appropriate medical treatments at another medical facility outweigh the increasing risks, if any, to the individual’s medical condition, and in the case of labor to the unborn child, from effecting the transfer.    X____________________________________________ DATE 12/13/24        TIME_______      ORIGINAL - SEND TO MEDICAL RECORDS   COPY - SEND WITH PATIENT DURING TRANSFER

## 2024-12-13 NOTE — ASSESSMENT & PLAN NOTE
Patient reported multiple falls over past day  Imaging negative for acute fractures  PT/OT evaluation treatment, no needs identified
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
## 2024-12-13 NOTE — PROGRESS NOTES
[facility] is requesting repeat ck level as the felt previous one was too elevated.  They would also like EKG tracing and Covid results as previously requested.  All results can be faxed to Family guidance at [phone]

## 2024-12-14 NOTE — [place] UNIVERSAL TRANSFER FORM
"[place] UNIVERSAL TRANSFER FORM  (ALL ITEMS MUST BE COMPLETED)    1. TRANSFER FROM: [facility]      TRANSFER TO: ***    2. DATE OF TRANSFER: 12/13/2024                        TIME OF TRANSFER: ***    3. PATIENT NAME: Rosmery Vargas E      YOB: 1960                             GENDER: female    4. LANGUAGE:   English    5. PHYSICIAN NAME:  Keshawn Baldwin MD                   PHONE: [phone]    6. CODE STATUS: Level 1 - Full Code        Out of Hospital DNR Attached: {Yes/No:20651}    7. :                                      :  No emergency contact information on file.           Health Care Representative/Proxy:  {Yes/No:20651}           Legal Guardian:  {Yes/No:20651}             NAME OF:           HEALTH CARE REPRESENTATIVE/PROXY:                                         OR           LEGAL GUARDIAN, IF NOT :                                               PHONE:  (Day)           (Night)                        (Cell)    8. REASON FOR TRANSFER: (Must include brief medical history and recent changes in physical function or cognition.) ***            V/S: /90   Pulse 92   Temp 98.8 °F (37.1 °C) (Oral)   Resp 20   Ht 5' 8\" (1.727 m)   Wt 90.1 kg (198 lb 11.2 oz)   SpO2 95%   BMI 30.21 kg/m²           PAIN: {Pain:20652}    9. PRIMARY DIAGNOSIS: Toxic metabolic encephalopathy      Secondary Diagnosis:         Pacemaker: {Yes/No:20651}      Internal Defib: {Yes/No:20651}          Mental Health Diagnosis (if Applicable):    10. RESTRAINTS: {Restraints:20654}     11. RESPIRATORY NEEDS: {Respiratory Needs:20655}    12. ISOLATION/PRECAUTION: {Isolation Precautions:18235}    13. ALLERGY: Patient has no known allergies.    14. SENSORY:       {Sensory:00920}    15. SKIN CONDITION: {Skin Cond:28936}    16. DIET: {Diet:31823}    17. IV ACCESS: {IV Access:20887}    18. PERSONAL ITEMS SENT WITH PATIENT: {Personal " Items:70142}    19. ATTACHED DOCUMENTS: MUST ATTACH CURRENT MEDICATION INFORMATION {Attached Documents:20891}    20. AT RISK ALERTS:{Risks:20892}        HARM TO: {Harm To:20893}    21. WEIGHT BEARING STATUS:         Left Leg: {None/Limited/Full:18039}        Right Leg: {None/Limited/Full:48456}    22. MENTAL STATUS:{Mental Status:92638}    23. FUNCTION:        Walk: {Self/With Help/Not Able:20899}        Transfer: {Self/With Help/Not Able:20899}        Toilet: {Self/With Help/Not Able:20899}        Feed: {Self/With Help/Not Able:20899}    24. IMMUNIZATIONS/SCREENING:     Immunization History   Administered Date(s) Administered    Tdap 11/14/2018, 08/12/2019, 04/02/2024       25. BOWEL: {Bowel:20900}    26. BLADDER: {Bladder:20901}    27. SENDING FACILITY CONTACT: ***                  Title: ***        Unit: ***        Phone: ***          REC'G FACILITY CONTACT (if known):        Title:        Unit:         Phone:         FORM PREFILLED BY (if applicable)       Title:       Unit:        Phone:         FORM COMPLETED BY Martina Arndt RN      Title: ***      Phone: ***

## 2024-12-18 NOTE — UTILIZATION REVIEW
NOTIFICATION OF ADMISSION DISCHARGE   This is a Notification of Discharge from [facility]. Please be advised that this patient has been discharge from our facility. Below you will find the admission and discharge date and time including the patient’s disposition.   UTILIZATION REVIEW CONTACT:  Oly Byrne  Utilization   Network Utilization Review Department  Phone: [phone] x carefully listen to the prompts. All voicemails are confidential.  Email: [email or web address]     ADMISSION INFORMATION  PRESENTATION DATE: 12/7/2024 11:52 AM  OBERVATION ADMISSION DATE: N/A  INPATIENT ADMISSION DATE: 12/7/24  4:13 PM   DISCHARGE DATE: 12/13/2024 10:15 PM   [facility]    Network Utilization Review Department  ATTENTION: Please call with any questions or concerns to [phone] and carefully listen to the prompts so that you are directed to the right person. All voicemails are confidential.   For Discharge needs, contact Care Management DC Support Team at [phone] opt. 2  Send all requests for admission clinical reviews, approved or denied determinations and any other requests to dedicated fax number below belonging to the campus where the patient is receiving treatment. List of dedicated fax numbers for the Facilities:  FACILITY NAME UR FAX NUMBER   ADMISSION DENIALS (Administrative/Medical Necessity) [phone]   DISCHARGE SUPPORT TEAM ([facility]) [phone]   PARENT CHILD HEALTH (Maternity/NICU/Pediatrics) [phone]   [facility] [phone]   [facility] [phone]   [facility] [phone]   [facility] [phone]   [facility] [phone]   [facility] [phone]   [facility] [phone]   [facility]  [facility] [phone]   [facility] [phone]   [facility] [phone]   [facility] [phone]   [facility] [phone]

## 2024-12-19 LAB
A-TOCOPHEROL VIT E SERPL-MCNC: 7.2 MG/L (ref 9–29)
GAMMA TOCOPHEROL SERPL-MCNC: 1.3 MG/L (ref 0.5–4.9)

## 2024-12-24 ENCOUNTER — RESULTS FOLLOW-UP (OUTPATIENT)
Age: 64
End: 2024-12-24

## 2024-12-24 DIAGNOSIS — R29.6 FALLS: Primary | ICD-10-CM

## 2024-12-24 DIAGNOSIS — E56.0 VITAMIN E DEFICIENCY: ICD-10-CM

## 2024-12-24 RX ORDER — ASCORBATE CALCIUM 500 MG
1 TABLET ORAL DAILY
Qty: 30 TABLET | Refills: 1 | Status: SHIPPED | OUTPATIENT
Start: 2024-12-24

## 2024-12-24 NOTE — LETTER
[facility] [address] [address]  Phone#  [phone]  Fax#  [phone]      December 30, 2024      Dear:   Rosmery Vargas         Our office has attempted to contact you several times regarding your {Reason list:51122}.  Could you please contact our office at [phone].    Thank you.     Sincerely,    Philomena Erickson MA

## 2024-12-24 NOTE — LETTER
December 30, 2024     Rosmery GARDINERMadelaine Sam  [address]  [place] [address]    Patient: Rosmery Vargas   YOB: 1960   Date of Visit: 12/24/2024       Dear Dr. Vargas:    Thank you for referring Rosmery Vargas to me for evaluation. Below are my notes for this consultation.    If you have questions, please do not hesitate to call me. I look forward to following your patient along with you.         Sincerely,        NALDO Lindquist        CC: No Recipients

## 2024-12-26 NOTE — TELEPHONE ENCOUNTER
----- Message from NALDO Trujillo sent at 12/24/2024  4:42 PM EST -----  Can let pt know that her Vitamin E is deficient and that may have some impact on her walking and balance.  I ordered a daily replacement to [facility] pharmacy but I would like her to follow up with her PCP re: deficiency for ongoing eval and management.     Thanks  Kevin

## 2024-12-30 NOTE — TELEPHONE ENCOUNTER
I tried to phone Patient however, the recording stated my call could not be completed at this time. 3rd attempt. Letter sent

## 2025-01-06 ENCOUNTER — APPOINTMENT (EMERGENCY)
Dept: RADIOLOGY | Facility: HOSPITAL | Age: 65
End: 2025-01-06
Payer: COMMERCIAL

## 2025-01-06 ENCOUNTER — HOSPITAL ENCOUNTER (EMERGENCY)
Facility: HOSPITAL | Age: 65
Discharge: HOME | End: 2025-01-06
Attending: STUDENT IN AN ORGANIZED HEALTH CARE EDUCATION/TRAINING PROGRAM | Admitting: STUDENT IN AN ORGANIZED HEALTH CARE EDUCATION/TRAINING PROGRAM
Payer: COMMERCIAL

## 2025-01-06 VITALS
HEART RATE: 67 BPM | TEMPERATURE: 98.3 F | OXYGEN SATURATION: 98 % | SYSTOLIC BLOOD PRESSURE: 103 MMHG | RESPIRATION RATE: 18 BRPM | DIASTOLIC BLOOD PRESSURE: 56 MMHG

## 2025-01-06 DIAGNOSIS — R60.0 BILATERAL LOWER EXTREMITY EDEMA: Primary | ICD-10-CM

## 2025-01-06 LAB
ANION GAP SERPL CALCULATED.3IONS-SCNC: 3 MMOL/L (ref 4–13)
BASOPHILS # BLD AUTO: 0.05 THOUSANDS/ΜL (ref 0–0.1)
BASOPHILS NFR BLD AUTO: 1 % (ref 0–1)
BNP SERPL-MCNC: 62 PG/ML (ref 0–100)
BUN SERPL-MCNC: 19 MG/DL (ref 5–25)
CALCIUM SERPL-MCNC: 8.4 MG/DL (ref 8.4–10.2)
CHLORIDE SERPL-SCNC: 104 MMOL/L (ref 96–108)
CO2 SERPL-SCNC: 24 MMOL/L (ref 21–32)
CREAT SERPL-MCNC: 0.91 MG/DL (ref 0.6–1.3)
EOSINOPHIL # BLD AUTO: 0.26 THOUSAND/ΜL (ref 0–0.61)
EOSINOPHIL NFR BLD AUTO: 3 % (ref 0–6)
ERYTHROCYTE [DISTWIDTH] IN BLOOD BY AUTOMATED COUNT: 14.7 % (ref 11.6–15.1)
GFR SERPL CREATININE-BSD FRML MDRD: 67 ML/MIN/1.73SQ M
GLUCOSE SERPL-MCNC: 79 MG/DL (ref 65–140)
HCT VFR BLD AUTO: 31.3 % (ref 34.8–46.1)
HGB BLD-MCNC: 10 G/DL (ref 11.5–15.4)
IMM GRANULOCYTES # BLD AUTO: 0.04 THOUSAND/UL (ref 0–0.2)
IMM GRANULOCYTES NFR BLD AUTO: 0 % (ref 0–2)
LYMPHOCYTES # BLD AUTO: 2.86 THOUSANDS/ΜL (ref 0.6–4.47)
LYMPHOCYTES NFR BLD AUTO: 29 % (ref 14–44)
MCH RBC QN AUTO: 29.3 PG (ref 26.8–34.3)
MCHC RBC AUTO-ENTMCNC: 31.9 G/DL (ref 31.4–37.4)
MCV RBC AUTO: 92 FL (ref 82–98)
MONOCYTES # BLD AUTO: 0.98 THOUSAND/ΜL (ref 0.17–1.22)
MONOCYTES NFR BLD AUTO: 10 % (ref 4–12)
NEUTROPHILS # BLD AUTO: 5.6 THOUSANDS/ΜL (ref 1.85–7.62)
NEUTS SEG NFR BLD AUTO: 57 % (ref 43–75)
NRBC BLD AUTO-RTO: 0 /100 WBCS
PLATELET # BLD AUTO: 433 THOUSANDS/UL (ref 149–390)
PMV BLD AUTO: 9.7 FL (ref 8.9–12.7)
POTASSIUM SERPL-SCNC: 3.7 MMOL/L (ref 3.5–5.3)
RBC # BLD AUTO: 3.41 MILLION/UL (ref 3.81–5.12)
SODIUM SERPL-SCNC: 131 MMOL/L (ref 135–147)
WBC # BLD AUTO: 9.79 THOUSAND/UL (ref 4.31–10.16)

## 2025-01-06 PROCEDURE — 99284 EMERGENCY DEPT VISIT MOD MDM: CPT

## 2025-01-06 PROCEDURE — 80048 BASIC METABOLIC PNL TOTAL CA: CPT | Performed by: STUDENT IN AN ORGANIZED HEALTH CARE EDUCATION/TRAINING PROGRAM

## 2025-01-06 PROCEDURE — 71045 X-RAY EXAM CHEST 1 VIEW: CPT

## 2025-01-06 PROCEDURE — 83880 ASSAY OF NATRIURETIC PEPTIDE: CPT | Performed by: STUDENT IN AN ORGANIZED HEALTH CARE EDUCATION/TRAINING PROGRAM

## 2025-01-06 PROCEDURE — 85025 COMPLETE CBC W/AUTO DIFF WBC: CPT | Performed by: STUDENT IN AN ORGANIZED HEALTH CARE EDUCATION/TRAINING PROGRAM

## 2025-01-06 PROCEDURE — 36415 COLL VENOUS BLD VENIPUNCTURE: CPT | Performed by: STUDENT IN AN ORGANIZED HEALTH CARE EDUCATION/TRAINING PROGRAM

## 2025-01-06 PROCEDURE — 99285 EMERGENCY DEPT VISIT HI MDM: CPT | Performed by: STUDENT IN AN ORGANIZED HEALTH CARE EDUCATION/TRAINING PROGRAM

## 2025-01-06 PROCEDURE — 93970 EXTREMITY STUDY: CPT | Performed by: SURGERY

## 2025-01-06 PROCEDURE — 93970 EXTREMITY STUDY: CPT

## 2025-01-07 NOTE — ED PROVIDER NOTES
Time reflects when diagnosis was documented in both MDM as applicable and the Disposition within this note       Time User Action Codes Description Comment    1/6/2025  7:49 PM Tristian Shin Add [R60.0] Bilateral lower extremity edema           ED Disposition       ED Disposition   Discharge    Condition   Stable    Date/Time   Mon Jan 6, 2025  7:49 PM    Comment   Rosmery GARDINER Sam discharge to home/self care.                   Assessment & Plan       Medical Decision Making  Patient is a 64 y.o. female who presents to the ED for lower extremity swelling.  Patient is nontoxic, well-appearing.     Differential includes but is not limited to: Lymphedema.  Doubt DVT.  Presentation not consistent with compartment syndrome.  Low suspicion for CHF.    Plan: Labs, chest x-ray, duplex, reassess                   Amount and/or Complexity of Data Reviewed  Labs: ordered.  Radiology: ordered and independent interpretation performed.        ED Course as of 01/07/25 0005   Mon Jan 06, 2025 1949 Reevaluated.  Resting comfortably.  Discussed results and findings.  Will discharge.  Return precautions discussed.  Patient verbalized understanding and agreed to plan of care.       Medications - No data to display    ED Risk Strat Scores                                              History of Present Illness       Chief Complaint   Patient presents with    Ankle Swelling     States her Xanax and Buspar doses were decreased and patient noticed her body was hot and ankles swollen today.       Past Medical History:   Diagnosis Date    Asthma     Bladder incontinence     Chronic pain     back    COPD (chronic obstructive pulmonary disease) (HCC)     GERD (gastroesophageal reflux disease)     Hyperlipidemia     Psychiatric disorder     bipolar      Past Surgical History:   Procedure Laterality Date    TUBAL LIGATION        Family History   Problem Relation Age of Onset    No Known Problems Mother     No Known Problems Sister     No Known  Problems Daughter     No Known Problems Maternal Aunt       Social History     Tobacco Use    Smoking status: Every Day     Current packs/day: 0.50     Average packs/day: 0.5 packs/day for 0.7 years (0.3 ttl pk-yrs)     Types: Cigarettes     Start date: 05/2024    Smokeless tobacco: Never   Vaping Use    Vaping status: Never Used   Substance Use Topics    Alcohol use: No    Drug use: No      E-Cigarette/Vaping    E-Cigarette Use Never User       E-Cigarette/Vaping Substances    Nicotine No     THC No     CBD No     Flavoring No     Other No     Unknown No       I have reviewed and agree with the history as documented.     64-year-old female, past medical history including COPD, GERD, hyperlipidemia, who presents to the emergency room for lower extremity swelling.  Started today.  States she recently decreased her meds but otherwise no new medications.  No chest pain, shortness of breath, fevers, chills, Renny pain, back pain.  No history of swelling like this in the past.  No other complaints or concerns.      Ankle Swelling      Review of Systems   Cardiovascular:  Positive for leg swelling.   All other systems reviewed and are negative.          Objective       ED Triage Vitals   Temperature Pulse Blood Pressure Respirations SpO2 Patient Position - Orthostatic VS   01/06/25 1742 01/06/25 1742 01/06/25 1742 01/06/25 1742 01/06/25 1742 01/06/25 1742   (!) 97.3 °F (36.3 °C) 67 105/53 18 96 % Lying      Temp Source Heart Rate Source BP Location FiO2 (%) Pain Score    01/06/25 1742 01/06/25 1742 01/06/25 1742 -- 01/06/25 1945    Tympanic Monitor Right arm  No Pain      Vitals      Date and Time Temp Pulse SpO2 Resp BP Pain Score FACES Pain Rating User   01/06/25 1945 98.3 °F (36.8 °C) 67 98 % 18 103/56 No Pain -- SF   01/06/25 1742 97.3 °F (36.3 °C) 67 96 % 18 105/53 -- -- OE            Physical Exam  Vitals and nursing note reviewed.   Constitutional:       General: She is not in acute distress.     Appearance: She  is well-developed. She is not diaphoretic.   HENT:      Head: Normocephalic and atraumatic.      Right Ear: External ear normal.      Left Ear: External ear normal.      Nose: Nose normal.   Eyes:      General: Lids are normal. No scleral icterus.  Cardiovascular:      Rate and Rhythm: Normal rate and regular rhythm.      Heart sounds: Normal heart sounds. No murmur heard.     No friction rub. No gallop.   Pulmonary:      Effort: Pulmonary effort is normal. No respiratory distress.      Breath sounds: Normal breath sounds. No wheezing or rales.   Abdominal:      Palpations: Abdomen is soft.      Tenderness: There is no abdominal tenderness. There is no guarding or rebound.   Musculoskeletal:         General: No deformity. Normal range of motion.      Cervical back: Normal range of motion and neck supple.      Right lower leg: Edema present.      Left lower leg: Edema present.      Comments: Very mild swelling to the bilateral lower extremities that extends to just above the ankles   Skin:     General: Skin is warm and dry.   Neurological:      General: No focal deficit present.      Mental Status: She is alert.   Psychiatric:         Mood and Affect: Mood normal.         Behavior: Behavior normal.         Results Reviewed       Procedure Component Value Units Date/Time    B-Type Natriuretic Peptide(BNP) [[id number]]  (Normal) Collected: 01/06/25 1858    Lab Status: Final result Specimen: Blood from Arm, Right Updated: 01/06/25 1938     BNP 62 pg/mL     Basic metabolic panel [[id number]]  (Abnormal) Collected: 01/06/25 1858    Lab Status: Final result Specimen: Blood from Arm, Right Updated: 01/06/25 1935     Sodium 131 mmol/L      Potassium 3.7 mmol/L      Chloride 104 mmol/L      CO2 24 mmol/L      ANION GAP 3 mmol/L      BUN 19 mg/dL      Creatinine 0.91 mg/dL      Glucose 79 mg/dL      Calcium 8.4 mg/dL      eGFR 67 ml/min/1.73sq m     Narrative:      [employer] guidelines for Chronic Kidney  Disease (CKD):     Stage 1 with normal or high GFR (GFR > 90 mL/min/1.73 square meters)    Stage 2 Mild CKD (GFR = 60-89 mL/min/1.73 square meters)    Stage 3A Moderate CKD (GFR = 45-59 mL/min/1.73 square meters)    Stage 3B Moderate CKD (GFR = 30-44 mL/min/1.73 square meters)    Stage 4 Severe CKD (GFR = 15-29 mL/min/1.73 square meters)    Stage 5 End Stage CKD (GFR <15 mL/min/1.73 square meters)  Note: GFR calculation is accurate only with a steady state creatinine    CBC and differential [[id number]]  (Abnormal) Collected: 01/06/25 1858    Lab Status: Final result Specimen: Blood from Arm, Right Updated: 01/06/25 1904     WBC 9.79 Thousand/uL      RBC 3.41 Million/uL      Hemoglobin 10.0 g/dL      Hematocrit 31.3 %      MCV 92 fL      MCH 29.3 pg      MCHC 31.9 g/dL      RDW 14.7 %      MPV 9.7 fL      Platelets 433 Thousands/uL      nRBC 0 /100 WBCs      Segmented % 57 %      Immature Grans % 0 %      Lymphocytes % 29 %      Monocytes % 10 %      Eosinophils Relative 3 %      Basophils Relative 1 %      Absolute Neutrophils 5.60 Thousands/µL      Absolute Immature Grans 0.04 Thousand/uL      Absolute Lymphocytes 2.86 Thousands/µL      Absolute Monocytes 0.98 Thousand/µL      Eosinophils Absolute 0.26 Thousand/µL      Basophils Absolute 0.05 Thousands/µL             XR chest 1 view portable   ED Interpretation by Tristian Shin DO (01/07 0004)   No acute cardiopulmonary disease       VAS VENOUS DUPLEX - LOWER LIMB BILATERAL   Final Interpretation by Darius Bryson DO (01/06 2156)          Procedures    ED Medication and Procedure Management   Prior to Admission Medications   Prescriptions Last Dose Informant Patient Reported? Taking?   Multiple Vitamin (MULTIVITAMIN) tablet   Yes No   Sig: Take 1 tablet by mouth daily   OLANZapine (ZyPREXA) 5 mg tablet   No No   Sig: Take 1 tablet (5 mg total) by mouth 2 (two) times a day   Vitamin E 100 units TABS   No No   Sig: Take 1 tablet (100 Units total) by mouth in the  morning   albuterol (ProAir HFA) 90 mcg/act inhaler   No No   Sig: Inhale 2 puffs every 6 (six) hours as needed for wheezing   aspirin 81 MG tablet   Yes No   Sig: Take 81 mg by mouth daily.   atorvastatin (LIPITOR) 20 mg tablet  Self Yes No   Sig: Take 40 mg by mouth daily   busPIRone (BUSPAR) 15 mg tablet  Other (Specify) Yes No   Sig: Take 15 mg by mouth 3 (three) times a day   fluticasone (FLONASE) 50 mcg/act nasal spray   Yes No   Si spray into each nostril daily   ketotifen (ZADITOR) 0.025 % ophthalmic solution   Yes No   Si drop 2 (two) times a day   lamoTRIgine (LaMICtal) 150 MG tablet  Care Giver Yes No   Sig: Take 200 mg by mouth daily   loratadine (CLARITIN) 10 mg tablet   Yes No   Sig: Take 10 mg by mouth daily   nicotine (NICODERM CQ) 14 mg/24hr TD 24 hr patch   No No   Sig: Place 1 patch on the skin over 24 hours daily   pantoprazole (PROTONIX) 40 mg tablet   Yes No   Sig: Take 40 mg by mouth daily.   propranolol (INDERAL) 40 mg tablet   Yes No   Sig: Take 10 mg by mouth daily    tolterodine (DETROL LA) 4 mg 24 hr capsule   Yes No   Sig: Take 4 mg by mouth daily.   umeclidinium bromide (INCRUSE ELLIPTA) 62.5 mcg/inh AEPB inhaler  Care Giver Yes No   Sig: Inhale 1 puff daily      Facility-Administered Medications: None     Discharge Medication List as of 2025  7:49 PM        CONTINUE these medications which have NOT CHANGED    Details   albuterol (ProAir HFA) 90 mcg/act inhaler Inhale 2 puffs every 6 (six) hours as needed for wheezing, Starting Thu 2024, Normal      aspirin 81 MG tablet Take 81 mg by mouth daily., Until Discontinued, Historical Med      atorvastatin (LIPITOR) 20 mg tablet Take 40 mg by mouth daily, Historical Med      busPIRone (BUSPAR) 15 mg tablet Take 15 mg by mouth 3 (three) times a day, Historical Med      fluticasone (FLONASE) 50 mcg/act nasal spray 1 spray into each nostril daily, Historical Med      ketotifen (ZADITOR) 0.025 % ophthalmic solution 1 drop 2 (two)  times a day, Historical Med      lamoTRIgine (LaMICtal) 150 MG tablet Take 200 mg by mouth daily, Historical Med      loratadine (CLARITIN) 10 mg tablet Take 10 mg by mouth daily, Historical Med      Multiple Vitamin (MULTIVITAMIN) tablet Take 1 tablet by mouth daily, Historical Med      nicotine (NICODERM CQ) 14 mg/24hr TD 24 hr patch Place 1 patch on the skin over 24 hours daily, Starting Sat 12/14/2024, No Print      OLANZapine (ZyPREXA) 5 mg tablet Take 1 tablet (5 mg total) by mouth 2 (two) times a day, Starting Fri 12/13/2024, No Print      pantoprazole (PROTONIX) 40 mg tablet Take 40 mg by mouth daily., Until Discontinued, Historical Med      propranolol (INDERAL) 40 mg tablet Take 10 mg by mouth daily , Historical Med      tolterodine (DETROL LA) 4 mg 24 hr capsule Take 4 mg by mouth daily., Until Discontinued, Historical Med      umeclidinium bromide (INCRUSE ELLIPTA) 62.5 mcg/inh AEPB inhaler Inhale 1 puff daily, Historical Med      Vitamin E 100 units TABS Take 1 tablet (100 Units total) by mouth in the morning, Starting Tue 12/24/2024, Normal           No discharge procedures on file.  ED SEPSIS DOCUMENTATION   Time reflects when diagnosis was documented in both MDM as applicable and the Disposition within this note       Time User Action Codes Description Comment    1/6/2025  7:49 PM Tristian Shin Add [R60.0] Bilateral lower extremity edema                  Tristian Shin, DO  01/07/25 0005

## 2025-01-07 NOTE — DISCHARGE INSTRUCTIONS
Please follow-up with your family doctor.  Return to the emergency room for any worsening swelling, fevers, chills, or for any other new or concerning symptoms.

## 2025-01-27 DIAGNOSIS — E56.0 VITAMIN E DEFICIENCY: ICD-10-CM

## 2025-01-27 DIAGNOSIS — R29.6 FALLS: ICD-10-CM

## 2025-01-28 RX ORDER — CYANOCOBALAMIN (VITAMIN B-12) 500 MCG
TABLET ORAL
Qty: 30 CAPSULE | Refills: 0 | Status: SHIPPED | OUTPATIENT
Start: 2025-01-28

## 2025-01-29 ENCOUNTER — TELEPHONE (OUTPATIENT)
Age: 65
End: 2025-01-29

## 2025-01-29 NOTE — TELEPHONE ENCOUNTER
I tried to reach out to Patient Re: vit E refills and providers message. However, there is no number on file for the Patient and when I called the number on the communication consent it states the call cannot be completed as dialed

## 2025-04-28 DIAGNOSIS — R29.6 FALLS: ICD-10-CM

## 2025-04-28 DIAGNOSIS — E56.0 VITAMIN E DEFICIENCY: ICD-10-CM

## 2025-04-29 RX ORDER — CYANOCOBALAMIN (VITAMIN B-12) 500 MCG
TABLET ORAL
Qty: 30 CAPSULE | Refills: 1 | OUTPATIENT
Start: 2025-04-29

## 2025-06-11 NOTE — ED PROVIDER NOTES
Time reflects when diagnosis was documented in both MDM as applicable and the Disposition within this note       Time User Action Codes Description Comment    12/7/2024  3:17 AM Hernando Degroot Add [R23.4] Cracked skin           ED Disposition       ED Disposition   Discharge    Condition   Stable    Date/Time   Sat Dec 7, 2024  3:17 AM    Comment   Rosmery GARDINER Sam discharge to home/self care.                   Assessment & Plan       Medical Decision Making  64-year-old female presenting to the ED today for bilateral hand pain.  No concern at this time for any traumatic injuries as patient has not had any falls.  She has obvious cracked skin on her hands which is likely the cause of her pain.  Discussed with her to continue outpatient follow-up with her PCP.  Encouraged her to use moisturizing skin creams.  Return precautions discussed and patient was discharged home.             Medications - No data to display    ED Risk Strat Scores                           SBIRT 22yo+      Flowsheet Row Most Recent Value   Initial Alcohol Screen: US AUDIT-C     1. How often do you have a drink containing alcohol? 0 Filed at: 12/07/2024 0314   2. How many drinks containing alcohol do you have on a typical day you are drinking?  0 Filed at: 12/07/2024 0314   3b. FEMALE Any Age, or MALE 65+: How often do you have 4 or more drinks on one occassion? 0 Filed at: 12/07/2024 0314   Audit-C Score 0 Filed at: 12/07/2024 0314                            History of Present Illness       Chief Complaint   Patient presents with    Skin Irritation     Pt reports that all of her fingers hurt. Dry skin noted. No further complaints.        Past Medical History:   Diagnosis Date    Asthma     Bladder incontinence     Chronic pain     back    COPD (chronic obstructive pulmonary disease) (HCC)     GERD (gastroesophageal reflux disease)     Hyperlipidemia     Psychiatric disorder     bipolar      Past Surgical History:   Procedure Laterality Date     TUBAL LIGATION        Family History   Problem Relation Age of Onset    No Known Problems Mother     No Known Problems Sister     No Known Problems Daughter     No Known Problems Maternal Aunt       Social History     Tobacco Use    Smoking status: Every Day     Current packs/day: 0.50     Average packs/day: 0.5 packs/day for 0.6 years (0.3 ttl pk-yrs)     Types: Cigarettes     Start date: 05/2024    Smokeless tobacco: Never   Vaping Use    Vaping status: Never Used   Substance Use Topics    Alcohol use: No    Drug use: No      E-Cigarette/Vaping    E-Cigarette Use Never User       E-Cigarette/Vaping Substances    Nicotine No     THC No     CBD No     Flavoring No     Other No     Unknown No       I have reviewed and agree with the history as documented.     64-year-old female multiple visits or ER in the last week and a half presenting to the ED today with bilateral hand pain.  She has cracked skin of the bilateral hands.  Says that she is unable to moisturize them on her own but she cannot give a good reason why.  No fevers or chills.  No nausea or vomiting.  No chest pain or shortness of breath.  Says that she has been taking all her psych medications as she should be.  No SI or HI.        Review of Systems   Constitutional:  Negative for chills and fever.   HENT:  Negative for hearing loss.    Eyes:  Negative for visual disturbance.   Respiratory:  Negative for shortness of breath.    Cardiovascular:  Negative for chest pain.   Gastrointestinal:  Negative for abdominal pain, constipation, diarrhea, nausea and vomiting.   Genitourinary:  Negative for difficulty urinating.   Musculoskeletal:  Negative for myalgias.   Skin:  Negative for color change.   Neurological:  Negative for dizziness and headaches.   Psychiatric/Behavioral:  Negative for agitation.    All other systems reviewed and are negative.          Objective       ED Triage Vitals [12/07/24 0312]   Temperature Pulse Blood Pressure Respirations SpO2  No Patient Position - Orthostatic VS   98.7 °F (37.1 °C) 87 145/78 16 98 % Sitting      Temp Source Heart Rate Source BP Location FiO2 (%) Pain Score    Oral Monitor Right arm -- 2      Vitals      Date and Time Temp Pulse SpO2 Resp BP Pain Score FACES Pain Rating User   12/07/24 0312 98.7 °F (37.1 °C) 87 98 % 16 145/78 2 -- CM            Physical Exam  Vitals and nursing note reviewed.   Constitutional:       General: She is not in acute distress.     Appearance: Normal appearance. She is well-developed. She is not ill-appearing.   HENT:      Head: Normocephalic and atraumatic.      Right Ear: External ear normal.      Left Ear: External ear normal.      Nose: Nose normal.      Mouth/Throat:      Mouth: Mucous membranes are moist.      Pharynx: Oropharynx is clear. No oropharyngeal exudate.   Eyes:      General:         Right eye: No discharge.         Left eye: No discharge.      Extraocular Movements: Extraocular movements intact.      Conjunctiva/sclera: Conjunctivae normal.      Pupils: Pupils are equal, round, and reactive to light.   Cardiovascular:      Rate and Rhythm: Normal rate and regular rhythm.      Heart sounds: Normal heart sounds. No murmur heard.     No friction rub. No gallop.   Pulmonary:      Effort: Pulmonary effort is normal. No respiratory distress.      Breath sounds: Normal breath sounds. No stridor. No wheezing.   Abdominal:      General: Bowel sounds are normal. There is no distension.      Palpations: Abdomen is soft.      Tenderness: There is no abdominal tenderness.   Musculoskeletal:         General: No swelling. Normal range of motion.      Cervical back: Normal range of motion and neck supple. No rigidity.   Skin:     General: Skin is warm and dry.      Capillary Refill: Capillary refill takes less than 2 seconds.      Comments: Dry Skin present on the bilateral fingertips but she does not have numbness that she was describing.  No erythema to suggest infection.   Neurological:       General: No focal deficit present.      Mental Status: She is alert and oriented to person, place, and time. Mental status is at baseline.      Cranial Nerves: No cranial nerve deficit.      Sensory: No sensory deficit.      Motor: No weakness.      Coordination: Coordination normal.      Gait: Gait normal.   Psychiatric:         Mood and Affect: Mood normal.         Behavior: Behavior normal.         Results Reviewed       None            No orders to display       Procedures    ED Medication and Procedure Management   Prior to Admission Medications   Prescriptions Last Dose Informant Patient Reported? Taking?   ALPRAZolam (XANAX) 0.25 mg tablet   Yes No   Sig: Take 0.25 mg by mouth daily as needed for anxiety   Biotin 5 MG TABS   Yes No   Sig: Take 1 tablet by mouth   Diclofenac Sodium (VOLTAREN) 1 %   No No   Sig: Apply 2 g topically 4 (four) times a day   Patient not taking: Reported on 10/5/2024   Emollient (cetaphil) cream   No No   Sig: Apply topically as needed for dry skin   Multiple Vitamin (MULTIVITAMIN) tablet   Yes No   Sig: Take 1 tablet by mouth daily   Umeclidinium-Vilanterol (ANORO ELLIPTA IN)   Yes No   Sig: Inhale 1 Units every 4 (four) hours as needed (sob)   albuterol (2.5 mg/3 mL) 0.083 % nebulizer solution  Other (Specify) Yes No   Sig: Take 2.5 mg by nebulization every 6 (six) hours as needed for wheezing or shortness of breath   albuterol (ProAir HFA) 90 mcg/act inhaler   No No   Sig: Inhale 2 puffs every 6 (six) hours as needed for wheezing   aspirin 81 MG tablet   Yes No   Sig: Take 81 mg by mouth daily.   atorvastatin (LIPITOR) 20 mg tablet  Self Yes No   Sig: Take 40 mg by mouth daily   busPIRone (BUSPAR) 15 mg tablet  Other (Specify) Yes No   Sig: Take 15 mg by mouth 3 (three) times a day   desvenlafaxine succinate (PRISTIQ) 50 mg 24 hr tablet   Yes No   Sig: Take 50 mg by mouth daily   fluticasone (FLONASE) 50 mcg/act nasal spray   Yes No   Si spray into each nostril daily    hydrOXYzine pamoate (VISTARIL) 50 mg capsule   Yes No   Sig: Take 50 mg by mouth daily   ketorolac (TORADOL) 10 mg tablet   No No   Sig: Take 1 tablet (10 mg total) by mouth every 6 (six) hours as needed for moderate pain for up to 3 days   ketotifen (ZADITOR) 0.025 % ophthalmic solution   Yes No   Si drop 2 (two) times a day   lamoTRIgine (LaMICtal) 150 MG tablet  Care Giver Yes No   Sig: Take 200 mg by mouth daily   lidocaine (Lidoderm) 5 %   No No   Sig: Apply 1 patch topically over 12 hours daily Remove & Discard patch within 12 hours or as directed by MD   Patient not taking: Reported on 10/5/2024   loratadine (CLARITIN) 10 mg tablet   Yes No   Sig: Take 10 mg by mouth daily   lurasidone (LATUDA) 40 mg tablet   Yes No   Sig: Take 80 mg by mouth daily with dinner    Patient not taking: Reported on 8/3/2022   meclizine (ANTIVERT) 25 mg tablet   Yes No   Sig: Take 25 mg by mouth every 8 (eight) hours as needed for dizziness   Patient not taking: Reported on 8/3/2022   montelukast (SINGULAIR) 10 mg tablet   Yes No   Sig: Take 10 mg by mouth daily at bedtime.   Patient not taking: Reported on 2024   naproxen (EC NAPROSYN) 500 MG EC tablet   No No   Sig: Take 1 tablet (500 mg total) by mouth 2 (two) times a day with meals   Patient not taking: Reported on 2019   neomycin-bacitracin-polymyxin (NEOSPORIN) 5-400-5,000 ointment   No No   Sig: Apply topically in the morning   Patient not taking: Reported on 2024   ondansetron (ZOFRAN-ODT) 4 mg disintegrating tablet   No No   Sig: Take 1 tablet (4 mg total) by mouth every 8 (eight) hours as needed for nausea or vomiting   oxyCODONE (Roxicodone) 5 immediate release tablet   No No   Sig: Take 1 tablet (5 mg total) by mouth every 6 (six) hours as needed for moderate pain Max Daily Amount: 20 mg   Patient not taking: Reported on 2024   pantoprazole (PROTONIX) 40 mg tablet   Yes No   Sig: Take 40 mg by mouth daily.   propranolol (INDERAL) 40 mg tablet    Yes No   Sig: Take 10 mg by mouth daily    tolterodine (DETROL LA) 4 mg 24 hr capsule   Yes No   Sig: Take 4 mg by mouth daily.   traMADol (ULTRAM) 50 mg tablet   No No   Si po tid prn pain   Patient not taking: Reported on 2024   traZODone (DESYREL) 50 mg tablet   Yes No   Sig: Take 100 mg by mouth daily at bedtime as needed    Patient not taking: Reported on 8/3/2022   umeclidinium bromide (INCRUSE ELLIPTA) 62.5 mcg/inh AEPB inhaler  Care Giver Yes No   Sig: Inhale 1 puff daily   venlafaxine (EFFEXOR-XR) 37.5 mg 24 hr capsule   Yes No   Sig: Take 75 mg by mouth daily     Patient not taking: Reported on 8/3/2022   ziprasidone (GEODON) 80 mg capsule   Yes No   Sig: Take 80 mg by mouth every evening      Facility-Administered Medications: None     Patient's Medications   Discharge Prescriptions    No medications on file     No discharge procedures on file.  ED SEPSIS DOCUMENTATION   Time reflects when diagnosis was documented in both MDM as applicable and the Disposition within this note       Time User Action Codes Description Comment    2024  3:17 AM Hernando Degroot Add [R23.4] Cracked skin                  Hernando Degroot MD  [id number]

## 2025-08-17 ENCOUNTER — APPOINTMENT (EMERGENCY)
Dept: RADIOLOGY | Facility: HOSPITAL | Age: 65
End: 2025-08-17
Payer: COMMERCIAL

## 2025-08-17 ENCOUNTER — HOSPITAL ENCOUNTER (EMERGENCY)
Facility: HOSPITAL | Age: 65
Discharge: HOME | End: 2025-08-17
Payer: COMMERCIAL

## 2025-08-17 VITALS
HEART RATE: 62 BPM | SYSTOLIC BLOOD PRESSURE: 133 MMHG | DIASTOLIC BLOOD PRESSURE: 73 MMHG | TEMPERATURE: 97.9 F | RESPIRATION RATE: 20 BRPM | WEIGHT: 218 LBS | BODY MASS INDEX: 33.15 KG/M2 | OXYGEN SATURATION: 98 %

## 2025-08-17 DIAGNOSIS — R21 RASH: ICD-10-CM

## 2025-08-17 DIAGNOSIS — R07.9 CHEST PAIN: ICD-10-CM

## 2025-08-17 DIAGNOSIS — R10.9 ABDOMINAL PAIN: Primary | ICD-10-CM

## 2025-08-17 DIAGNOSIS — R11.0 NAUSEA: ICD-10-CM

## 2025-08-17 LAB
2HR DELTA HS TROPONIN: 0 NG/L
ALBUMIN SERPL BCG-MCNC: 4 G/DL (ref 3.5–5)
ALP SERPL-CCNC: 125 U/L (ref 34–104)
ALT SERPL W P-5'-P-CCNC: 10 U/L (ref 7–52)
ANION GAP SERPL CALCULATED.3IONS-SCNC: 8 MMOL/L (ref 4–13)
AST SERPL W P-5'-P-CCNC: 12 U/L (ref 13–39)
BASOPHILS # BLD AUTO: 0.09 THOUSANDS/ÂΜL (ref 0–0.1)
BASOPHILS NFR BLD AUTO: 1 % (ref 0–1)
BILIRUB SERPL-MCNC: 0.41 MG/DL (ref 0.2–1)
BILIRUB UR QL STRIP: NEGATIVE
BUN SERPL-MCNC: 12 MG/DL (ref 5–25)
CALCIUM SERPL-MCNC: 8.6 MG/DL (ref 8.4–10.2)
CARDIAC TROPONIN I PNL SERPL HS: 7 NG/L (ref ?–50)
CARDIAC TROPONIN I PNL SERPL HS: 7 NG/L (ref ?–50)
CHLORIDE SERPL-SCNC: 101 MMOL/L (ref 96–108)
CLARITY UR: CLEAR
CO2 SERPL-SCNC: 29 MMOL/L (ref 21–32)
COLOR UR: YELLOW
CREAT SERPL-MCNC: 0.87 MG/DL (ref 0.6–1.3)
EOSINOPHIL # BLD AUTO: 0.47 THOUSAND/ÂΜL (ref 0–0.61)
EOSINOPHIL NFR BLD AUTO: 4 % (ref 0–6)
ERYTHROCYTE [DISTWIDTH] IN BLOOD BY AUTOMATED COUNT: 13 % (ref 11.6–15.1)
GFR SERPL CREATININE-BSD FRML MDRD: 70 ML/MIN/1.73SQ M
GLUCOSE SERPL-MCNC: 105 MG/DL (ref 65–140)
GLUCOSE UR STRIP-MCNC: NEGATIVE MG/DL
HCT VFR BLD AUTO: 38.7 % (ref 34.8–46.1)
HGB BLD-MCNC: 12.8 G/DL (ref 11.5–15.4)
HGB UR QL STRIP.AUTO: NEGATIVE
IMM GRANULOCYTES # BLD AUTO: 0.05 THOUSAND/UL (ref 0–0.2)
IMM GRANULOCYTES NFR BLD AUTO: 0 % (ref 0–2)
KETONES UR STRIP-MCNC: NEGATIVE MG/DL
LEUKOCYTE ESTERASE UR QL STRIP: NEGATIVE
LIPASE SERPL-CCNC: 18 U/L (ref 11–82)
LYMPHOCYTES # BLD AUTO: 3.05 THOUSANDS/ÂΜL (ref 0.6–4.47)
LYMPHOCYTES NFR BLD AUTO: 24 % (ref 14–44)
MCH RBC QN AUTO: 29.6 PG (ref 26.8–34.3)
MCHC RBC AUTO-ENTMCNC: 33.1 G/DL (ref 31.4–37.4)
MCV RBC AUTO: 89 FL (ref 82–98)
MONOCYTES # BLD AUTO: 0.99 THOUSAND/ÂΜL (ref 0.17–1.22)
MONOCYTES NFR BLD AUTO: 8 % (ref 4–12)
NEUTROPHILS # BLD AUTO: 8.03 THOUSANDS/ÂΜL (ref 1.85–7.62)
NEUTS SEG NFR BLD AUTO: 63 % (ref 43–75)
NITRITE UR QL STRIP: NEGATIVE
NRBC BLD AUTO-RTO: 0 /100 WBCS
PH UR STRIP.AUTO: 6 [PH]
PLATELET # BLD AUTO: 386 THOUSANDS/UL (ref 149–390)
PMV BLD AUTO: 9.5 FL (ref 8.9–12.7)
POTASSIUM SERPL-SCNC: 4 MMOL/L (ref 3.5–5.3)
PROT SERPL-MCNC: 6.8 G/DL (ref 6.4–8.4)
PROT UR STRIP-MCNC: NEGATIVE MG/DL
RBC # BLD AUTO: 4.33 MILLION/UL (ref 3.81–5.12)
SODIUM SERPL-SCNC: 138 MMOL/L (ref 135–147)
SP GR UR STRIP.AUTO: <1.005 (ref 1–1.03)
UROBILINOGEN UR STRIP-ACNC: <2 MG/DL
WBC # BLD AUTO: 12.68 THOUSAND/UL (ref 4.31–10.16)

## 2025-08-17 PROCEDURE — 85025 COMPLETE CBC W/AUTO DIFF WBC: CPT

## 2025-08-17 PROCEDURE — 74177 CT ABD & PELVIS W/CONTRAST: CPT

## 2025-08-17 PROCEDURE — 99285 EMERGENCY DEPT VISIT HI MDM: CPT

## 2025-08-17 PROCEDURE — 81003 URINALYSIS AUTO W/O SCOPE: CPT

## 2025-08-17 PROCEDURE — 80053 COMPREHEN METABOLIC PANEL: CPT

## 2025-08-17 PROCEDURE — 99284 EMERGENCY DEPT VISIT MOD MDM: CPT

## 2025-08-17 PROCEDURE — 84484 ASSAY OF TROPONIN QUANT: CPT

## 2025-08-17 PROCEDURE — 96374 THER/PROPH/DIAG INJ IV PUSH: CPT

## 2025-08-17 PROCEDURE — 96375 TX/PRO/DX INJ NEW DRUG ADDON: CPT

## 2025-08-17 PROCEDURE — 93005 ELECTROCARDIOGRAM TRACING: CPT

## 2025-08-17 PROCEDURE — 83690 ASSAY OF LIPASE: CPT

## 2025-08-17 PROCEDURE — 36415 COLL VENOUS BLD VENIPUNCTURE: CPT

## 2025-08-17 RX ORDER — NYSTATIN 100000 [USP'U]/G
POWDER TOPICAL 3 TIMES DAILY
Qty: 15 G | Refills: 0 | Status: SHIPPED | OUTPATIENT
Start: 2025-08-17

## 2025-08-17 RX ORDER — KETOROLAC TROMETHAMINE 30 MG/ML
15 INJECTION, SOLUTION INTRAMUSCULAR; INTRAVENOUS ONCE
Status: COMPLETED | OUTPATIENT
Start: 2025-08-17 | End: 2025-08-17

## 2025-08-17 RX ORDER — ONDANSETRON 4 MG/1
4 TABLET, ORALLY DISINTEGRATING ORAL EVERY 6 HOURS PRN
Qty: 12 TABLET | Refills: 0 | Status: SHIPPED | OUTPATIENT
Start: 2025-08-17

## 2025-08-17 RX ORDER — ONDANSETRON 2 MG/ML
4 INJECTION INTRAMUSCULAR; INTRAVENOUS ONCE
Status: COMPLETED | OUTPATIENT
Start: 2025-08-17 | End: 2025-08-17

## 2025-08-17 RX ADMIN — ONDANSETRON 4 MG: 2 INJECTION INTRAMUSCULAR; INTRAVENOUS at 08:26

## 2025-08-17 RX ADMIN — KETOROLAC TROMETHAMINE 15 MG: 30 INJECTION, SOLUTION INTRAMUSCULAR; INTRAVENOUS at 08:27

## 2025-08-17 RX ADMIN — IOHEXOL 100 ML: 350 INJECTION, SOLUTION INTRAVENOUS at 09:55

## 2025-08-18 LAB
ATRIAL RATE: 66 BPM
PR INTERVAL: 110 MS
QRS AXIS: 21 DEGREES
QRSD INTERVAL: 98 MS
QT INTERVAL: 452 MS
QTC INTERVAL: 473 MS
T WAVE AXIS: 51 DEGREES
VENTRICULAR RATE: 66 BPM

## 2025-08-18 PROCEDURE — 93010 ELECTROCARDIOGRAM REPORT: CPT | Performed by: INTERNAL MEDICINE

## 2025-08-19 ENCOUNTER — HOSPITAL ENCOUNTER (EMERGENCY)
Facility: HOSPITAL | Age: 65
Discharge: HOME | End: 2025-08-19
Attending: EMERGENCY MEDICINE | Admitting: EMERGENCY MEDICINE
Payer: COMMERCIAL

## 2025-08-19 ENCOUNTER — APPOINTMENT (EMERGENCY)
Dept: RADIOLOGY | Facility: HOSPITAL | Age: 65
End: 2025-08-19
Payer: COMMERCIAL

## 2025-08-19 VITALS
DIASTOLIC BLOOD PRESSURE: 96 MMHG | SYSTOLIC BLOOD PRESSURE: 156 MMHG | RESPIRATION RATE: 22 BRPM | HEIGHT: 68 IN | TEMPERATURE: 97.2 F | OXYGEN SATURATION: 96 % | HEART RATE: 80 BPM | BODY MASS INDEX: 33.15 KG/M2

## 2025-08-19 DIAGNOSIS — R07.89 CHEST TIGHTNESS: Primary | ICD-10-CM

## 2025-08-19 DIAGNOSIS — R11.2 NAUSEA AND VOMITING: ICD-10-CM

## 2025-08-19 LAB
2HR DELTA HS TROPONIN: 0 NG/L
ALBUMIN SERPL BCG-MCNC: 4.4 G/DL (ref 3.5–5)
ALP SERPL-CCNC: 133 U/L (ref 34–104)
ALT SERPL W P-5'-P-CCNC: 12 U/L (ref 7–52)
ANION GAP SERPL CALCULATED.3IONS-SCNC: 9 MMOL/L (ref 4–13)
AST SERPL W P-5'-P-CCNC: 17 U/L (ref 13–39)
BASOPHILS # BLD AUTO: 0.08 THOUSANDS/ÂΜL (ref 0–0.1)
BASOPHILS NFR BLD AUTO: 1 % (ref 0–1)
BILIRUB SERPL-MCNC: 0.73 MG/DL (ref 0.2–1)
BNP SERPL-MCNC: 15 PG/ML (ref 0–100)
BUN SERPL-MCNC: 9 MG/DL (ref 5–25)
CALCIUM SERPL-MCNC: 9 MG/DL (ref 8.4–10.2)
CARDIAC TROPONIN I PNL SERPL HS: 6 NG/L (ref ?–50)
CARDIAC TROPONIN I PNL SERPL HS: 6 NG/L (ref ?–50)
CHLORIDE SERPL-SCNC: 98 MMOL/L (ref 96–108)
CO2 SERPL-SCNC: 28 MMOL/L (ref 21–32)
CREAT SERPL-MCNC: 1.04 MG/DL (ref 0.6–1.3)
EOSINOPHIL # BLD AUTO: 0.41 THOUSAND/ÂΜL (ref 0–0.61)
EOSINOPHIL NFR BLD AUTO: 3 % (ref 0–6)
ERYTHROCYTE [DISTWIDTH] IN BLOOD BY AUTOMATED COUNT: 12.7 % (ref 11.6–15.1)
FLUAV AG UPPER RESP QL IA.RAPID: NEGATIVE
FLUBV AG UPPER RESP QL IA.RAPID: NEGATIVE
GFR SERPL CREATININE-BSD FRML MDRD: 56 ML/MIN/1.73SQ M
GLUCOSE SERPL-MCNC: 98 MG/DL (ref 65–140)
HCT VFR BLD AUTO: 40.8 % (ref 34.8–46.1)
HGB BLD-MCNC: 13.7 G/DL (ref 11.5–15.4)
IMM GRANULOCYTES # BLD AUTO: 0.04 THOUSAND/UL (ref 0–0.2)
IMM GRANULOCYTES NFR BLD AUTO: 0 % (ref 0–2)
LIPASE SERPL-CCNC: 7 U/L (ref 11–82)
LYMPHOCYTES # BLD AUTO: 3.36 THOUSANDS/ÂΜL (ref 0.6–4.47)
LYMPHOCYTES NFR BLD AUTO: 25 % (ref 14–44)
MCH RBC QN AUTO: 29.3 PG (ref 26.8–34.3)
MCHC RBC AUTO-ENTMCNC: 33.6 G/DL (ref 31.4–37.4)
MCV RBC AUTO: 87 FL (ref 82–98)
MONOCYTES # BLD AUTO: 1.19 THOUSAND/ÂΜL (ref 0.17–1.22)
MONOCYTES NFR BLD AUTO: 9 % (ref 4–12)
NEUTROPHILS # BLD AUTO: 8.14 THOUSANDS/ÂΜL (ref 1.85–7.62)
NEUTS SEG NFR BLD AUTO: 62 % (ref 43–75)
NRBC BLD AUTO-RTO: 0 /100 WBCS
PLATELET # BLD AUTO: 446 THOUSANDS/UL (ref 149–390)
PMV BLD AUTO: 9.2 FL (ref 8.9–12.7)
POTASSIUM SERPL-SCNC: 4.2 MMOL/L (ref 3.5–5.3)
PROT SERPL-MCNC: 7 G/DL (ref 6.4–8.4)
RBC # BLD AUTO: 4.68 MILLION/UL (ref 3.81–5.12)
SARS-COV+SARS-COV-2 AG RESP QL IA.RAPID: NEGATIVE
SODIUM SERPL-SCNC: 135 MMOL/L (ref 135–147)
WBC # BLD AUTO: 13.22 THOUSAND/UL (ref 4.31–10.16)

## 2025-08-19 PROCEDURE — 85025 COMPLETE CBC W/AUTO DIFF WBC: CPT | Performed by: EMERGENCY MEDICINE

## 2025-08-19 PROCEDURE — 87804 INFLUENZA ASSAY W/OPTIC: CPT | Performed by: EMERGENCY MEDICINE

## 2025-08-19 PROCEDURE — 36415 COLL VENOUS BLD VENIPUNCTURE: CPT | Performed by: EMERGENCY MEDICINE

## 2025-08-19 PROCEDURE — 83690 ASSAY OF LIPASE: CPT | Performed by: EMERGENCY MEDICINE

## 2025-08-19 PROCEDURE — 71045 X-RAY EXAM CHEST 1 VIEW: CPT

## 2025-08-19 PROCEDURE — 80053 COMPREHEN METABOLIC PANEL: CPT | Performed by: EMERGENCY MEDICINE

## 2025-08-19 PROCEDURE — 84484 ASSAY OF TROPONIN QUANT: CPT | Performed by: EMERGENCY MEDICINE

## 2025-08-19 PROCEDURE — 99285 EMERGENCY DEPT VISIT HI MDM: CPT | Performed by: EMERGENCY MEDICINE

## 2025-08-19 PROCEDURE — 96361 HYDRATE IV INFUSION ADD-ON: CPT

## 2025-08-19 PROCEDURE — 96374 THER/PROPH/DIAG INJ IV PUSH: CPT

## 2025-08-19 PROCEDURE — 87811 SARS-COV-2 COVID19 W/OPTIC: CPT | Performed by: EMERGENCY MEDICINE

## 2025-08-19 PROCEDURE — 83880 ASSAY OF NATRIURETIC PEPTIDE: CPT | Performed by: EMERGENCY MEDICINE

## 2025-08-19 PROCEDURE — 93005 ELECTROCARDIOGRAM TRACING: CPT

## 2025-08-19 PROCEDURE — 99285 EMERGENCY DEPT VISIT HI MDM: CPT

## 2025-08-19 PROCEDURE — 96375 TX/PRO/DX INJ NEW DRUG ADDON: CPT

## 2025-08-19 RX ORDER — ACETAMINOPHEN 10 MG/ML
1000 INJECTION, SOLUTION INTRAVENOUS ONCE
Status: COMPLETED | OUTPATIENT
Start: 2025-08-19 | End: 2025-08-19

## 2025-08-19 RX ORDER — ONDANSETRON 2 MG/ML
4 INJECTION INTRAMUSCULAR; INTRAVENOUS ONCE
Status: COMPLETED | OUTPATIENT
Start: 2025-08-19 | End: 2025-08-19

## 2025-08-19 RX ORDER — KETOROLAC TROMETHAMINE 30 MG/ML
15 INJECTION, SOLUTION INTRAMUSCULAR; INTRAVENOUS ONCE
Status: COMPLETED | OUTPATIENT
Start: 2025-08-19 | End: 2025-08-19

## 2025-08-19 RX ADMIN — SODIUM CHLORIDE 500 ML: 0.9 INJECTION, SOLUTION INTRAVENOUS at 16:16

## 2025-08-19 RX ADMIN — ONDANSETRON 4 MG: 2 INJECTION INTRAMUSCULAR; INTRAVENOUS at 16:19

## 2025-08-19 RX ADMIN — KETOROLAC TROMETHAMINE 15 MG: 30 INJECTION, SOLUTION INTRAMUSCULAR; INTRAVENOUS at 16:20

## 2025-08-19 RX ADMIN — ACETAMINOPHEN 1000 MG: 10 INJECTION INTRAVENOUS at 16:22

## 2025-08-20 ENCOUNTER — HOSPITAL ENCOUNTER (EMERGENCY)
Facility: HOSPITAL | Age: 65
Discharge: HOME | End: 2025-08-20
Attending: EMERGENCY MEDICINE | Admitting: EMERGENCY MEDICINE
Payer: COMMERCIAL

## 2025-08-20 VITALS
RESPIRATION RATE: 22 BRPM | DIASTOLIC BLOOD PRESSURE: 71 MMHG | TEMPERATURE: 97.5 F | HEART RATE: 96 BPM | SYSTOLIC BLOOD PRESSURE: 141 MMHG | OXYGEN SATURATION: 98 %

## 2025-08-20 DIAGNOSIS — R51.9 HEADACHE: ICD-10-CM

## 2025-08-20 DIAGNOSIS — H10.9 CONJUNCTIVITIS: Primary | ICD-10-CM

## 2025-08-20 LAB
ATRIAL RATE: 80 BPM
P AXIS: 36 DEGREES
PR INTERVAL: 122 MS
QRS AXIS: -11 DEGREES
QRSD INTERVAL: 100 MS
QT INTERVAL: 410 MS
QTC INTERVAL: 472 MS
T WAVE AXIS: 67 DEGREES
VENTRICULAR RATE: 80 BPM

## 2025-08-20 PROCEDURE — 99284 EMERGENCY DEPT VISIT MOD MDM: CPT | Performed by: EMERGENCY MEDICINE

## 2025-08-20 PROCEDURE — 99283 EMERGENCY DEPT VISIT LOW MDM: CPT

## 2025-08-20 PROCEDURE — 93010 ELECTROCARDIOGRAM REPORT: CPT | Performed by: INTERNAL MEDICINE

## 2025-08-20 RX ORDER — ACETAMINOPHEN 325 MG/1
975 TABLET ORAL ONCE
Status: COMPLETED | OUTPATIENT
Start: 2025-08-20 | End: 2025-08-20

## 2025-08-20 RX ORDER — ERYTHROMYCIN 5 MG/G
OINTMENT OPHTHALMIC
Qty: 3.5 G | Refills: 0 | Status: SHIPPED | OUTPATIENT
Start: 2025-08-20

## 2025-08-20 RX ORDER — ERYTHROMYCIN 5 MG/G
0.5 OINTMENT OPHTHALMIC ONCE
Status: COMPLETED | OUTPATIENT
Start: 2025-08-20 | End: 2025-08-20

## 2025-08-20 RX ORDER — METOCLOPRAMIDE 10 MG/1
10 TABLET ORAL ONCE
Status: COMPLETED | OUTPATIENT
Start: 2025-08-20 | End: 2025-08-20

## 2025-08-20 RX ADMIN — ERYTHROMYCIN 0.5 INCH: 5 OINTMENT OPHTHALMIC at 23:13

## 2025-08-20 RX ADMIN — METOCLOPRAMIDE 10 MG: 10 TABLET ORAL at 23:12

## 2025-08-20 RX ADMIN — ACETAMINOPHEN 975 MG: 325 TABLET ORAL at 23:11
